# Patient Record
Sex: MALE | Race: WHITE | NOT HISPANIC OR LATINO
[De-identification: names, ages, dates, MRNs, and addresses within clinical notes are randomized per-mention and may not be internally consistent; named-entity substitution may affect disease eponyms.]

---

## 2017-04-26 ENCOUNTER — APPOINTMENT (OUTPATIENT)
Dept: INTERNAL MEDICINE | Facility: CLINIC | Age: 77
End: 2017-04-26

## 2017-04-26 VITALS
SYSTOLIC BLOOD PRESSURE: 162 MMHG | DIASTOLIC BLOOD PRESSURE: 78 MMHG | OXYGEN SATURATION: 91 % | RESPIRATION RATE: 16 BRPM | TEMPERATURE: 97.7 F | HEART RATE: 114 BPM | WEIGHT: 148 LBS | HEIGHT: 66 IN | BODY MASS INDEX: 23.78 KG/M2

## 2017-04-26 PROBLEM — Z00.00 ENCOUNTER FOR PREVENTIVE HEALTH EXAMINATION: Status: ACTIVE | Noted: 2017-04-26

## 2017-04-26 RX ORDER — VALSARTAN AND HYDROCHLOROTHIAZIDE 160; 12.5 MG/1; MG/1
160-12.5 TABLET, FILM COATED ORAL
Qty: 90 | Refills: 0 | Status: ACTIVE | COMMUNITY
Start: 2016-12-04

## 2017-04-26 RX ORDER — MIRTAZAPINE 7.5 MG/1
7.5 TABLET, FILM COATED ORAL
Qty: 90 | Refills: 0 | Status: ACTIVE | COMMUNITY
Start: 2016-11-25

## 2017-04-26 RX ORDER — GABAPENTIN 300 MG/1
300 CAPSULE ORAL
Qty: 90 | Refills: 0 | Status: ACTIVE | COMMUNITY
Start: 2017-04-12

## 2017-04-26 RX ORDER — ESCITALOPRAM OXALATE 10 MG/1
10 TABLET ORAL
Qty: 22 | Refills: 0 | Status: ACTIVE | COMMUNITY
Start: 2017-04-12

## 2017-04-26 RX ORDER — CLONAZEPAM 0.5 MG/1
0.5 TABLET ORAL
Qty: 90 | Refills: 0 | Status: ACTIVE | COMMUNITY
Start: 2016-11-15

## 2017-04-26 RX ORDER — CLONIDINE HYDROCHLORIDE 0.1 MG/1
0.1 TABLET ORAL
Qty: 90 | Refills: 0 | Status: ACTIVE | COMMUNITY
Start: 2016-11-02

## 2017-04-26 RX ORDER — GABAPENTIN 100 MG/1
100 CAPSULE ORAL
Qty: 60 | Refills: 0 | Status: ACTIVE | COMMUNITY
Start: 2017-03-10

## 2017-04-26 RX ORDER — FOLIC ACID 1 MG/1
1 TABLET ORAL
Qty: 90 | Refills: 0 | Status: ACTIVE | COMMUNITY
Start: 2017-04-12

## 2017-04-26 RX ORDER — CYANOCOBALAMIN/FOLIC AC/VIT B6 1-2.2-25MG
2.2-25-1 TABLET ORAL
Qty: 30 | Refills: 0 | Status: ACTIVE | COMMUNITY
Start: 2016-11-12

## 2017-04-28 ENCOUNTER — EMERGENCY (EMERGENCY)
Facility: HOSPITAL | Age: 77
LOS: 0 days | Discharge: HOME | End: 2017-04-28
Attending: EMERGENCY MEDICINE | Admitting: EMERGENCY MEDICINE
Payer: MEDICARE

## 2017-04-28 VITALS
HEART RATE: 91 BPM | SYSTOLIC BLOOD PRESSURE: 152 MMHG | OXYGEN SATURATION: 93 % | DIASTOLIC BLOOD PRESSURE: 79 MMHG | RESPIRATION RATE: 16 BRPM

## 2017-04-28 VITALS
HEIGHT: 66 IN | OXYGEN SATURATION: 95 % | HEART RATE: 80 BPM | WEIGHT: 147.05 LBS | SYSTOLIC BLOOD PRESSURE: 118 MMHG | DIASTOLIC BLOOD PRESSURE: 52 MMHG | TEMPERATURE: 98 F | RESPIRATION RATE: 18 BRPM

## 2017-04-28 DIAGNOSIS — R31.9 HEMATURIA, UNSPECIFIED: ICD-10-CM

## 2017-04-28 LAB
ALBUMIN SERPL ELPH-MCNC: 3.4 G/DL — SIGNIFICANT CHANGE UP (ref 3.3–5)
ALP SERPL-CCNC: 70 U/L — SIGNIFICANT CHANGE UP (ref 40–120)
ALT FLD-CCNC: 17 U/L — SIGNIFICANT CHANGE UP (ref 12–78)
ANION GAP SERPL CALC-SCNC: 7 MMOL/L — SIGNIFICANT CHANGE UP (ref 5–17)
APPEARANCE UR: CLEAR — SIGNIFICANT CHANGE UP
APTT BLD: 28.4 SEC — SIGNIFICANT CHANGE UP (ref 27.5–37.4)
AST SERPL-CCNC: 20 U/L — SIGNIFICANT CHANGE UP (ref 15–37)
BACTERIA # UR AUTO: (no result)
BASOPHILS # BLD AUTO: 0.1 K/UL — SIGNIFICANT CHANGE UP (ref 0–0.2)
BASOPHILS NFR BLD AUTO: 1 % — SIGNIFICANT CHANGE UP (ref 0–2)
BILIRUB SERPL-MCNC: 0.3 MG/DL — SIGNIFICANT CHANGE UP (ref 0.2–1.2)
BILIRUB UR-MCNC: NEGATIVE — SIGNIFICANT CHANGE UP
BUN SERPL-MCNC: 21 MG/DL — SIGNIFICANT CHANGE UP (ref 7–23)
CALCIUM SERPL-MCNC: 8.8 MG/DL — SIGNIFICANT CHANGE UP (ref 8.5–10.1)
CHLORIDE SERPL-SCNC: 102 MMOL/L — SIGNIFICANT CHANGE UP (ref 96–108)
CO2 SERPL-SCNC: 31 MMOL/L — SIGNIFICANT CHANGE UP (ref 22–31)
COLOR SPEC: YELLOW — SIGNIFICANT CHANGE UP
CREAT SERPL-MCNC: 1.1 MG/DL — SIGNIFICANT CHANGE UP (ref 0.5–1.3)
DIFF PNL FLD: (no result)
EOSINOPHIL # BLD AUTO: 0.3 K/UL — SIGNIFICANT CHANGE UP (ref 0–0.5)
EOSINOPHIL NFR BLD AUTO: 3.3 % — SIGNIFICANT CHANGE UP (ref 0–6)
EPI CELLS # UR: SIGNIFICANT CHANGE UP
GLUCOSE SERPL-MCNC: 109 MG/DL — HIGH (ref 70–99)
GLUCOSE UR QL: NEGATIVE MG/DL — SIGNIFICANT CHANGE UP
HCT VFR BLD CALC: 36.2 % — LOW (ref 39–50)
HGB BLD-MCNC: 12.2 G/DL — LOW (ref 13–17)
INR BLD: 0.95 RATIO — SIGNIFICANT CHANGE UP (ref 0.88–1.16)
KETONES UR-MCNC: NEGATIVE — SIGNIFICANT CHANGE UP
LEUKOCYTE ESTERASE UR-ACNC: NEGATIVE — SIGNIFICANT CHANGE UP
LYMPHOCYTES # BLD AUTO: 1.7 K/UL — SIGNIFICANT CHANGE UP (ref 1–3.3)
LYMPHOCYTES # BLD AUTO: 21.2 % — SIGNIFICANT CHANGE UP (ref 13–44)
MCHC RBC-ENTMCNC: 30.1 PG — SIGNIFICANT CHANGE UP (ref 27–34)
MCHC RBC-ENTMCNC: 33.6 GM/DL — SIGNIFICANT CHANGE UP (ref 32–36)
MCV RBC AUTO: 89.8 FL — SIGNIFICANT CHANGE UP (ref 80–100)
MONOCYTES # BLD AUTO: 1 K/UL — HIGH (ref 0–0.9)
MONOCYTES NFR BLD AUTO: 12.8 % — SIGNIFICANT CHANGE UP (ref 2–14)
NEUTROPHILS # BLD AUTO: 4.9 K/UL — SIGNIFICANT CHANGE UP (ref 1.8–7.4)
NEUTROPHILS NFR BLD AUTO: 61.8 % — SIGNIFICANT CHANGE UP (ref 43–77)
NITRITE UR-MCNC: NEGATIVE — SIGNIFICANT CHANGE UP
PH UR: 5 — SIGNIFICANT CHANGE UP (ref 5–8)
PLATELET # BLD AUTO: 182 K/UL — SIGNIFICANT CHANGE UP (ref 150–400)
POTASSIUM SERPL-MCNC: 3.9 MMOL/L — SIGNIFICANT CHANGE UP (ref 3.5–5.3)
POTASSIUM SERPL-SCNC: 3.9 MMOL/L — SIGNIFICANT CHANGE UP (ref 3.5–5.3)
PROT SERPL-MCNC: 7.2 GM/DL — SIGNIFICANT CHANGE UP (ref 6–8.3)
PROT UR-MCNC: NEGATIVE MG/DL — SIGNIFICANT CHANGE UP
PROTHROM AB SERPL-ACNC: 10.2 SEC — SIGNIFICANT CHANGE UP (ref 9.8–12.7)
RBC # BLD: 4.04 M/UL — LOW (ref 4.2–5.8)
RBC # FLD: 12.2 % — SIGNIFICANT CHANGE UP (ref 10.3–14.5)
RBC CASTS # UR COMP ASSIST: (no result) /HPF (ref 0–4)
SODIUM SERPL-SCNC: 140 MMOL/L — SIGNIFICANT CHANGE UP (ref 135–145)
SP GR SPEC: 1.01 — SIGNIFICANT CHANGE UP (ref 1.01–1.02)
UROBILINOGEN FLD QL: NEGATIVE MG/DL — SIGNIFICANT CHANGE UP
WBC # BLD: 7.9 K/UL — SIGNIFICANT CHANGE UP (ref 3.8–10.5)
WBC # FLD AUTO: 7.9 K/UL — SIGNIFICANT CHANGE UP (ref 3.8–10.5)
WBC UR QL: SIGNIFICANT CHANGE UP

## 2017-04-28 PROCEDURE — 74176 CT ABD & PELVIS W/O CONTRAST: CPT | Mod: 26

## 2017-04-28 PROCEDURE — 99284 EMERGENCY DEPT VISIT MOD MDM: CPT

## 2017-04-28 NOTE — ED PROVIDER NOTE - DETAILS:
I, Mya Gonzalez, performed the initial face to face bedside interview with this patient regarding history of present illness, review of symptoms and relevant past medical, social and family history.  I completed an independent physical examination.  I was the initial provider who evaluated this patient. The history, relevant review of systems, past medical and surgical history, medical decision making, and physical examination was documented by the scribe in my presence and I attest to the accuracy of the documentation.

## 2017-04-28 NOTE — ED PROVIDER NOTE - NS ED MD SCRIBE ATTENDING SCRIBE SECTIONS
RESULTS/PROGRESS NOTE/DISPOSITION/PAST MEDICAL/SURGICAL/SOCIAL HISTORY/REVIEW OF SYSTEMS/PHYSICAL EXAM/HISTORY OF PRESENT ILLNESS

## 2017-04-28 NOTE — ED ADULT NURSE NOTE - CHPI ED SYMPTOMS NEG
no blood in stool/no chills/no fever/no abdominal distension/no nausea/no burning urination/no dysuria/no vomiting/no diarrhea

## 2017-04-28 NOTE — ED PROVIDER NOTE - OBJECTIVE STATEMENT
75 y/o M presents to the ED c/o hematuria. Pt states he had hematuria last night, but did not have it this morning. Pt on 2.5L O2 at home. Denies dysuria.

## 2017-04-28 NOTE — ED PROVIDER NOTE - MEDICAL DECISION MAKING DETAILS
pt with hematuria, no fever, chills, back or abdominal pain, no dysuria will check labs, urinalysis r/o uti and ctap r/o obstructive uropathy

## 2017-04-28 NOTE — ED ADULT NURSE NOTE - LANGUAGE ASSISTANCE NEEDED
No-Patient/Caregiver offered and refused free interpretation services./patient speaks/understands english

## 2017-05-03 ENCOUNTER — APPOINTMENT (OUTPATIENT)
Dept: UROLOGY | Facility: CLINIC | Age: 77
End: 2017-05-03

## 2017-05-03 VITALS
BODY MASS INDEX: 23.63 KG/M2 | WEIGHT: 147 LBS | TEMPERATURE: 98 F | HEART RATE: 100 BPM | DIASTOLIC BLOOD PRESSURE: 60 MMHG | HEIGHT: 66 IN | SYSTOLIC BLOOD PRESSURE: 130 MMHG

## 2017-05-03 DIAGNOSIS — Z82.5 FAMILY HISTORY OF ASTHMA AND OTHER CHRONIC LOWER RESPIRATORY DISEASES: ICD-10-CM

## 2017-05-03 DIAGNOSIS — F17.200 NICOTINE DEPENDENCE, UNSPECIFIED, UNCOMPLICATED: ICD-10-CM

## 2017-05-03 DIAGNOSIS — Z78.9 OTHER SPECIFIED HEALTH STATUS: ICD-10-CM

## 2017-05-03 DIAGNOSIS — Z87.891 PERSONAL HISTORY OF NICOTINE DEPENDENCE: ICD-10-CM

## 2017-05-03 DIAGNOSIS — Z80.9 FAMILY HISTORY OF MALIGNANT NEOPLASM, UNSPECIFIED: ICD-10-CM

## 2017-05-04 ENCOUNTER — RX RENEWAL (OUTPATIENT)
Age: 77
End: 2017-05-04

## 2017-05-12 ENCOUNTER — APPOINTMENT (OUTPATIENT)
Dept: UROLOGY | Facility: CLINIC | Age: 77
End: 2017-05-12

## 2017-05-12 VITALS
SYSTOLIC BLOOD PRESSURE: 155 MMHG | TEMPERATURE: 97.2 F | WEIGHT: 147 LBS | HEIGHT: 66 IN | HEART RATE: 80 BPM | BODY MASS INDEX: 23.63 KG/M2 | DIASTOLIC BLOOD PRESSURE: 73 MMHG

## 2017-05-12 DIAGNOSIS — Z98.890 OTHER SPECIFIED POSTPROCEDURAL STATES: ICD-10-CM

## 2017-05-17 ENCOUNTER — RX RENEWAL (OUTPATIENT)
Age: 77
End: 2017-05-17

## 2017-05-17 RX ORDER — ATORVASTATIN CALCIUM 20 MG/1
20 TABLET, FILM COATED ORAL
Qty: 90 | Refills: 2 | Status: ACTIVE | COMMUNITY
Start: 2016-11-12 | End: 1900-01-01

## 2017-06-05 ENCOUNTER — OTHER (OUTPATIENT)
Age: 77
End: 2017-06-05

## 2017-06-05 ENCOUNTER — RX RENEWAL (OUTPATIENT)
Age: 77
End: 2017-06-05

## 2017-06-05 DIAGNOSIS — R60.9 EDEMA, UNSPECIFIED: ICD-10-CM

## 2017-06-05 DIAGNOSIS — M19.90 UNSPECIFIED OSTEOARTHRITIS, UNSPECIFIED SITE: ICD-10-CM

## 2017-06-05 RX ORDER — ADHESIVE BANDAGE
BANDAGE TOPICAL
Refills: 0 | Status: ACTIVE | COMMUNITY

## 2017-06-05 RX ORDER — ESCITALOPRAM OXALATE 20 MG/1
20 TABLET, FILM COATED ORAL
Refills: 0 | Status: ACTIVE | COMMUNITY

## 2017-06-05 RX ORDER — DILTIAZEM HYDROCHLORIDE 240 MG/1
240 CAPSULE, COATED, EXTENDED RELEASE ORAL
Refills: 0 | Status: ACTIVE | COMMUNITY

## 2017-06-05 RX ORDER — ALENDRONATE SODIUM 70 MG/1
70 TABLET ORAL
Refills: 0 | Status: ACTIVE | COMMUNITY

## 2017-06-05 RX ORDER — VALSARTAN AND HYDROCHLOROTHIAZIDE 160; 12.5 MG/1; MG/1
160-12.5 TABLET, FILM COATED ORAL
Refills: 0 | Status: ACTIVE | COMMUNITY

## 2017-06-06 ENCOUNTER — OTHER (OUTPATIENT)
Age: 77
End: 2017-06-06

## 2017-06-06 ENCOUNTER — OUTPATIENT (OUTPATIENT)
Dept: OUTPATIENT SERVICES | Facility: HOSPITAL | Age: 77
LOS: 1 days | Discharge: HOME | End: 2017-06-06
Payer: MEDICARE

## 2017-06-06 VITALS
RESPIRATION RATE: 17 BRPM | HEIGHT: 66 IN | SYSTOLIC BLOOD PRESSURE: 135 MMHG | WEIGHT: 147.93 LBS | HEART RATE: 100 BPM | TEMPERATURE: 98 F | DIASTOLIC BLOOD PRESSURE: 64 MMHG | OXYGEN SATURATION: 94 %

## 2017-06-06 DIAGNOSIS — J95.811 POSTPROCEDURAL PNEUMOTHORAX: ICD-10-CM

## 2017-06-06 DIAGNOSIS — Z98.49 CATARACT EXTRACTION STATUS, UNSPECIFIED EYE: Chronic | ICD-10-CM

## 2017-06-06 DIAGNOSIS — Z01.818 ENCOUNTER FOR OTHER PREPROCEDURAL EXAMINATION: ICD-10-CM

## 2017-06-06 DIAGNOSIS — I11.0 HYPERTENSIVE HEART DISEASE WITH HEART FAILURE: ICD-10-CM

## 2017-06-06 DIAGNOSIS — I50.32 CHRONIC DIASTOLIC (CONGESTIVE) HEART FAILURE: ICD-10-CM

## 2017-06-06 DIAGNOSIS — J90 PLEURAL EFFUSION, NOT ELSEWHERE CLASSIFIED: ICD-10-CM

## 2017-06-06 DIAGNOSIS — Z99.81 DEPENDENCE ON SUPPLEMENTAL OXYGEN: ICD-10-CM

## 2017-06-06 DIAGNOSIS — D49.4 NEOPLASM OF UNSPECIFIED BEHAVIOR OF BLADDER: ICD-10-CM

## 2017-06-06 DIAGNOSIS — C67.9 MALIGNANT NEOPLASM OF BLADDER, UNSPECIFIED: ICD-10-CM

## 2017-06-06 DIAGNOSIS — J44.1 CHRONIC OBSTRUCTIVE PULMONARY DISEASE WITH (ACUTE) EXACERBATION: ICD-10-CM

## 2017-06-06 LAB
ANION GAP SERPL CALC-SCNC: 5 MMOL/L — SIGNIFICANT CHANGE UP (ref 5–17)
APPEARANCE UR: CLEAR — SIGNIFICANT CHANGE UP
BACTERIA # UR AUTO: (no result)
BASOPHILS # BLD AUTO: 0.1 K/UL — SIGNIFICANT CHANGE UP (ref 0–0.2)
BASOPHILS NFR BLD AUTO: 0.6 % — SIGNIFICANT CHANGE UP (ref 0–2)
BILIRUB UR-MCNC: (no result)
BLD GP AB SCN SERPL QL: SIGNIFICANT CHANGE UP
BUN SERPL-MCNC: 30 MG/DL — HIGH (ref 7–23)
CALCIUM SERPL-MCNC: 8.7 MG/DL — SIGNIFICANT CHANGE UP (ref 8.5–10.1)
CHLORIDE SERPL-SCNC: 100 MMOL/L — SIGNIFICANT CHANGE UP (ref 96–108)
CO2 SERPL-SCNC: 34 MMOL/L — HIGH (ref 22–31)
COLOR SPEC: YELLOW — SIGNIFICANT CHANGE UP
COMMENT - URINE: SIGNIFICANT CHANGE UP
CREAT SERPL-MCNC: 1.38 MG/DL — HIGH (ref 0.5–1.3)
DIFF PNL FLD: (no result)
EOSINOPHIL # BLD AUTO: 0.3 K/UL — SIGNIFICANT CHANGE UP (ref 0–0.5)
EOSINOPHIL NFR BLD AUTO: 3.9 % — SIGNIFICANT CHANGE UP (ref 0–6)
EPI CELLS # UR: SIGNIFICANT CHANGE UP
GLUCOSE SERPL-MCNC: 109
GLUCOSE SERPL-MCNC: 109 MG/DL — HIGH (ref 70–99)
GLUCOSE UR QL: NEGATIVE MG/DL — SIGNIFICANT CHANGE UP
HCT VFR BLD CALC: 36.5 % — LOW (ref 39–50)
HGB BLD-MCNC: 12.4 G/DL — LOW (ref 13–17)
INR BLD: 0.95 RATIO — SIGNIFICANT CHANGE UP (ref 0.88–1.16)
INR PPP: 0.95
KETONES UR-MCNC: (no result)
LEUKOCYTE ESTERASE UR-ACNC: (no result)
LYMPHOCYTES # BLD AUTO: 1.4 K/UL — SIGNIFICANT CHANGE UP (ref 1–3.3)
LYMPHOCYTES # BLD AUTO: 16.7 % — SIGNIFICANT CHANGE UP (ref 13–44)
MCHC RBC-ENTMCNC: 30.4 PG — SIGNIFICANT CHANGE UP (ref 27–34)
MCHC RBC-ENTMCNC: 33.9 GM/DL — SIGNIFICANT CHANGE UP (ref 32–36)
MCV RBC AUTO: 89.9 FL — SIGNIFICANT CHANGE UP (ref 80–100)
MONOCYTES # BLD AUTO: 1 K/UL — HIGH (ref 0–0.9)
MONOCYTES NFR BLD AUTO: 11.6 % — SIGNIFICANT CHANGE UP (ref 2–14)
NEUTROPHILS # BLD AUTO: 5.8 K/UL — SIGNIFICANT CHANGE UP (ref 1.8–7.4)
NEUTROPHILS NFR BLD AUTO: 67.2 % — SIGNIFICANT CHANGE UP (ref 43–77)
NITRITE UR-MCNC: NEGATIVE — SIGNIFICANT CHANGE UP
PH UR: 5 — SIGNIFICANT CHANGE UP (ref 5–8)
PLATELET # BLD AUTO: 193 K/UL — SIGNIFICANT CHANGE UP (ref 150–400)
POTASSIUM SERPL-MCNC: 4.1 MMOL/L — SIGNIFICANT CHANGE UP (ref 3.5–5.3)
POTASSIUM SERPL-SCNC: 4.1 MMOL/L — SIGNIFICANT CHANGE UP (ref 3.5–5.3)
PROT UR-MCNC: 30 MG/DL
PROTHROM AB SERPL-ACNC: 10.3 SEC — SIGNIFICANT CHANGE UP (ref 9.8–12.7)
PT BLD: 10.3
RBC # BLD: 4.06 M/UL — LOW (ref 4.2–5.8)
RBC # FLD: 12.4 % — SIGNIFICANT CHANGE UP (ref 10.3–14.5)
RBC CASTS # UR COMP ASSIST: >50 /HPF (ref 0–4)
SODIUM SERPL-SCNC: 139 MMOL/L — SIGNIFICANT CHANGE UP (ref 135–145)
SP GR SPEC: 1.02 — SIGNIFICANT CHANGE UP (ref 1.01–1.02)
TYPE + AB SCN PNL BLD: SIGNIFICANT CHANGE UP
UROBILINOGEN FLD QL: 1 MG/DL
WBC # BLD: 8.6 K/UL — SIGNIFICANT CHANGE UP (ref 3.8–10.5)
WBC # FLD AUTO: 8.6 K/UL — SIGNIFICANT CHANGE UP (ref 3.8–10.5)
WBC UR QL: SIGNIFICANT CHANGE UP

## 2017-06-06 PROCEDURE — 93010 ELECTROCARDIOGRAM REPORT: CPT

## 2017-06-06 NOTE — H&P PST ADULT - FAMILY HISTORY
Father  Still living? No  Family history of cancer, Age at diagnosis: Age Unknown     Mother  Still living? No  Family history of asthma in mother, Age at diagnosis: Age Unknown

## 2017-06-06 NOTE — H&P PST ADULT - ASSESSMENT
75 y/o male with bladder tumor. Scheduled for TURBT with intravesical mitomycin instillation with Dr. Romo.    Plan  1. Stop all NSAIDS, herbal supplements and vitamins for 7 days.  2. NPO at midnight.  3. Take the following medications (oxycodone if needed, clonazepam, clonidine) with small sips of water on the morning of your procedure/surgery.

## 2017-06-06 NOTE — H&P PST ADULT - HISTORY OF PRESENT ILLNESS
77 y/o male with bladder tumor. Complain of hematuria. Here today for PST for TURBT with intravesical mitomycin instillation.

## 2017-06-06 NOTE — H&P PST ADULT - PMH
Anxiety    Bladder tumor    BPH (benign prostatic hypertrophy)    COPD (chronic obstructive pulmonary disease)    Depression    Hematuria    HTN (hypertension)    Hypercholesterolemia    Low back pain    Osteoarthritis    Pleural effusion  2016

## 2017-06-07 LAB
CULTURE RESULTS: SIGNIFICANT CHANGE UP
SPECIMEN SOURCE: SIGNIFICANT CHANGE UP

## 2017-06-09 ENCOUNTER — APPOINTMENT (OUTPATIENT)
Dept: INTERNAL MEDICINE | Facility: CLINIC | Age: 77
End: 2017-06-09

## 2017-06-09 ENCOUNTER — OUTPATIENT (OUTPATIENT)
Dept: OUTPATIENT SERVICES | Facility: HOSPITAL | Age: 77
LOS: 1 days | Discharge: HOME | End: 2017-06-09
Payer: MEDICARE

## 2017-06-09 VITALS
HEART RATE: 90 BPM | OXYGEN SATURATION: 95 % | WEIGHT: 137 LBS | BODY MASS INDEX: 22.02 KG/M2 | HEIGHT: 66 IN | DIASTOLIC BLOOD PRESSURE: 80 MMHG | SYSTOLIC BLOOD PRESSURE: 132 MMHG | RESPIRATION RATE: 16 BRPM | TEMPERATURE: 97.8 F

## 2017-06-09 DIAGNOSIS — I50.32 CHRONIC DIASTOLIC (CONGESTIVE) HEART FAILURE: ICD-10-CM

## 2017-06-09 DIAGNOSIS — Z99.81 DEPENDENCE ON SUPPLEMENTAL OXYGEN: ICD-10-CM

## 2017-06-09 DIAGNOSIS — I11.0 HYPERTENSIVE HEART DISEASE WITH HEART FAILURE: ICD-10-CM

## 2017-06-09 DIAGNOSIS — J95.811 POSTPROCEDURAL PNEUMOTHORAX: ICD-10-CM

## 2017-06-09 DIAGNOSIS — J90 PLEURAL EFFUSION, NOT ELSEWHERE CLASSIFIED: ICD-10-CM

## 2017-06-09 DIAGNOSIS — Z98.49 CATARACT EXTRACTION STATUS, UNSPECIFIED EYE: Chronic | ICD-10-CM

## 2017-06-09 DIAGNOSIS — Z01.818 ENCOUNTER FOR OTHER PREPROCEDURAL EXAMINATION: ICD-10-CM

## 2017-06-09 DIAGNOSIS — C67.9 MALIGNANT NEOPLASM OF BLADDER, UNSPECIFIED: ICD-10-CM

## 2017-06-09 DIAGNOSIS — I50.9 HEART FAILURE, UNSPECIFIED: ICD-10-CM

## 2017-06-09 DIAGNOSIS — J44.1 CHRONIC OBSTRUCTIVE PULMONARY DISEASE WITH (ACUTE) EXACERBATION: ICD-10-CM

## 2017-06-09 PROCEDURE — 71020: CPT | Mod: 26

## 2017-06-09 RX ORDER — OXYCODONE AND ACETAMINOPHEN 5; 325 MG/1; MG/1
5-325 TABLET ORAL
Qty: 30 | Refills: 0 | Status: ACTIVE | COMMUNITY
Start: 2017-01-03 | End: 1900-01-01

## 2017-06-12 RX ORDER — SODIUM CHLORIDE 9 MG/ML
1000 INJECTION INTRAMUSCULAR; INTRAVENOUS; SUBCUTANEOUS
Qty: 0 | Refills: 0 | Status: DISCONTINUED | OUTPATIENT
Start: 2017-06-13 | End: 2017-06-13

## 2017-06-12 RX ORDER — ONDANSETRON 8 MG/1
4 TABLET, FILM COATED ORAL ONCE
Qty: 0 | Refills: 0 | Status: DISCONTINUED | OUTPATIENT
Start: 2017-06-13 | End: 2017-06-13

## 2017-06-12 RX ORDER — OXYCODONE HYDROCHLORIDE 5 MG/1
10 TABLET ORAL EVERY 6 HOURS
Qty: 0 | Refills: 0 | Status: DISCONTINUED | OUTPATIENT
Start: 2017-06-13 | End: 2017-06-13

## 2017-06-12 RX ORDER — FENTANYL CITRATE 50 UG/ML
50 INJECTION INTRAVENOUS
Qty: 0 | Refills: 0 | Status: DISCONTINUED | OUTPATIENT
Start: 2017-06-13 | End: 2017-06-13

## 2017-06-13 ENCOUNTER — RESULT REVIEW (OUTPATIENT)
Age: 77
End: 2017-06-13

## 2017-06-13 ENCOUNTER — APPOINTMENT (OUTPATIENT)
Dept: UROLOGY | Facility: HOSPITAL | Age: 77
End: 2017-06-13

## 2017-06-13 ENCOUNTER — OUTPATIENT (OUTPATIENT)
Dept: OUTPATIENT SERVICES | Facility: HOSPITAL | Age: 77
LOS: 1 days | Discharge: HOME | End: 2017-06-13
Payer: MEDICARE

## 2017-06-13 VITALS
DIASTOLIC BLOOD PRESSURE: 80 MMHG | OXYGEN SATURATION: 95 % | RESPIRATION RATE: 18 BRPM | TEMPERATURE: 97 F | SYSTOLIC BLOOD PRESSURE: 173 MMHG | HEART RATE: 80 BPM

## 2017-06-13 VITALS
OXYGEN SATURATION: 91 % | SYSTOLIC BLOOD PRESSURE: 123 MMHG | WEIGHT: 147.93 LBS | HEART RATE: 72 BPM | DIASTOLIC BLOOD PRESSURE: 72 MMHG | TEMPERATURE: 97 F | HEIGHT: 66 IN | RESPIRATION RATE: 16 BRPM

## 2017-06-13 DIAGNOSIS — Z98.49 CATARACT EXTRACTION STATUS, UNSPECIFIED EYE: Chronic | ICD-10-CM

## 2017-06-13 PROCEDURE — 88300 SURGICAL PATH GROSS: CPT | Mod: 26

## 2017-06-13 PROCEDURE — 88307 TISSUE EXAM BY PATHOLOGIST: CPT | Mod: 26

## 2017-06-13 RX ORDER — PHENAZOPYRIDINE HCL 100 MG
1 TABLET ORAL
Qty: 9 | Refills: 0 | OUTPATIENT
Start: 2017-06-13 | End: 2017-06-16

## 2017-06-13 RX ORDER — METHENAMINE MANDELATE 1 G
1 TABLET ORAL
Qty: 0 | Refills: 0 | COMMUNITY

## 2017-06-13 RX ORDER — PHENAZOPYRIDINE HCL 100 MG
100 TABLET ORAL ONCE
Qty: 0 | Refills: 0 | Status: COMPLETED | OUTPATIENT
Start: 2017-06-13 | End: 2017-06-13

## 2017-06-13 RX ORDER — MITOMYCIN 5 MG/10ML
40 INJECTION, POWDER, LYOPHILIZED, FOR SOLUTION INTRAVENOUS ONCE
Qty: 0 | Refills: 0 | Status: DISCONTINUED | OUTPATIENT
Start: 2017-06-13 | End: 2017-06-20

## 2017-06-13 RX ADMIN — FENTANYL CITRATE 50 MICROGRAM(S): 50 INJECTION INTRAVENOUS at 09:42

## 2017-06-13 RX ADMIN — Medication 100 MILLIGRAM(S): at 09:42

## 2017-06-13 RX ADMIN — FENTANYL CITRATE 50 MICROGRAM(S): 50 INJECTION INTRAVENOUS at 09:09

## 2017-06-13 NOTE — ASU DISCHARGE PLAN (ADULT/PEDIATRIC). - NOTIFY
Persistent Nausea and Vomiting/Fever greater than 101/Inability to Tolerate Liquids or Foods/Bleeding that does not stop/Pain not relieved by Medications/Garcia catheter not draining

## 2017-06-13 NOTE — ASU DISCHARGE PLAN (ADULT/PEDIATRIC). - MEDICATION SUMMARY - MEDICATIONS TO TAKE
I will START or STAY ON the medications listed below when I get home from the hospital:    oxycodone-acetaminophen 7.5 mg-325 mg oral tablet, extended release  -- 2 tab(s) by mouth every 12 hours  -- Indication: For Home med    cloNIDine 0.1 mg oral tablet  -- 1 tab(s) by mouth once a day  -- Indication: For Home med    tamsulosin 0.4 mg oral capsule  -- 1 cap(s) by mouth once a day  -- Indication: For Home med    clonazePAM 0.5 mg oral tablet  -- 1 tab(s) by mouth 3 times a day  -- Indication: For Home med    gabapentin 300 mg oral capsule  -- 1 cap(s) by mouth once a day (at bedtime)  -- Indication: For Home med    traZODone 50 mg oral tablet  -- 2 tab(s) by mouth once a day (at bedtime)  -- Indication: For Home med    mirtazapine 7.5 mg oral tablet  -- 2 tab(s) by mouth once a day (at bedtime)  -- Indication: For Home med    atorvastatin 20 mg oral tablet  -- 1 tab(s) by mouth once a day  -- Indication: For Home med    ProAir HFA 90 mcg/inh inhalation aerosol  -- 2 puff(s) inhaled 4 times a day  -- Indication: For Home med    Spiriva 18 mcg inhalation capsule  -- 1 cap(s) inhaled once a day  -- Indication: For Home med    furosemide 40 mg oral tablet  -- 1 tab(s) by mouth once a day  -- Indication: For Home med    Klor-Con M20 oral tablet, extended release  -- 1 tab(s) by mouth once a day  -- Indication: For Home med    magnesium aspartate  -- 1 tab(s) by mouth once a day  -- Indication: For Home med    Pyridium 100 mg oral tablet  -- 1 tab(s) by mouth 3 times a day (after meals)  -- May discolor urine or feces.  Medication should be taken with plenty of water.  Take with food or milk.    -- Indication: For BLADDER TUMOR    Vitamin D3 1000 intl units oral capsule  -- 1 cap(s) by mouth once a day  -- Indication: For Home med    folic acid 1 mg oral tablet  -- 1 tab(s) by mouth once a day  -- Indication: For Home med

## 2017-06-13 NOTE — BRIEF OPERATIVE NOTE - PROCEDURE
Transurethral resection of bladder tumor by bipolar cautery  06/13/2017  with Intravesical Mitomycin instillation.  Active  BUBBA

## 2017-06-15 DIAGNOSIS — D49.4 NEOPLASM OF UNSPECIFIED BEHAVIOR OF BLADDER: ICD-10-CM

## 2017-06-15 DIAGNOSIS — F41.9 ANXIETY DISORDER, UNSPECIFIED: ICD-10-CM

## 2017-06-15 DIAGNOSIS — I50.9 HEART FAILURE, UNSPECIFIED: ICD-10-CM

## 2017-06-15 DIAGNOSIS — K21.9 GASTRO-ESOPHAGEAL REFLUX DISEASE WITHOUT ESOPHAGITIS: ICD-10-CM

## 2017-06-15 DIAGNOSIS — I12.9 HYPERTENSIVE CHRONIC KIDNEY DISEASE WITH STAGE 1 THROUGH STAGE 4 CHRONIC KIDNEY DISEASE, OR UNSPECIFIED CHRONIC KIDNEY DISEASE: ICD-10-CM

## 2017-06-15 DIAGNOSIS — C67.2 MALIGNANT NEOPLASM OF LATERAL WALL OF BLADDER: ICD-10-CM

## 2017-06-15 DIAGNOSIS — J43.9 EMPHYSEMA, UNSPECIFIED: ICD-10-CM

## 2017-06-15 DIAGNOSIS — F17.210 NICOTINE DEPENDENCE, CIGARETTES, UNCOMPLICATED: ICD-10-CM

## 2017-06-15 DIAGNOSIS — N21.0 CALCULUS IN BLADDER: ICD-10-CM

## 2017-06-15 DIAGNOSIS — N40.0 BENIGN PROSTATIC HYPERPLASIA WITHOUT LOWER URINARY TRACT SYMPTOMS: ICD-10-CM

## 2017-06-15 DIAGNOSIS — N18.9 CHRONIC KIDNEY DISEASE, UNSPECIFIED: ICD-10-CM

## 2017-06-15 LAB
CULTURE RESULTS: SIGNIFICANT CHANGE UP
SPECIMEN SOURCE: SIGNIFICANT CHANGE UP
SURGICAL PATHOLOGY FINAL REPORT - CH: SIGNIFICANT CHANGE UP

## 2017-06-16 ENCOUNTER — APPOINTMENT (OUTPATIENT)
Dept: UROLOGY | Facility: CLINIC | Age: 77
End: 2017-06-16

## 2017-06-16 VITALS
HEIGHT: 66 IN | BODY MASS INDEX: 22.02 KG/M2 | HEART RATE: 105 BPM | WEIGHT: 137 LBS | OXYGEN SATURATION: 93 % | DIASTOLIC BLOOD PRESSURE: 79 MMHG | TEMPERATURE: 97.5 F | SYSTOLIC BLOOD PRESSURE: 166 MMHG

## 2017-06-16 LAB — COMPN STONE: SIGNIFICANT CHANGE UP

## 2017-06-21 ENCOUNTER — INPATIENT (INPATIENT)
Facility: HOSPITAL | Age: 77
LOS: 6 days | Discharge: HOME HEALTH | End: 2017-06-28
Attending: FAMILY MEDICINE | Admitting: FAMILY MEDICINE
Payer: MEDICARE

## 2017-06-21 VITALS
OXYGEN SATURATION: 100 % | WEIGHT: 145.06 LBS | RESPIRATION RATE: 18 BRPM | HEIGHT: 64 IN | HEART RATE: 73 BPM | DIASTOLIC BLOOD PRESSURE: 76 MMHG | TEMPERATURE: 98 F | SYSTOLIC BLOOD PRESSURE: 136 MMHG

## 2017-06-21 DIAGNOSIS — Z98.49 CATARACT EXTRACTION STATUS, UNSPECIFIED EYE: Chronic | ICD-10-CM

## 2017-06-21 DIAGNOSIS — R53.1 WEAKNESS: ICD-10-CM

## 2017-06-21 DIAGNOSIS — R06.09 OTHER FORMS OF DYSPNEA: ICD-10-CM

## 2017-06-21 DIAGNOSIS — I50.30 UNSPECIFIED DIASTOLIC (CONGESTIVE) HEART FAILURE: ICD-10-CM

## 2017-06-21 DIAGNOSIS — D64.9 ANEMIA, UNSPECIFIED: ICD-10-CM

## 2017-06-21 DIAGNOSIS — I10 ESSENTIAL (PRIMARY) HYPERTENSION: ICD-10-CM

## 2017-06-21 DIAGNOSIS — C67.9 MALIGNANT NEOPLASM OF BLADDER, UNSPECIFIED: ICD-10-CM

## 2017-06-21 DIAGNOSIS — R31.9 HEMATURIA, UNSPECIFIED: ICD-10-CM

## 2017-06-21 DIAGNOSIS — J44.9 CHRONIC OBSTRUCTIVE PULMONARY DISEASE, UNSPECIFIED: ICD-10-CM

## 2017-06-21 LAB
ALBUMIN SERPL ELPH-MCNC: 3.3 G/DL — SIGNIFICANT CHANGE UP (ref 3.3–5)
ALP SERPL-CCNC: 70 U/L — SIGNIFICANT CHANGE UP (ref 40–120)
ALT FLD-CCNC: 9 U/L — LOW (ref 12–78)
ANION GAP SERPL CALC-SCNC: 3 MMOL/L — LOW (ref 5–17)
APPEARANCE UR: CLEAR — SIGNIFICANT CHANGE UP
APTT BLD: 28.6 SEC — SIGNIFICANT CHANGE UP (ref 27.5–37.4)
AST SERPL-CCNC: 16 U/L — SIGNIFICANT CHANGE UP (ref 15–37)
BACTERIA # UR AUTO: (no result)
BASOPHILS # BLD AUTO: 0.1 K/UL — SIGNIFICANT CHANGE UP (ref 0–0.2)
BASOPHILS NFR BLD AUTO: 0.8 % — SIGNIFICANT CHANGE UP (ref 0–2)
BILIRUB SERPL-MCNC: 0.3 MG/DL — SIGNIFICANT CHANGE UP (ref 0.2–1.2)
BILIRUB UR-MCNC: NEGATIVE — SIGNIFICANT CHANGE UP
BUN SERPL-MCNC: 27 MG/DL — HIGH (ref 7–23)
CALCIUM SERPL-MCNC: 9 MG/DL — SIGNIFICANT CHANGE UP (ref 8.5–10.1)
CHLORIDE SERPL-SCNC: 102 MMOL/L — SIGNIFICANT CHANGE UP (ref 96–108)
CO2 SERPL-SCNC: 34 MMOL/L — HIGH (ref 22–31)
COLOR SPEC: YELLOW — SIGNIFICANT CHANGE UP
CREAT SERPL-MCNC: 1.04 MG/DL — SIGNIFICANT CHANGE UP (ref 0.5–1.3)
DIFF PNL FLD: (no result)
EOSINOPHIL # BLD AUTO: 0.4 K/UL — SIGNIFICANT CHANGE UP (ref 0–0.5)
EOSINOPHIL NFR BLD AUTO: 5.4 % — SIGNIFICANT CHANGE UP (ref 0–6)
EPI CELLS # UR: SIGNIFICANT CHANGE UP
GLUCOSE SERPL-MCNC: 90 MG/DL — SIGNIFICANT CHANGE UP (ref 70–99)
GLUCOSE UR QL: NEGATIVE MG/DL — SIGNIFICANT CHANGE UP
HCT VFR BLD CALC: 34 % — LOW (ref 39–50)
HGB BLD-MCNC: 11.4 G/DL — LOW (ref 13–17)
HYALINE CASTS # UR AUTO: (no result) /LPF
INR BLD: 1.03 RATIO — SIGNIFICANT CHANGE UP (ref 0.88–1.16)
KETONES UR-MCNC: NEGATIVE — SIGNIFICANT CHANGE UP
LACTATE SERPL-SCNC: 0.6 MMOL/L — LOW (ref 0.7–2)
LEUKOCYTE ESTERASE UR-ACNC: (no result)
LYMPHOCYTES # BLD AUTO: 1.6 K/UL — SIGNIFICANT CHANGE UP (ref 1–3.3)
LYMPHOCYTES # BLD AUTO: 20.2 % — SIGNIFICANT CHANGE UP (ref 13–44)
MCHC RBC-ENTMCNC: 30.2 PG — SIGNIFICANT CHANGE UP (ref 27–34)
MCHC RBC-ENTMCNC: 33.7 GM/DL — SIGNIFICANT CHANGE UP (ref 32–36)
MCV RBC AUTO: 89.6 FL — SIGNIFICANT CHANGE UP (ref 80–100)
MONOCYTES # BLD AUTO: 0.7 K/UL — SIGNIFICANT CHANGE UP (ref 0–0.9)
MONOCYTES NFR BLD AUTO: 9.4 % — SIGNIFICANT CHANGE UP (ref 2–14)
NEUTROPHILS # BLD AUTO: 5 K/UL — SIGNIFICANT CHANGE UP (ref 1.8–7.4)
NEUTROPHILS NFR BLD AUTO: 64.2 % — SIGNIFICANT CHANGE UP (ref 43–77)
NITRITE UR-MCNC: NEGATIVE — SIGNIFICANT CHANGE UP
PH UR: 5 — SIGNIFICANT CHANGE UP (ref 5–8)
PLATELET # BLD AUTO: 233 K/UL — SIGNIFICANT CHANGE UP (ref 150–400)
POTASSIUM SERPL-MCNC: 4.5 MMOL/L — SIGNIFICANT CHANGE UP (ref 3.5–5.3)
POTASSIUM SERPL-SCNC: 4.5 MMOL/L — SIGNIFICANT CHANGE UP (ref 3.5–5.3)
PROT SERPL-MCNC: 7.2 GM/DL — SIGNIFICANT CHANGE UP (ref 6–8.3)
PROT UR-MCNC: 30 MG/DL
PROTHROM AB SERPL-ACNC: 11.1 SEC — SIGNIFICANT CHANGE UP (ref 9.8–12.7)
RAPID RVP RESULT: SIGNIFICANT CHANGE UP
RBC # BLD: 3.79 M/UL — LOW (ref 4.2–5.8)
RBC # FLD: 11.9 % — SIGNIFICANT CHANGE UP (ref 10.3–14.5)
RBC CASTS # UR COMP ASSIST: (no result) /HPF (ref 0–4)
SODIUM SERPL-SCNC: 139 MMOL/L — SIGNIFICANT CHANGE UP (ref 135–145)
SP GR SPEC: 1.02 — SIGNIFICANT CHANGE UP (ref 1.01–1.02)
TROPONIN I SERPL-MCNC: <0.015 NG/ML — SIGNIFICANT CHANGE UP (ref 0.01–0.04)
UROBILINOGEN FLD QL: NEGATIVE MG/DL — SIGNIFICANT CHANGE UP
WBC # BLD: 7.7 K/UL — SIGNIFICANT CHANGE UP (ref 3.8–10.5)
WBC # FLD AUTO: 7.7 K/UL — SIGNIFICANT CHANGE UP (ref 3.8–10.5)
WBC UR QL: SIGNIFICANT CHANGE UP

## 2017-06-21 PROCEDURE — 71010: CPT | Mod: 26

## 2017-06-21 PROCEDURE — 93010 ELECTROCARDIOGRAM REPORT: CPT

## 2017-06-21 PROCEDURE — 99285 EMERGENCY DEPT VISIT HI MDM: CPT

## 2017-06-21 RX ORDER — TIOTROPIUM BROMIDE 18 UG/1
1 CAPSULE ORAL; RESPIRATORY (INHALATION) DAILY
Qty: 0 | Refills: 0 | Status: DISCONTINUED | OUTPATIENT
Start: 2017-06-21 | End: 2017-06-28

## 2017-06-21 RX ORDER — CEFEPIME 1 G/1
1000 INJECTION, POWDER, FOR SOLUTION INTRAMUSCULAR; INTRAVENOUS ONCE
Qty: 0 | Refills: 0 | Status: COMPLETED | OUTPATIENT
Start: 2017-06-21 | End: 2017-06-21

## 2017-06-21 RX ORDER — BUDESONIDE AND FORMOTEROL FUMARATE DIHYDRATE 160; 4.5 UG/1; UG/1
2 AEROSOL RESPIRATORY (INHALATION)
Qty: 0 | Refills: 0 | Status: DISCONTINUED | OUTPATIENT
Start: 2017-06-21 | End: 2017-06-28

## 2017-06-21 RX ORDER — IPRATROPIUM/ALBUTEROL SULFATE 18-103MCG
3 AEROSOL WITH ADAPTER (GRAM) INHALATION ONCE
Qty: 0 | Refills: 0 | Status: COMPLETED | OUTPATIENT
Start: 2017-06-21 | End: 2017-06-21

## 2017-06-21 RX ORDER — CEFEPIME 1 G/1
INJECTION, POWDER, FOR SOLUTION INTRAMUSCULAR; INTRAVENOUS
Qty: 0 | Refills: 0 | Status: DISCONTINUED | OUTPATIENT
Start: 2017-06-21 | End: 2017-06-21

## 2017-06-21 RX ORDER — FUROSEMIDE 40 MG
40 TABLET ORAL DAILY
Qty: 0 | Refills: 0 | Status: DISCONTINUED | OUTPATIENT
Start: 2017-06-21 | End: 2017-06-24

## 2017-06-21 RX ORDER — TAMSULOSIN HYDROCHLORIDE 0.4 MG/1
0.4 CAPSULE ORAL AT BEDTIME
Qty: 0 | Refills: 0 | Status: DISCONTINUED | OUTPATIENT
Start: 2017-06-21 | End: 2017-06-28

## 2017-06-21 RX ORDER — VANCOMYCIN HCL 1 G
1000 VIAL (EA) INTRAVENOUS ONCE
Qty: 0 | Refills: 0 | Status: COMPLETED | OUTPATIENT
Start: 2017-06-21 | End: 2017-06-22

## 2017-06-21 RX ORDER — TAMSULOSIN HYDROCHLORIDE 0.4 MG/1
1 CAPSULE ORAL
Qty: 0 | Refills: 0 | COMMUNITY

## 2017-06-21 RX ORDER — CLONAZEPAM 1 MG
1 TABLET ORAL
Qty: 0 | Refills: 0 | COMMUNITY

## 2017-06-21 RX ORDER — TRAZODONE HCL 50 MG
100 TABLET ORAL AT BEDTIME
Qty: 0 | Refills: 0 | Status: DISCONTINUED | OUTPATIENT
Start: 2017-06-21 | End: 2017-06-28

## 2017-06-21 RX ORDER — MIRTAZAPINE 45 MG/1
7.5 TABLET, ORALLY DISINTEGRATING ORAL AT BEDTIME
Qty: 0 | Refills: 0 | Status: DISCONTINUED | OUTPATIENT
Start: 2017-06-21 | End: 2017-06-28

## 2017-06-21 RX ORDER — CLONAZEPAM 1 MG
0.5 TABLET ORAL THREE TIMES A DAY
Qty: 0 | Refills: 0 | Status: DISCONTINUED | OUTPATIENT
Start: 2017-06-21 | End: 2017-06-23

## 2017-06-21 RX ORDER — SODIUM CHLORIDE 9 MG/ML
1000 INJECTION INTRAMUSCULAR; INTRAVENOUS; SUBCUTANEOUS ONCE
Qty: 0 | Refills: 0 | Status: COMPLETED | OUTPATIENT
Start: 2017-06-21 | End: 2017-06-21

## 2017-06-21 RX ORDER — MIRTAZAPINE 45 MG/1
2 TABLET, ORALLY DISINTEGRATING ORAL
Qty: 0 | Refills: 0 | COMMUNITY

## 2017-06-21 RX ORDER — FOLIC ACID 0.8 MG
1 TABLET ORAL DAILY
Qty: 0 | Refills: 0 | Status: DISCONTINUED | OUTPATIENT
Start: 2017-06-21 | End: 2017-06-28

## 2017-06-21 RX ORDER — IPRATROPIUM/ALBUTEROL SULFATE 18-103MCG
3 AEROSOL WITH ADAPTER (GRAM) INHALATION EVERY 4 HOURS
Qty: 0 | Refills: 0 | Status: DISCONTINUED | OUTPATIENT
Start: 2017-06-21 | End: 2017-06-28

## 2017-06-21 RX ORDER — TRAZODONE HCL 50 MG
2 TABLET ORAL
Qty: 0 | Refills: 0 | COMMUNITY

## 2017-06-21 RX ORDER — GABAPENTIN 400 MG/1
1 CAPSULE ORAL
Qty: 0 | Refills: 0 | COMMUNITY

## 2017-06-21 RX ORDER — GABAPENTIN 400 MG/1
300 CAPSULE ORAL AT BEDTIME
Qty: 0 | Refills: 0 | Status: DISCONTINUED | OUTPATIENT
Start: 2017-06-21 | End: 2017-06-28

## 2017-06-21 RX ORDER — ATORVASTATIN CALCIUM 80 MG/1
20 TABLET, FILM COATED ORAL AT BEDTIME
Qty: 0 | Refills: 0 | Status: DISCONTINUED | OUTPATIENT
Start: 2017-06-21 | End: 2017-06-28

## 2017-06-21 RX ORDER — ENOXAPARIN SODIUM 100 MG/ML
40 INJECTION SUBCUTANEOUS EVERY 24 HOURS
Qty: 0 | Refills: 0 | Status: DISCONTINUED | OUTPATIENT
Start: 2017-06-21 | End: 2017-06-28

## 2017-06-21 RX ORDER — ASPIRIN/CALCIUM CARB/MAGNESIUM 324 MG
81 TABLET ORAL DAILY
Qty: 0 | Refills: 0 | Status: DISCONTINUED | OUTPATIENT
Start: 2017-06-21 | End: 2017-06-28

## 2017-06-21 RX ADMIN — CEFEPIME 100 MILLIGRAM(S): 1 INJECTION, POWDER, FOR SOLUTION INTRAMUSCULAR; INTRAVENOUS at 20:58

## 2017-06-21 RX ADMIN — Medication 3 MILLILITER(S): at 20:15

## 2017-06-21 RX ADMIN — SODIUM CHLORIDE 1000 MILLILITER(S): 9 INJECTION INTRAMUSCULAR; INTRAVENOUS; SUBCUTANEOUS at 16:30

## 2017-06-21 RX ADMIN — Medication 125 MILLIGRAM(S): at 20:15

## 2017-06-21 NOTE — ED PROVIDER NOTE - SECONDARY DIAGNOSIS.
HCAP (healthcare-associated pneumonia) Chronic obstructive pulmonary disease with acute exacerbation

## 2017-06-21 NOTE — SBIRT NOTE. - NSSBIRTSERVICES_GEN_A_ED_FT
Provided SBIRT services: Full screen Negative. Positive reinforcement provided given patient currently within healthy guidelines. Education materials reviewed and given to patient.  Audit Score: 4  DAST Score: 0

## 2017-06-21 NOTE — ED PROVIDER NOTE - OBJECTIVE STATEMENT
77 y/o M with PMHx of COPD, O2 dependent 2.5L, chronic cough (not worse), bladder surgery on Tuesday June 13th, removed cancerous lump, left the same day, given chemo inside the bladder presents to ED for weakness. Pt states that since the surgery he feels weak, tired, and cant move his legs. Pt also states he has mild bloody urine and decreased PO intake. Also c/o incontinence. No fever, HA, SOB, CP, chills, abd pain, dysuria. PMD

## 2017-06-21 NOTE — H&P ADULT - PROBLEM SELECTOR PLAN 1
R/O coronary ischemia /Doubt HCAP /Doubt acute decompensated CHF  Admit to Tele  #ARMIN  # asa 81  #continue statin  #Cardio consult  # doubt acute PNA-CXR unchanged- given first dose of cefepime and vanco in Ed-will check blood cx and follow off antibiotics for now  # pulmonary consult

## 2017-06-21 NOTE — ED ADULT NURSE NOTE - CHPI ED SYMPTOMS NEG
no numbness/no pain/no dizziness/no nausea/no chills/no tingling/no fever/no decreased eating/drinking/no vomiting

## 2017-06-21 NOTE — H&P ADULT - HISTORY OF PRESENT ILLNESS
76 y.o. male with PMH of COPD, Diastolic CHF, HTN, HLD, Depression, GERD, PMHx: :: HTN  HLD  COPD  Diastolic CHF  Depression  Urinary retention  GERD  PSHx: :: denies  Family History: :: noncontributory to current presentation  Allergies:  Allergies  No Known Allergies:  ROS: :: Nausea, Dizziness; All 76 y.o. male with PMH of COPD home O2 dependent 2L, Diastolic CHF, HTN, HLD, Depression, GERD,    bladder surgery on Tuesday June 13th, removed cancerous lump, left the same day, given chemo inside the bladder presents to ED for weakness. Pt states that since the surgery he feels weak, tired, and cant move his legs due to b/l knee pain .His family also states that for past 3 days has been appearing to have intermittent SOB . Pt also states he has mild bloody urine and decreased PO intake. Also c/o chronic  incontinence. patient was unable to do home physical therapy due to generalised weakness and physical therapist recommended to go to ER.  No fever, HA, SOB, CP, chills, abd pain, dysuria  In ED EKG- NSRno significant chnages as compared to prior EKG  CXR- small right pleural effusion  with atelectasis unchanged from old CXR  In Ed patient was given vanco and cefepime for suspected HCAP  lactate neg,1st troponin negative ,afebrile ,saturating at 95% on home O2 2L 	  	  	  	  	  	  	  	  	  	  PMHx: :: HTN  HLD  COPD  Diastolic CHF  Depression  Urinary retention  GERD  PSHx: :: Bladder cancerous tumor resection june 13th 2017 with intravesical chemo therapy   Family History: :: noncontributory to current presentation  ROS: :: Nausea, Dizziness; All

## 2017-06-21 NOTE — H&P ADULT - PROBLEM SELECTOR PLAN 7
stable s/p intravesical chemo and bladder mass resection june 13th  continue tamsulosin  DVT prophylaxis- lovenox

## 2017-06-21 NOTE — ED ADULT NURSE NOTE - OBJECTIVE STATEMENT
Pt states he had bladder surgery on 6/14/17 and now feels weak. No sick contacts, fever, no pain. Pt is on home O2 at 3Lmin

## 2017-06-21 NOTE — ED PROVIDER NOTE - ENMT NEGATIVE STATEMENT, MLM
Ears: no ear pain and no hearing problems.Nose: no nasal congestion and no nasal drainage.Mouth/Throat: no dysphagia, no hoarseness and no throat pain.Neck: no lumps, no pain, no stiffness and no swollen glands. +decreased PO intake

## 2017-06-21 NOTE — ED PROVIDER NOTE - CONSTITUTIONAL, MLM
normal... Chronically Ill appearing, well nourished, awake, alert, oriented to person, place, time/situation and in no apparent distress.

## 2017-06-21 NOTE — H&P ADULT - ASSESSMENT
76 y.o. male with PMH of COPD home O2 dependent 2L, Diastolic CHF, HTN, HLD, Depression, GERD,    bladder surgery on Tuesday June 13th, removed cancerous lump, left the same day, given chemo inside the bladder presents to ED for weakness. Pt states that since the surgery he feels weak, tired, and cant move his legs due to b/l knee pain .His family also states that for past 3 days has been appearing to have intermittent SOB . Pt also states he has mild bloody urine and decreased PO intake. Also c/o chronic  incontinence. patient was unable to do home physical therapy due to generalised weakness and physical therapist recommended to go to ER.  No fever, HA, SOB, CP, chills, abd pain, dysuria  In ED EKG- NSRno significant chnages as compared to prior EKG  CXR- small right pleural effusion  with atelectasis unchanged from old CXR  In Ed patient was given vanco and cefepime for suspected HCAP  lactate neg,1st troponin negative ,afebrile ,saturating at 95% on home O2 2L

## 2017-06-21 NOTE — ED ADULT NURSE REASSESSMENT NOTE - COMFORT CARE
Rounded with patient at 19:00, patient resting comfortably
repositioned/side rails up/warm blanket provided

## 2017-06-21 NOTE — H&P ADULT - PROBLEM SELECTOR PLAN 5
chronic diastolic heart failure ,doubt acute  clincal decompensation   continue lasix po   continue statin  start asa81 daily

## 2017-06-21 NOTE — ED ADULT NURSE REASSESSMENT NOTE - NS ED NURSE REASSESS COMMENT FT1
Pt changed and repositioned for comfort. Pt provided warm blanket and dim lights. Antibiotics infusing at this time. Pt awaiting evaluation by hospitalist.

## 2017-06-21 NOTE — H&P ADULT - NSHPPHYSICALEXAM_GEN_ALL_CORE
PHYSICAL EXAM:    Daily Height in cm: 162.56 (21 Jun 2017 14:24)    Daily     ICU Vital Signs Last 24 Hrs  T(C): 36.6, Max: 36.6 (06-21 @ 20:34)  T(F): 97.8, Max: 97.8 (06-21 @ 20:34)  HR: 76 (73 - 76)  BP: 160/73 (136/76 - 160/73)  BP(mean): --  ABP: --  ABP(mean): --  RR: 17 (17 - 18)  SpO2: 98% (98% - 100%)      Constitutional: NAD at rest,speaks in full sentences  HEENT: Atraumatic, YURIDIA, Normal, No congestion  Respiratory: Breath Sounds normal, no rhonchi/wheeze  Cardiovascular: N S1S2; BETH present  Gastrointestinal: Abdomen soft, non tender, Bowel Ssounds present  Extremities: No edema, peripheral pulses present  Neurological: AAO x 3, B/L lower extremities weaker than upper due to b/l chronic knee pain  Skin: Non cellulitic,     Back: No CVA tenderness   Musculoskeletal: non tender  Breasts: Deferred  Genitourinary: deferred  Rectal: Deferred

## 2017-06-21 NOTE — ED PROVIDER NOTE - MEDICAL DECISION MAKING DETAILS
77 y/o M s/p removal of cancerous bladder tumor with local chemo here for weakness, not eating or drinking well. Will get Labs, IV hydration, and CXR.

## 2017-06-21 NOTE — H&P ADULT - NSHPLABSRESULTS_GEN_ALL_CORE
11.4   7.7   )-----------( 233      ( 2017 16:32 )             34.0       CBC Full  -  ( 2017 16:32 )  WBC Count : 7.7 K/uL  Hemoglobin : 11.4 g/dL  Hematocrit : 34.0 %  Platelet Count - Automated : 233 K/uL  Mean Cell Volume : 89.6 fl  Mean Cell Hemoglobin : 30.2 pg  Mean Cell Hemoglobin Concentration : 33.7 gm/dL  Auto Neutrophil # : 5.0 K/uL  Auto Lymphocyte # : 1.6 K/uL  Auto Monocyte # : 0.7 K/uL  Auto Eosinophil # : 0.4 K/uL  Auto Basophil # : 0.1 K/uL  Auto Neutrophil % : 64.2 %  Auto Lymphocyte % : 20.2 %  Auto Monocyte % : 9.4 %  Auto Eosinophil % : 5.4 %  Auto Basophil % : 0.8 %          139  |  102  |  27<H>  ----------------------------<  90  4.5   |  34<H>  |  1.04    Ca    9.0      2017 16:32    TPro  7.2  /  Alb  3.3  /  TBili  0.3  /  DBili  x   /  AST  16  /  ALT  9<L>  /  AlkPhos  70  06-21      LIVER FUNCTIONS - ( 2017 16:32 )  Alb: 3.3 g/dL / Pro: 7.2 gm/dL / ALK PHOS: 70 U/L / ALT: 9 U/L / AST: 16 U/L / GGT: x             PT/INR - ( 2017 16:32 )   PT: 11.1 sec;   INR: 1.03 ratio         PTT - ( 2017 16:32 )  PTT:28.6 sec    CARDIAC MARKERS ( 2017 16:32 )  <0.015 ng/mL / x     / x     / x     / x            Urinalysis Basic - ( 2017 18:46 )    Color: Yellow / Appearance: Clear / S.020 / pH: x  Gluc: x / Ketone: Negative  / Bili: Negative / Urobili: Negative mg/dL   Blood: x / Protein: 30 mg/dL / Nitrite: Negative   Leuk Esterase: Trace / RBC: 6-10 /HPF / WBC 3-5   Sq Epi: x / Non Sq Epi: Occasional / Bacteria: Few

## 2017-06-21 NOTE — ED PROVIDER NOTE - CARE PLAN
Principal Discharge DX:	Weakness  Secondary Diagnosis:	HCAP (healthcare-associated pneumonia)  Secondary Diagnosis:	Chronic obstructive pulmonary disease with acute exacerbation

## 2017-06-21 NOTE — H&P ADULT - PROBLEM SELECTOR PLAN 4
secondary to s/p intravesical chemo vs r/o coronnary ischemia vs r/o FTT  doubt acute CHF exacerbation or HCAP  # consider PT eval and short term rehab once acute PNA and ACS ruled out

## 2017-06-21 NOTE — ED PROVIDER NOTE - PROGRESS NOTE DETAILS
Vangie MIRAMONTES (Crystal): Discussed case with wife. Pt is not able to walk at home due to weakness and has not been eating. Discussed results of tests. Pt has possible worsening of right pleural effusion, C/o increased SOB and cough. Will treat as HCAP and admit. Antibiotics and fluid administration delay due to atypical presentation. Ya NORMAN

## 2017-06-22 DIAGNOSIS — J90 PLEURAL EFFUSION, NOT ELSEWHERE CLASSIFIED: ICD-10-CM

## 2017-06-22 DIAGNOSIS — R09.02 HYPOXEMIA: ICD-10-CM

## 2017-06-22 LAB
ALBUMIN SERPL ELPH-MCNC: 2.9 G/DL — LOW (ref 3.3–5)
ALP SERPL-CCNC: 65 U/L — SIGNIFICANT CHANGE UP (ref 40–120)
ALT FLD-CCNC: 12 U/L — SIGNIFICANT CHANGE UP (ref 12–78)
ANION GAP SERPL CALC-SCNC: 4 MMOL/L — LOW (ref 5–17)
AST SERPL-CCNC: 16 U/L — SIGNIFICANT CHANGE UP (ref 15–37)
BASOPHILS # BLD AUTO: 0 K/UL — SIGNIFICANT CHANGE UP (ref 0–0.2)
BASOPHILS NFR BLD AUTO: 0.4 % — SIGNIFICANT CHANGE UP (ref 0–2)
BILIRUB SERPL-MCNC: 0.3 MG/DL — SIGNIFICANT CHANGE UP (ref 0.2–1.2)
BUN SERPL-MCNC: 22 MG/DL — SIGNIFICANT CHANGE UP (ref 7–23)
CALCIUM SERPL-MCNC: 8.2 MG/DL — LOW (ref 8.5–10.1)
CHLORIDE SERPL-SCNC: 102 MMOL/L — SIGNIFICANT CHANGE UP (ref 96–108)
CK SERPL-CCNC: 58 U/L — SIGNIFICANT CHANGE UP (ref 26–308)
CK SERPL-CCNC: 82 U/L — SIGNIFICANT CHANGE UP (ref 26–308)
CO2 SERPL-SCNC: 29 MMOL/L — SIGNIFICANT CHANGE UP (ref 22–31)
CREAT SERPL-MCNC: 1.03 MG/DL — SIGNIFICANT CHANGE UP (ref 0.5–1.3)
EOSINOPHIL # BLD AUTO: 0 K/UL — SIGNIFICANT CHANGE UP (ref 0–0.5)
EOSINOPHIL NFR BLD AUTO: 0.3 % — SIGNIFICANT CHANGE UP (ref 0–6)
GLUCOSE SERPL-MCNC: 189 MG/DL — HIGH (ref 70–99)
HCT VFR BLD CALC: 33 % — LOW (ref 39–50)
HGB BLD-MCNC: 11.1 G/DL — LOW (ref 13–17)
LYMPHOCYTES # BLD AUTO: 0.5 K/UL — LOW (ref 1–3.3)
LYMPHOCYTES # BLD AUTO: 9.8 % — LOW (ref 13–44)
MAGNESIUM SERPL-MCNC: 2 MG/DL — SIGNIFICANT CHANGE UP (ref 1.6–2.6)
MCHC RBC-ENTMCNC: 30.1 PG — SIGNIFICANT CHANGE UP (ref 27–34)
MCHC RBC-ENTMCNC: 33.7 GM/DL — SIGNIFICANT CHANGE UP (ref 32–36)
MCV RBC AUTO: 89.4 FL — SIGNIFICANT CHANGE UP (ref 80–100)
MONOCYTES # BLD AUTO: 0 K/UL — SIGNIFICANT CHANGE UP (ref 0–0.9)
MONOCYTES NFR BLD AUTO: 0.6 % — LOW (ref 2–14)
NEUTROPHILS # BLD AUTO: 4.8 K/UL — SIGNIFICANT CHANGE UP (ref 1.8–7.4)
NEUTROPHILS NFR BLD AUTO: 88.9 % — HIGH (ref 43–77)
NT-PROBNP SERPL-SCNC: 245 PG/ML — SIGNIFICANT CHANGE UP (ref 0–450)
PHOSPHATE SERPL-MCNC: 2.8 MG/DL — SIGNIFICANT CHANGE UP (ref 2.5–4.5)
PLATELET # BLD AUTO: 205 K/UL — SIGNIFICANT CHANGE UP (ref 150–400)
POTASSIUM SERPL-MCNC: 4.5 MMOL/L — SIGNIFICANT CHANGE UP (ref 3.5–5.3)
POTASSIUM SERPL-SCNC: 4.5 MMOL/L — SIGNIFICANT CHANGE UP (ref 3.5–5.3)
PROT SERPL-MCNC: 6.5 GM/DL — SIGNIFICANT CHANGE UP (ref 6–8.3)
RBC # BLD: 3.7 M/UL — LOW (ref 4.2–5.8)
RBC # FLD: 12 % — SIGNIFICANT CHANGE UP (ref 10.3–14.5)
SODIUM SERPL-SCNC: 135 MMOL/L — SIGNIFICANT CHANGE UP (ref 135–145)
TROPONIN I SERPL-MCNC: <0.015 NG/ML — SIGNIFICANT CHANGE UP (ref 0.01–0.04)
WBC # BLD: 5.4 K/UL — SIGNIFICANT CHANGE UP (ref 3.8–10.5)
WBC # FLD AUTO: 5.4 K/UL — SIGNIFICANT CHANGE UP (ref 3.8–10.5)

## 2017-06-22 PROCEDURE — 99222 1ST HOSP IP/OBS MODERATE 55: CPT

## 2017-06-22 PROCEDURE — 93010 ELECTROCARDIOGRAM REPORT: CPT

## 2017-06-22 RX ADMIN — ENOXAPARIN SODIUM 40 MILLIGRAM(S): 100 INJECTION SUBCUTANEOUS at 06:18

## 2017-06-22 RX ADMIN — TAMSULOSIN HYDROCHLORIDE 0.4 MILLIGRAM(S): 0.4 CAPSULE ORAL at 21:56

## 2017-06-22 RX ADMIN — Medication 81 MILLIGRAM(S): at 11:51

## 2017-06-22 RX ADMIN — Medication 0.5 MILLIGRAM(S): at 13:39

## 2017-06-22 RX ADMIN — Medication 250 MILLIGRAM(S): at 02:13

## 2017-06-22 RX ADMIN — Medication 100 MILLIGRAM(S): at 21:56

## 2017-06-22 RX ADMIN — Medication 0.5 MILLIGRAM(S): at 06:18

## 2017-06-22 RX ADMIN — Medication 1 MILLIGRAM(S): at 11:51

## 2017-06-22 RX ADMIN — MIRTAZAPINE 7.5 MILLIGRAM(S): 45 TABLET, ORALLY DISINTEGRATING ORAL at 21:56

## 2017-06-22 RX ADMIN — Medication 0.5 MILLIGRAM(S): at 21:56

## 2017-06-22 RX ADMIN — Medication 40 MILLIGRAM(S): at 06:18

## 2017-06-22 RX ADMIN — GABAPENTIN 300 MILLIGRAM(S): 400 CAPSULE ORAL at 21:56

## 2017-06-22 RX ADMIN — ATORVASTATIN CALCIUM 20 MILLIGRAM(S): 80 TABLET, FILM COATED ORAL at 21:56

## 2017-06-22 RX ADMIN — Medication 0.1 MILLIGRAM(S): at 06:18

## 2017-06-22 NOTE — CONSULT NOTE ADULT - ASSESSMENT
76 year old man with COPD Diastolic  dysfunction, presetns with weakness after chemo    Supportive care, normal EKG, negative cardiac enzymes, no signs of heart failure     no cardiac interventions planned at this time     please call as needed
agree with no abx treatment  CXR unchanged  cont O2  on lasix  DVT proph

## 2017-06-22 NOTE — CONSULT NOTE ADULT - SUBJECTIVE AND OBJECTIVE BOX
HPI:  76 y.o. male with PMH of COPD home O2 dependent 2L, Diastolic CHF, HTN, HLD, Depression, GERD,    bladder surgery on , removed cancerous lump, left the same day, given chemo inside the bladder presents to ED for weakness. Pt states that since the surgery he feels weak, tired, and cant move his legs due to b/l knee pain .His family also states that for past 3 days has been appearing to have intermittent SOB . Pt also states he has mild bloody urine and decreased PO intake. Also c/o chronic  incontinence. patient was unable to do home physical therapy due to generalised weakness and physical therapist recommended to go to ER.  No fever, HA, SOB, CP, chills, abd pain, dysuria  In ED EKG- NSRno significant chnages as compared to prior EKG  CXR- small right pleural effusion  with atelectasis unchanged from old CXR  In Ed patient was given vanco and cefepime for suspected HCAP  lactate neg,1st troponin negative ,afebrile ,saturating at 95% on home O2 2L. Today patient denies respiratory distress. No chest pain or palps. 	  	  	  	  	  	  	  	  	  	  PMHx: :: HTN  HLD  COPD  Diastolic CHF  Depression  Urinary retention  GERD  PSHx: :: Bladder cancerous tumor resection 2017 with intravesical chemo therapy   Family History: :: noncontributory to current presentation  ROS: :: Nausea, Dizziness; All (2017 22:25)      PAST MEDICAL & SURGICAL HISTORY:  Hematuria  BPH (benign prostatic hypertrophy)  Low back pain  Osteoarthritis  Pleural effusion: 2016  Hypercholesterolemia  Anxiety  Depression  COPD (chronic obstructive pulmonary disease)  HTN (hypertension)  Bladder tumor  No pertinent past medical history  Cataract extraction status:   b/l      MEDICATIONS  (STANDING):  cloNIDine 0.1milliGRAM(s) Oral daily  atorvastatin 20milliGRAM(s) Oral at bedtime  tiotropium 18 MICROgram(s) Capsule 1Capsule(s) Inhalation daily  furosemide    Tablet 40milliGRAM(s) Oral daily  folic acid 1milliGRAM(s) Oral daily  enoxaparin Injectable 40milliGRAM(s) SubCutaneous every 24 hours  tamsulosin 0.4milliGRAM(s) Oral at bedtime  clonazePAM Tablet 0.5milliGRAM(s) Oral three times a day  gabapentin 300milliGRAM(s) Oral at bedtime  mirtazapine 7.5milliGRAM(s) Oral at bedtime  traZODone 100milliGRAM(s) Oral at bedtime  buDESOnide 160 MICROgram(s)/formoterol 4.5 MICROgram(s) Inhaler 2Puff(s) Inhalation two times a day  aspirin  chewable 81milliGRAM(s) Oral daily    MEDICATIONS  (PRN):  ALBUTerol/ipratropium for Nebulization 3milliLiter(s) Nebulizer every 4 hours PRN Shortness of Breath and/or Wheezing      Allergies    No Known Allergies    Intolerances        SOCIAL HISTORY: Denies tobacco, etoh abuse or illicit drug use    FAMILY HISTORY:  Family history of asthma in mother (Mother)  Family history of cancer (Father): father      Vital Signs Last 24 Hrs  T(C): 36.7, Max: 36.7 ( @ 00:15)  T(F): 98.1, Max: 98.1 ( @ 05:13)  HR: 83 (65 - 83)  BP: 143/75 (130/62 - 161/70)  BP(mean): --  RR: 18 (16 - 18)  SpO2: 94% (93% - 100%)    REVIEW OF SYSTEMS:    CONSTITUTIONAL:  As per HPI.  SKIN: no rashes  HEENT:  Eyes:  No diplopia or blurred vision. ENT:  No earache, sore throat or runny nose.  CARDIOVASCULAR:  No pressure, squeezing, tightness, heaviness or aching about the chest, neck, axilla or epigastrium.  RESPIRATORY:  No cough, shortness of breath, PND or orthopnea.  GASTROINTESTINAL:  No nausea, vomiting or diarrhea.  GENITOURINARY:  No dysuria, frequency or urgency.  MUSCULOSKELETAL:  As per HPI.  SKIN:  No change in skin, hair or nails.  NEUROLOGIC:  No paresthesias, fasciculations, seizures or weakness.  PSYCHIATRIC:  No disorder of thought or mood.  ENDOCRINE:  No heat or cold intolerance, polyuria or polydipsia.  HEMATOLOGICAL:  No easy bruising or bleedings:  .     PHYSICAL EXAMINATION:    GENERAL APPEARANCE:  Pt. is not currently dyspneic, in no distress. Pt. is alert, oriented, and pleasant.  HEENT:  Pupils are normal and react normally. No icterus. Mucous membranes well colored.  NECK:  Supple. No lymphadenopathy. Jugular venous pressure not elevated. Carotids equal.   HEART: S1S2 reg with 2/6 systolic murmur  CHEST:  bilat ronchi with crackles  ABDOMEN:  Soft and nontender.   EXTREMITIES: pos edema  SKIN:  No rash or significant lesions are noted.  CNS: no focal deficits    LABS:                        11.1   5.4   )-----------( 205      ( 2017 04:26 )             33.0     06-22    135  |  102  |  22  ----------------------------<  189<H>  4.5   |  29  |  1.03    Ca    8.2<L>      2017 04:26  Phos  2.8     -22  Mg     2.0         TPro  6.5  /  Alb  2.9<L>  /  TBili  0.3  /  DBili  x   /  AST  16  /  ALT  12  /  AlkPhos  65  06-22    LIVER FUNCTIONS - ( 2017 04:26 )  Alb: 2.9 g/dL / Pro: 6.5 gm/dL / ALK PHOS: 65 U/L / ALT: 12 U/L / AST: 16 U/L / GGT: x           PT/INR - ( 2017 16:32 )   PT: 11.1 sec;   INR: 1.03 ratio         PTT - ( 2017 16:32 )  PTT:28.6 sec  CARDIAC MARKERS ( 2017 06:15 )  <0.015 ng/mL / x     / 58 U/L / x     / x      CARDIAC MARKERS ( 2017 04:26 )  <0.015 ng/mL / x     / 82 U/L / x     / x      CARDIAC MARKERS ( 2017 16:32 )  <0.015 ng/mL / x     / x     / x     / x          Urinalysis Basic - ( 2017 18:46 )    Color: Yellow / Appearance: Clear / S.020 / pH: x  Gluc: x / Ketone: Negative  / Bili: Negative / Urobili: Negative mg/dL   Blood: x / Protein: 30 mg/dL / Nitrite: Negative   Leuk Esterase: Trace / RBC: 6-10 /HPF / WBC 3-5   Sq Epi: x / Non Sq Epi: Occasional / Bacteria: Few          RADIOLOGY & ADDITIONAL STUDIES:

## 2017-06-22 NOTE — PROGRESS NOTE ADULT - SUBJECTIVE AND OBJECTIVE BOX
cc: weakness, sob    76 y.o. male with PMH of COPD home O2 dependent 2L, Diastolic CHF, HTN, HLD, Depression, GERD,    bladder surgery on , removed cancerous lump, left the same day, given chemo inside the bladder presents to ED for weakness. Pt states that since the surgery he feels weak, tired, and cant move his legs due to b/l knee pain .His family also states that for past 3 days has been appearing to have intermittent SOB . Pt also states he has mild bloody urine and decreased PO intake. Also c/o chronic  incontinence. patient was unable to do home physical therapy due to generalised weakness and physical therapist recommended to go to ER.  Was adm for weakness, poss infection, poss copd exac    Feels better today, less weak, not SOB anymore; has o2 at home per pt.          Vital Signs Last 24 Hrs  T(C): 36.7, Max: 36.7 ( @ 00:15)  T(F): 98.1, Max: 98.1 ( @ 05:13)  HR: 83 (65 - 83)  BP: 143/75 (130/62 - 161/70)  BP(mean): --  RR: 18 (16 - 18)  SpO2: 94% (93% - 100%)        GENERAL APPEARANCE:  Pt. is not currently dyspneic, in no distress. Pt. is alert, oriented, and pleasant.  HEENT:  Pupils are normal and react normally. No icterus. Mucous membranes well colored.  NECK:  Supple. No lymphadenopathy. Jugular venous pressure not elevated. Carotids equal.   HEART: S1S2 reg with 2/6 systolic murmur  CHEST:  bilat ronchi with crackles  ABDOMEN:  Soft and nontender.   EXTREMITIES: pos edema  SKIN:  No rash or significant lesions are noted.  CNS: no focal deficits    LABS:                        11.1   5.4   )-----------( 205      ( 2017 04:26 )               33.0     -    135  |  102  |  22  ----------------------------<  189<H>  4.5   |  29  |  1.03    Ca    8.2<L>      2017 04:26  Phos  2.8       Mg     2.0         TPro  6.5  /  Alb  2.9<L>  /  TBili  0.3  /  DBili  x   /  AST  16  /  ALT  12  /  AlkPhos  65  06-22    LIVER FUNCTIONS - ( 2017 04:26 )  Alb: 2.9 g/dL / Pro: 6.5 gm/dL / ALK PHOS: 65 U/L / ALT: 12 U/L / AST: 16 U/L / GGT: x           PT/INR - ( 2017 16:32 )   PT: 11.1 sec;   INR: 1.03 ratio         PTT - ( 2017 16:32 )  PTT:28.6 sec  CARDIAC MARKERS ( 2017 06:15 )  <0.015 ng/mL / x     / 58 U/L / x     / x      CARDIAC MARKERS ( 2017 04:26 )  <0.015 ng/mL / x     / 82 U/L / x     / x      CARDIAC MARKERS ( 2017 16:32 )  <0.015 ng/mL / x     / x     / x     / x          Urinalysis Basic - ( 2017 18:46 )    Color: Yellow / Appearance: Clear / S.020 / pH: x  Gluc: x / Ketone: Negative  / Bili: Negative / Urobili: Negative mg/dL   Blood: x / Protein: 30 mg/dL / Nitrite: Negative   Leuk Esterase: Trace / RBC: 6-10 /HPF / WBC 3-5   Sq Epi: x / Non Sq Epi: Occasional / Bacteria: Few      Problem/Plan - 1:  ·  Problem: Exertional dyspnea.  Plan: R/O coronary ischemia /Doubt HCAP /Doubt acute decompensated CHFvs COPD  - improved, poss COPD related; not started on steroids by his pulm, not wheezing  # doubt acute PNA-CXR unchanged- given first dose of cefepime and vanco in Ed-will check blood cx and follow off antibiotics for now      Problem/Plan - 2:  ·  Problem: Hematuria.  Plan: chronic microscopic hematuria -not worse that before- secondary to bladder cancer   - outpt f/up     Problem/Plan - 3:  ·  Problem: Anemia.  Plan: chronic anemia -likely secondary to chronic hematuria vs anemia of chronic disease  #Monitor H/H.     Problem/Plan - 4:  ·  Problem: Weakness generalized.  Plan: secondary to s/p intravesical chemo vs r/o coronnary ischemia vs r/o FTT  doubt acute CHF exacerbation or HCAP  - resolved  - pt is also on high dose trazodone, gabapentin; no change for now if weakness recur decrease dose     Problem/Plan - 5:  ·  Problem: Diastolic heart failure.  Plan: chronic diastolic heart failure ,doubt acute  clinical decompensation   continue lasix po   continue statin  start asa81 daily.     Problem/Plan - 6:  Problem: COPD (chronic obstructive pulmonary disease). Plan: stable  continue home O2  neb tx prn   inhaled steroid  continue.    Problem/Plan - 7:  ·  Problem: Bladder cancer.  Plan: stable s/p intravesical chemo and bladder mass resection   continue tamsulosin  DVT prophylaxis- lovenox.       Dispo: PT eval and rehab if stable

## 2017-06-22 NOTE — DIETITIAN INITIAL EVALUATION ADULT. - PERTINENT LABORATORY DATA
6/22: Hgb/Hct: 11.1/33 (L), Albumin: 2.9 (L), Glucose: 189 (H), Ca+: 8.2 (L), Corrected Ca+:9.08 (WNL)

## 2017-06-22 NOTE — DIETITIAN INITIAL EVALUATION ADULT. - OTHER INFO
Patient lives at home with wife who cooks, patient reports poor PO intake 3 days prior to admission, however patient states his appetite has improved since admission.

## 2017-06-23 DIAGNOSIS — J44.9 CHRONIC OBSTRUCTIVE PULMONARY DISEASE, UNSPECIFIED: ICD-10-CM

## 2017-06-23 LAB — TSH SERPL-MCNC: 0.36 UIU/ML — LOW (ref 0.36–3.74)

## 2017-06-23 PROCEDURE — 99233 SBSQ HOSP IP/OBS HIGH 50: CPT

## 2017-06-23 PROCEDURE — 93010 ELECTROCARDIOGRAM REPORT: CPT

## 2017-06-23 RX ADMIN — Medication 0.1 MILLIGRAM(S): at 05:23

## 2017-06-23 RX ADMIN — BUDESONIDE AND FORMOTEROL FUMARATE DIHYDRATE 2 PUFF(S): 160; 4.5 AEROSOL RESPIRATORY (INHALATION) at 19:35

## 2017-06-23 RX ADMIN — GABAPENTIN 300 MILLIGRAM(S): 400 CAPSULE ORAL at 21:49

## 2017-06-23 RX ADMIN — Medication 40 MILLIGRAM(S): at 05:23

## 2017-06-23 RX ADMIN — TAMSULOSIN HYDROCHLORIDE 0.4 MILLIGRAM(S): 0.4 CAPSULE ORAL at 21:49

## 2017-06-23 RX ADMIN — BUDESONIDE AND FORMOTEROL FUMARATE DIHYDRATE 2 PUFF(S): 160; 4.5 AEROSOL RESPIRATORY (INHALATION) at 08:35

## 2017-06-23 RX ADMIN — ATORVASTATIN CALCIUM 20 MILLIGRAM(S): 80 TABLET, FILM COATED ORAL at 21:49

## 2017-06-23 RX ADMIN — Medication 0.5 MILLIGRAM(S): at 07:26

## 2017-06-23 RX ADMIN — Medication 81 MILLIGRAM(S): at 11:33

## 2017-06-23 RX ADMIN — Medication 1 MILLIGRAM(S): at 11:33

## 2017-06-23 RX ADMIN — TIOTROPIUM BROMIDE 1 CAPSULE(S): 18 CAPSULE ORAL; RESPIRATORY (INHALATION) at 08:36

## 2017-06-23 RX ADMIN — ENOXAPARIN SODIUM 40 MILLIGRAM(S): 100 INJECTION SUBCUTANEOUS at 05:23

## 2017-06-23 NOTE — PROGRESS NOTE ADULT - ASSESSMENT
cont O2/bronchodilators  on lasix  change solumedrol to prednisone  off abx  has recurrent right effussion which has been drained 3 times in past  does not want pleurx catheter  would treat conservatively with diuretics  DVT proph

## 2017-06-23 NOTE — PHYSICAL THERAPY INITIAL EVALUATION ADULT - GENERAL OBSERVATIONS, REHAB EVAL
Pt received supine in bed, asleep but aroused to voice, willing to participate with PT. Pt on tele, 2L O2. Pt denies pain.

## 2017-06-23 NOTE — PHYSICAL THERAPY INITIAL EVALUATION ADULT - CRITERIA FOR SKILLED THERAPEUTIC INTERVENTIONS
impairments found/functional limitations in following categories/predicted duration of therapy intervention/risk reduction/prevention/anticipated discharge recommendation/anticipated equipment needs at discharge/rehab potential/therapy frequency

## 2017-06-23 NOTE — PROGRESS NOTE ADULT - SUBJECTIVE AND OBJECTIVE BOX
Subjective:  no acute distress      MEDICATIONS  (STANDING):  cloNIDine 0.1milliGRAM(s) Oral daily  atorvastatin 20milliGRAM(s) Oral at bedtime  tiotropium 18 MICROgram(s) Capsule 1Capsule(s) Inhalation daily  furosemide    Tablet 40milliGRAM(s) Oral daily  folic acid 1milliGRAM(s) Oral daily  enoxaparin Injectable 40milliGRAM(s) SubCutaneous every 24 hours  tamsulosin 0.4milliGRAM(s) Oral at bedtime  clonazePAM Tablet 0.5milliGRAM(s) Oral three times a day  gabapentin 300milliGRAM(s) Oral at bedtime  mirtazapine 7.5milliGRAM(s) Oral at bedtime  traZODone 100milliGRAM(s) Oral at bedtime  buDESOnide 160 MICROgram(s)/formoterol 4.5 MICROgram(s) Inhaler 2Puff(s) Inhalation two times a day  aspirin  chewable 81milliGRAM(s) Oral daily    MEDICATIONS  (PRN):  ALBUTerol/ipratropium for Nebulization 3milliLiter(s) Nebulizer every 4 hours PRN Shortness of Breath and/or Wheezing      Allergies    No Known Allergies    Intolerances        REVIEW OF SYSTEMS:    CONSTITUTIONAL:  As per HPI.  HEENT:  Eyes:  No diplopia or blurred vision. ENT:  No earache, sore throat or runny nose.  CARDIOVASCULAR:  No pressure, squeezing, tightness, heaviness or aching about the chest, neck, axilla or epigastrium.  RESPIRATORY:  No cough, shortness of breath, PND or orthopnea.  GASTROINTESTINAL:  No nausea, vomiting or diarrhea.  GENITOURINARY:  No dysuria, frequency or urgency.  MUSCULOSKELETAL:  no joint pain, deformity, tenderness  EXTREMITIES: no clubbing cyanosis,edema  SKIN:  No change in skin, hair or nails.  NEUROLOGIC:  No paresthesias, fasciculations, seizures or weakness.  PSYCHIATRIC:  No disorder of thought or mood.  ENDOCRINE:  No heat or cold intolerance, polyuria or polydipsia.  HEMATOLOGICAL:  No easy bruising or bleedings:    Vital Signs Last 24 Hrs  T(C): 36.3, Max: 36.4 ( @ 16:38)  T(F): 97.4, Max: 97.5 ( @ 16:38)  HR: 81 (81 - 99)  BP: 148/69 (124/72 - 148/69)  BP(mean): --  RR: 18 (17 - 18)  SpO2: 100% (93% - 100%)    PHYSICAL EXAMINATION:  SKIN: no rashes  HEAD: NC/AT  EYES: PERRLA, EOMI  EARS: TM's intact  NOSE: no abnormalities  NECK:  Supple. No lymphadenopathy. Jugular venous pressure not elevated. Carotids equal.   HEART:  S1S2 reg; 2/6 systolic nurnur  CHEST:  good air entry with drecreased BS right 1/3.  ABDOMEN:  Soft and nontender.   EXTREMITIES:  no C/C/E  NEURO: AAO x 3, no focal deficts       LABS:                        11.1   5.4   )-----------( 205      ( 2017 04:26 )             33.0     -    135  |  102  |  22  ----------------------------<  189<H>  4.5   |  29  |  1.03    Ca    8.2<L>      2017 04:26  Phos  2.8       Mg     2.0         TPro  6.5  /  Alb  2.9<L>  /  TBili  0.3  /  DBili  x   /  AST  16  /  ALT  12  /  AlkPhos  65  -22    PT/INR - ( 2017 16:32 )   PT: 11.1 sec;   INR: 1.03 ratio         PTT - ( 2017 16:32 )  PTT:28.6 sec  Urinalysis Basic - ( 2017 18:46 )    Color: Yellow / Appearance: Clear / S.020 / pH: x  Gluc: x / Ketone: Negative  / Bili: Negative / Urobili: Negative mg/dL   Blood: x / Protein: 30 mg/dL / Nitrite: Negative   Leuk Esterase: Trace / RBC: 6-10 /HPF / WBC 3-5   Sq Epi: x / Non Sq Epi: Occasional / Bacteria: Few        RADIOLOGY & ADDITIONAL TESTS:  CXR: pulmonary congestion, right pleural effussion

## 2017-06-23 NOTE — PHYSICAL THERAPY INITIAL EVALUATION ADULT - PLANNED THERAPY INTERVENTIONS, PT EVAL
gait training/transfer training/bed mobility training/strengthening/balance training/postural re-education

## 2017-06-23 NOTE — PROGRESS NOTE ADULT - SUBJECTIVE AND OBJECTIVE BOX
cc: weakness, sob          76 y.o. male with PMH of COPD home O2 dependent 2L, Diastolic CHF, HTN, HLD, Depression, GERD,  bladder surgery on , removed cancerous lump, left the same day, given chemo inside the bladder admitted on 17 for weakness. Pt states that since the surgery he feels weak, tired, and cant move his legs due to b/l knee pain .His family also states that for past 3 days has been appearing to have intermittent SOB . Pt also states he has mild bloody urine and decreased PO intake. Also c/o chronic  incontinence. Patient was unable to do home physical therapy due to generalized weakness and physical therapist recommended to go to ER.  Was adm for weakness, poss infection, poss copd exac     Feels better today, less weak, not SOB anymore; has o2 at home per pt.   afebrile, dyspneic, more weak today     Vital Signs Last 24 Hrs  T(C): 36.3, Max: 36.4 ( @ 11:42)  T(F): 97.3, Max: 97.6 ( @ 11:42)  HR: 82 (71 - 91)  BP: 114/57 (114/57 - 148/69)  RR: 17 (17 - 18)  SpO2: 99% (93% - 100%)      GENERAL APPEARANCE: mildly dyspneic, in no distress. Pt. is alert, oriented, and pleasant.  HEENT:  Pupils are normal and react normally. No icterus. Mucous membranes well colored.  NECK:  Supple. No lymphadenopathy. Jugular venous pressure not elevated. Carotids equal.   HEART: S1S2 reg with 2/6 systolic murmur  CHEST:  bilat ronchi with crackles  ABDOMEN:  Soft and nontender.   EXTREMITIES: pos edema  SKIN:  No rash or significant lesions are noted.  CNS: no focal deficits    LABS:      135  |  102  |  22  ----------------------------<  189<H>  4.5   |  29  |  1.03    Ca    8.2<L>      2017 04:26  Phos  2.8       Mg     2.0         TPro  6.5  /  Alb  2.9<L>  /  TBili  0.3  /  DBili  x   /  AST  16  /  ALT  12  /  AlkPhos  65  06-22                            11.1   5.4   )-----------( 205      ( 2017 04:26 )             33.0       CARDIAC MARKERS ( 2017 08:58 )  <0.015 ng/mL / x     / x     / x     / x      CARDIAC MARKERS ( 2017 06:15 )  <0.015 ng/mL / x     / 58 U/L / x     / x      CARDIAC MARKERS ( 2017 04:26 )  <0.015 ng/mL / x     / 82 U/L / x     / x            LIVER FUNCTIONS - ( 2017 04:26 )  Alb: 2.9 g/dL / Pro: 6.5 gm/dL / ALK PHOS: 65 U/L / ALT: 12 U/L / AST: 16 U/L / GGT: x                 Urinalysis Basic - ( 2017 18:46 )    Color: Yellow / Appearance: Clear / S.020 / pH: x  Gluc: x / Ketone: Negative  / Bili: Negative / Urobili: Negative mg/dL   Blood: x / Protein: 30 mg/dL / Nitrite: Negative   Leuk Esterase: Trace / RBC: 6-10 /HPF / WBC 3-5   Sq Epi: x / Non Sq Epi: Occasional / Bacteria: Few                            11.1   5.4   )-----------( 205      ( 2017 04:26 )               33.0     06-22    135  |  102  |  22  ----------------------------<  189<H>  4.5   |  29  |  1.03    Ca    8.2<L>      2017 04:26  Phos  2.8     -22  Mg     2.0         TPro  6.5  /  Alb  2.9<L>  /  TBili  0.3  /  DBili  x   /  AST  16  /  ALT  12  /  AlkPhos  65  -22    LIVER FUNCTIONS - ( 2017 04:26 )  Alb: 2.9 g/dL / Pro: 6.5 gm/dL / ALK PHOS: 65 U/L / ALT: 12 U/L / AST: 16 U/L / GGT: x           PT/INR - ( 2017 16:32 )   PT: 11.1 sec;   INR: 1.03 ratio         PTT - ( 2017 16:32 )  PTT:28.6 sec  CARDIAC MARKERS ( 2017 06:15 )  <0.015 ng/mL / x     / 58 U/L / x     / x      CARDIAC MARKERS ( 2017 04:26 )  <0.015 ng/mL / x     / 82 U/L / x     / x      CARDIAC MARKERS ( 2017 16:32 )  <0.015 ng/mL / x     / x     / x     / x          Urinalysis Basic - ( 2017 18:46 )    Color: Yellow / Appearance: Clear / S.020 / pH: x  Gluc: x / Ketone: Negative  / Bili: Negative / Urobili: Negative mg/dL   Blood: x / Protein: 30 mg/dL / Nitrite: Negative   Leuk Esterase: Trace / RBC: 6-10 /HPF / WBC 3-5   Sq Epi: x / Non Sq Epi: Occasional / Bacteria: Few     CHEST SINGLE VIEW FRONTAL                         2017  IMPRESSION:     Since 2017, unchanged small rightpleural effusion with   associated atelectasis.          Problem/Plan - 1:  ·  Problem: Exertional dyspnea.  Plan: R/O coronary ischemia /Doubt HCAP /Doubt acute decompensated CHFvs COPD  - improved, poss COPD related; not started on steroids by his pulm, not wheezing  # doubt acute PNA-CXR unchanged- given first dose of cefepime and vanco in Ed-will check blood cx and follow off antibiotics for now      Problem/Plan - 2:  ·  Problem: Hematuria.  Plan: chronic microscopic hematuria -not worse that before- secondary to bladder cancer   - outpt f/up     Problem/Plan - 3:  ·  Problem: Anemia.  Plan: chronic anemia -likely secondary to chronic hematuria vs anemia of chronic disease  #Monitor H/H.     Problem/Plan - 4:  ·  Problem: Weakness generalized.  Plan: secondary to s/p intravesical chemo vs r/o coronnary ischemia vs r/o FTT  doubt acute CHF exacerbation or HCAP  - resolved  - pt is also on high dose trazodone, gabapentin; no change for now if weakness recur decrease dose     Problem/Plan - 5:  ·  Problem: Diastolic heart failure.  Plan: chronic diastolic heart failure ,doubt acute  clinical decompensation   continue lasix po   continue statin  start asa81 daily.     Problem/Plan - 6:  Problem: COPD (chronic obstructive pulmonary disease). Plan: stable  continue home O2  neb tx prn   inhaled steroid  continue.    Problem/Plan - 7:  ·  Problem: Bladder cancer.  Plan: stable s/p intravesical chemo and bladder mass resection   continue tamsulosin  DVT prophylaxis- lovenox.       Dispo: PT eval and rehab if stable cc: weakness, sob          76 y.o. male with PMH of COPD home O2 dependent 2L, Diastolic CHF, HTN, HLD, Depression, GERD,  bladder surgery on , removed cancerous lump, left the same day, given chemo inside the bladder admitted on 17 for weakness. Pt states that since the surgery he feels weak, tired, and cant move his legs due to b/l knee pain .His family also states that for past 3 days has been appearing to have intermittent SOB . Pt also states he has mild bloody urine and decreased PO intake. Also c/o chronic  incontinence. Patient was unable to do home physical therapy due to generalized weakness and physical therapist recommended to go to ER.  Was adm for weakness, poss infection, poss copd exac     Feels better today, less weak, not SOB anymore; has o2 at home per pt.   afebrile, dyspneic, more weak today     Vital Signs Last 24 Hrs  T(C): 36.3, Max: 36.4 ( @ 11:42)  T(F): 97.3, Max: 97.6 ( @ 11:42)  HR: 82 (71 - 91)  BP: 114/57 (114/57 - 148/69)  RR: 17 (17 - 18)  SpO2: 99% (93% - 100%)      GENERAL APPEARANCE: mildly dyspneic, in no distress. Pt. is alert, oriented, and pleasant.  HEENT:  Pupils are normal and react normally. No icterus. Mucous membranes well colored.  NECK:  Supple. No lymphadenopathy. Jugular venous pressure not elevated. Carotids equal.   HEART: S1S2 reg with 2/6 systolic murmur  CHEST:  bilat ronchi with crackles  ABDOMEN:  Soft and nontender.   EXTREMITIES: pos edema  SKIN:  No rash or significant lesions are noted.  CNS: no focal deficits    LABS:      135  |  102  |  22  ----------------------------<  189<H>  4.5   |  29  |  1.03    Ca    8.2<L>      2017 04:26  Phos  2.8       Mg     2.0         TPro  6.5  /  Alb  2.9<L>  /  TBili  0.3  /  DBili  x   /  AST  16  /  ALT  12  /  AlkPhos  65  06-22                            11.1   5.4   )-----------( 205      ( 2017 04:26 )             33.0       CARDIAC MARKERS ( 2017 08:58 )  <0.015 ng/mL / x     / x     / x     / x      CARDIAC MARKERS ( 2017 06:15 )  <0.015 ng/mL / x     / 58 U/L / x     / x      CARDIAC MARKERS ( 2017 04:26 )  <0.015 ng/mL / x     / 82 U/L / x     / x            LIVER FUNCTIONS - ( 2017 04:26 )  Alb: 2.9 g/dL / Pro: 6.5 gm/dL / ALK PHOS: 65 U/L / ALT: 12 U/L / AST: 16 U/L / GGT: x                 Urinalysis Basic - ( 2017 18:46 )    Color: Yellow / Appearance: Clear / S.020 / pH: x  Gluc: x / Ketone: Negative  / Bili: Negative / Urobili: Negative mg/dL   Blood: x / Protein: 30 mg/dL / Nitrite: Negative   Leuk Esterase: Trace / RBC: 6-10 /HPF / WBC 3-5   Sq Epi: x / Non Sq Epi: Occasional / Bacteria: Few                            11.1   5.4   )-----------( 205      ( 2017 04:26 )               33.0     06-22    135  |  102  |  22  ----------------------------<  189<H>  4.5   |  29  |  1.03    Ca    8.2<L>      2017 04:26  Phos  2.8     -22  Mg     2.0         TPro  6.5  /  Alb  2.9<L>  /  TBili  0.3  /  DBili  x   /  AST  16  /  ALT  12  /  AlkPhos  65  -22    LIVER FUNCTIONS - ( 2017 04:26 )  Alb: 2.9 g/dL / Pro: 6.5 gm/dL / ALK PHOS: 65 U/L / ALT: 12 U/L / AST: 16 U/L / GGT: x           PT/INR - ( 2017 16:32 )   PT: 11.1 sec;   INR: 1.03 ratio         PTT - ( 2017 16:32 )  PTT:28.6 sec  CARDIAC MARKERS ( 2017 06:15 )  <0.015 ng/mL / x     / 58 U/L / x     / x      CARDIAC MARKERS ( 2017 04:26 )  <0.015 ng/mL / x     / 82 U/L / x     / x      CARDIAC MARKERS ( 2017 16:32 )  <0.015 ng/mL / x     / x     / x     / x          Urinalysis Basic - ( 2017 18:46 )    Color: Yellow / Appearance: Clear / S.020 / pH: x  Gluc: x / Ketone: Negative  / Bili: Negative / Urobili: Negative mg/dL   Blood: x / Protein: 30 mg/dL / Nitrite: Negative   Leuk Esterase: Trace / RBC: 6-10 /HPF / WBC 3-5   Sq Epi: x / Non Sq Epi: Occasional / Bacteria: Few     CHEST SINGLE VIEW FRONTAL                         2017  IMPRESSION:     Since 2017, unchanged small rightpleural effusion with   associated atelectasis.          Problem/Plan - 1:  ·  Problem: Exertional dyspnea.  Plan: R/O coronary ischemia /Doubt HCAP /Doubt acute decompensated CHFvs COPD  - improved, poss COPD related; not started on steroids by his pulm, not wheezing  # doubt acute PNA-CXR unchanged- given first dose of cefepime and vanco in Ed-will check blood cx and follow off antibiotics for now      Problem/Plan - 2:  ·  Problem: Hematuria.  Plan: chronic microscopic hematuria -not worse that before- secondary to bladder cancer   - outpt f/up     Problem/Plan - 3:  ·  Problem: Anemia.  Plan: chronic anemia -likely secondary to chronic hematuria vs anemia of chronic disease  #Monitor H/H.     Problem/Plan - 4:  ·  Problem: Weakness generalized.  Plan: secondary to s/p intravesical chemo vs r/o coronnary ischemia vs r/o FTT  doubt acute CHF exacerbation or HCAP  - resolved  - pt is also on high dose trazodone, gabapentin; no change for now if weakness recur decrease dose   - check TSH, B12, vit D    Problem/Plan - 5:  ·  Problem: Diastolic heart failure.  Plan: chronic diastolic heart failure ,doubt acute  clinical decompensation   continue lasix po   continue statin  start asa81 daily.     Problem/Plan - 6:  Problem: COPD (chronic obstructive pulmonary disease). Plan: stable  continue home O2  neb tx prn   inhaled steroid  continue.    Problem/Plan - 7:  ·  Problem: Bladder cancer.  Plan: stable s/p intravesical chemo and bladder mass resection   continue tamsulosin  DVT prophylaxis- lovenox.       Dispo: PT eval and rehab if stable

## 2017-06-24 LAB
24R-OH-CALCIDIOL SERPL-MCNC: 43.3 NG/ML — SIGNIFICANT CHANGE UP (ref 30–100)
ANION GAP SERPL CALC-SCNC: 3 MMOL/L — LOW (ref 5–17)
BUN SERPL-MCNC: 29 MG/DL — HIGH (ref 7–23)
CALCIUM SERPL-MCNC: 8 MG/DL — LOW (ref 8.5–10.1)
CHLORIDE SERPL-SCNC: 103 MMOL/L — SIGNIFICANT CHANGE UP (ref 96–108)
CO2 SERPL-SCNC: 33 MMOL/L — HIGH (ref 22–31)
CREAT SERPL-MCNC: 0.9 MG/DL — SIGNIFICANT CHANGE UP (ref 0.5–1.3)
FERRITIN SERPL-MCNC: 176 NG/ML — SIGNIFICANT CHANGE UP (ref 30–400)
FOLATE RBC-MCNC: 2282 NG/ML — HIGH (ref 499–1504)
GLUCOSE SERPL-MCNC: 80 MG/DL — SIGNIFICANT CHANGE UP (ref 70–99)
HCT VFR BLD CALC: 32.5 % — LOW (ref 39–50)
HCT VFR BLD CALC: 36 % — LOW (ref 39–50)
HGB BLD-MCNC: 11 G/DL — LOW (ref 13–17)
MCHC RBC-ENTMCNC: 30.3 PG — SIGNIFICANT CHANGE UP (ref 27–34)
MCHC RBC-ENTMCNC: 33.8 GM/DL — SIGNIFICANT CHANGE UP (ref 32–36)
MCV RBC AUTO: 89.6 FL — SIGNIFICANT CHANGE UP (ref 80–100)
PLATELET # BLD AUTO: 215 K/UL — SIGNIFICANT CHANGE UP (ref 150–400)
POTASSIUM SERPL-MCNC: 3.7 MMOL/L — SIGNIFICANT CHANGE UP (ref 3.5–5.3)
POTASSIUM SERPL-SCNC: 3.7 MMOL/L — SIGNIFICANT CHANGE UP (ref 3.5–5.3)
RBC # BLD: 3.63 M/UL — LOW (ref 4.2–5.8)
RBC # FLD: 12.4 % — SIGNIFICANT CHANGE UP (ref 10.3–14.5)
SODIUM SERPL-SCNC: 139 MMOL/L — SIGNIFICANT CHANGE UP (ref 135–145)
VIT B12 SERPL-MCNC: 1049 PG/ML — HIGH (ref 243–894)
WBC # BLD: 6.8 K/UL — SIGNIFICANT CHANGE UP (ref 3.8–10.5)
WBC # FLD AUTO: 6.8 K/UL — SIGNIFICANT CHANGE UP (ref 3.8–10.5)

## 2017-06-24 PROCEDURE — 71250 CT THORAX DX C-: CPT | Mod: 26

## 2017-06-24 PROCEDURE — 99232 SBSQ HOSP IP/OBS MODERATE 35: CPT

## 2017-06-24 RX ORDER — FUROSEMIDE 40 MG
40 TABLET ORAL DAILY
Qty: 0 | Refills: 0 | Status: DISCONTINUED | OUTPATIENT
Start: 2017-06-24 | End: 2017-06-26

## 2017-06-24 RX ORDER — FAMOTIDINE 10 MG/ML
20 INJECTION INTRAVENOUS DAILY
Qty: 0 | Refills: 0 | Status: DISCONTINUED | OUTPATIENT
Start: 2017-06-24 | End: 2017-06-28

## 2017-06-24 RX ORDER — KETOCONAZOLE 20 MG/G
1 AEROSOL, FOAM TOPICAL EVERY 12 HOURS
Qty: 0 | Refills: 0 | Status: DISCONTINUED | OUTPATIENT
Start: 2017-06-24 | End: 2017-06-28

## 2017-06-24 RX ADMIN — Medication 81 MILLIGRAM(S): at 12:35

## 2017-06-24 RX ADMIN — BUDESONIDE AND FORMOTEROL FUMARATE DIHYDRATE 2 PUFF(S): 160; 4.5 AEROSOL RESPIRATORY (INHALATION) at 09:20

## 2017-06-24 RX ADMIN — FAMOTIDINE 20 MILLIGRAM(S): 10 INJECTION INTRAVENOUS at 12:35

## 2017-06-24 RX ADMIN — ENOXAPARIN SODIUM 40 MILLIGRAM(S): 100 INJECTION SUBCUTANEOUS at 06:09

## 2017-06-24 RX ADMIN — Medication 40 MILLIGRAM(S): at 12:34

## 2017-06-24 RX ADMIN — KETOCONAZOLE 1 APPLICATION(S): 20 AEROSOL, FOAM TOPICAL at 18:15

## 2017-06-24 RX ADMIN — BUDESONIDE AND FORMOTEROL FUMARATE DIHYDRATE 2 PUFF(S): 160; 4.5 AEROSOL RESPIRATORY (INHALATION) at 19:39

## 2017-06-24 RX ADMIN — TAMSULOSIN HYDROCHLORIDE 0.4 MILLIGRAM(S): 0.4 CAPSULE ORAL at 21:33

## 2017-06-24 RX ADMIN — Medication 3 MILLILITER(S): at 09:16

## 2017-06-24 RX ADMIN — GABAPENTIN 300 MILLIGRAM(S): 400 CAPSULE ORAL at 21:33

## 2017-06-24 RX ADMIN — Medication 1 MILLIGRAM(S): at 12:35

## 2017-06-24 RX ADMIN — Medication 40 MILLIGRAM(S): at 06:09

## 2017-06-24 RX ADMIN — ATORVASTATIN CALCIUM 20 MILLIGRAM(S): 80 TABLET, FILM COATED ORAL at 21:33

## 2017-06-24 RX ADMIN — TIOTROPIUM BROMIDE 1 CAPSULE(S): 18 CAPSULE ORAL; RESPIRATORY (INHALATION) at 09:16

## 2017-06-24 RX ADMIN — Medication 100 MILLIGRAM(S): at 21:33

## 2017-06-24 RX ADMIN — MIRTAZAPINE 7.5 MILLIGRAM(S): 45 TABLET, ORALLY DISINTEGRATING ORAL at 21:33

## 2017-06-24 NOTE — PROGRESS NOTE ADULT - SUBJECTIVE AND OBJECTIVE BOX
Subjective:  Awake, alert. Sl dyspnea. No sputum production    MEDICATIONS  (STANDING):  cloNIDine 0.1milliGRAM(s) Oral daily  atorvastatin 20milliGRAM(s) Oral at bedtime  tiotropium 18 MICROgram(s) Capsule 1Capsule(s) Inhalation daily  folic acid 1milliGRAM(s) Oral daily  enoxaparin Injectable 40milliGRAM(s) SubCutaneous every 24 hours  tamsulosin 0.4milliGRAM(s) Oral at bedtime  gabapentin 300milliGRAM(s) Oral at bedtime  mirtazapine 7.5milliGRAM(s) Oral at bedtime  traZODone 100milliGRAM(s) Oral at bedtime  buDESOnide 160 MICROgram(s)/formoterol 4.5 MICROgram(s) Inhaler 2Puff(s) Inhalation two times a day  aspirin  chewable 81milliGRAM(s) Oral daily  furosemide   Injectable 40milliGRAM(s) IV Push daily  predniSONE   Tablet 40milliGRAM(s) Oral daily  famotidine    Tablet 20milliGRAM(s) Oral daily  ketoconazole 2% Cream 1Application(s) Topical every 12 hours    MEDICATIONS  (PRN):  ALBUTerol/ipratropium for Nebulization 3milliLiter(s) Nebulizer every 4 hours PRN Shortness of Breath and/or Wheezing      Allergies    No Known Allergies    Intolerances        Vital Signs Last 24 Hrs  T(C): 36.4, Max: 36.6 (06-23 @ 20:54)  T(F): 97.5, Max: 97.8 (06-23 @ 20:54)  HR: 85 (71 - 85)  BP: 110/59 (103/37 - 114/57)  BP(mean): --  RR: 16 (16 - 17)  SpO2: 97% (92% - 99%)    PHYSICAL EXAMINATION:    NECK:  Supple. No lymphadenopathy. Jugular venous pressure not elevated. Carotids equal.   HEART:   The cardiac impulse has a normal quality. Reg., Nl S1 and S2.  There are no murmurs, rubs or gallops noted  CHEST:  Chest with decreased BS at right base and dullness to percussion approx 1/2 up. Normal respiratory effort.  ABDOMEN:  Soft and nontender.   EXTREMITIES:  There is no edema.       LABS:                        11.0   6.8   )-----------( 215      ( 24 Jun 2017 06:00 )             32.5     06-24    139  |  103  |  29<H>  ----------------------------<  80  3.7   |  33<H>  |  0.90    Ca    8.0<L>      24 Jun 2017 06:00            RADIOLOGY & ADDITIONAL TESTS:  Lungs, Airways and Pleura: Central tracheobronchial tree is grossly   patent. Mild centrilobular emphysema. No endobronchial lesions. Moderate   to large right pleural effusion with right lower lobe compressive   atelectasis. Trace left pleural effusion with minimal left basilar   atelectasis. Scattered less than 0.3 cm pulmonary nodules.    Heart and Great Vessels: Atherosclerotic changes of the aorta and   coronary vasculature. Heart is normal in size. Trace pericardial effusion.    Mediastinum and Fay: Hiatal hernia.    Neck and Chest Wall: Minimal bilateral gynecomastia.    Bones: Degenerative changes and osteopenia.    Upper Abdomen:  Tiny gallstone.    IMPRESSION:         Moderate to large right pleural effusion with right lower lobe   compressive atelectasis. Trace left pleural effusion with minimal left   basilar atelectasis.     Scattered less than 0.3 cm pulmonary nodules    Assessment and Plan:   · Assessment		  cont O2/bronchodilators  on lasix  change solumedrol to prednisone  has recurrent right effussion which has been drained 3 times in past-now agrees to therapeutic thorocentesis  DVT proph    Problem/Plan - 1:  ·  Problem: Pleural effusion.     Problem/Plan - 2:  ·  Problem: Chronic obstructive pulmonary disease, unspecified COPD type.     Problem/Plan - 3:  ·  Problem: Diastolic heart failure.     Problem/Plan - 4:  ·  Problem: Hypoxemia.     Problem/Plan - 5:  ·  Problem: Bladder cancer.

## 2017-06-24 NOTE — PROGRESS NOTE ADULT - SUBJECTIVE AND OBJECTIVE BOX
cc: weakness, sob          76 y.o. male with PMH of COPD home O2 dependent 2L, Diastolic CHF, HTN, HLD, Depression, GERD,  bladder surgery on , removed cancerous lump, left the same day, given chemo inside the bladder admitted on 17 for weakness. Pt states that since the surgery he feels weak, tired, and cant move his legs due to b/l knee pain .His family also states that for past 3 days has been appearing to have intermittent SOB . Pt also states he has mild bloody urine and decreased PO intake. Also c/o chronic  incontinence. Patient was unable to do home physical therapy due to generalized weakness and physical therapist recommended to go to ER.  Was adm for weakness, poss infection, poss copd exac     Feels better today, less weak, not SOB anymore; has o2 at home per pt.   afebrile, dyspneic, more weak today   - more alert, denies dyspnea, plan of care discussed in length    Vital Signs Last 24 Hrs  T(C): 36.4, Max: 36.6 ( @ 20:54)  T(F): 97.5, Max: 97.8 ( @ 20:54)  HR: 85 (71 - 85)  BP: 110/59 (103/37 - 114/57)  BP(mean): --  RR: 16 (16 - 17)  SpO2: 97% (92% - 99%)    GENERAL APPEARANCE: mildly dyspneic, in no distress. Pt. is alert, oriented, and pleasant.  HEENT:  Pupils are normal and react normally. No icterus. Mucous membranes well colored.  NECK:  Supple. No lymphadenopathy. Jugular venous pressure not elevated. Carotids equal.   HEART: S1S2 reg with 2/6 systolic murmur  CHEST:  bilat ronchi with crackles  ABDOMEN:  Soft and nontender.   EXTREMITIES: pos edema  SKIN:  No rash or significant lesions are noted.  CNS: no focal deficits    LABS:      139  |  103  |  29<H>  ----------------------------<  80  3.7   |  33<H>  |  0.90    Ca    8.0<L>      2017 06:00                          11.0   6.8   )-----------( 215      ( 2017 06:00 )             32.5     06-22    135  |  102  |  22  ----------------------------<  189<H>  4.5   |  29  |  1.03    Ca    8.2<L>      2017 04:26  Phos  2.8     06-22  Mg     2.0     22    TPro  6.5  /  Alb  2.9<L>  /  TBili  0.3  /  DBili  x   /  AST  16  /  ALT  12  /  AlkPhos  65  22                            11.1   5.4   )-----------( 205      ( 2017 04:26 )             33.0       CARDIAC MARKERS ( 2017 08:58 )  <0.015 ng/mL / x     / x     / x     / x      CARDIAC MARKERS ( 2017 06:15 )  <0.015 ng/mL / x     / 58 U/L / x     / x      CARDIAC MARKERS ( 2017 04:26 )  <0.015 ng/mL / x     / 82 U/L / x     / x            LIVER FUNCTIONS - ( 2017 04:26 )  Alb: 2.9 g/dL / Pro: 6.5 gm/dL / ALK PHOS: 65 U/L / ALT: 12 U/L / AST: 16 U/L / GGT: x                 Urinalysis Basic - ( 2017 18:46 )    Color: Yellow / Appearance: Clear / S.020 / pH: x  Gluc: x / Ketone: Negative  / Bili: Negative / Urobili: Negative mg/dL   Blood: x / Protein: 30 mg/dL / Nitrite: Negative   Leuk Esterase: Trace / RBC: 6-10 /HPF / WBC 3-5   Sq Epi: x / Non Sq Epi: Occasional / Bacteria: Few                            11.1   5.4   )-----------( 205      ( 2017 04:26 )               33.0     06-22    135  |  102  |  22  ----------------------------<  189<H>  4.5   |  29  |  1.03    Ca    8.2<L>      2017 04:26  Phos  2.8     06-22  Mg     2.0     06-22    TPro  6.5  /  Alb  2.9<L>  /  TBili  0.3  /  DBili  x   /  AST  16  /  ALT  12  /  AlkPhos  65      LIVER FUNCTIONS - ( 2017 04:26 )  Alb: 2.9 g/dL / Pro: 6.5 gm/dL / ALK PHOS: 65 U/L / ALT: 12 U/L / AST: 16 U/L / GGT: x           PT/INR - ( 2017 16:32 )   PT: 11.1 sec;   INR: 1.03 ratio         PTT - ( 2017 16:32 )  PTT:28.6 sec  CARDIAC MARKERS ( 2017 06:15 )  <0.015 ng/mL / x     / 58 U/L / x     / x      CARDIAC MARKERS ( 2017 04:26 )  <0.015 ng/mL / x     / 82 U/L / x     / x      CARDIAC MARKERS ( 2017 16:32 )  <0.015 ng/mL / x     / x     / x     / x          Urinalysis Basic - ( 2017 18:46 )    Color: Yellow / Appearance: Clear / S.020 / pH: x  Gluc: x / Ketone: Negative  / Bili: Negative / Urobili: Negative mg/dL   Blood: x / Protein: 30 mg/dL / Nitrite: Negative   Leuk Esterase: Trace / RBC: 6-10 /HPF / WBC 3-5   Sq Epi: x / Non Sq Epi: Occasional / Bacteria: Few     CHEST SINGLE VIEW FRONTAL                         2017  IMPRESSION:     Since 2017, unchanged small rightpleural effusion with   associated atelectasis.      CT CHEST               2017    Moderate to large right pleural effusion with right lower lobe   compressive atelectasis. Trace left pleural effusion with minimal left   basilar atelectasis.     Scattered less than 0.3 cm pulmonary nodules      Problem/Plan - 1:  ·  Problem: Exertional dyspnea.  Plan: R/O coronary ischemia /Doubt HCAP /Doubt acute decompensated CHFvs COPD  - Due to large right sided effusion  - d/w Dr. Glover will benefit from right thoracocentesis diagnostic and therapiutic  - IR consult  - Start IV diuresis, daily weights  - improved, poss COPD related; po prednisone taper  # doubt acute PNA-CXR unchanged- given first dose of cefepime and vanco in Ed-will check blood cx and follow off antibiotics for now      Problem/Plan - 2:  ·  Problem: Hematuria.  Plan: chronic microscopic hematuria -not worse that before- secondary to bladder cancer   - outpt f/up     Problem/Plan - 3:  ·  Problem: Anemia.  Plan: chronic anemia -likely secondary to chronic hematuria vs anemia of chronic disease  #Monitor H/H.     Problem/Plan - 4:  ·  Problem: Weakness generalized.  Plan: secondary to s/p intravesical chemo vs r/o coronnary ischemia vs r/o FTT  doubt acute CHF exacerbation or HCAP  - resolved  - pt is also on high dose trazodone, gabapentin; no change for now if weakness recur decrease dose   - check TSH, B12, vit D    Problem/Plan - 5:  ·  Problem: Diastolic heart failure.  Plan: chronic diastolic heart failure ,doubt acute  clinical decompensation   continue lasix IV due to plural effusion  continue statin  start asa81 daily.     Problem/Plan - 6:  Problem: COPD (chronic obstructive pulmonary disease). Plan: stable  continue home O2  neb tx prn   inhaled steroid  continue.    Problem/Plan - 7:  ·  Problem: Bladder cancer.  Plan: stable s/p intravesical chemo and bladder mass resection   continue tamsulosin  DVT prophylaxis- lovenox.       Dispo: IV diuresis, IR for right thoracocentesis

## 2017-06-25 LAB
ANION GAP SERPL CALC-SCNC: 3 MMOL/L — LOW (ref 5–17)
APPEARANCE UR: CLEAR — SIGNIFICANT CHANGE UP
BACTERIA # UR AUTO: (no result)
BILIRUB UR-MCNC: NEGATIVE — SIGNIFICANT CHANGE UP
BUN SERPL-MCNC: 31 MG/DL — HIGH (ref 7–23)
CALCIUM SERPL-MCNC: 8.2 MG/DL — LOW (ref 8.5–10.1)
CHLORIDE SERPL-SCNC: 97 MMOL/L — SIGNIFICANT CHANGE UP (ref 96–108)
CO2 SERPL-SCNC: 35 MMOL/L — HIGH (ref 22–31)
COLOR SPEC: YELLOW — SIGNIFICANT CHANGE UP
CREAT SERPL-MCNC: 1.02 MG/DL — SIGNIFICANT CHANGE UP (ref 0.5–1.3)
DIFF PNL FLD: (no result)
EPI CELLS # UR: SIGNIFICANT CHANGE UP
GLUCOSE SERPL-MCNC: 128 MG/DL — HIGH (ref 70–99)
GLUCOSE UR QL: NEGATIVE MG/DL — SIGNIFICANT CHANGE UP
HCT VFR BLD CALC: 35.7 % — LOW (ref 39–50)
HGB BLD-MCNC: 11.9 G/DL — LOW (ref 13–17)
KETONES UR-MCNC: NEGATIVE — SIGNIFICANT CHANGE UP
LEUKOCYTE ESTERASE UR-ACNC: (no result)
MCHC RBC-ENTMCNC: 29.3 PG — SIGNIFICANT CHANGE UP (ref 27–34)
MCHC RBC-ENTMCNC: 33.4 GM/DL — SIGNIFICANT CHANGE UP (ref 32–36)
MCV RBC AUTO: 87.6 FL — SIGNIFICANT CHANGE UP (ref 80–100)
NITRITE UR-MCNC: NEGATIVE — SIGNIFICANT CHANGE UP
NT-PROBNP SERPL-SCNC: 141 PG/ML — SIGNIFICANT CHANGE UP (ref 0–450)
PH UR: 6 — SIGNIFICANT CHANGE UP (ref 5–8)
PLATELET # BLD AUTO: 242 K/UL — SIGNIFICANT CHANGE UP (ref 150–400)
POTASSIUM SERPL-MCNC: 3.4 MMOL/L — LOW (ref 3.5–5.3)
POTASSIUM SERPL-SCNC: 3.4 MMOL/L — LOW (ref 3.5–5.3)
PROT UR-MCNC: NEGATIVE MG/DL — SIGNIFICANT CHANGE UP
RBC # BLD: 4.08 M/UL — LOW (ref 4.2–5.8)
RBC # FLD: 12 % — SIGNIFICANT CHANGE UP (ref 10.3–14.5)
RBC CASTS # UR COMP ASSIST: (no result) /HPF (ref 0–4)
SODIUM SERPL-SCNC: 135 MMOL/L — SIGNIFICANT CHANGE UP (ref 135–145)
SP GR SPEC: 1 — LOW (ref 1.01–1.02)
UROBILINOGEN FLD QL: NEGATIVE MG/DL — SIGNIFICANT CHANGE UP
WBC # BLD: 8.3 K/UL — SIGNIFICANT CHANGE UP (ref 3.8–10.5)
WBC # FLD AUTO: 8.3 K/UL — SIGNIFICANT CHANGE UP (ref 3.8–10.5)
WBC UR QL: (no result)

## 2017-06-25 PROCEDURE — 99232 SBSQ HOSP IP/OBS MODERATE 35: CPT

## 2017-06-25 RX ORDER — CEFTRIAXONE 500 MG/1
1 INJECTION, POWDER, FOR SOLUTION INTRAMUSCULAR; INTRAVENOUS ONCE
Qty: 0 | Refills: 0 | Status: COMPLETED | OUTPATIENT
Start: 2017-06-25 | End: 2017-06-25

## 2017-06-25 RX ORDER — CEFTRIAXONE 500 MG/1
INJECTION, POWDER, FOR SOLUTION INTRAMUSCULAR; INTRAVENOUS
Qty: 0 | Refills: 0 | Status: DISCONTINUED | OUTPATIENT
Start: 2017-06-25 | End: 2017-06-28

## 2017-06-25 RX ORDER — CEFTRIAXONE 500 MG/1
1 INJECTION, POWDER, FOR SOLUTION INTRAMUSCULAR; INTRAVENOUS EVERY 24 HOURS
Qty: 0 | Refills: 0 | Status: DISCONTINUED | OUTPATIENT
Start: 2017-06-26 | End: 2017-06-28

## 2017-06-25 RX ORDER — POTASSIUM CHLORIDE 20 MEQ
40 PACKET (EA) ORAL ONCE
Qty: 0 | Refills: 0 | Status: COMPLETED | OUTPATIENT
Start: 2017-06-25 | End: 2017-06-25

## 2017-06-25 RX ORDER — CLONAZEPAM 1 MG
0.5 TABLET ORAL THREE TIMES A DAY
Qty: 0 | Refills: 0 | Status: DISCONTINUED | OUTPATIENT
Start: 2017-06-25 | End: 2017-06-28

## 2017-06-25 RX ADMIN — BUDESONIDE AND FORMOTEROL FUMARATE DIHYDRATE 2 PUFF(S): 160; 4.5 AEROSOL RESPIRATORY (INHALATION) at 21:02

## 2017-06-25 RX ADMIN — KETOCONAZOLE 1 APPLICATION(S): 20 AEROSOL, FOAM TOPICAL at 06:26

## 2017-06-25 RX ADMIN — GABAPENTIN 300 MILLIGRAM(S): 400 CAPSULE ORAL at 23:18

## 2017-06-25 RX ADMIN — CEFTRIAXONE 100 GRAM(S): 500 INJECTION, POWDER, FOR SOLUTION INTRAMUSCULAR; INTRAVENOUS at 17:10

## 2017-06-25 RX ADMIN — Medication 100 MILLIGRAM(S): at 23:17

## 2017-06-25 RX ADMIN — Medication 40 MILLIEQUIVALENT(S): at 11:46

## 2017-06-25 RX ADMIN — Medication 0.1 MILLIGRAM(S): at 06:23

## 2017-06-25 RX ADMIN — TAMSULOSIN HYDROCHLORIDE 0.4 MILLIGRAM(S): 0.4 CAPSULE ORAL at 23:18

## 2017-06-25 RX ADMIN — Medication 40 MILLIGRAM(S): at 06:23

## 2017-06-25 RX ADMIN — KETOCONAZOLE 1 APPLICATION(S): 20 AEROSOL, FOAM TOPICAL at 17:10

## 2017-06-25 RX ADMIN — Medication 81 MILLIGRAM(S): at 11:42

## 2017-06-25 RX ADMIN — BUDESONIDE AND FORMOTEROL FUMARATE DIHYDRATE 2 PUFF(S): 160; 4.5 AEROSOL RESPIRATORY (INHALATION) at 10:34

## 2017-06-25 RX ADMIN — ATORVASTATIN CALCIUM 20 MILLIGRAM(S): 80 TABLET, FILM COATED ORAL at 23:18

## 2017-06-25 RX ADMIN — Medication 1 MILLIGRAM(S): at 11:42

## 2017-06-25 RX ADMIN — TIOTROPIUM BROMIDE 1 CAPSULE(S): 18 CAPSULE ORAL; RESPIRATORY (INHALATION) at 10:34

## 2017-06-25 RX ADMIN — Medication 3 MILLILITER(S): at 10:34

## 2017-06-25 RX ADMIN — ENOXAPARIN SODIUM 40 MILLIGRAM(S): 100 INJECTION SUBCUTANEOUS at 06:23

## 2017-06-25 RX ADMIN — MIRTAZAPINE 7.5 MILLIGRAM(S): 45 TABLET, ORALLY DISINTEGRATING ORAL at 23:18

## 2017-06-25 RX ADMIN — FAMOTIDINE 20 MILLIGRAM(S): 10 INJECTION INTRAVENOUS at 11:43

## 2017-06-25 NOTE — PROGRESS NOTE ADULT - SUBJECTIVE AND OBJECTIVE BOX
Subjective:  Awake, alert. Comfortable at rest    MEDICATIONS  (STANDING):  cloNIDine 0.1milliGRAM(s) Oral daily  atorvastatin 20milliGRAM(s) Oral at bedtime  tiotropium 18 MICROgram(s) Capsule 1Capsule(s) Inhalation daily  folic acid 1milliGRAM(s) Oral daily  enoxaparin Injectable 40milliGRAM(s) SubCutaneous every 24 hours  tamsulosin 0.4milliGRAM(s) Oral at bedtime  gabapentin 300milliGRAM(s) Oral at bedtime  mirtazapine 7.5milliGRAM(s) Oral at bedtime  traZODone 100milliGRAM(s) Oral at bedtime  buDESOnide 160 MICROgram(s)/formoterol 4.5 MICROgram(s) Inhaler 2Puff(s) Inhalation two times a day  aspirin  chewable 81milliGRAM(s) Oral daily  furosemide   Injectable 40milliGRAM(s) IV Push daily  predniSONE   Tablet 40milliGRAM(s) Oral daily  famotidine    Tablet 20milliGRAM(s) Oral daily  ketoconazole 2% Cream 1Application(s) Topical every 12 hours    MEDICATIONS  (PRN):  ALBUTerol/ipratropium for Nebulization 3milliLiter(s) Nebulizer every 4 hours PRN Shortness of Breath and/or Wheezing      Allergies    No Known Allergies    Intolerances        Vital Signs Last 24 Hrs  T(C): 36.7, Max: 36.7 (06-24 @ 21:16)  T(F): 98.1, Max: 98.1 (06-24 @ 21:16)  HR: 94 (85 - 94)  BP: 126/52 (110/59 - 127/64)  BP(mean): --  RR: 16 (16 - 17)  SpO2: 97% (96% - 98%)    PHYSICAL EXAMINATION:    NECK:  Supple. No lymphadenopathy. Jugular venous pressure not elevated. Carotids equal.   HEART:   The cardiac impulse has a normal quality. Reg., Nl S1 and S2.  There are no murmurs, rubs or gallops noted  CHEST:  Chest with decreased BS at right base, otherwise clear. Normal respiratory effort.  ABDOMEN:  Soft and nontender.   EXTREMITIES:  There is no edema.       LABS:                        11.9   8.3   )-----------( 242      ( 25 Jun 2017 06:03 )             35.7     06-25    135  |  97  |  31<H>  ----------------------------<  128<H>  3.4<L>   |  35<H>  |  1.02    Ca    8.2<L>      25 Jun 2017 06:03            RADIOLOGY & ADDITIONAL TESTS:    Assessment and Plan:   · Assessment		  cont O2/bronchodilators  on lasix  change solumedrol to prednisone  has recurrent right effussion which has been drained 3 times in past-now agrees to therapeutic thorocentesis  DVT proph    Problem/Plan - 1:  ·  Problem: Pleural effusion.     Problem/Plan - 2:  ·  Problem: Chronic obstructive pulmonary disease, unspecified COPD type.     Problem/Plan - 3:  ·  Problem: Diastolic heart failure.     Problem/Plan - 4:  ·  Problem: Hypoxemia.     Problem/Plan - 5:  ·  Problem: Bladder cancer.

## 2017-06-25 NOTE — PROGRESS NOTE ADULT - SUBJECTIVE AND OBJECTIVE BOX
cc: weakness, sob          76 y.o. male with PMH of COPD home O2 dependent 2L, Diastolic CHF, HTN, HLD, Depression, GERD,  bladder surgery on , removed cancerous lump, left the same day, given chemo inside the bladder admitted on 17 for weakness. Pt states that since the surgery he feels weak, tired, and cant move his legs due to b/l knee pain .His family also states that for past 3 days has been appearing to have intermittent SOB . Pt also states he has mild bloody urine and decreased PO intake. Also c/o chronic  incontinence. Patient was unable to do home physical therapy due to generalized weakness and physical therapist recommended to go to ER.  Was adm for weakness, poss infection, poss copd exac     Feels better today, less weak, not SOB anymore; has o2 at home per pt.   afebrile, dyspneic, more weak today   - more alert, denies dyspnea, plan of care discussed in length   - reports tremors in the hands, denies dyspnea, CP, papitations  ROS - all other systems negative, except mention above    Vital Signs Last 24 Hrs  T(C): 36.7, Max: 36.7 ( @ 21:16)  T(F): 98, Max: 98.1 ( @ 21:16)  HR: 100 (86 - 100)  BP: 149/64 (116/60 - 149/64)  BP(mean): --  RR: 17 (16 - 17)  SpO2: 97% (96% - 99%)      GENERAL APPEARANCE: mildly dyspneic, in no distress. Pt. is alert, oriented, and pleasant.  HEENT:  Pupils are normal and react normally. No icterus. Mucous membranes well colored.  NECK:  Supple. No lymphadenopathy. Jugular venous pressure not elevated. Carotids equal.   HEART: S1S2 reg with 2/6 systolic murmur  CHEST:  bilat ronchi with crackles  ABDOMEN:  Soft and nontender.   EXTREMITIES: pos edema, + hand tremor bilateral  SKIN:  No rash or significant lesions are noted.  CNS: no focal deficits    LABS:      135  |  97  |  31<H>  ----------------------------<  128<H>  3.4<L>   |  35<H>  |  1.02    Ca    8.2<L>      2017 06:03                        11.9   8.3   )-----------( 242      ( 2017 06:03 )             35.7   Urinalysis Basic - ( 2017 14:40 )    Color: Yellow / Appearance: Clear / S.005 / pH: x  Gluc: x / Ketone: Negative  / Bili: Negative / Urobili: Negative mg/dL   Blood: x / Protein: Negative mg/dL / Nitrite: Negative   Leuk Esterase: Moderate / RBC: 6-10 /HPF / WBC 26-50   Sq Epi: x / Non Sq Epi: Few / Bacteria: Moderate          139  |  103  |  29<H>  ----------------------------<  80  3.7   |  33<H>  |  0.90    Ca    8.0<L>      2017 06:00                          11.0   6.8   )-----------( 215      ( 2017 06:00 )             32.5     06-22    135  |  102  |  22  ----------------------------<  189<H>  4.5   |  29  |  1.03    Ca    8.2<L>      2017 04:26  Phos  2.8     22  Mg     2.0         TPro  6.5  /  Alb  2.9<L>  /  TBili  0.3  /  DBili  x   /  AST  16  /  ALT  12  /  AlkPhos  65  -22                            11.1   5.4   )-----------( 205      ( 2017 04:26 )             33.0       CARDIAC MARKERS ( 2017 08:58 )  <0.015 ng/mL / x     / x     / x     / x      CARDIAC MARKERS ( 2017 06:15 )  <0.015 ng/mL / x     / 58 U/L / x     / x      CARDIAC MARKERS ( 2017 04:26 )  <0.015 ng/mL / x     / 82 U/L / x     / x            LIVER FUNCTIONS - ( 2017 04:26 )  Alb: 2.9 g/dL / Pro: 6.5 gm/dL / ALK PHOS: 65 U/L / ALT: 12 U/L / AST: 16 U/L / GGT: x                 Urinalysis Basic - ( 2017 18:46 )    Color: Yellow / Appearance: Clear / S.020 / pH: x  Gluc: x / Ketone: Negative  / Bili: Negative / Urobili: Negative mg/dL   Blood: x / Protein: 30 mg/dL / Nitrite: Negative   Leuk Esterase: Trace / RBC: 6-10 /HPF / WBC 3-5   Sq Epi: x / Non Sq Epi: Occasional / Bacteria: Few                            11.1   5.4   )-----------( 205      ( 2017 04:26 )               33.0     -22    135  |  102  |  22  ----------------------------<  189<H>  4.5   |  29  |  1.03    Ca    8.2<L>      2017 04:26  Phos  2.8       Mg     2.0         TPro  6.5  /  Alb  2.9<L>  /  TBili  0.3  /  DBili  x   /  AST  16  /  ALT  12  /  AlkPhos  65  06-22    LIVER FUNCTIONS - ( 2017 04:26 )  Alb: 2.9 g/dL / Pro: 6.5 gm/dL / ALK PHOS: 65 U/L / ALT: 12 U/L / AST: 16 U/L / GGT: x           PT/INR - ( 2017 16:32 )   PT: 11.1 sec;   INR: 1.03 ratio         PTT - ( 2017 16:32 )  PTT:28.6 sec  CARDIAC MARKERS ( 2017 06:15 )  <0.015 ng/mL / x     / 58 U/L / x     / x      CARDIAC MARKERS ( 2017 04:26 )  <0.015 ng/mL / x     / 82 U/L / x     / x      CARDIAC MARKERS ( 2017 16:32 )  <0.015 ng/mL / x     / x     / x     / x          Urinalysis Basic - ( 2017 18:46 )    Color: Yellow / Appearance: Clear / S.020 / pH: x  Gluc: x / Ketone: Negative  / Bili: Negative / Urobili: Negative mg/dL   Blood: x / Protein: 30 mg/dL / Nitrite: Negative   Leuk Esterase: Trace / RBC: 6-10 /HPF / WBC 3-5   Sq Epi: x / Non Sq Epi: Occasional / Bacteria: Few     CHEST SINGLE VIEW FRONTAL                         2017  IMPRESSION:     Since 2017, unchanged small right pleural effusion with   associated atelectasis.      CT CHEST               2017    Moderate to large right pleural effusion with right lower lobe   compressive atelectasis. Trace left pleural effusion with minimal left   basilar atelectasis.     Scattered less than 0.3 cm pulmonary nodules      Problem/Plan - 1:  ·  Problem: Exertional dyspnea.  Plan: R/O coronary ischemia /Doubt HCAP /Doubt acute decompensated CHFvs COPD  - Due to large right sided effusion  - d/w Dr. Glover will benefit from right thoracocentesis diagnostic and therapiutic  - IR consult  - Start IV diuresis, daily weights  - improved, poss COPD related; po prednisone taper  # doubt acute PNA-CXR unchanged- given first dose of cefepime and vanco in Ed-will check blood cx and follow off antibiotics for now      Problem/Plan - 2:  ·  Problem: Hematuria.  Plan: chronic microscopic hematuria -not worse that before- secondary to bladder cancer   - outpt f/up     Problem/Plan - 3:  ·  Problem: Anemia.  Plan: chronic anemia -likely secondary to chronic hematuria vs anemia of chronic disease  #Monitor H/H.     Problem/Plan - 4:  ·  Problem: Weakness generalized.  Plan: secondary to s/p intravesical chemo vs r/o coronnary ischemia vs r/o FTT  doubt acute CHF exacerbation or HCAP  - resolved  - pt is also on high dose trazodone, gabapentin; no change for now if weakness recur decrease dose   - check TSH, B12, vit D    Problem/Plan - 5:  ·  Problem: Diastolic heart failure.  Plan: chronic diastolic heart failure ,doubt acute  clinical decompensation   continue lasix IV due to plural effusion  continue statin  start asa81 daily.     Problem/Plan - 6:  Problem: COPD (chronic obstructive pulmonary disease). Plan: stable  continue home O2  neb tx prn   inhaled steroid  continue.    Problem/Plan - 7:  ·  Problem: Bladder cancer.  Plan: stable s/p intravesical chemo and bladder mass resection   continue tamsulosin  DVT prophylaxis- lovenox.     Scattered less than 0.3 cm pulmonary nodules  - outpatient follow up with pulmonology, d/w relatives          Dispo: IV diuresis, IR for right thoracocentesis, NPO form midnight

## 2017-06-26 LAB
ALBUMIN SERPL ELPH-MCNC: 3.2 G/DL — LOW (ref 3.3–5)
ALP SERPL-CCNC: 61 U/L — SIGNIFICANT CHANGE UP (ref 40–120)
ALT FLD-CCNC: 28 U/L — SIGNIFICANT CHANGE UP (ref 12–78)
ANION GAP SERPL CALC-SCNC: 7 MMOL/L — SIGNIFICANT CHANGE UP (ref 5–17)
AST SERPL-CCNC: 25 U/L — SIGNIFICANT CHANGE UP (ref 15–37)
BILIRUB DIRECT SERPL-MCNC: <0.1 MG/DL — SIGNIFICANT CHANGE UP (ref 0–0.2)
BILIRUB INDIRECT FLD-MCNC: >0.1 MG/DL — LOW (ref 0.2–1)
BILIRUB SERPL-MCNC: 0.2 MG/DL — SIGNIFICANT CHANGE UP (ref 0.2–1.2)
BUN SERPL-MCNC: 47 MG/DL — HIGH (ref 7–23)
CALCIUM SERPL-MCNC: 8.3 MG/DL — LOW (ref 8.5–10.1)
CHLORIDE SERPL-SCNC: 98 MMOL/L — SIGNIFICANT CHANGE UP (ref 96–108)
CO2 SERPL-SCNC: 33 MMOL/L — HIGH (ref 22–31)
CREAT SERPL-MCNC: 1.32 MG/DL — HIGH (ref 0.5–1.3)
CULTURE RESULTS: NO GROWTH — SIGNIFICANT CHANGE UP
GLUCOSE SERPL-MCNC: 107 MG/DL — HIGH (ref 70–99)
HCT VFR BLD CALC: 35.7 % — LOW (ref 39–50)
HGB BLD-MCNC: 12.2 G/DL — LOW (ref 13–17)
LDH SERPL L TO P-CCNC: 160 U/L — SIGNIFICANT CHANGE UP (ref 50–242)
MCHC RBC-ENTMCNC: 30.1 PG — SIGNIFICANT CHANGE UP (ref 27–34)
MCHC RBC-ENTMCNC: 34.2 GM/DL — SIGNIFICANT CHANGE UP (ref 32–36)
MCV RBC AUTO: 88.1 FL — SIGNIFICANT CHANGE UP (ref 80–100)
NT-PROBNP SERPL-SCNC: 141 PG/ML — SIGNIFICANT CHANGE UP (ref 0–450)
PLATELET # BLD AUTO: 240 K/UL — SIGNIFICANT CHANGE UP (ref 150–400)
POTASSIUM SERPL-MCNC: 3.6 MMOL/L — SIGNIFICANT CHANGE UP (ref 3.5–5.3)
POTASSIUM SERPL-SCNC: 3.6 MMOL/L — SIGNIFICANT CHANGE UP (ref 3.5–5.3)
PROT SERPL-MCNC: 6.9 GM/DL — SIGNIFICANT CHANGE UP (ref 6–8.3)
RBC # BLD: 4.05 M/UL — LOW (ref 4.2–5.8)
RBC # FLD: 12 % — SIGNIFICANT CHANGE UP (ref 10.3–14.5)
SODIUM SERPL-SCNC: 138 MMOL/L — SIGNIFICANT CHANGE UP (ref 135–145)
SPECIMEN SOURCE: SIGNIFICANT CHANGE UP
WBC # BLD: 14.8 K/UL — HIGH (ref 3.8–10.5)
WBC # FLD AUTO: 14.8 K/UL — HIGH (ref 3.8–10.5)

## 2017-06-26 PROCEDURE — 99233 SBSQ HOSP IP/OBS HIGH 50: CPT

## 2017-06-26 RX ADMIN — MIRTAZAPINE 7.5 MILLIGRAM(S): 45 TABLET, ORALLY DISINTEGRATING ORAL at 22:53

## 2017-06-26 RX ADMIN — TAMSULOSIN HYDROCHLORIDE 0.4 MILLIGRAM(S): 0.4 CAPSULE ORAL at 22:53

## 2017-06-26 RX ADMIN — TIOTROPIUM BROMIDE 1 CAPSULE(S): 18 CAPSULE ORAL; RESPIRATORY (INHALATION) at 07:38

## 2017-06-26 RX ADMIN — Medication 40 MILLIGRAM(S): at 05:58

## 2017-06-26 RX ADMIN — ATORVASTATIN CALCIUM 20 MILLIGRAM(S): 80 TABLET, FILM COATED ORAL at 22:54

## 2017-06-26 RX ADMIN — Medication 1 MILLIGRAM(S): at 13:49

## 2017-06-26 RX ADMIN — CEFTRIAXONE 100 GRAM(S): 500 INJECTION, POWDER, FOR SOLUTION INTRAMUSCULAR; INTRAVENOUS at 17:56

## 2017-06-26 RX ADMIN — KETOCONAZOLE 1 APPLICATION(S): 20 AEROSOL, FOAM TOPICAL at 05:58

## 2017-06-26 RX ADMIN — Medication 0.1 MILLIGRAM(S): at 05:58

## 2017-06-26 RX ADMIN — ENOXAPARIN SODIUM 40 MILLIGRAM(S): 100 INJECTION SUBCUTANEOUS at 05:58

## 2017-06-26 RX ADMIN — BUDESONIDE AND FORMOTEROL FUMARATE DIHYDRATE 2 PUFF(S): 160; 4.5 AEROSOL RESPIRATORY (INHALATION) at 07:38

## 2017-06-26 RX ADMIN — GABAPENTIN 300 MILLIGRAM(S): 400 CAPSULE ORAL at 22:54

## 2017-06-26 RX ADMIN — FAMOTIDINE 20 MILLIGRAM(S): 10 INJECTION INTRAVENOUS at 13:49

## 2017-06-26 RX ADMIN — BUDESONIDE AND FORMOTEROL FUMARATE DIHYDRATE 2 PUFF(S): 160; 4.5 AEROSOL RESPIRATORY (INHALATION) at 19:37

## 2017-06-26 RX ADMIN — Medication 0.5 MILLIGRAM(S): at 22:52

## 2017-06-26 RX ADMIN — Medication 100 MILLIGRAM(S): at 22:53

## 2017-06-26 RX ADMIN — KETOCONAZOLE 1 APPLICATION(S): 20 AEROSOL, FOAM TOPICAL at 17:55

## 2017-06-26 RX ADMIN — Medication 81 MILLIGRAM(S): at 13:49

## 2017-06-26 NOTE — PROGRESS NOTE ADULT - SUBJECTIVE AND OBJECTIVE BOX
Subjective:  no distress  somewhat confused      MEDICATIONS  (STANDING):  cloNIDine 0.1milliGRAM(s) Oral daily  atorvastatin 20milliGRAM(s) Oral at bedtime  tiotropium 18 MICROgram(s) Capsule 1Capsule(s) Inhalation daily  folic acid 1milliGRAM(s) Oral daily  enoxaparin Injectable 40milliGRAM(s) SubCutaneous every 24 hours  tamsulosin 0.4milliGRAM(s) Oral at bedtime  gabapentin 300milliGRAM(s) Oral at bedtime  mirtazapine 7.5milliGRAM(s) Oral at bedtime  traZODone 100milliGRAM(s) Oral at bedtime  buDESOnide 160 MICROgram(s)/formoterol 4.5 MICROgram(s) Inhaler 2Puff(s) Inhalation two times a day  aspirin  chewable 81milliGRAM(s) Oral daily  furosemide   Injectable 40milliGRAM(s) IV Push daily  predniSONE   Tablet 40milliGRAM(s) Oral daily  famotidine    Tablet 20milliGRAM(s) Oral daily  ketoconazole 2% Cream 1Application(s) Topical every 12 hours  cefTRIAXone   IVPB 1Gram(s) IV Intermittent every 24 hours  cefTRIAXone   IVPB  IV Intermittent     MEDICATIONS  (PRN):  ALBUTerol/ipratropium for Nebulization 3milliLiter(s) Nebulizer every 4 hours PRN Shortness of Breath and/or Wheezing  clonazePAM Tablet 0.5milliGRAM(s) Oral three times a day PRN anxiety      Allergies    No Known Allergies    Intolerances        REVIEW OF SYSTEMS:    CONSTITUTIONAL:  As per HPI.  HEENT:  Eyes:  No diplopia or blurred vision. ENT:  No earache, sore throat or runny nose.  CARDIOVASCULAR:  No pressure, squeezing, tightness, heaviness or aching about the chest, neck, axilla or epigastrium.  RESPIRATORY:  No cough, shortness of breath, PND or orthopnea.  GASTROINTESTINAL:  No nausea, vomiting or diarrhea.  GENITOURINARY:  No dysuria, frequency or urgency.  MUSCULOSKELETAL:  no joint pain, deformity, tenderness  EXTREMITIES: no clubbing cyanosis,edema  SKIN:  No change in skin, hair or nails.  NEUROLOGIC:  No paresthesias, fasciculations, seizures or weakness.  PSYCHIATRIC:  No disorder of thought or mood.  ENDOCRINE:  No heat or cold intolerance, polyuria or polydipsia.  HEMATOLOGICAL:  No easy bruising or bleedings:    Vital Signs Last 24 Hrs  T(C): 36.6, Max: 36.7 ( @ 17:21)  T(F): 97.8, Max: 98 ( @ 17:21)  HR: 85 (84 - 100)  BP: 130/62 (116/60 - 149/64)  BP(mean): --  RR: 17 (16 - 17)  SpO2: 97% (95% - 99%)    PHYSICAL EXAMINATION:  SKIN: no rashes  HEAD: NC/AT  EYES: PERRLA, EOMI  EARS: TM's intact  NOSE: no abnormalities  NECK:  Supple. No lymphadenopathy. Jugular venous pressure not elevated. Carotids equal.   HEART:   The cardiac impulse has a normal quality. Reg., Nl S1 and S2.  There are no murmurs, rubs or gallops noted  CHEST:  decreased BS right side  ABDOMEN:  Soft and nontender.   EXTREMITIES:  no C/C/E  NEURO: AAO x 3, no focal deficts       LABS:                        12.2   14.8  )-----------( 240      ( 2017 06:11 )             35.7         138  |  98  |  47<H>  ----------------------------<  107<H>  3.6   |  33<H>  |  1.32<H>    Ca    8.3<L>      2017 06:11        Urinalysis Basic - ( 2017 14:40 )    Color: Yellow / Appearance: Clear / S.005 / pH: x  Gluc: x / Ketone: Negative  / Bili: Negative / Urobili: Negative mg/dL   Blood: x / Protein: Negative mg/dL / Nitrite: Negative   Leuk Esterase: Moderate / RBC: 6-10 /HPF / WBC 26-50   Sq Epi: x / Non Sq Epi: Few / Bacteria: Moderate        RADIOLOGY & ADDITIONAL TESTS:

## 2017-06-26 NOTE — PROGRESS NOTE ADULT - SUBJECTIVE AND OBJECTIVE BOX
cc: weakness, sob          76 y.o. male with PMH of COPD home O2 dependent 2L, Diastolic CHF, HTN, HLD, Depression, GERD,  bladder surgery on , removed cancerous lump, left the same day, given chemo inside the bladder admitted on 17 for weakness. Pt states that since the surgery he feels weak, tired, and cant move his legs due to b/l knee pain .His family also states that for past 3 days has been appearing to have intermittent SOB . Pt also states he has mild bloody urine and decreased PO intake. Also c/o chronic  incontinence. Patient was unable to do home physical therapy due to generalized weakness and physical therapist recommended to go to ER.  Was adm for weakness, poss infection, poss copd exac     Feels better today, less weak, not SOB anymore; has o2 at home per pt.   afebrile, dyspneic, more weak today   - more alert, denies dyspnea, plan of care discussed in length   - reports tremors in the hands, denies dyspnea, CP, papitations   awaiting thoracocentesis, denies dyspnea, palpitations, tele - no events    ROS - all other systems negative, except mention above    Vital Signs Last 24 Hrs  T(C): 36.3, Max: 36.6 ( @ 04:36)  T(F): 97.4, Max: 97.8 ( @ 04:36)  HR: 104 (84 - 104)  BP: 156/46 (130/62 - 156/46)  BP(mean): --  RR: 17 (17 - 17)  SpO2: 91% (91% - 98%)    GENERAL APPEARANCE: mildly dyspneic, in no distress. Pt. is alert, oriented, and pleasant.  HEENT:  Pupils are normal and react normally. No icterus. Mucous membranes well colored.  NECK:  Supple. No lymphadenopathy. Jugular venous pressure not elevated. Carotids equal.   HEART: S1S2 reg with 2/6 systolic murmur  CHEST:  bilat ronchi with crackles  ABDOMEN:  Soft and nontender.   EXTREMITIES: pos edema, + hand tremor bilateral  SKIN:  No rash or significant lesions are noted.  CNS: no focal deficits    LABS:      138  |  98  |  47<H>  ----------------------------<  107<H>  3.6   |  33<H>  |  1.32<H>    Ca    8.3<L>      2017 06:11    TPro  6.9  /  Alb  3.2<L>  /  TBili  0.2  /  DBili  <0.1  /  AST  25  /  ALT  28  /  AlkPhos                            12.2   14.8  )-----------( 240      ( 2017 06:11 )             35.7         LIVER FUNCTIONS - ( 2017 15:52 )  Alb: 3.2 g/dL / Pro: 6.9 gm/dL / ALK PHOS: 61 U/L / ALT: 28 U/L / AST: 25 U/L / GGT: x           Urinalysis Basic - ( 2017 14:40 )  Color: Yellow / Appearance: Clear / S.005 / pH: x  Gluc: x / Ketone: Negative  / Bili: Negative / Urobili: Negative mg/dL   Blood: x / Protein: Negative mg/dL / Nitrite: Negative   Leuk Esterase: Moderate / RBC: 6-10 /HPF / WBC 26-50   Sq Epi: x / Non Sq Epi: Few / Bacteria: Moderate        138  |  98  |  47<H>  ----------------------------<  107<H>  3.6   |  33<H>  |  1.32<H>    Ca    8.3<L>      2017 06:11    TPro  6.9  /  Alb  3.2<L>  /  TBili  0.2  /  DBili  <0.1  /  AST  25  /  ALT  28  /  AlkPhos                              12.2   14.8  )-----------( 240      ( 2017 06:11 )             35.7     LIVER FUNCTIONS - ( 2017 15:52 )  Alb: 3.2 g/dL / Pro: 6.9 gm/dL / ALK PHOS: 61 U/L / ALT: 28 U/L / AST: 25 U/L / GGT: x               135  |  97  |  31<H>  ----------------------------<  128<H>  3.4<L>   |  35<H>  |  1.02    Ca    8.2<L>      2017 06:03                        11.9   8.3   )-----------( 242      ( 2017 06:03 )             35.7   Urinalysis Basic - ( 2017 14:40 )    Color: Yellow / Appearance: Clear / S.005 / pH: x  Gluc: x / Ketone: Negative  / Bili: Negative / Urobili: Negative mg/dL   Blood: x / Protein: Negative mg/dL / Nitrite: Negative   Leuk Esterase: Moderate / RBC: 6-10 /HPF / WBC 26-50   Sq Epi: x / Non Sq Epi: Few / Bacteria: Moderate          139  |  103  |  29<H>  ----------------------------<  80  3.7   |  33<H>  |  0.90    Ca    8.0<L>      2017 06:00                          11.0   6.8   )-----------( 215      ( 2017 06:00 )             32.5     06-22    135  |  102  |  22  ----------------------------<  189<H>  4.5   |  29  |  1.03    Ca    8.2<L>      2017 04:26  Phos  2.8     -22  Mg     2.0     -22    TPro  6.5  /  Alb  2.9<L>  /  TBili  0.3  /  DBili  x   /  AST  16  /  ALT  12  /  AlkPhos  65  06-22                            11.1   5.4   )-----------( 205      ( 2017 04:26 )             33.0       CARDIAC MARKERS ( 2017 08:58 )  <0.015 ng/mL / x     / x     / x     / x      CARDIAC MARKERS ( 2017 06:15 )  <0.015 ng/mL / x     / 58 U/L / x     / x      CARDIAC MARKERS ( 2017 04:26 )  <0.015 ng/mL / x     / 82 U/L / x     / x            LIVER FUNCTIONS - ( 2017 04:26 )  Alb: 2.9 g/dL / Pro: 6.5 gm/dL / ALK PHOS: 65 U/L / ALT: 12 U/L / AST: 16 U/L / GGT: x                 Urinalysis Basic - ( 2017 18:46 )    Color: Yellow / Appearance: Clear / S.020 / pH: x  Gluc: x / Ketone: Negative  / Bili: Negative / Urobili: Negative mg/dL   Blood: x / Protein: 30 mg/dL / Nitrite: Negative   Leuk Esterase: Trace / RBC: 6-10 /HPF / WBC 3-5   Sq Epi: x / Non Sq Epi: Occasional / Bacteria: Few                            11.1   5.4   )-----------( 205      ( 2017 04:26 )               33.0     22    135  |  102  |  22  ----------------------------<  189<H>  4.5   |  29  |  1.03    Ca    8.2<L>      2017 04:26  Phos  2.8     22  Mg     2.0         TPro  6.5  /  Alb  2.9<L>  /  TBili  0.3  /  DBili  x   /  AST  16  /  ALT  12  /  AlkPhos  65  -22    LIVER FUNCTIONS - ( 2017 04:26 )  Alb: 2.9 g/dL / Pro: 6.5 gm/dL / ALK PHOS: 65 U/L / ALT: 12 U/L / AST: 16 U/L / GGT: x           PT/INR - ( 2017 16:32 )   PT: 11.1 sec;   INR: 1.03 ratio         PTT - ( 2017 16:32 )  PTT:28.6 sec  CARDIAC MARKERS ( 2017 06:15 )  <0.015 ng/mL / x     / 58 U/L / x     / x      CARDIAC MARKERS ( 2017 04:26 )  <0.015 ng/mL / x     / 82 U/L / x     / x      CARDIAC MARKERS ( 2017 16:32 )  <0.015 ng/mL / x     / x     / x     / x          Urinalysis Basic - ( 2017 18:46 )    Color: Yellow / Appearance: Clear / S.020 / pH: x  Gluc: x / Ketone: Negative  / Bili: Negative / Urobili: Negative mg/dL   Blood: x / Protein: 30 mg/dL / Nitrite: Negative   Leuk Esterase: Trace / RBC: 6-10 /HPF / WBC 3-5   Sq Epi: x / Non Sq Epi: Occasional / Bacteria: Few     CHEST SINGLE VIEW FRONTAL                         2017  IMPRESSION:     Since 2017, unchanged small right pleural effusion with   associated atelectasis.      CT CHEST               2017    Moderate to large right pleural effusion with right lower lobe   compressive atelectasis. Trace left pleural effusion with minimal left   basilar atelectasis.     Scattered less than 0.3 cm pulmonary nodules      Problem/Plan - 1:  ·  Problem: Exertional dyspnea.  Plan: R/O coronary ischemia /Doubt HCAP /Doubt acute decompensated CHFvs COPD  - Due to large right sided effusion  - d/w Dr. Glover will benefit from right thoracocentesis diagnostic and therapiutic  - IR consult for thoracocentesis   - s/p  IV diuresis, daily weights, will hold lasix due to worsening of renal function  - improved, poss COPD related; po prednisone taper  # doubt acute PNA-CXR unchanged- given first dose of cefepime and vanco in Ed-will check blood cx and follow off antibiotics for now      Problem/Plan - 2:  ·  Problem: Hematuria.  Plan: chronic microscopic hematuria -not worse that before- secondary to bladder cancer   - outpt f/up     Problem/Plan - 3:  ·  Problem: Anemia.  Plan: chronic anemia -likely secondary to chronic hematuria vs anemia of chronic disease  #Monitor H/H.     Problem/Plan - 4:  ·  Problem: Weakness generalized.  Plan: secondary to s/p intravesical chemo vs r/o coronnary ischemia vs r/o FTT  doubt acute CHF exacerbation or HCAP  - resolved  - pt is also on high dose trazodone, gabapentin; no change for now if weakness recur decrease dose   - check TSH, B12, vit D - wnl    Problem/Plan - 5:  ·  Problem: Diastolic heart failure.  Plan: chronic diastolic heart failure ,doubt acute  clinical decompensation   continue lasix IV due to plural effusion  continue statin  start asa81 daily.     Problem/Plan - 6:  Problem: COPD (chronic obstructive pulmonary disease). Plan: stable  continue home O2  neb tx prn   inhaled steroid  continue.    Problem/Plan - 7:  ·  Problem: Bladder cancer.  Plan: stable s/p intravesical chemo and bladder mass resection   continue tamsulosin  DVT prophylaxis- lovenox.     Scattered less than 0.3 cm pulmonary nodules  - outpatient follow up with pulmonology, d/w relatives          Dispo: IV diuresis, IR for right thoracocentesis, NPO form midnight cc: weakness, sob          76 y.o. male with PMH of COPD home O2 dependent 2L, Diastolic CHF, HTN, HLD, Depression, GERD,  bladder surgery on , removed cancerous lump, left the same day, given chemo inside the bladder admitted on 17 for weakness. Pt states that since the surgery he feels weak, tired, and cant move his legs due to b/l knee pain .His family also states that for past 3 days has been appearing to have intermittent SOB . Pt also states he has mild bloody urine and decreased PO intake. Also c/o chronic  incontinence. Patient was unable to do home physical therapy due to generalized weakness and physical therapist recommended to go to ER.  Was adm for weakness, poss infection, poss copd exac     Feels better today, less weak, not SOB anymore; has o2 at home per pt.   afebrile, dyspneic, more weak today   - more alert, denies dyspnea, plan of care discussed in length   - reports tremors in the hands, denies dyspnea, CP, papitations   awaiting thoracocentesis, denies dyspnea, palpitations, tele - no events    ROS - all other systems negative, except mention above    Vital Signs Last 24 Hrs  T(C): 36.3, Max: 36.6 ( @ 04:36)  T(F): 97.4, Max: 97.8 ( @ 04:36)  HR: 104 (84 - 104)  BP: 156/46 (130/62 - 156/46)  BP(mean): --  RR: 17 (17 - 17)  SpO2: 91% (91% - 98%)    GENERAL APPEARANCE: mildly dyspneic, in no distress. Pt. is alert, oriented, and pleasant.  HEENT:  Pupils are normal and react normally. No icterus. Mucous membranes well colored.  NECK:  Supple. No lymphadenopathy. Jugular venous pressure not elevated. Carotids equal.   HEART: S1S2 reg with 2/6 systolic murmur  CHEST:  bilat ronchi with crackles  ABDOMEN:  Soft and nontender.   EXTREMITIES: pos edema, + hand tremor bilateral  SKIN:  No rash or significant lesions are noted.  CNS: no focal deficits    LABS:      138  |  98  |  47<H>  ----------------------------<  107<H>  3.6   |  33<H>  |  1.32<H>    Ca    8.3<L>      2017 06:11    TPro  6.9  /  Alb  3.2<L>  /  TBili  0.2  /  DBili  <0.1  /  AST  25  /  ALT  28  /  AlkPhos                            12.2   14.8  )-----------( 240      ( 2017 06:11 )             35.7         LIVER FUNCTIONS - ( 2017 15:52 )  Alb: 3.2 g/dL / Pro: 6.9 gm/dL / ALK PHOS: 61 U/L / ALT: 28 U/L / AST: 25 U/L / GGT: x           Urinalysis Basic - ( 2017 14:40 )  Color: Yellow / Appearance: Clear / S.005 / pH: x  Gluc: x / Ketone: Negative  / Bili: Negative / Urobili: Negative mg/dL   Blood: x / Protein: Negative mg/dL / Nitrite: Negative   Leuk Esterase: Moderate / RBC: 6-10 /HPF / WBC 26-50   Sq Epi: x / Non Sq Epi: Few / Bacteria: Moderate        138  |  98  |  47<H>  ----------------------------<  107<H>  3.6   |  33<H>  |  1.32<H>    Ca    8.3<L>      2017 06:11    TPro  6.9  /  Alb  3.2<L>  /  TBili  0.2  /  DBili  <0.1  /  AST  25  /  ALT  28  /  AlkPhos                              12.2   14.8  )-----------( 240      ( 2017 06:11 )             35.7     LIVER FUNCTIONS - ( 2017 15:52 )  Alb: 3.2 g/dL / Pro: 6.9 gm/dL / ALK PHOS: 61 U/L / ALT: 28 U/L / AST: 25 U/L / GGT: x               135  |  97  |  31<H>  ----------------------------<  128<H>  3.4<L>   |  35<H>  |  1.02    Ca    8.2<L>      2017 06:03                        11.9   8.3   )-----------( 242      ( 2017 06:03 )             35.7   Urinalysis Basic - ( 2017 14:40 )    Color: Yellow / Appearance: Clear / S.005 / pH: x  Gluc: x / Ketone: Negative  / Bili: Negative / Urobili: Negative mg/dL   Blood: x / Protein: Negative mg/dL / Nitrite: Negative   Leuk Esterase: Moderate / RBC: 6-10 /HPF / WBC 26-50   Sq Epi: x / Non Sq Epi: Few / Bacteria: Moderate          139  |  103  |  29<H>  ----------------------------<  80  3.7   |  33<H>  |  0.90    Ca    8.0<L>      2017 06:00                          11.0   6.8   )-----------( 215      ( 2017 06:00 )             32.5     06-22    135  |  102  |  22  ----------------------------<  189<H>  4.5   |  29  |  1.03    Ca    8.2<L>      2017 04:26  Phos  2.8     -22  Mg     2.0     -22    TPro  6.5  /  Alb  2.9<L>  /  TBili  0.3  /  DBili  x   /  AST  16  /  ALT  12  /  AlkPhos  65  06-22                            11.1   5.4   )-----------( 205      ( 2017 04:26 )             33.0       CARDIAC MARKERS ( 2017 08:58 )  <0.015 ng/mL / x     / x     / x     / x      CARDIAC MARKERS ( 2017 06:15 )  <0.015 ng/mL / x     / 58 U/L / x     / x      CARDIAC MARKERS ( 2017 04:26 )  <0.015 ng/mL / x     / 82 U/L / x     / x            LIVER FUNCTIONS - ( 2017 04:26 )  Alb: 2.9 g/dL / Pro: 6.5 gm/dL / ALK PHOS: 65 U/L / ALT: 12 U/L / AST: 16 U/L / GGT: x                 Urinalysis Basic - ( 2017 18:46 )    Color: Yellow / Appearance: Clear / S.020 / pH: x  Gluc: x / Ketone: Negative  / Bili: Negative / Urobili: Negative mg/dL   Blood: x / Protein: 30 mg/dL / Nitrite: Negative   Leuk Esterase: Trace / RBC: 6-10 /HPF / WBC 3-5   Sq Epi: x / Non Sq Epi: Occasional / Bacteria: Few                            11.1   5.4   )-----------( 205      ( 2017 04:26 )               33.0     22    135  |  102  |  22  ----------------------------<  189<H>  4.5   |  29  |  1.03    Ca    8.2<L>      2017 04:26  Phos  2.8     22  Mg     2.0         TPro  6.5  /  Alb  2.9<L>  /  TBili  0.3  /  DBili  x   /  AST  16  /  ALT  12  /  AlkPhos  65  -22    LIVER FUNCTIONS - ( 2017 04:26 )  Alb: 2.9 g/dL / Pro: 6.5 gm/dL / ALK PHOS: 65 U/L / ALT: 12 U/L / AST: 16 U/L / GGT: x           PT/INR - ( 2017 16:32 )   PT: 11.1 sec;   INR: 1.03 ratio         PTT - ( 2017 16:32 )  PTT:28.6 sec  CARDIAC MARKERS ( 2017 06:15 )  <0.015 ng/mL / x     / 58 U/L / x     / x      CARDIAC MARKERS ( 2017 04:26 )  <0.015 ng/mL / x     / 82 U/L / x     / x      CARDIAC MARKERS ( 2017 16:32 )  <0.015 ng/mL / x     / x     / x     / x          Urinalysis Basic - ( 2017 18:46 )    Color: Yellow / Appearance: Clear / S.020 / pH: x  Gluc: x / Ketone: Negative  / Bili: Negative / Urobili: Negative mg/dL   Blood: x / Protein: 30 mg/dL / Nitrite: Negative   Leuk Esterase: Trace / RBC: 6-10 /HPF / WBC 3-5   Sq Epi: x / Non Sq Epi: Occasional / Bacteria: Few     CHEST SINGLE VIEW FRONTAL                         2017  IMPRESSION:     Since 2017, unchanged small right pleural effusion with   associated atelectasis.      CT CHEST               2017    Moderate to large right pleural effusion with right lower lobe   compressive atelectasis. Trace left pleural effusion with minimal left   basilar atelectasis.     Scattered less than 0.3 cm pulmonary nodules      Problem/Plan - 1:  ·  Problem: Exertional dyspnea.  Plan: R/O coronary ischemia /Doubt HCAP /Doubt acute decompensated CHFvs COPD  - Due to large right sided effusion  - d/w Dr. Glover will benefit from right thoracocentesis diagnostic and therapiutic  - IR consult for thoracocentesis   - s/p  IV diuresis, daily weights, will hold lasix due to worsening of renal function  - improved, poss COPD related; po prednisone taper  # doubt acute PNA-CXR unchanged- given first dose of cefepime and vanco in Ed-will check blood cx and follow off antibiotics for now      Problem/Plan - 2:  ·  Problem: Hematuria.  Plan: chronic microscopic hematuria -not worse that before- secondary to bladder cancer   - outpt f/up     Problem/Plan - 3:  ·  Problem: Anemia.  Plan: chronic anemia -likely secondary to chronic hematuria vs anemia of chronic disease  #Monitor H/H.     Problem/Plan - 4:  ·  Problem: Weakness generalized.  Plan: secondary to s/p intravesical chemo vs r/o coronnary ischemia vs r/o FTT  doubt acute CHF exacerbation or HCAP  - resolved  - pt is also on high dose trazodone, gabapentin; no change for now if weakness recur decrease dose   - check TSH, B12, vit D - wnl    Problem/Plan - 5:  ·  Problem: Diastolic heart failure.  Plan: chronic diastolic heart failure ,doubt acute  clinical decompensation   continue lasix IV due to plural effusion  continue statin  start asa81 daily.     Problem/Plan - 6:  Problem: COPD (chronic obstructive pulmonary disease). Plan: stable  continue home O2  neb tx prn   inhaled steroid  continue.    Problem/Plan - 7:  ·  Problem: Bladder cancer.  Plan: stable s/p intravesical chemo and bladder mass resection   continue tamsulosin  DVT prophylaxis- lovenox.     # UTI  -c/w IV ceftriaxone #2  - f/u urine cx    Scattered less than 0.3 cm pulmonary nodules  - outpatient follow up with pulmonology, d/w relatives          Dispo: IV diuresis, IR for right thoracocentesis, NPO form midnight

## 2017-06-27 ENCOUNTER — RESULT REVIEW (OUTPATIENT)
Age: 77
End: 2017-06-27

## 2017-06-27 LAB
ALBUMIN FLD-MCNC: 2.7 G/DL — SIGNIFICANT CHANGE UP
ANION GAP SERPL CALC-SCNC: 6 MMOL/L — SIGNIFICANT CHANGE UP (ref 5–17)
B PERT IGG+IGM PNL SER: (no result)
BUN SERPL-MCNC: 57 MG/DL — HIGH (ref 7–23)
CALCIUM SERPL-MCNC: 8 MG/DL — LOW (ref 8.5–10.1)
CHLORIDE SERPL-SCNC: 97 MMOL/L — SIGNIFICANT CHANGE UP (ref 96–108)
CO2 SERPL-SCNC: 35 MMOL/L — HIGH (ref 22–31)
COLOR FLD: YELLOW — SIGNIFICANT CHANGE UP
CREAT SERPL-MCNC: 1.29 MG/DL — SIGNIFICANT CHANGE UP (ref 0.5–1.3)
CULTURE RESULTS: SIGNIFICANT CHANGE UP
CULTURE RESULTS: SIGNIFICANT CHANGE UP
EOSINOPHIL # FLD: 2 % — SIGNIFICANT CHANGE UP
FLUID INTAKE SUBSTANCE CLASS: SIGNIFICANT CHANGE UP
FLUID SEGMENTED GRANULOCYTES: 5 % — SIGNIFICANT CHANGE UP
GLUCOSE FLD-MCNC: 116 MG/DL — SIGNIFICANT CHANGE UP
GLUCOSE SERPL-MCNC: 88 MG/DL — SIGNIFICANT CHANGE UP (ref 70–99)
GRAM STN FLD: SIGNIFICANT CHANGE UP
HCT VFR BLD CALC: 37.1 % — LOW (ref 39–50)
HGB BLD-MCNC: 12.3 G/DL — LOW (ref 13–17)
LDH SERPL L TO P-CCNC: 117 U/L — SIGNIFICANT CHANGE UP
LYMPHOCYTES # FLD: 73 % — SIGNIFICANT CHANGE UP
MCHC RBC-ENTMCNC: 29.5 PG — SIGNIFICANT CHANGE UP (ref 27–34)
MCHC RBC-ENTMCNC: 33.1 GM/DL — SIGNIFICANT CHANGE UP (ref 32–36)
MCV RBC AUTO: 89.1 FL — SIGNIFICANT CHANGE UP (ref 80–100)
MONOS+MACROS # FLD: 20 % — SIGNIFICANT CHANGE UP
NIGHT BLUE STAIN TISS: SIGNIFICANT CHANGE UP
PH FLD: 7.38 — SIGNIFICANT CHANGE UP
PLATELET # BLD AUTO: 232 K/UL — SIGNIFICANT CHANGE UP (ref 150–400)
POTASSIUM SERPL-MCNC: 3.1 MMOL/L — LOW (ref 3.5–5.3)
POTASSIUM SERPL-SCNC: 3.1 MMOL/L — LOW (ref 3.5–5.3)
PROT FLD-MCNC: 4.4 G/DL — SIGNIFICANT CHANGE UP
RBC # BLD: 4.16 M/UL — LOW (ref 4.2–5.8)
RBC # FLD: 12.6 % — SIGNIFICANT CHANGE UP (ref 10.3–14.5)
RCV VOL RI: 11 /UL — HIGH (ref 0–5)
SODIUM SERPL-SCNC: 138 MMOL/L — SIGNIFICANT CHANGE UP (ref 135–145)
SPECIMEN SOURCE FLD: SIGNIFICANT CHANGE UP
SPECIMEN SOURCE: SIGNIFICANT CHANGE UP
TOTAL NUCLEATED CELL COUNT, BODY FLUID: 302 /UL — HIGH (ref 0–5)
TUBE TYPE: SIGNIFICANT CHANGE UP
WBC # BLD: 13.2 K/UL — HIGH (ref 3.8–10.5)
WBC # FLD AUTO: 13.2 K/UL — HIGH (ref 3.8–10.5)

## 2017-06-27 PROCEDURE — 88108 CYTOPATH CONCENTRATE TECH: CPT | Mod: 26

## 2017-06-27 PROCEDURE — 99233 SBSQ HOSP IP/OBS HIGH 50: CPT

## 2017-06-27 PROCEDURE — 32555 ASPIRATE PLEURA W/ IMAGING: CPT | Mod: RT

## 2017-06-27 PROCEDURE — 88305 TISSUE EXAM BY PATHOLOGIST: CPT | Mod: 26

## 2017-06-27 PROCEDURE — 71010: CPT | Mod: 26

## 2017-06-27 RX ORDER — POTASSIUM CHLORIDE 20 MEQ
40 PACKET (EA) ORAL ONCE
Qty: 0 | Refills: 0 | Status: COMPLETED | OUTPATIENT
Start: 2017-06-27 | End: 2017-06-27

## 2017-06-27 RX ADMIN — Medication 1 MILLIGRAM(S): at 12:20

## 2017-06-27 RX ADMIN — KETOCONAZOLE 1 APPLICATION(S): 20 AEROSOL, FOAM TOPICAL at 06:46

## 2017-06-27 RX ADMIN — Medication 40 MILLIGRAM(S): at 06:41

## 2017-06-27 RX ADMIN — KETOCONAZOLE 1 APPLICATION(S): 20 AEROSOL, FOAM TOPICAL at 18:40

## 2017-06-27 RX ADMIN — FAMOTIDINE 20 MILLIGRAM(S): 10 INJECTION INTRAVENOUS at 12:23

## 2017-06-27 RX ADMIN — Medication 0.1 MILLIGRAM(S): at 06:40

## 2017-06-27 RX ADMIN — TIOTROPIUM BROMIDE 1 CAPSULE(S): 18 CAPSULE ORAL; RESPIRATORY (INHALATION) at 11:18

## 2017-06-27 RX ADMIN — Medication 0.5 MILLIGRAM(S): at 16:28

## 2017-06-27 RX ADMIN — GABAPENTIN 300 MILLIGRAM(S): 400 CAPSULE ORAL at 21:29

## 2017-06-27 RX ADMIN — BUDESONIDE AND FORMOTEROL FUMARATE DIHYDRATE 2 PUFF(S): 160; 4.5 AEROSOL RESPIRATORY (INHALATION) at 21:42

## 2017-06-27 RX ADMIN — BUDESONIDE AND FORMOTEROL FUMARATE DIHYDRATE 2 PUFF(S): 160; 4.5 AEROSOL RESPIRATORY (INHALATION) at 11:18

## 2017-06-27 RX ADMIN — Medication 100 MILLIGRAM(S): at 21:30

## 2017-06-27 RX ADMIN — Medication 81 MILLIGRAM(S): at 12:19

## 2017-06-27 RX ADMIN — Medication 40 MILLIEQUIVALENT(S): at 12:21

## 2017-06-27 RX ADMIN — TAMSULOSIN HYDROCHLORIDE 0.4 MILLIGRAM(S): 0.4 CAPSULE ORAL at 21:30

## 2017-06-27 RX ADMIN — ATORVASTATIN CALCIUM 20 MILLIGRAM(S): 80 TABLET, FILM COATED ORAL at 21:29

## 2017-06-27 RX ADMIN — CEFTRIAXONE 100 GRAM(S): 500 INJECTION, POWDER, FOR SOLUTION INTRAMUSCULAR; INTRAVENOUS at 16:27

## 2017-06-27 RX ADMIN — Medication 0.5 MILLIGRAM(S): at 06:45

## 2017-06-27 RX ADMIN — MIRTAZAPINE 7.5 MILLIGRAM(S): 45 TABLET, ORALLY DISINTEGRATING ORAL at 21:29

## 2017-06-27 NOTE — PROGRESS NOTE ADULT - SUBJECTIVE AND OBJECTIVE BOX
cc: weakness, sob          76 y.o. male with PMH of COPD home O2 dependent 2L, Diastolic CHF, HTN, HLD, Depression, GERD,  bladder surgery on , removed cancerous lump, left the same day, given chemo inside the bladder admitted on 17 for weakness. Pt states that since the surgery he feels weak, tired, and cant move his legs due to b/l knee pain .His family also states that for past 3 days has been appearing to have intermittent SOB . Pt also states he has mild bloody urine and decreased PO intake. Also c/o chronic  incontinence. Patient was unable to do home physical therapy due to generalized weakness and physical therapist recommended to go to ER.  Was adm for weakness, poss infection, poss copd exac     Feels better today, less weak, not SOB anymore; has o2 at home per pt.   afebrile, dyspneic, more weak today   - more alert, denies dyspnea, plan of care discussed in length   - reports tremors in the hands, denies dyspnea, CP, papitations   awaiting thoracocentesis, denies dyspnea, palpitations, tele - no events   - s/p right sided thoracocentesis 900 cc, clear fluids, denies CP, dyspnea improved    ROS - all other systems negative, except mention above    Vital Signs Last 24 Hrs  T(C): 36.3 (2017 16:00), Max: 36.4 (2017 09:49)  T(F): 97.4 (2017 16:00), Max: 97.6 (2017 10:30)  HR: 87 (2017 16:00) (69 - 102)  BP: 116/69 (2017 16:00) (114/51 - 135/64)  BP(mean): --  RR: 16 (2017 16:00) (16 - 17)  SpO2: 99% (2017 16:00) (95% - 100%)    GENERAL APPEARANCE: mildly dyspneic, in no distress. Pt. is alert, oriented, and pleasant.  HEENT:  Pupils are normal and react normally. No icterus. Mucous membranes well colored.  NECK:  Supple. No lymphadenopathy. Jugular venous pressure not elevated. Carotids equal.   HEART: S1S2 reg with 2/6 systolic murmur  CHEST:  bilat ronchi with crackles  ABDOMEN:  Soft and nontender.   EXTREMITIES: pos edema, + hand tremor bilateral  SKIN:  No rash or significant lesions are noted.  CNS: no focal deficits    LABS:      138  |  97  |  57<H>  ----------------------------<  88  3.1<L>   |  35<H>  |  1.29    Ca    8.0<L>      2017 06:16    TPro  6.9  /  Alb  3.2<L>  /  TBili  0.2  /  DBili  <0.1  /  AST  25  /  ALT  28  /  AlkPhos  61                              12.3   13.2  )-----------( 232      ( 2017 06:16 )             37.1             LIVER FUNCTIONS - ( 2017 15:52 )  Alb: 3.2 g/dL / Pro: 6.9 gm/dL / ALK PHOS: 61 U/L / ALT: 28 U/L / AST: 25 U/L / GGT: x                       138  |  98  |  47<H>  ----------------------------<  107<H>  3.6   |  33<H>  |  1.32<H>    Ca    8.3<L>      2017 06:11    TPro  6.9  /  Alb  3.2<L>  /  TBili  0.2  /  DBili  <0.1  /  AST  25  /  ALT  28  /  AlkPhos                            12.2   14.8  )-----------( 240      ( 2017 06:11 )             35.7         LIVER FUNCTIONS - ( 2017 15:52 )  Alb: 3.2 g/dL / Pro: 6.9 gm/dL / ALK PHOS: 61 U/L / ALT: 28 U/L / AST: 25 U/L / GGT: x           Urinalysis Basic - ( 2017 14:40 )  Color: Yellow / Appearance: Clear / S.005 / pH: x  Gluc: x / Ketone: Negative  / Bili: Negative / Urobili: Negative mg/dL   Blood: x / Protein: Negative mg/dL / Nitrite: Negative   Leuk Esterase: Moderate / RBC: 6-10 /HPF / WBC 26-50   Sq Epi: x / Non Sq Epi: Few / Bacteria: Moderate        138  |  98  |  47<H>  ----------------------------<  107<H>  3.6   |  33<H>  |  1.32<H>    Ca    8.3<L>      2017 06:11    TPro  6.9  /  Alb  3.2<L>  /  TBili  0.2  /  DBili  <0.1  /  AST  25  /  ALT  28  /  AlkPhos  61                              12.2   14.8  )-----------( 240      ( 2017 06:11 )             35.7     LIVER FUNCTIONS - ( 2017 15:52 )  Alb: 3.2 g/dL / Pro: 6.9 gm/dL / ALK PHOS: 61 U/L / ALT: 28 U/L / AST: 25 U/L / GGT: x               135  |  97  |  31<H>  ----------------------------<  128<H>  3.4<L>   |  35<H>  |  1.02    Ca    8.2<L>      2017 06:03                        11.9   8.3   )-----------( 242      ( 2017 06:03 )             35.7   Urinalysis Basic - ( 2017 14:40 )    Color: Yellow / Appearance: Clear / S.005 / pH: x  Gluc: x / Ketone: Negative  / Bili: Negative / Urobili: Negative mg/dL   Blood: x / Protein: Negative mg/dL / Nitrite: Negative   Leuk Esterase: Moderate / RBC: 6-10 /HPF / WBC 26-50   Sq Epi: x / Non Sq Epi: Few / Bacteria: Moderate          139  |  103  |  29<H>  ----------------------------<  80  3.7   |  33<H>  |  0.90    Ca    8.0<L>      2017 06:00                          11.0   6.8   )-----------( 215      ( 2017 06:00 )             32.5     06-22    135  |  102  |  22  ----------------------------<  189<H>  4.5   |  29  |  1.03    Ca    8.2<L>      2017 04:26  Phos  2.8     22  Mg     2.0         TPro  6.5  /  Alb  2.9<L>  /  TBili  0.3  /  DBili  x   /  AST  16  /  ALT  12  /  AlkPhos  65                              11.1   5.4   )-----------( 205      ( 2017 04:26 )             33.0       CARDIAC MARKERS ( 2017 08:58 )  <0.015 ng/mL / x     / x     / x     / x      CARDIAC MARKERS ( 2017 06:15 )  <0.015 ng/mL / x     / 58 U/L / x     / x      CARDIAC MARKERS ( 2017 04:26 )  <0.015 ng/mL / x     / 82 U/L / x     / x            LIVER FUNCTIONS - ( 2017 04:26 )  Alb: 2.9 g/dL / Pro: 6.5 gm/dL / ALK PHOS: 65 U/L / ALT: 12 U/L / AST: 16 U/L / GGT: x                 Urinalysis Basic - ( 2017 18:46 )    Color: Yellow / Appearance: Clear / S.020 / pH: x  Gluc: x / Ketone: Negative  / Bili: Negative / Urobili: Negative mg/dL   Blood: x / Protein: 30 mg/dL / Nitrite: Negative   Leuk Esterase: Trace / RBC: 6-10 /HPF / WBC 3-5   Sq Epi: x / Non Sq Epi: Occasional / Bacteria: Few                            11.1   5.4   )-----------( 205      ( 2017 04:26 )               33.0     06-22    135  |  102  |  22  ----------------------------<  189<H>  4.5   |  29  |  1.03    Ca    8.2<L>      2017 04:26  Phos  2.8     06-  Mg     2.0     -    TPro  6.5  /  Alb  2.9<L>  /  TBili  0.3  /  DBili  x   /  AST  16  /  ALT  12  /  AlkPhos  65      LIVER FUNCTIONS - ( 2017 04:26 )  Alb: 2.9 g/dL / Pro: 6.5 gm/dL / ALK PHOS: 65 U/L / ALT: 12 U/L / AST: 16 U/L / GGT: x           PT/INR - ( 2017 16:32 )   PT: 11.1 sec;   INR: 1.03 ratio         PTT - ( 2017 16:32 )  PTT:28.6 sec  CARDIAC MARKERS ( 2017 06:15 )  <0.015 ng/mL / x     / 58 U/L / x     / x      CARDIAC MARKERS ( 2017 04:26 )  <0.015 ng/mL / x     / 82 U/L / x     / x      CARDIAC MARKERS ( 2017 16:32 )  <0.015 ng/mL / x     / x     / x     / x          Urinalysis Basic - ( 2017 18:46 )    Color: Yellow / Appearance: Clear / S.020 / pH: x  Gluc: x / Ketone: Negative  / Bili: Negative / Urobili: Negative mg/dL   Blood: x / Protein: 30 mg/dL / Nitrite: Negative   Leuk Esterase: Trace / RBC: 6-10 /HPF / WBC 3-5   Sq Epi: x / Non Sq Epi: Occasional / Bacteria: Few     CHEST SINGLE VIEW FRONTAL                         2017  IMPRESSION:     Since 2017, unchanged small right pleural effusion with   associated atelectasis.      CT CHEST               2017    Moderate to large right pleural effusion with right lower lobe   compressive atelectasis. Trace left pleural effusion with minimal left   basilar atelectasis.     Scattered less than 0.3 cm pulmonary nodules      Problem/Plan - 1:  ·  Problem: Exertional dyspnea.  Plan: R/O coronary ischemia /Doubt HCAP /Doubt acute decompensated CHFvs COPD  - Due to large right sided effusion s/p Right thoracocentesis 900 cc - f/u fluid analysis  - d/w Dr. Glover will benefit from right thoracocentesis diagnostic and therapiutic  - IR consult for thoracocentesis   - s/p  IV diuresis, daily weights, will hold lasix due to worsening of renal function  - improved, poss COPD related; po prednisone taper  # doubt acute PNA-CXR unchanged- given first dose of cefepime and vanco in Ed-will check blood cx and follow off antibiotics for now      Problem/Plan - 2:  ·  Problem: Hematuria.  Plan: chronic microscopic hematuria -not worse that before- secondary to bladder cancer   - outpt f/up     Problem/Plan - 3:  ·  Problem: Anemia.  Plan: chronic anemia -likely secondary to chronic hematuria vs anemia of chronic disease  #Monitor H/H.     Problem/Plan - 4:  ·  Problem: Weakness generalized.  Plan: secondary to s/p intravesical chemo vs r/o coronnary ischemia vs r/o FTT  doubt acute CHF exacerbation or HCAP  - resolved  - pt is also on high dose trazodone, gabapentin; no change for now if weakness recur decrease dose   - check TSH, B12, vit D - wnl    Problem/Plan - 5:  ·  Problem: Diastolic heart failure.  Plan: chronic diastolic heart failure ,doubt acute  clinical decompensation   continue lasix IV due to plural effusion  continue statin  start asa81 daily.     Problem/Plan - 6:  Problem: COPD (chronic obstructive pulmonary disease). Plan: stable  continue home O2  neb tx prn   inhaled steroid  continue.    Problem/Plan - 7:  ·  Problem: Bladder cancer.  Plan: stable s/p intravesical chemo and bladder mass resection   continue tamsulosin  DVT prophylaxis- lovenox.     # UTI  -c/w IV ceftriaxone #2  - f/u urine cx - no growth    Scattered less than 0.3 cm pulmonary nodules  - outpatient follow up with pulmonology, d/w relatives          Dispo: CXR in am, PT, SW, d/c planning for linnea cc: weakness, sob          76 y.o. male with PMH of COPD home O2 dependent 2L, Diastolic CHF, HTN, HLD, Depression, GERD,  bladder surgery on , removed cancerous lump, left the same day, given chemo inside the bladder admitted on 17 for weakness. Pt states that since the surgery he feels weak, tired, and cant move his legs due to b/l knee pain .His family also states that for past 3 days has been appearing to have intermittent SOB . Pt also states he has mild bloody urine and decreased PO intake. Also c/o chronic  incontinence. Patient was unable to do home physical therapy due to generalized weakness and physical therapist recommended to go to ER.  Was adm for weakness, poss infection, poss copd exac     Feels better today, less weak, not SOB anymore; has o2 at home per pt.   afebrile, dyspneic, more weak today   - more alert, denies dyspnea, plan of care discussed in length   - reports tremors in the hands, denies dyspnea, CP, papitations   awaiting thoracocentesis, denies dyspnea, palpitations, tele - no events   - s/p right sided thoracocentesis 900 cc, clear fluids, denies CP, dyspnea improved    ROS - all other systems negative, except mention above    Vital Signs Last 24 Hrs  T(C): 36.3 (2017 16:00), Max: 36.4 (2017 09:49)  T(F): 97.4 (2017 16:00), Max: 97.6 (2017 10:30)  HR: 87 (2017 16:00) (69 - 102)  BP: 116/69 (2017 16:00) (114/51 - 135/64)  BP(mean): --  RR: 16 (2017 16:00) (16 - 17)  SpO2: 99% (2017 16:00) (95% - 100%)    GENERAL APPEARANCE: mildly dyspneic, in no distress. Pt. is alert, oriented, and pleasant.  HEENT:  Pupils are normal and react normally. No icterus. Mucous membranes well colored.  NECK:  Supple. No lymphadenopathy. Jugular venous pressure not elevated. Carotids equal.   HEART: S1S2 reg with 2/6 systolic murmur  CHEST:  bilat ronchi with crackles  ABDOMEN:  Soft and nontender.   EXTREMITIES: pos edema, + hand tremor bilateral  SKIN:  No rash or significant lesions are noted.  CNS: no focal deficits    LABS:      138  |  97  |  57<H>  ----------------------------<  88  3.1<L>   |  35<H>  |  1.29    Ca    8.0<L>      2017 06:16    TPro  6.9  /  Alb  3.2<L>  /  TBili  0.2  /  DBili  <0.1  /  AST  25  /  ALT  28  /  AlkPhos  61                              12.3   13.2  )-----------( 232      ( 2017 06:16 )             37.1             LIVER FUNCTIONS - ( 2017 15:52 )  Alb: 3.2 g/dL / Pro: 6.9 gm/dL / ALK PHOS: 61 U/L / ALT: 28 U/L / AST: 25 U/L / GGT: x                       138  |  98  |  47<H>  ----------------------------<  107<H>  3.6   |  33<H>  |  1.32<H>    Ca    8.3<L>      2017 06:11    TPro  6.9  /  Alb  3.2<L>  /  TBili  0.2  /  DBili  <0.1  /  AST  25  /  ALT  28  /  AlkPhos                            12.2   14.8  )-----------( 240      ( 2017 06:11 )             35.7         LIVER FUNCTIONS - ( 2017 15:52 )  Alb: 3.2 g/dL / Pro: 6.9 gm/dL / ALK PHOS: 61 U/L / ALT: 28 U/L / AST: 25 U/L / GGT: x           Urinalysis Basic - ( 2017 14:40 )  Color: Yellow / Appearance: Clear / S.005 / pH: x  Gluc: x / Ketone: Negative  / Bili: Negative / Urobili: Negative mg/dL   Blood: x / Protein: Negative mg/dL / Nitrite: Negative   Leuk Esterase: Moderate / RBC: 6-10 /HPF / WBC 26-50   Sq Epi: x / Non Sq Epi: Few / Bacteria: Moderate        138  |  98  |  47<H>  ----------------------------<  107<H>  3.6   |  33<H>  |  1.32<H>    Ca    8.3<L>      2017 06:11    TPro  6.9  /  Alb  3.2<L>  /  TBili  0.2  /  DBili  <0.1  /  AST  25  /  ALT  28  /  AlkPhos  61                              12.2   14.8  )-----------( 240      ( 2017 06:11 )             35.7     LIVER FUNCTIONS - ( 2017 15:52 )  Alb: 3.2 g/dL / Pro: 6.9 gm/dL / ALK PHOS: 61 U/L / ALT: 28 U/L / AST: 25 U/L / GGT: x               135  |  97  |  31<H>  ----------------------------<  128<H>  3.4<L>   |  35<H>  |  1.02    Ca    8.2<L>      2017 06:03                        11.9   8.3   )-----------( 242      ( 2017 06:03 )             35.7   Urinalysis Basic - ( 2017 14:40 )    Color: Yellow / Appearance: Clear / S.005 / pH: x  Gluc: x / Ketone: Negative  / Bili: Negative / Urobili: Negative mg/dL   Blood: x / Protein: Negative mg/dL / Nitrite: Negative   Leuk Esterase: Moderate / RBC: 6-10 /HPF / WBC 26-50   Sq Epi: x / Non Sq Epi: Few / Bacteria: Moderate          139  |  103  |  29<H>  ----------------------------<  80  3.7   |  33<H>  |  0.90    Ca    8.0<L>      2017 06:00                          11.0   6.8   )-----------( 215      ( 2017 06:00 )             32.5     06-22    135  |  102  |  22  ----------------------------<  189<H>  4.5   |  29  |  1.03    Ca    8.2<L>      2017 04:26  Phos  2.8     22  Mg     2.0         TPro  6.5  /  Alb  2.9<L>  /  TBili  0.3  /  DBili  x   /  AST  16  /  ALT  12  /  AlkPhos  65                              11.1   5.4   )-----------( 205      ( 2017 04:26 )             33.0       CARDIAC MARKERS ( 2017 08:58 )  <0.015 ng/mL / x     / x     / x     / x      CARDIAC MARKERS ( 2017 06:15 )  <0.015 ng/mL / x     / 58 U/L / x     / x      CARDIAC MARKERS ( 2017 04:26 )  <0.015 ng/mL / x     / 82 U/L / x     / x            LIVER FUNCTIONS - ( 2017 04:26 )  Alb: 2.9 g/dL / Pro: 6.5 gm/dL / ALK PHOS: 65 U/L / ALT: 12 U/L / AST: 16 U/L / GGT: x                 Urinalysis Basic - ( 2017 18:46 )    Color: Yellow / Appearance: Clear / S.020 / pH: x  Gluc: x / Ketone: Negative  / Bili: Negative / Urobili: Negative mg/dL   Blood: x / Protein: 30 mg/dL / Nitrite: Negative   Leuk Esterase: Trace / RBC: 6-10 /HPF / WBC 3-5   Sq Epi: x / Non Sq Epi: Occasional / Bacteria: Few                            11.1   5.4   )-----------( 205      ( 2017 04:26 )               33.0     06-22    135  |  102  |  22  ----------------------------<  189<H>  4.5   |  29  |  1.03    Ca    8.2<L>      2017 04:26  Phos  2.8     06-  Mg     2.0     -    TPro  6.5  /  Alb  2.9<L>  /  TBili  0.3  /  DBili  x   /  AST  16  /  ALT  12  /  AlkPhos  65      LIVER FUNCTIONS - ( 2017 04:26 )  Alb: 2.9 g/dL / Pro: 6.5 gm/dL / ALK PHOS: 65 U/L / ALT: 12 U/L / AST: 16 U/L / GGT: x           PT/INR - ( 2017 16:32 )   PT: 11.1 sec;   INR: 1.03 ratio         PTT - ( 2017 16:32 )  PTT:28.6 sec  CARDIAC MARKERS ( 2017 06:15 )  <0.015 ng/mL / x     / 58 U/L / x     / x      CARDIAC MARKERS ( 2017 04:26 )  <0.015 ng/mL / x     / 82 U/L / x     / x      CARDIAC MARKERS ( 2017 16:32 )  <0.015 ng/mL / x     / x     / x     / x          Urinalysis Basic - ( 2017 18:46 )    Color: Yellow / Appearance: Clear / S.020 / pH: x  Gluc: x / Ketone: Negative  / Bili: Negative / Urobili: Negative mg/dL   Blood: x / Protein: 30 mg/dL / Nitrite: Negative   Leuk Esterase: Trace / RBC: 6-10 /HPF / WBC 3-5   Sq Epi: x / Non Sq Epi: Occasional / Bacteria: Few     CHEST SINGLE VIEW FRONTAL                         2017  IMPRESSION:     Since 2017, unchanged small right pleural effusion with   associated atelectasis.      CT CHEST               2017    Moderate to large right pleural effusion with right lower lobe   compressive atelectasis. Trace left pleural effusion with minimal left   basilar atelectasis.     Scattered less than 0.3 cm pulmonary nodules      Problem/Plan - 1:  ·  Problem: Exertional dyspnea.  Plan: R/O coronary ischemia /Doubt HCAP /Doubt acute decompensated CHFvs COPD  - Due to large right sided effusion s/p Right thoracocentesis 900 cc - f/u fluid analysis  - CXR after procedure - tiny rigth pneumothorax, will repeat CXR in am  - d/w Dr. Glover will benefit from right thoracocentesis diagnostic and therapiutic  - IR consult for thoracocentesis   - s/p  IV diuresis, daily weights, will hold lasix due to worsening of renal function  - improved, poss COPD related; po prednisone taper  # doubt acute PNA-CXR unchanged- given first dose of cefepime and vanco in Ed-will check blood cx and follow off antibiotics for now      Problem/Plan - 2:  ·  Problem: Hematuria.  Plan: chronic microscopic hematuria -not worse that before- secondary to bladder cancer   - outpt f/up     Problem/Plan - 3:  ·  Problem: Anemia.  Plan: chronic anemia -likely secondary to chronic hematuria vs anemia of chronic disease  #Monitor H/H.     Problem/Plan - 4:  ·  Problem: Weakness generalized.  Plan: secondary to s/p intravesical chemo vs r/o coronnary ischemia vs r/o FTT  doubt acute CHF exacerbation or HCAP  - resolved  - pt is also on high dose trazodone, gabapentin; no change for now if weakness recur decrease dose   - check TSH, B12, vit D - wnl    Problem/Plan - 5:  ·  Problem: Diastolic heart failure.  Plan: chronic diastolic heart failure ,doubt acute  clinical decompensation   continue lasix IV due to plural effusion  continue statin  start asa81 daily.     Problem/Plan - 6:  Problem: COPD (chronic obstructive pulmonary disease). Plan: stable  continue home O2  neb tx prn   inhaled steroid  continue.    Problem/Plan - 7:  ·  Problem: Bladder cancer.  Plan: stable s/p intravesical chemo and bladder mass resection   continue tamsulosin  DVT prophylaxis- lovenox.     # UTI  -c/w IV ceftriaxone #2  - f/u urine cx - no growth    Scattered less than 0.3 cm pulmonary nodules  - outpatient follow up with pulmonology, d/w relatives          Dispo: CXR in am, PT, SW, d/c planning for linnea

## 2017-06-28 VITALS — OXYGEN SATURATION: 96 %

## 2017-06-28 LAB
ANION GAP SERPL CALC-SCNC: 6 MMOL/L — SIGNIFICANT CHANGE UP (ref 5–17)
BUN SERPL-MCNC: 50 MG/DL — HIGH (ref 7–23)
CALCIUM SERPL-MCNC: 8.3 MG/DL — LOW (ref 8.5–10.1)
CHLORIDE SERPL-SCNC: 99 MMOL/L — SIGNIFICANT CHANGE UP (ref 96–108)
CO2 SERPL-SCNC: 32 MMOL/L — HIGH (ref 22–31)
CREAT SERPL-MCNC: 1.03 MG/DL — SIGNIFICANT CHANGE UP (ref 0.5–1.3)
GLUCOSE SERPL-MCNC: 96 MG/DL — SIGNIFICANT CHANGE UP (ref 70–99)
HCT VFR BLD CALC: 35.4 % — LOW (ref 39–50)
HGB BLD-MCNC: 11.6 G/DL — LOW (ref 13–17)
MCHC RBC-ENTMCNC: 29.5 PG — SIGNIFICANT CHANGE UP (ref 27–34)
MCHC RBC-ENTMCNC: 32.7 GM/DL — SIGNIFICANT CHANGE UP (ref 32–36)
MCV RBC AUTO: 90.1 FL — SIGNIFICANT CHANGE UP (ref 80–100)
NON-GYN CYTOLOGY SPEC: SIGNIFICANT CHANGE UP
PLATELET # BLD AUTO: 215 K/UL — SIGNIFICANT CHANGE UP (ref 150–400)
POTASSIUM SERPL-MCNC: 3.5 MMOL/L — SIGNIFICANT CHANGE UP (ref 3.5–5.3)
POTASSIUM SERPL-SCNC: 3.5 MMOL/L — SIGNIFICANT CHANGE UP (ref 3.5–5.3)
RBC # BLD: 3.92 M/UL — LOW (ref 4.2–5.8)
RBC # FLD: 12.8 % — SIGNIFICANT CHANGE UP (ref 10.3–14.5)
SODIUM SERPL-SCNC: 137 MMOL/L — SIGNIFICANT CHANGE UP (ref 135–145)
WBC # BLD: 15.7 K/UL — HIGH (ref 3.8–10.5)
WBC # FLD AUTO: 15.7 K/UL — HIGH (ref 3.8–10.5)

## 2017-06-28 PROCEDURE — 71010: CPT | Mod: 26

## 2017-06-28 PROCEDURE — 99233 SBSQ HOSP IP/OBS HIGH 50: CPT

## 2017-06-28 RX ORDER — KETOCONAZOLE 20 MG/G
1 AEROSOL, FOAM TOPICAL
Qty: 1 | Refills: 0 | OUTPATIENT
Start: 2017-06-28 | End: 2017-07-08

## 2017-06-28 RX ORDER — ASPIRIN/CALCIUM CARB/MAGNESIUM 324 MG
1 TABLET ORAL
Qty: 30 | Refills: 0 | OUTPATIENT
Start: 2017-06-28 | End: 2017-07-28

## 2017-06-28 RX ORDER — CLONAZEPAM 1 MG
1 TABLET ORAL
Qty: 0 | Refills: 0 | COMMUNITY

## 2017-06-28 RX ORDER — FUROSEMIDE 40 MG
1 TABLET ORAL
Qty: 0 | Refills: 0 | COMMUNITY

## 2017-06-28 RX ORDER — FUROSEMIDE 40 MG
1 TABLET ORAL
Qty: 30 | Refills: 0 | OUTPATIENT
Start: 2017-06-28 | End: 2017-07-28

## 2017-06-28 RX ADMIN — KETOCONAZOLE 1 APPLICATION(S): 20 AEROSOL, FOAM TOPICAL at 06:15

## 2017-06-28 RX ADMIN — Medication 40 MILLIGRAM(S): at 06:15

## 2017-06-28 RX ADMIN — Medication 0.1 MILLIGRAM(S): at 06:15

## 2017-06-28 RX ADMIN — CEFTRIAXONE 100 GRAM(S): 500 INJECTION, POWDER, FOR SOLUTION INTRAMUSCULAR; INTRAVENOUS at 15:59

## 2017-06-28 RX ADMIN — ENOXAPARIN SODIUM 40 MILLIGRAM(S): 100 INJECTION SUBCUTANEOUS at 06:15

## 2017-06-28 RX ADMIN — Medication 1 MILLIGRAM(S): at 11:59

## 2017-06-28 RX ADMIN — FAMOTIDINE 20 MILLIGRAM(S): 10 INJECTION INTRAVENOUS at 11:59

## 2017-06-28 RX ADMIN — Medication 3 MILLILITER(S): at 13:37

## 2017-06-28 RX ADMIN — Medication 81 MILLIGRAM(S): at 11:59

## 2017-06-28 NOTE — DISCHARGE NOTE ADULT - ADDITIONAL INSTRUCTIONS
follow up with PCP within 1 week  return to ED if fever, abdominal pain, nausea, vomiting, chest pain, dyspnea

## 2017-06-28 NOTE — DISCHARGE NOTE ADULT - MEDICATION SUMMARY - MEDICATIONS TO CHANGE
I will SWITCH the dose or number of times a day I take the medications listed below when I get home from the hospital:    clonazePAM 0.5 mg oral tablet  -- 1 tab(s) by mouth 3 times a day    furosemide 40 mg oral tablet  -- 1 tab(s) by mouth once a day

## 2017-06-28 NOTE — DISCHARGE NOTE ADULT - MEDICATION SUMMARY - MEDICATIONS TO TAKE
I will START or STAY ON the medications listed below when I get home from the hospital:    predniSONE 10 mg oral tablet  -- 3  tab(s) by mouth once a day for 3 days, than 2 tabs once for 3 days, 1 tabs once for 3 days  -- It is very important that you take or use this exactly as directed.  Do not skip doses or discontinue unless directed by your doctor.  Obtain medical advice before taking any non-prescription drugs as some may affect the action of this medication.  Take with food or milk.    -- Indication: For COPD (chronic obstructive pulmonary disease)    aspirin 81 mg oral tablet, chewable  -- 1 tab(s) by mouth once a day  -- Indication: For Diastolic heart failure    cloNIDine 0.1 mg oral tablet  -- 1 tab(s) by mouth once a day  -- Indication: For Hypertension    tamsulosin 0.4 mg oral capsule  -- 1 cap(s) by mouth once a day  -- Indication: For Bladder cancer    gabapentin 300 mg oral capsule  -- 1 cap(s) by mouth once a day (at bedtime)  -- Indication: For Pain    clonazePAM 0.5 mg oral tablet  -- 1 tab(s) by mouth 3 times a day, As Needed for tremor  -- Indication: For tremor    mirtazapine 7.5 mg oral tablet  -- 1 tab(s) by mouth once a day (at bedtime)  -- Indication: For insomnia    traZODone 100 mg oral tablet  -- 1 tab(s) by mouth once a day (at bedtime)  -- Indication: For Poor sleep    atorvastatin 20 mg oral tablet  -- 1 tab(s) by mouth once a day  -- Indication: For Hyperlipidemia    Advair  mcg-21 mcg/inh inhalation aerosol  -- 2 puff(s) inhaled 2 times a day  -- Indication: For Dyspnea    ProAir HFA 90 mcg/inh inhalation aerosol  -- 2 puff(s) inhaled 4 times a day  -- Indication: For Dyspnea    Spiriva 18 mcg inhalation capsule  -- 1 cap(s) inhaled once a day  -- Indication: For Dyspnea    ketoconazole 2% topical cream  -- 1 application on skin every 12 hours  -- Indication: For face rash    furosemide 20 mg oral tablet  -- 1 tab(s) by mouth once a day  -- Avoid prolonged or excessive exposure to direct and/or artificial sunlight while taking this medication.  It is very important that you take or use this exactly as directed.  Do not skip doses or discontinue unless directed by your doctor.  It may be advisable to drink a full glass orange juice or eat a banana daily while taking this medication.    -- Indication: For Diastolic heart failure    Klor-Con M20 oral tablet, extended release  -- 1 tab(s) by mouth once a day  -- Indication: For Diastolic heart failure    magnesium aspartate  -- 1 tab(s) by mouth once a day  -- Indication: For supplements    folic acid 1 mg oral tablet  -- 1 tab(s) by mouth once a day  -- Indication: For vitamins    Vitamin D3 1000 intl units oral capsule  -- 1 cap(s) by mouth once a day  -- Indication: For vitamins

## 2017-06-28 NOTE — DISCHARGE NOTE ADULT - MEDICATION SUMMARY - MEDICATIONS TO STOP TAKING
I will STOP taking the medications listed below when I get home from the hospital:    oxycodone-acetaminophen 7.5 mg-325 mg oral tablet, extended release  -- 2 tab(s) by mouth every 12 hours    Pyridium 100 mg oral tablet  -- 1 tab(s) by mouth 3 times a day (after meals)  -- May discolor urine or feces.  Medication should be taken with plenty of water.  Take with food or milk.

## 2017-06-28 NOTE — DISCHARGE NOTE ADULT - PLAN OF CARE
prevent worsening follow up with PCP within 1 week follow up with Dr. Glover for fluid analysis results Blood in urine follow up with urology within 1 -2 weeks urine culture - no infection follow up with dr. Glover for repeat imaging to monitor repeat cbc in 3-7 days to establish resolution of leukocytosis

## 2017-06-28 NOTE — DISCHARGE NOTE ADULT - HOSPITAL COURSE
76 y.o. male with PMH of COPD home O2 dependent 2L, Diastolic CHF, HTN, HLD, Depression, GERD,  bladder surgery on Tuesday June 13th, removed cancerous lump, left the same day, given chemo inside the bladder admitted on 6/21/17 for weakness. Pt states that since the surgery he feels weak, tired, and cant move his legs due to b/l knee pain .His family also states that for past 3 days has been appearing to have intermittent SOB . Pt also states he has mild bloody urine and decreased PO intake. Also c/o chronic  incontinence. Patient was unable to do home physical therapy due to generalized weakness and physical therapist recommended to go to ER.  Was adm for weakness, poss infection, poss copd exac    6/22 Feels better today, less weak, not SOB anymore; has o2 at home per pt.  6/23 afebrile, dyspneic, more weak today   6/24- more alert, denies dyspnea, plan of care discussed in length  6/25 - reports tremors in the hands, denies dyspnea, CP, papitations  6/26 awaiting thoracocentesis, denies dyspnea, palpitations, tele - no events  6/27 - s/p right sided thoracocentesis 900 cc, clear fluids, denies CP, dyspnea improved  6/28 - denies dyspnea, cough, eager to go home, denies CP  ROS - all other systems negative, except mention above  Vital Signs Last 24 Hrs  T(C): 36.7 (28 Jun 2017 10:05), Max: 36.7 (27 Jun 2017 20:40)  T(F): 98 (28 Jun 2017 10:05), Max: 98 (27 Jun 2017 20:40)  HR: 78 (28 Jun 2017 14:17) (71 - 87)  BP: 119/57 (28 Jun 2017 10:05) (116/69 - 138/62)  BP(mean): --  RR: 16 (28 Jun 2017 10:05) (16 - 16)  SpO2: 96% (28 Jun 2017 14:17) (94% - 99%)  GENERAL APPEARANCE: mildly dyspneic, in no distress. Pt. is alert, oriented, and pleasant.  HEENT:  Pupils are normal and react normally. No icterus. Mucous membranes well colored.  NECK:  Supple. No lymphadenopathy. Jugular venous pressure not elevated. Carotids equal.   HEART: S1S2 reg with 2/6 systolic murmur  CHEST:  bilat ronchi with crackles  ABDOMEN:  Soft and nontender.   EXTREMITIES: pos edema, + hand tremor bilateral  SKIN:  No rash or significant lesions are noted.  CNS: no focal deficits      Problem/Plan - 1:  ·  Problem: Exertional dyspnea.  Plan: R/O coronary ischemia /Doubt HCAP /Doubt acute decompensated CHFvs COPD  - Due to large right sided effusion s/p Right thoracocentesis 900 cc - f/u fluid analysis  - CXR after procedure - tiny rigth pneumothorax, will repeat CXR today - no Pneumotorax  - d/w Dr. Glover  - s/p  IV diuresis, daily weights, will hold lasix due to worsening of renal function, restart 20 mg lasix  - improved, poss COPD related; po prednisone taper  # doubt acute PNA-CXR unchanged- given first dose of cefepime and vanco in Ed-will check blood cx and follow off antibiotics for now      Problem/Plan - 2:  ·  Problem: Hematuria.  Plan: chronic microscopic hematuria -not worse that before- secondary to bladder cancer   - outpt f/up with urology    Problem/Plan - 3:  ·  Problem: Anemia.  Plan: chronic anemia -likely secondary to chronic hematuria vs anemia of chronic disease  #Monitor H/H.     Problem/Plan - 4:  ·  Problem: Weakness generalized.  Plan: secondary to s/p intravesical chemo vs r/o coronnary ischemia vs r/o FTT  doubt acute CHF exacerbation or HCAP  - resolved  - pt is also on high dose trazodone, gabapentin; no change for now if weakness recur decrease dose   - check TSH, B12, vit D - wnl    Problem/Plan - 5:  ·  Problem: Diastolic heart failure.  Plan: chronic diastolic heart failure ,doubt acute  clinical decompensation   continue lasix IV due to plural effusion  continue statin  start asa81 daily.     Problem/Plan - 6:  Problem: COPD (chronic obstructive pulmonary disease). Plan: stable  continue home O2  neb tx prn   inhaled steroid  continue.    Problem/Plan - 7:  ·  Problem: Bladder cancer.  Plan: stable s/p intravesical chemo and bladder mass resection june 13th  continue tamsulosin  DVT prophylaxis- lovenox.     # Pyuria and hematuria unlikely UTI  -c/w IV ceftriaxone #2  - f/u urine cx - no growth    Scattered less than 0.3 cm pulmonary nodules  - outpatient follow up with pulmonology, d/w relatives    Disposition - medically optimized to be discharged home with close follow up with PCP within 1 week  return to ED if fever, abdominal pain, nausea, vomiting, chest pain, dyspnea  Discharge plan discussed with patient, RN  Patient advised to follow up with PCP within 3-7 days  time spend 40 min            Dispo: CXR in am, PT, SW, d/c planning for linnea

## 2017-06-28 NOTE — DISCHARGE NOTE ADULT - CARE PROVIDER_API CALL
Dg Glover), Internal Medicine; Pulmonary Disease  [address] [place] [address]  Phone: [phone]  Fax: [phone]

## 2017-06-28 NOTE — DISCHARGE NOTE ADULT - CARE PLAN
Principal Discharge DX:	Chronic obstructive pulmonary disease with acute exacerbation Principal Discharge DX:	Chronic obstructive pulmonary disease with acute exacerbation  Goal:	prevent worsening  Instructions for follow-up, activity and diet:	follow up with PCP within 1 week  Secondary Diagnosis:	Pleural effusion  Instructions for follow-up, activity and diet:	follow up with Dr. Glover for fluid analysis results  Secondary Diagnosis:	Hematuria  Goal:	urine culture - no infection  Instructions for follow-up, activity and diet:	Blood in urine follow up with urology within 1 -2 weeks  Secondary Diagnosis:	Pulmonary nodule  Instructions for follow-up, activity and diet:	follow up with dr. Glover for repeat imaging to monitor  Secondary Diagnosis:	Leukocytosis  Instructions for follow-up, activity and diet:	repeat cbc in 3-7 days to establish resolution of leukocytosis

## 2017-06-28 NOTE — DISCHARGE NOTE ADULT - OTHER SIGNIFICANT FINDINGS
Complete Blood Count in AM (06.28.17 @ 06:47)    WBC Count: 15.7 K/uL    RBC Count: 3.92 M/uL    Hemoglobin: 11.6 g/dL    Hematocrit: 35.4 %    Mean Cell Volume: 90.1 fl    Mean Cell Hemoglobin: 29.5 pg    Mean Cell Hemoglobin Conc: 32.7 gm/dL    Red Cell Distrib Width: 12.8 %    Platelet Count - Automated: 215 K/uL    Basic Metabolic Panel in AM (06.28.17 @ 06:47)    Sodium, Serum: 137 mmol/L    Potassium, Serum: 3.5 mmol/L    Chloride, Serum: 99 mmol/L    Carbon Dioxide, Serum: 32 mmol/L    Anion Gap, Serum: 6 mmol/L    Blood Urea Nitrogen, Serum: 50 mg/dL    Creatinine, Serum: 1.03 mg/dL    Glucose, Serum: 96 mg/dL    Calcium, Total Serum: 8.3 mg/dL   CT CHEST               06/24/2017    Moderate to large right pleural effusion with right lower lobe   compressive atelectasis. Trace left pleural effusion with minimal left   basilar atelectasis.     Scattered less than 0.3 cm pulmonary nodules    < from: Xray Chest 1 View AP/PA. (06.28.17 @ 09:19) >  IMPRESSION: Small right pleural effusion has developed.  No pneumothorax.    < end of copied text >

## 2017-07-02 LAB
CULTURE RESULTS: SIGNIFICANT CHANGE UP
SPECIMEN SOURCE: SIGNIFICANT CHANGE UP

## 2017-07-03 ENCOUNTER — RX RENEWAL (OUTPATIENT)
Age: 77
End: 2017-07-03

## 2017-07-03 DIAGNOSIS — J44.1 CHRONIC OBSTRUCTIVE PULMONARY DISEASE WITH (ACUTE) EXACERBATION: ICD-10-CM

## 2017-07-03 DIAGNOSIS — E78.00 PURE HYPERCHOLESTEROLEMIA, UNSPECIFIED: ICD-10-CM

## 2017-07-03 DIAGNOSIS — J90 PLEURAL EFFUSION, NOT ELSEWHERE CLASSIFIED: ICD-10-CM

## 2017-07-03 DIAGNOSIS — R31.9 HEMATURIA, UNSPECIFIED: ICD-10-CM

## 2017-07-03 DIAGNOSIS — K21.9 GASTRO-ESOPHAGEAL REFLUX DISEASE WITHOUT ESOPHAGITIS: ICD-10-CM

## 2017-07-03 DIAGNOSIS — D50.0 IRON DEFICIENCY ANEMIA SECONDARY TO BLOOD LOSS (CHRONIC): ICD-10-CM

## 2017-07-03 DIAGNOSIS — R91.8 OTHER NONSPECIFIC ABNORMAL FINDING OF LUNG FIELD: ICD-10-CM

## 2017-07-03 DIAGNOSIS — E78.5 HYPERLIPIDEMIA, UNSPECIFIED: ICD-10-CM

## 2017-07-03 DIAGNOSIS — Z99.81 DEPENDENCE ON SUPPLEMENTAL OXYGEN: ICD-10-CM

## 2017-07-03 DIAGNOSIS — J95.811 POSTPROCEDURAL PNEUMOTHORAX: ICD-10-CM

## 2017-07-03 DIAGNOSIS — R25.1 TREMOR, UNSPECIFIED: ICD-10-CM

## 2017-07-03 DIAGNOSIS — Z92.21 PERSONAL HISTORY OF ANTINEOPLASTIC CHEMOTHERAPY: ICD-10-CM

## 2017-07-03 DIAGNOSIS — I11.0 HYPERTENSIVE HEART DISEASE WITH HEART FAILURE: ICD-10-CM

## 2017-07-03 DIAGNOSIS — T45.1X5A ADVERSE EFFECT OF ANTINEOPLASTIC AND IMMUNOSUPPRESSIVE DRUGS, INITIAL ENCOUNTER: ICD-10-CM

## 2017-07-03 DIAGNOSIS — R31.29 OTHER MICROSCOPIC HEMATURIA: ICD-10-CM

## 2017-07-03 DIAGNOSIS — I50.32 CHRONIC DIASTOLIC (CONGESTIVE) HEART FAILURE: ICD-10-CM

## 2017-07-03 DIAGNOSIS — D63.8 ANEMIA IN OTHER CHRONIC DISEASES CLASSIFIED ELSEWHERE: ICD-10-CM

## 2017-07-03 DIAGNOSIS — M19.90 UNSPECIFIED OSTEOARTHRITIS, UNSPECIFIED SITE: ICD-10-CM

## 2017-07-03 DIAGNOSIS — N39.0 URINARY TRACT INFECTION, SITE NOT SPECIFIED: ICD-10-CM

## 2017-07-03 DIAGNOSIS — R53.1 WEAKNESS: ICD-10-CM

## 2017-07-03 DIAGNOSIS — Z87.891 PERSONAL HISTORY OF NICOTINE DEPENDENCE: ICD-10-CM

## 2017-07-03 DIAGNOSIS — C67.9 MALIGNANT NEOPLASM OF BLADDER, UNSPECIFIED: ICD-10-CM

## 2017-07-03 DIAGNOSIS — D72.829 ELEVATED WHITE BLOOD CELL COUNT, UNSPECIFIED: ICD-10-CM

## 2017-07-03 DIAGNOSIS — R09.02 HYPOXEMIA: ICD-10-CM

## 2017-07-13 ENCOUNTER — APPOINTMENT (OUTPATIENT)
Dept: INTERNAL MEDICINE | Facility: CLINIC | Age: 77
End: 2017-07-13

## 2017-07-13 VITALS
HEIGHT: 66 IN | DIASTOLIC BLOOD PRESSURE: 60 MMHG | TEMPERATURE: 98.1 F | WEIGHT: 142 LBS | HEART RATE: 90 BPM | SYSTOLIC BLOOD PRESSURE: 130 MMHG | BODY MASS INDEX: 22.82 KG/M2 | OXYGEN SATURATION: 95 % | RESPIRATION RATE: 16 BRPM

## 2017-07-13 DIAGNOSIS — R42 DIZZINESS AND GIDDINESS: ICD-10-CM

## 2017-07-13 LAB
BILIRUB UR QL STRIP: NORMAL
CLARITY UR: CLEAR
COLLECTION METHOD: NORMAL
GLUCOSE UR-MCNC: NORMAL
HCG UR QL: 0.2 EU/DL
HGB UR QL STRIP.AUTO: NORMAL
KETONES UR-MCNC: NORMAL
LEUKOCYTE ESTERASE UR QL STRIP: NORMAL
NITRITE UR QL STRIP: NORMAL
PH UR STRIP: 8
PROT UR STRIP-MCNC: NORMAL
SP GR UR STRIP: 1

## 2017-07-13 RX ORDER — TRAZODONE HYDROCHLORIDE 50 MG/1
50 TABLET ORAL
Qty: 180 | Refills: 3 | Status: DISCONTINUED | COMMUNITY
Start: 2016-11-20 | End: 2017-07-13

## 2017-07-13 RX ORDER — OXYCODONE AND ACETAMINOPHEN 5; 325 MG/1; MG/1
5-325 TABLET ORAL
Qty: 30 | Refills: 0 | Status: ACTIVE | COMMUNITY
Start: 2017-07-13 | End: 1900-01-01

## 2017-07-13 RX ORDER — CLONAZEPAM 0.5 MG/1
0.5 TABLET ORAL
Qty: 90 | Refills: 0 | Status: ACTIVE | COMMUNITY
Start: 2017-07-13 | End: 1900-01-01

## 2017-07-13 RX ORDER — TRAZODONE HYDROCHLORIDE 50 MG/1
50 TABLET ORAL
Refills: 0 | Status: ACTIVE | COMMUNITY

## 2017-07-14 LAB
APPEARANCE: CLEAR
BACTERIA: NEGATIVE
BILIRUBIN URINE: NEGATIVE
BLOOD URINE: NEGATIVE
COLOR: YELLOW
GLUCOSE QUALITATIVE U: NORMAL MG/DL
HYALINE CASTS: 1 /LPF
KETONES URINE: NEGATIVE
LEUKOCYTE ESTERASE URINE: NEGATIVE
MICROSCOPIC-UA: NORMAL
NITRITE URINE: NEGATIVE
PH URINE: 8.5
PROTEIN URINE: 30 MG/DL
RED BLOOD CELLS URINE: 6 /HPF
SPECIFIC GRAVITY URINE: 1.02
SQUAMOUS EPITHELIAL CELLS: 2 /HPF
UROBILINOGEN URINE: NORMAL MG/DL
WHITE BLOOD CELLS URINE: 4 /HPF

## 2017-07-17 LAB — BACTERIA UR CULT: NORMAL

## 2017-07-25 ENCOUNTER — APPOINTMENT (OUTPATIENT)
Dept: INTERNAL MEDICINE | Facility: CLINIC | Age: 77
End: 2017-07-25

## 2017-07-26 LAB
CULTURE RESULTS: SIGNIFICANT CHANGE UP
SPECIMEN SOURCE: SIGNIFICANT CHANGE UP

## 2017-07-28 ENCOUNTER — OTHER (OUTPATIENT)
Age: 77
End: 2017-07-28

## 2017-07-31 ENCOUNTER — MEDICATION RENEWAL (OUTPATIENT)
Age: 77
End: 2017-07-31

## 2017-08-01 LAB — GLUCOSE SERPL-MCNC: 119

## 2017-08-10 ENCOUNTER — RX RENEWAL (OUTPATIENT)
Age: 77
End: 2017-08-10

## 2017-08-16 LAB
CULTURE RESULTS: SIGNIFICANT CHANGE UP
SPECIMEN SOURCE: SIGNIFICANT CHANGE UP

## 2017-08-21 ENCOUNTER — RX RENEWAL (OUTPATIENT)
Age: 77
End: 2017-08-21

## 2017-08-23 ENCOUNTER — INPATIENT (INPATIENT)
Facility: HOSPITAL | Age: 77
LOS: 5 days | Discharge: NURSING, REHAB OR LONG-TERM CARE | End: 2017-08-29
Attending: INTERNAL MEDICINE | Admitting: INTERNAL MEDICINE
Payer: MEDICARE

## 2017-08-23 ENCOUNTER — APPOINTMENT (OUTPATIENT)
Dept: INTERNAL MEDICINE | Facility: CLINIC | Age: 77
End: 2017-08-23

## 2017-08-23 VITALS
DIASTOLIC BLOOD PRESSURE: 94 MMHG | HEART RATE: 85 BPM | WEIGHT: 139.99 LBS | TEMPERATURE: 98 F | OXYGEN SATURATION: 96 % | RESPIRATION RATE: 19 BRPM | SYSTOLIC BLOOD PRESSURE: 152 MMHG | HEIGHT: 66 IN

## 2017-08-23 DIAGNOSIS — Z98.49 CATARACT EXTRACTION STATUS, UNSPECIFIED EYE: Chronic | ICD-10-CM

## 2017-08-23 LAB
ALBUMIN SERPL ELPH-MCNC: 3.7 G/DL — SIGNIFICANT CHANGE UP (ref 3.3–5)
ALP SERPL-CCNC: 76 U/L — SIGNIFICANT CHANGE UP (ref 40–120)
ALT FLD-CCNC: 15 U/L — SIGNIFICANT CHANGE UP (ref 12–78)
ANION GAP SERPL CALC-SCNC: 9 MMOL/L — SIGNIFICANT CHANGE UP (ref 5–17)
APPEARANCE UR: CLEAR — SIGNIFICANT CHANGE UP
APTT BLD: 28.7 SEC — SIGNIFICANT CHANGE UP (ref 27.5–37.4)
AST SERPL-CCNC: 26 U/L — SIGNIFICANT CHANGE UP (ref 15–37)
BACTERIA # UR AUTO: (no result)
BASOPHILS # BLD AUTO: 0.1 K/UL — SIGNIFICANT CHANGE UP (ref 0–0.2)
BASOPHILS NFR BLD AUTO: 1.3 % — SIGNIFICANT CHANGE UP (ref 0–2)
BILIRUB SERPL-MCNC: 0.4 MG/DL — SIGNIFICANT CHANGE UP (ref 0.2–1.2)
BILIRUB UR-MCNC: NEGATIVE — SIGNIFICANT CHANGE UP
BUN SERPL-MCNC: 23 MG/DL — SIGNIFICANT CHANGE UP (ref 7–23)
CALCIUM SERPL-MCNC: 9 MG/DL — SIGNIFICANT CHANGE UP (ref 8.5–10.1)
CHLORIDE SERPL-SCNC: 102 MMOL/L — SIGNIFICANT CHANGE UP (ref 96–108)
CO2 SERPL-SCNC: 27 MMOL/L — SIGNIFICANT CHANGE UP (ref 22–31)
COLOR SPEC: YELLOW — SIGNIFICANT CHANGE UP
CREAT SERPL-MCNC: 1.08 MG/DL — SIGNIFICANT CHANGE UP (ref 0.5–1.3)
DIFF PNL FLD: (no result)
EOSINOPHIL # BLD AUTO: 0.1 K/UL — SIGNIFICANT CHANGE UP (ref 0–0.5)
EOSINOPHIL NFR BLD AUTO: 0.8 % — SIGNIFICANT CHANGE UP (ref 0–6)
EPI CELLS # UR: SIGNIFICANT CHANGE UP
GLUCOSE SERPL-MCNC: 91 MG/DL — SIGNIFICANT CHANGE UP (ref 70–99)
GLUCOSE UR QL: NEGATIVE MG/DL — SIGNIFICANT CHANGE UP
HCT VFR BLD CALC: 39.4 % — SIGNIFICANT CHANGE UP (ref 39–50)
HGB BLD-MCNC: 13.1 G/DL — SIGNIFICANT CHANGE UP (ref 13–17)
INR BLD: 1.02 RATIO — SIGNIFICANT CHANGE UP (ref 0.88–1.16)
KETONES UR-MCNC: (no result)
LEUKOCYTE ESTERASE UR-ACNC: (no result)
LYMPHOCYTES # BLD AUTO: 1.3 K/UL — SIGNIFICANT CHANGE UP (ref 1–3.3)
LYMPHOCYTES # BLD AUTO: 14.2 % — SIGNIFICANT CHANGE UP (ref 13–44)
MCHC RBC-ENTMCNC: 29.6 PG — SIGNIFICANT CHANGE UP (ref 27–34)
MCHC RBC-ENTMCNC: 33.3 GM/DL — SIGNIFICANT CHANGE UP (ref 32–36)
MCV RBC AUTO: 89 FL — SIGNIFICANT CHANGE UP (ref 80–100)
MONOCYTES # BLD AUTO: 1.1 K/UL — HIGH (ref 0–0.9)
MONOCYTES NFR BLD AUTO: 12 % — SIGNIFICANT CHANGE UP (ref 2–14)
NEUTROPHILS # BLD AUTO: 6.5 K/UL — SIGNIFICANT CHANGE UP (ref 1.8–7.4)
NEUTROPHILS NFR BLD AUTO: 71.8 % — SIGNIFICANT CHANGE UP (ref 43–77)
NITRITE UR-MCNC: NEGATIVE — SIGNIFICANT CHANGE UP
NT-PROBNP SERPL-SCNC: 239 PG/ML — SIGNIFICANT CHANGE UP (ref 0–450)
PH UR: 5 — SIGNIFICANT CHANGE UP (ref 5–8)
PLATELET # BLD AUTO: 270 K/UL — SIGNIFICANT CHANGE UP (ref 150–400)
POTASSIUM SERPL-MCNC: 3.9 MMOL/L — SIGNIFICANT CHANGE UP (ref 3.5–5.3)
POTASSIUM SERPL-SCNC: 3.9 MMOL/L — SIGNIFICANT CHANGE UP (ref 3.5–5.3)
PROT SERPL-MCNC: 7.7 GM/DL — SIGNIFICANT CHANGE UP (ref 6–8.3)
PROT UR-MCNC: 30 MG/DL
PROTHROM AB SERPL-ACNC: 11 SEC — SIGNIFICANT CHANGE UP (ref 9.8–12.7)
RBC # BLD: 4.42 M/UL — SIGNIFICANT CHANGE UP (ref 4.2–5.8)
RBC # FLD: 12.7 % — SIGNIFICANT CHANGE UP (ref 10.3–14.5)
RBC CASTS # UR COMP ASSIST: (no result) /HPF (ref 0–4)
SODIUM SERPL-SCNC: 138 MMOL/L — SIGNIFICANT CHANGE UP (ref 135–145)
SP GR SPEC: 1.02 — SIGNIFICANT CHANGE UP (ref 1.01–1.02)
TROPONIN I SERPL-MCNC: <0.015 NG/ML — SIGNIFICANT CHANGE UP (ref 0.01–0.04)
UROBILINOGEN FLD QL: NEGATIVE MG/DL — SIGNIFICANT CHANGE UP
WBC # BLD: 9 K/UL — SIGNIFICANT CHANGE UP (ref 3.8–10.5)
WBC # FLD AUTO: 9 K/UL — SIGNIFICANT CHANGE UP (ref 3.8–10.5)
WBC UR QL: (no result)

## 2017-08-23 PROCEDURE — 71010: CPT | Mod: 26

## 2017-08-23 PROCEDURE — 93010 ELECTROCARDIOGRAM REPORT: CPT

## 2017-08-23 PROCEDURE — 99285 EMERGENCY DEPT VISIT HI MDM: CPT

## 2017-08-23 RX ORDER — ATORVASTATIN CALCIUM 80 MG/1
20 TABLET, FILM COATED ORAL AT BEDTIME
Qty: 0 | Refills: 0 | Status: DISCONTINUED | OUTPATIENT
Start: 2017-08-23 | End: 2017-08-29

## 2017-08-23 RX ORDER — ASPIRIN/CALCIUM CARB/MAGNESIUM 324 MG
81 TABLET ORAL DAILY
Qty: 0 | Refills: 0 | Status: DISCONTINUED | OUTPATIENT
Start: 2017-08-23 | End: 2017-08-29

## 2017-08-23 RX ORDER — TAMSULOSIN HYDROCHLORIDE 0.4 MG/1
0.4 CAPSULE ORAL AT BEDTIME
Qty: 0 | Refills: 0 | Status: DISCONTINUED | OUTPATIENT
Start: 2017-08-23 | End: 2017-08-29

## 2017-08-23 RX ORDER — CEFEPIME 1 G/1
1000 INJECTION, POWDER, FOR SOLUTION INTRAMUSCULAR; INTRAVENOUS EVERY 24 HOURS
Qty: 0 | Refills: 0 | Status: DISCONTINUED | OUTPATIENT
Start: 2017-08-23 | End: 2017-08-23

## 2017-08-23 RX ORDER — IPRATROPIUM/ALBUTEROL SULFATE 18-103MCG
3 AEROSOL WITH ADAPTER (GRAM) INHALATION ONCE
Qty: 0 | Refills: 0 | Status: COMPLETED | OUTPATIENT
Start: 2017-08-23 | End: 2017-08-23

## 2017-08-23 RX ORDER — TIOTROPIUM BROMIDE 18 UG/1
1 CAPSULE ORAL; RESPIRATORY (INHALATION) DAILY
Qty: 0 | Refills: 0 | Status: DISCONTINUED | OUTPATIENT
Start: 2017-08-23 | End: 2017-08-29

## 2017-08-23 RX ORDER — IPRATROPIUM/ALBUTEROL SULFATE 18-103MCG
3 AEROSOL WITH ADAPTER (GRAM) INHALATION EVERY 4 HOURS
Qty: 0 | Refills: 0 | Status: DISCONTINUED | OUTPATIENT
Start: 2017-08-23 | End: 2017-08-24

## 2017-08-23 RX ORDER — BUDESONIDE, MICRONIZED 100 %
0.5 POWDER (GRAM) MISCELLANEOUS
Qty: 0 | Refills: 0 | Status: DISCONTINUED | OUTPATIENT
Start: 2017-08-23 | End: 2017-08-29

## 2017-08-23 RX ORDER — CEFEPIME 1 G/1
1000 INJECTION, POWDER, FOR SOLUTION INTRAMUSCULAR; INTRAVENOUS ONCE
Qty: 0 | Refills: 0 | Status: COMPLETED | OUTPATIENT
Start: 2017-08-23 | End: 2017-08-23

## 2017-08-23 RX ORDER — TRAZODONE HCL 50 MG
100 TABLET ORAL AT BEDTIME
Qty: 0 | Refills: 0 | Status: DISCONTINUED | OUTPATIENT
Start: 2017-08-23 | End: 2017-08-29

## 2017-08-23 RX ORDER — IPRATROPIUM/ALBUTEROL SULFATE 18-103MCG
3 AEROSOL WITH ADAPTER (GRAM) INHALATION EVERY 6 HOURS
Qty: 0 | Refills: 0 | Status: DISCONTINUED | OUTPATIENT
Start: 2017-08-23 | End: 2017-08-24

## 2017-08-23 RX ORDER — MIRTAZAPINE 45 MG/1
7.5 TABLET, ORALLY DISINTEGRATING ORAL DAILY
Qty: 0 | Refills: 0 | Status: DISCONTINUED | OUTPATIENT
Start: 2017-08-23 | End: 2017-08-29

## 2017-08-23 RX ORDER — FUROSEMIDE 40 MG
20 TABLET ORAL DAILY
Qty: 0 | Refills: 0 | Status: DISCONTINUED | OUTPATIENT
Start: 2017-08-23 | End: 2017-08-24

## 2017-08-23 RX ORDER — CLONAZEPAM 1 MG
0.5 TABLET ORAL THREE TIMES A DAY
Qty: 0 | Refills: 0 | Status: DISCONTINUED | OUTPATIENT
Start: 2017-08-23 | End: 2017-08-29

## 2017-08-23 RX ORDER — SODIUM CHLORIDE 9 MG/ML
3 INJECTION INTRAMUSCULAR; INTRAVENOUS; SUBCUTANEOUS ONCE
Qty: 0 | Refills: 0 | Status: COMPLETED | OUTPATIENT
Start: 2017-08-23 | End: 2017-08-23

## 2017-08-23 RX ORDER — GABAPENTIN 400 MG/1
300 CAPSULE ORAL AT BEDTIME
Qty: 0 | Refills: 0 | Status: DISCONTINUED | OUTPATIENT
Start: 2017-08-23 | End: 2017-08-29

## 2017-08-23 RX ADMIN — Medication 125 MILLIGRAM(S): at 16:46

## 2017-08-23 RX ADMIN — Medication 3 MILLILITER(S): at 16:46

## 2017-08-23 RX ADMIN — SODIUM CHLORIDE 3 MILLILITER(S): 9 INJECTION INTRAMUSCULAR; INTRAVENOUS; SUBCUTANEOUS at 16:28

## 2017-08-23 NOTE — ED PROVIDER NOTE - ENMT, MLM
Airway patent, Nasal mucosa clear. Mouth with moist mucosa. Throat has no vesicles, no oropharyngeal exudates and uvula is midline. Neck supple, nontender +lipoma over right infero post neck.

## 2017-08-23 NOTE — H&P ADULT - NSHPPHYSICALEXAM_GEN_ALL_CORE
PHYSICAL EXAM:    Daily Height in cm: 167.64 (23 Aug 2017 15:21)    Daily     ICU Vital Signs Last 24 Hrs  T(C): 36.6 (23 Aug 2017 19:18), Max: 37.4 (23 Aug 2017 16:37)  T(F): 97.9 (23 Aug 2017 19:18), Max: 99.4 (23 Aug 2017 16:37)  HR: 100 (23 Aug 2017 19:18) (85 - 100)  BP: 155/94 (23 Aug 2017 19:18) (152/94 - 155/94)  BP(mean): --  ABP: --  ABP(mean): --  RR: 22 (23 Aug 2017 19:18) (19 - 22)  SpO2: 98% (23 Aug 2017 19:18) (96% - 98%)      Constitutional: NAD on nasal cannula  HEENT: Atraumatic, YURIDIA, Normal, No congestion  Respiratory: right lower lung field with inspiratory crackles and B/l expiratory mild wheezing    Cardiovascular: N S1S2; BETH present  Gastrointestinal: Abdomen soft, non tender, Bowel Ssounds present  Extremities: No edema, peripheral pulses present  Neurological: AAO x 2 (does not recall year-says he is forgetful), no gross focal motor deficits  Skin: Non cellulitic,    Back: No CVA tenderness   Musculoskeletal: non tender  Breasts: Deferred  Genitourinary: deferred  Rectal: Deferred

## 2017-08-23 NOTE — H&P ADULT - NSHPLABSRESULTS_GEN_ALL_CORE
13.1   9.0   )-----------( 270      ( 23 Aug 2017 16:37 )             39.4       CBC Full  -  ( 23 Aug 2017 16:37 )  WBC Count : 9.0 K/uL  Hemoglobin : 13.1 g/dL  Hematocrit : 39.4 %  Platelet Count - Automated : 270 K/uL  Mean Cell Volume : 89.0 fl  Mean Cell Hemoglobin : 29.6 pg  Mean Cell Hemoglobin Concentration : 33.3 gm/dL  Auto Neutrophil # : 6.5 K/uL  Auto Lymphocyte # : 1.3 K/uL  Auto Monocyte # : 1.1 K/uL  Auto Eosinophil # : 0.1 K/uL  Auto Basophil # : 0.1 K/uL  Auto Neutrophil % : 71.8 %  Auto Lymphocyte % : 14.2 %  Auto Monocyte % : 12.0 %  Auto Eosinophil % : 0.8 %  Auto Basophil % : 1.3 %          138  |  102  |  23  ----------------------------<  91  3.9   |  27  |  1.08    Ca    9.0      23 Aug 2017 16:37    TPro  7.7  /  Alb  3.7  /  TBili  0.4  /  DBili  x   /  AST  26  /  ALT  15  /  AlkPhos  76  08-23      LIVER FUNCTIONS - ( 23 Aug 2017 16:37 )  Alb: 3.7 g/dL / Pro: 7.7 gm/dL / ALK PHOS: 76 U/L / ALT: 15 U/L / AST: 26 U/L / GGT: x             PT/INR - ( 23 Aug 2017 16:37 )   PT: 11.0 sec;   INR: 1.02 ratio         PTT - ( 23 Aug 2017 16:37 )  PTT:28.7 sec    CARDIAC MARKERS ( 23 Aug 2017 16:37 )  <0.015 ng/mL / x     / x     / x     / x            Urinalysis Basic - ( 23 Aug 2017 20:35 )    Color: Yellow / Appearance: Clear / S.020 / pH: x  Gluc: x / Ketone: Small  / Bili: Negative / Urobili: Negative mg/dL   Blood: x / Protein: 30 mg/dL / Nitrite: Negative   Leuk Esterase: Small / RBC: 6-10 /HPF / WBC 11-25   Sq Epi: x / Non Sq Epi: Few / Bacteria: Few

## 2017-08-23 NOTE — ED PROVIDER NOTE - CARDIAC, MLM
Normal radial pulse, Normal rate, regular rhythm.  Heart sounds S1, S2.  No murmurs, rubs or gallops.

## 2017-08-23 NOTE — ED PROVIDER NOTE - PROGRESS NOTE DETAILS
ED Scribe Silvia Ko scribing for and in the presence of ED attending Dr. Russell. Pt upon reevaluation symptoms improved. Lung exam diffuse increased air entry better than initially. pt clinically stable. plans ambulation trial tbd if no decompensation. Dr. Russell:  informed by RN that pt is too unsteady on his feet, failded ambulation trial.  Pt aware TBA.

## 2017-08-23 NOTE — ED PROVIDER NOTE - MUSCULOSKELETAL, MLM
MAEx4 SLR 30 deg, Spine appears normal, range of motion is not limited, no muscle or joint tenderness

## 2017-08-23 NOTE — H&P ADULT - HISTORY OF PRESENT ILLNESS
76 y.o. male with PMH of COPD home O2 dependent 2L, Diastolic CHF, HTN, HLD, Depression, GERD, hx of bladder ca s/p mass resection and intravesical chemo june 13th 2017  PMHx: :: HTN  HLD  COPD  Diastolic CHF  Depression  Urinary retention  GERD  PSHx: :: Bladder cancerous tumor resection june 13th 2017 with intravesical chemo therapy   Family History: :: noncontributory to current presentation  ROS: :: Nausea, Dizziness; Al 77 y.o. male with PMH of COPD home O2 dependent 2L, Diastolic CHF, HTN, HLD, Depression, GERD, hx of bladder ca s/p mass resection and intravesical chemo june 13th 2017,   presents to the ED c/o SOB. Patient reported having  increased SOB and MATA since last night. Partially but inadequately treated by MDI at home. +home neb, though pt did not use. +mild cough, nonproductive. Decreased appetite since past few days. No h/o headache, fever, dizziness, abd pain, nvd, cp, rash, or urinary incontinence.  IN ED patient was given neb tx with IV solumedrol which improved symptoms ,but patient failed ambulation trial in ED due to unsteady gait,CXR- shows increased right sided pleural effusion small to moderate ,but no apparent cephalization or pulmonary vascular congestion  Patient did not have a fever, WBC wnl, troponinx1 wnl, pro BNP wnl,EKG NSR,shows old septal infarct,non specific ST/T wave changes ,in V1 early T wave repolarization is present in old EKG in june 2017	    PMHx: :: HTN  HLD  COPD  Diastolic CHF  Depression  Urinary retention  GERD  PSHx: :: Bladder cancerous tumor resection june 13th 2017 with intravesical chemo therapy   Family History: :: noncontributory to current presentation

## 2017-08-23 NOTE — ED ADULT NURSE REASSESSMENT NOTE - NS ED NURSE REASSESS COMMENT FT1
Pt reports relief with tx and medication. O2 98% on 2LNC. Pt repositioned for comfort and provided with sandwich tray. A+Ox3 VSS. Safety maintained.
RN attempted to ambulate pt. pt shaky and unsteady. pt only able to stand for a short moment, pt then stated "it feels like my legs are giving out." Dr. Russell made aware.

## 2017-08-23 NOTE — ED PROVIDER NOTE - OBJECTIVE STATEMENT
76 y/o M PMHx COPD/emphysema, presents to the ED s/p SOB. The pt provides that he started having increased SOB and MATA since last night. Partially but inadequately treated by MDI at home. +home neb, though pt did not use. +mild cough, nonproductive. Decreased appetite since past few days. No h/o headache, fever, dizziness, abd pain, nvd, cp, rash, or urinary incontinence. PMD/Pulm Dr. Glover

## 2017-08-23 NOTE — ED PROVIDER NOTE - MEDICAL DECISION MAKING DETAILS
78 y/o M c/o sob, to obtain labs, nebs, cxr, reassess. 76 y/o M PMHx COPD elderly white male c/o MATA since past few days, partially relieved on home nebs, didn't use home nebs today, ill appearing, nontoxic, doesn't meet severe sepsis clinical criteria    plan: Duo nebs, o2 supplemental nasal cannula, pulse ox, labs, cxr, reassess.

## 2017-08-23 NOTE — H&P ADULT - ASSESSMENT
77 y.o. male with PMH of COPD home O2 dependent 2L, Diastolic CHF, HTN, HLD, Depression, GERD, hx of bladder ca s/p mass resection and intravesical chemo june 13th 2017,   presents to the ED c/o SOB.   Assessment/Plan # SOB likely secondary to Mild acute COPD exacerbation/Worsening of chronic right pleural effusion-now small to moderate 77 y.o. male with PMH of COPD home O2 dependent 2L, Diastolic CHF, HTN, HLD, Depression, GERD, hx of bladder ca s/p mass resection and intravesical chemo june 13th 2017,   presents to the ED c/o SOB.   Assessment/Plan 1. SOB likely secondary to Mild Acute COPD exacerbation/ Worsening of chronic right pleural effusion-now small to moderate/ ?Malignant effusion -(although no malignant cells on pleural fluid cytology june 2017) vs ? PNA vs Transudate 2.Mixed exudative/transudative   pleural fluid picture based on light's criteria and serum/pleural albumin ratio   based -on pleural fluid analysis done june 2017 r/o malignant pleural effusion  3.Unlikely Acute decompensated Diastolic CHF(BNP wnl,no CXR signs of pulmonary vascular congestion or cephalization)/Less likely PNA(WBC wnl,no fever,no productive cough)  * Admit to Med surg *neb tx,Iv solumedrol q8 hrs ,inhaled steroids , *IV cefepime x1 to cover for gm negative PNA- although less likely-will give one time dose and f/u blood cx -off further antibiotics,unless fever or signs of infection *continue po lasix,asa,statin  4.hx of HTN , 5.bladder CA s/p mass resection+intravesical chemo-june 2017, 6. hx peripheral neuropathy *continue clonidine ,flomax ,gabapentin  &. DVT prophylaxis heparin SC 77 y.o. male with PMH of COPD home O2 dependent 2L, Diastolic CHF, HTN, HLD, Depression, GERD, hx of bladder ca s/p mass resection and intravesical chemo june 13th 2017,   presents to the ED c/o SOB.   Assessment/Plan 1. SOB likely secondary to Mild Acute COPD exacerbation/ Worsening of chronic right pleural effusion-now small to moderate/ ?Malignant effusion -(although no malignant cells on pleural fluid cytology june 2017) vs ? PNA vs Transudate 2.Mixed exudative/transudative   pleural fluid picture based on light's criteria and serum/pleural albumin ratio   based -on pleural fluid analysis done june 2017 r/o malignant pleural effusion  3.Unlikely Acute decompensated Diastolic CHF(BNP wnl,no CXR signs of pulmonary vascular congestion or cephalization)/Less likely PNA(WBC wnl,no fever,no productive cough)  * Admit to Med surg *neb tx,Iv solumedrol q8 hrs ,inhaled steroids , *IV cefepime x1 to cover for gm negative PNA- although less likely-will give one time dose and f/u blood cx -off further antibiotics,unless fever or signs of infection *continue po lasix,asa,statin  4.hx of HTN , 5.bladder CA s/p mass resection+intravesical chemo-june 2017, 6. hx peripheral neuropathy *continue clonidine ,flomax ,gabapentin  7. unsteady gait secondary to generalised weakness *consider short term rehab   &. DVT prophylaxis heparin SC

## 2017-08-23 NOTE — ED PROVIDER NOTE - CONSTITUTIONAL, MLM
normal... Elderly white male, mildly ill appearing, mild respi discomfort, no sentence shortening, nontoxic, awake, alert, oriented to person, place, time/situation

## 2017-08-24 DIAGNOSIS — J44.1 CHRONIC OBSTRUCTIVE PULMONARY DISEASE WITH (ACUTE) EXACERBATION: ICD-10-CM

## 2017-08-24 DIAGNOSIS — R26.2 DIFFICULTY IN WALKING, NOT ELSEWHERE CLASSIFIED: ICD-10-CM

## 2017-08-24 DIAGNOSIS — J90 PLEURAL EFFUSION, NOT ELSEWHERE CLASSIFIED: ICD-10-CM

## 2017-08-24 DIAGNOSIS — R26.81 UNSTEADINESS ON FEET: ICD-10-CM

## 2017-08-24 DIAGNOSIS — D49.4 NEOPLASM OF UNSPECIFIED BEHAVIOR OF BLADDER: ICD-10-CM

## 2017-08-24 DIAGNOSIS — N40.0 BENIGN PROSTATIC HYPERPLASIA WITHOUT LOWER URINARY TRACT SYMPTOMS: ICD-10-CM

## 2017-08-24 DIAGNOSIS — F32.9 MAJOR DEPRESSIVE DISORDER, SINGLE EPISODE, UNSPECIFIED: ICD-10-CM

## 2017-08-24 DIAGNOSIS — I10 ESSENTIAL (PRIMARY) HYPERTENSION: ICD-10-CM

## 2017-08-24 DIAGNOSIS — E78.5 HYPERLIPIDEMIA, UNSPECIFIED: ICD-10-CM

## 2017-08-24 DIAGNOSIS — F41.9 ANXIETY DISORDER, UNSPECIFIED: ICD-10-CM

## 2017-08-24 DIAGNOSIS — G62.9 POLYNEUROPATHY, UNSPECIFIED: ICD-10-CM

## 2017-08-24 PROCEDURE — 99223 1ST HOSP IP/OBS HIGH 75: CPT

## 2017-08-24 PROCEDURE — 71250 CT THORAX DX C-: CPT | Mod: 26

## 2017-08-24 PROCEDURE — 71030: CPT | Mod: 26

## 2017-08-24 RX ORDER — IPRATROPIUM/ALBUTEROL SULFATE 18-103MCG
3 AEROSOL WITH ADAPTER (GRAM) INHALATION ONCE
Qty: 0 | Refills: 0 | Status: COMPLETED | OUTPATIENT
Start: 2017-08-24 | End: 2017-08-29

## 2017-08-24 RX ORDER — ESCITALOPRAM OXALATE 10 MG/1
20 TABLET, FILM COATED ORAL DAILY
Qty: 0 | Refills: 0 | Status: DISCONTINUED | OUTPATIENT
Start: 2017-08-24 | End: 2017-08-29

## 2017-08-24 RX ORDER — FUROSEMIDE 40 MG
40 TABLET ORAL DAILY
Qty: 0 | Refills: 0 | Status: DISCONTINUED | OUTPATIENT
Start: 2017-08-24 | End: 2017-08-25

## 2017-08-24 RX ORDER — FUROSEMIDE 40 MG
40 TABLET ORAL DAILY
Qty: 0 | Refills: 0 | Status: DISCONTINUED | OUTPATIENT
Start: 2017-08-24 | End: 2017-08-24

## 2017-08-24 RX ORDER — ENOXAPARIN SODIUM 100 MG/ML
40 INJECTION SUBCUTANEOUS EVERY 24 HOURS
Qty: 0 | Refills: 0 | Status: DISCONTINUED | OUTPATIENT
Start: 2017-08-24 | End: 2017-08-29

## 2017-08-24 RX ADMIN — Medication 100 MILLIGRAM(S): at 21:48

## 2017-08-24 RX ADMIN — ENOXAPARIN SODIUM 40 MILLIGRAM(S): 100 INJECTION SUBCUTANEOUS at 05:40

## 2017-08-24 RX ADMIN — ATORVASTATIN CALCIUM 20 MILLIGRAM(S): 80 TABLET, FILM COATED ORAL at 21:47

## 2017-08-24 RX ADMIN — Medication 0.5 MILLIGRAM(S): at 09:24

## 2017-08-24 RX ADMIN — Medication 81 MILLIGRAM(S): at 12:30

## 2017-08-24 RX ADMIN — Medication 20 MILLIGRAM(S): at 04:37

## 2017-08-24 RX ADMIN — MIRTAZAPINE 7.5 MILLIGRAM(S): 45 TABLET, ORALLY DISINTEGRATING ORAL at 12:30

## 2017-08-24 RX ADMIN — GABAPENTIN 300 MILLIGRAM(S): 400 CAPSULE ORAL at 21:48

## 2017-08-24 RX ADMIN — Medication 0.5 MILLIGRAM(S): at 18:38

## 2017-08-24 RX ADMIN — TAMSULOSIN HYDROCHLORIDE 0.4 MILLIGRAM(S): 0.4 CAPSULE ORAL at 21:48

## 2017-08-24 RX ADMIN — Medication 3 MILLILITER(S): at 07:55

## 2017-08-24 RX ADMIN — Medication 0.5 MILLIGRAM(S): at 20:13

## 2017-08-24 RX ADMIN — CEFEPIME 100 MILLIGRAM(S): 1 INJECTION, POWDER, FOR SOLUTION INTRAMUSCULAR; INTRAVENOUS at 00:31

## 2017-08-24 RX ADMIN — Medication 60 MILLIGRAM(S): at 05:40

## 2017-08-24 RX ADMIN — Medication 40 MILLIGRAM(S): at 15:57

## 2017-08-24 RX ADMIN — Medication 0.5 MILLIGRAM(S): at 07:55

## 2017-08-24 RX ADMIN — Medication 0.1 MILLIGRAM(S): at 04:37

## 2017-08-24 NOTE — PHYSICAL THERAPY INITIAL EVALUATION ADULT - GENERAL OBSERVATIONS, REHAB EVAL
resting supine in bed after returned from ECHO,c/o technician pressing too hard with transducer on anterior chest "like they going to break all my bones",pt with tremulousness BUEs,he is R hand dominant ,IV R forearm covered with Coban wrap ?to prevent pt from dislodging

## 2017-08-24 NOTE — PHYSICAL THERAPY INITIAL EVALUATION ADULT - CRITERIA FOR SKILLED THERAPEUTIC INTERVENTIONS
impairments found/rehab potential/functional limitations in following categories/risk reduction/prevention/therapy frequency/anticipated equipment needs at discharge/predicted duration of therapy intervention

## 2017-08-24 NOTE — PHYSICAL THERAPY INITIAL EVALUATION ADULT - GROSSLY INTACT, SENSORY
Grossly Intact/pt is very sensitive to deep touch/pressure anterior chest wall,LUE diffusely,?hypersensitive

## 2017-08-24 NOTE — CONSULT NOTE ADULT - SUBJECTIVE AND OBJECTIVE BOX
HPI:  77 y.o. male with PMH of COPD home O2 dependent 2L, Diastolic CHF, HTN, HLD, Depression, GERD, hx of bladder ca s/p mass resection and intravesical chemo 2017,   presents to the ED c/o SOB. Patient reported having  increased SOB and MATA since last night. Partially but inadequately treated by MDI at home. +home neb, though pt did not use. +mild cough, nonproductive. Decreased appetite since past few days. No h/o headache, fever, dizziness, abd pain, nvd, cp, rash, or urinary incontinence.  IN ED patient was given neb tx with IV solumedrol which improved symptoms ,but patient failed ambulation trial in ED due to unsteady gait,CXR- shows increased right sided pleural effusion small to moderate ,but no apparent cephalization or pulmonary vascular congestion  Patient did not have a fever, WBC wnl, troponinx1 wnl, pro BNP wnl,EKG NSR,shows old septal infarct,non specific ST/T wave changes ,in V1 early T wave repolarization is present in old EKG in 2017	    a/w SOB. no cough, sputum, fever. feels about same today. CXR small right effusion, inc c/w . on home O2 2L/min. has severe COPD FEV1 0.86. previous thoracentesison R  fluid borderline exud/transudate, cytology negative.      PMHx: :: HTN  HLD  COPD  Diastolic CHF  Depression  Urinary retention  GERD  PSHx: :: Bladder cancerous tumor resection 2017 with intravesical chemo therapy   Family History: :: noncontributory to current presentation (23 Aug 2017 23:58)      PAST MEDICAL & SURGICAL HISTORY:  Hematuria  BPH (benign prostatic hypertrophy)  Low back pain  Osteoarthritis  Pleural effusion: 2016  Hypercholesterolemia  Anxiety  Depression  COPD (chronic obstructive pulmonary disease)  HTN (hypertension)  Bladder tumor  Cataract extraction status:   b/l      MEDICATIONS  (STANDING):  ALBUTerol/ipratropium for Nebulization 3 milliLiter(s) Nebulizer every 6 hours  methylPREDNISolone sodium succinate Injectable 60 milliGRAM(s) IV Push every 8 hours  buDESOnide   0.5 milliGRAM(s) Respule 0.5 milliGRAM(s) Nebulizer two times a day  aspirin  chewable 81 milliGRAM(s) Oral daily  cloNIDine 0.1 milliGRAM(s) Oral daily  tamsulosin 0.4 milliGRAM(s) Oral at bedtime  gabapentin 300 milliGRAM(s) Oral at bedtime  mirtazapine 7.5 milliGRAM(s) Oral daily  traZODone 100 milliGRAM(s) Oral at bedtime  atorvastatin 20 milliGRAM(s) Oral at bedtime  tiotropium 18 MICROgram(s) Capsule 1 Capsule(s) Inhalation daily  furosemide    Tablet 20 milliGRAM(s) Oral daily  enoxaparin Injectable 40 milliGRAM(s) SubCutaneous every 24 hours    MEDICATIONS  (PRN):  ALBUTerol/ipratropium for Nebulization 3 milliLiter(s) Nebulizer every 4 hours PRN Shortness of Breath and/or Wheezing  clonazePAM Tablet 0.5 milliGRAM(s) Oral three times a day PRN agitation/anxiety      Allergies    No Known Allergies    Intolerances        SOCIAL HISTORY: Denies tobacco, etoh abuse or illicit drug use    FAMILY HISTORY:  Family history of asthma in mother (Mother)  Family history of cancer (Father): father      Vital Signs Last 24 Hrs  T(C): 36.5 (24 Aug 2017 08:59), Max: 37.4 (23 Aug 2017 16:37)  T(F): 97.7 (24 Aug 2017 08:59), Max: 99.4 (23 Aug 2017 16:37)  HR: 88 (24 Aug 2017 08:59) (85 - 100)  BP: 161/71 (24 Aug 2017 08:59) (150/74 - 182/84)  BP(mean): --  RR: 20 (24 Aug 2017 08:59) (18 - 22)  SpO2: 95% (24 Aug 2017 08:59) (95% - 98%)    REVIEW OF SYSTEMS:    CONSTITUTIONAL:  As per HPI.  HEENT:  Eyes:  No diplopia or blurred vision. ENT:  No earache, sore throat or runny nose.  CARDIOVASCULAR:  No pressure, squeezing, tightness, heaviness or aching about the chest, neck, axilla or epigastrium.  RESPIRATORY:  see above  GASTROINTESTINAL:  No nausea, vomiting or diarrhea.  GENITOURINARY:  No dysuria, frequency or urgency.  MUSCULOSKELETAL:  As per HPI.  SKIN:  No change in skin, hair or nails.  NEUROLOGIC:  No paresthesias, fasciculations, seizures or weakness.  PSYCHIATRIC:  No disorder of thought or mood.  ENDOCRINE:  No heat or cold intolerance, polyuria or polydipsia.  HEMATOLOGICAL:  No easy bruising or bleedings:  .     PHYSICAL EXAMINATION:    GENERAL APPEARANCE:  Pt. is not currently dyspneic, in no distress. Pt. is alert, oriented, and pleasant.  HEENT:  Pupils are normal and react normally. No icterus. Mucous membranes well colored.  NECK:  Supple. No lymphadenopathy. Jugular venous pressure not elevated. Carotids equal.   HEART:   The cardiac impulse has a normal quality. Regular. Normal S1 and S2. There are no murmurs, rubs or gallops noted  CHEST:  expir rhonchi bilat.  ABDOMEN:  Soft and nontender.   EXTREMITIES:  There is no cyanosis, clubbing or edema.   SKIN:  No rash or significant lesions are noted.  Neuro: Alert, awake, and O x 3.      LABS:                        13.1   9.0   )-----------( 270      ( 23 Aug 2017 16:37 )             39.4     08-    138  |  102  |  23  ----------------------------<  91  3.9   |  27  |  1.08    Ca    9.0      23 Aug 2017 16:37    TPro  7.7  /  Alb  3.7  /  TBili  0.4  /  DBili  x   /  AST  26  /  ALT  15  /  AlkPhos  76  23    LIVER FUNCTIONS - ( 23 Aug 2017 16:37 )  Alb: 3.7 g/dL / Pro: 7.7 gm/dL / ALK PHOS: 76 U/L / ALT: 15 U/L / AST: 26 U/L / GGT: x           PT/INR - ( 23 Aug 2017 16:37 )   PT: 11.0 sec;   INR: 1.02 ratio         PTT - ( 23 Aug 2017 16:37 )  PTT:28.7 sec  CARDIAC MARKERS ( 23 Aug 2017 16:37 )  <0.015 ng/mL / x     / x     / x     / x          Urinalysis Basic - ( 23 Aug 2017 20:35 )    Color: Yellow / Appearance: Clear / S.020 / pH: x  Gluc: x / Ketone: Small  / Bili: Negative / Urobili: Negative mg/dL   Blood: x / Protein: 30 mg/dL / Nitrite: Negative   Leuk Esterase: Small / RBC: 6-10 /HPF / WBC 11-25   Sq Epi: x / Non Sq Epi: Few / Bacteria: Few          RADIOLOGY & ADDITIONAL STUDIES:

## 2017-08-24 NOTE — PHYSICAL THERAPY INITIAL EVALUATION ADULT - ADDITIONAL COMMENTS
wife reports pt has few steps to enter home and requires assistance of 1PA to safely negotiate,can stay on main floor of home

## 2017-08-24 NOTE — PHYSICAL THERAPY INITIAL EVALUATION ADULT - REFERRING PHYSICIAN, REHAB EVAL
Dr Edward Kayserian, family medicine resident (Dr SHANNAN Goetz, Attending Physician) 8/24/17 @ 12:58pm

## 2017-08-24 NOTE — PHYSICAL THERAPY INITIAL EVALUATION ADULT - DIAGNOSIS, PT EVAL
Pleural Effusion,r/o PNA,debility,tremors acute exacerbation of COPD,+Pleural Effusion,r/o PNA,debility,tremors

## 2017-08-24 NOTE — CONSULT NOTE ADULT - ASSESSMENT
a/w SOB. good O2 sat on 2L NC.  pleural effusion on R has mildly increased c/w 6/28. has severe COPD, treat for exacerbation. Nebs, O2, budesonide, solumedrol (decrease to 40 mg q 8 hrs). PT consult.

## 2017-08-24 NOTE — PHYSICAL THERAPY INITIAL EVALUATION ADULT - ACTIVE RANGE OF MOTION EXAMINATION, REHAB EVAL
bilateral  lower extremity Active ROM was WFL (within functional limits)/R shoulder elevation moderately limited (dominant UE; decreased hamstring flexibility bilaterally c/w increased chair,bedrest AAROM)/bilateral upper extremity Active ROM was WFL (within functional limits)/deficits as listed below

## 2017-08-24 NOTE — DIETITIAN INITIAL EVALUATION ADULT. - ENERGY NEEDS
Ht.     66 "        Wt.     64   kg               BMI        22.6          IBW   64    kg               Pt is at 100   %  IBW

## 2017-08-24 NOTE — DIETITIAN INITIAL EVALUATION ADULT. - OTHER INFO
Consult for anorexia/bulimia.  Pt has pmh of COPD, CHF, HTN, p/w SOB, MATA.  Pt on remeron Consult for anorexia/bulimia.  Pt has pmh of COPD, CHF, HTN, p/w SOB, MATA.  Pt on remeron.  Pt is confused, agitated, refuses nutrition focused physical exam .  Pt does not answer questions, says he wants to be left alone.

## 2017-08-25 DIAGNOSIS — N39.0 URINARY TRACT INFECTION, SITE NOT SPECIFIED: ICD-10-CM

## 2017-08-25 LAB
ANION GAP SERPL CALC-SCNC: 10 MMOL/L — SIGNIFICANT CHANGE UP (ref 5–17)
BUN SERPL-MCNC: 42 MG/DL — HIGH (ref 7–23)
CALCIUM SERPL-MCNC: 8.9 MG/DL — SIGNIFICANT CHANGE UP (ref 8.5–10.1)
CHLORIDE SERPL-SCNC: 100 MMOL/L — SIGNIFICANT CHANGE UP (ref 96–108)
CO2 SERPL-SCNC: 29 MMOL/L — SIGNIFICANT CHANGE UP (ref 22–31)
CREAT SERPL-MCNC: 1.16 MG/DL — SIGNIFICANT CHANGE UP (ref 0.5–1.3)
GLUCOSE SERPL-MCNC: 117 MG/DL — HIGH (ref 70–99)
HCT VFR BLD CALC: 37.8 % — LOW (ref 39–50)
HGB BLD-MCNC: 12.8 G/DL — LOW (ref 13–17)
MCHC RBC-ENTMCNC: 30.4 PG — SIGNIFICANT CHANGE UP (ref 27–34)
MCHC RBC-ENTMCNC: 34 GM/DL — SIGNIFICANT CHANGE UP (ref 32–36)
MCV RBC AUTO: 89.4 FL — SIGNIFICANT CHANGE UP (ref 80–100)
PLATELET # BLD AUTO: 259 K/UL — SIGNIFICANT CHANGE UP (ref 150–400)
POTASSIUM SERPL-MCNC: 3.3 MMOL/L — LOW (ref 3.5–5.3)
POTASSIUM SERPL-SCNC: 3.3 MMOL/L — LOW (ref 3.5–5.3)
RBC # BLD: 4.23 M/UL — SIGNIFICANT CHANGE UP (ref 4.2–5.8)
RBC # FLD: 13.1 % — SIGNIFICANT CHANGE UP (ref 10.3–14.5)
SODIUM SERPL-SCNC: 139 MMOL/L — SIGNIFICANT CHANGE UP (ref 135–145)
WBC # BLD: 12.6 K/UL — HIGH (ref 3.8–10.5)
WBC # FLD AUTO: 12.6 K/UL — HIGH (ref 3.8–10.5)

## 2017-08-25 PROCEDURE — 99233 SBSQ HOSP IP/OBS HIGH 50: CPT

## 2017-08-25 PROCEDURE — 93306 TTE W/DOPPLER COMPLETE: CPT | Mod: 26

## 2017-08-25 PROCEDURE — 76770 US EXAM ABDO BACK WALL COMP: CPT | Mod: 26

## 2017-08-25 RX ORDER — CEFTRIAXONE 500 MG/1
1 INJECTION, POWDER, FOR SOLUTION INTRAMUSCULAR; INTRAVENOUS ONCE
Qty: 0 | Refills: 0 | Status: COMPLETED | OUTPATIENT
Start: 2017-08-25 | End: 2017-08-25

## 2017-08-25 RX ORDER — FUROSEMIDE 40 MG
40 TABLET ORAL EVERY 12 HOURS
Qty: 0 | Refills: 0 | Status: DISCONTINUED | OUTPATIENT
Start: 2017-08-25 | End: 2017-08-28

## 2017-08-25 RX ORDER — POTASSIUM CHLORIDE 20 MEQ
20 PACKET (EA) ORAL ONCE
Qty: 0 | Refills: 0 | Status: COMPLETED | OUTPATIENT
Start: 2017-08-25 | End: 2017-08-25

## 2017-08-25 RX ORDER — CEFTRIAXONE 500 MG/1
INJECTION, POWDER, FOR SOLUTION INTRAMUSCULAR; INTRAVENOUS
Qty: 0 | Refills: 0 | Status: DISCONTINUED | OUTPATIENT
Start: 2017-08-25 | End: 2017-08-28

## 2017-08-25 RX ORDER — CEFTRIAXONE 500 MG/1
1 INJECTION, POWDER, FOR SOLUTION INTRAMUSCULAR; INTRAVENOUS EVERY 24 HOURS
Qty: 0 | Refills: 0 | Status: DISCONTINUED | OUTPATIENT
Start: 2017-08-26 | End: 2017-08-28

## 2017-08-25 RX ADMIN — Medication 40 MILLIGRAM(S): at 06:10

## 2017-08-25 RX ADMIN — TAMSULOSIN HYDROCHLORIDE 0.4 MILLIGRAM(S): 0.4 CAPSULE ORAL at 21:31

## 2017-08-25 RX ADMIN — Medication 40 MILLIGRAM(S): at 17:11

## 2017-08-25 RX ADMIN — ATORVASTATIN CALCIUM 20 MILLIGRAM(S): 80 TABLET, FILM COATED ORAL at 21:31

## 2017-08-25 RX ADMIN — TIOTROPIUM BROMIDE 1 CAPSULE(S): 18 CAPSULE ORAL; RESPIRATORY (INHALATION) at 11:03

## 2017-08-25 RX ADMIN — Medication 20 MILLIEQUIVALENT(S): at 16:23

## 2017-08-25 RX ADMIN — MIRTAZAPINE 7.5 MILLIGRAM(S): 45 TABLET, ORALLY DISINTEGRATING ORAL at 11:45

## 2017-08-25 RX ADMIN — Medication 40 MILLIGRAM(S): at 06:07

## 2017-08-25 RX ADMIN — Medication 81 MILLIGRAM(S): at 11:45

## 2017-08-25 RX ADMIN — CEFTRIAXONE 100 GRAM(S): 500 INJECTION, POWDER, FOR SOLUTION INTRAMUSCULAR; INTRAVENOUS at 16:22

## 2017-08-25 RX ADMIN — GABAPENTIN 300 MILLIGRAM(S): 400 CAPSULE ORAL at 21:31

## 2017-08-25 RX ADMIN — Medication 100 MILLIGRAM(S): at 21:31

## 2017-08-25 RX ADMIN — ESCITALOPRAM OXALATE 20 MILLIGRAM(S): 10 TABLET, FILM COATED ORAL at 11:45

## 2017-08-25 RX ADMIN — ENOXAPARIN SODIUM 40 MILLIGRAM(S): 100 INJECTION SUBCUTANEOUS at 06:09

## 2017-08-25 RX ADMIN — Medication 0.1 MILLIGRAM(S): at 06:12

## 2017-08-26 DIAGNOSIS — F03.90 UNSPECIFIED DEMENTIA WITHOUT BEHAVIORAL DISTURBANCE: ICD-10-CM

## 2017-08-26 LAB
ANION GAP SERPL CALC-SCNC: 5 MMOL/L — SIGNIFICANT CHANGE UP (ref 5–17)
BUN SERPL-MCNC: 43 MG/DL — HIGH (ref 7–23)
CALCIUM SERPL-MCNC: 8.4 MG/DL — LOW (ref 8.5–10.1)
CHLORIDE SERPL-SCNC: 100 MMOL/L — SIGNIFICANT CHANGE UP (ref 96–108)
CO2 SERPL-SCNC: 35 MMOL/L — HIGH (ref 22–31)
CREAT SERPL-MCNC: 1.08 MG/DL — SIGNIFICANT CHANGE UP (ref 0.5–1.3)
GLUCOSE SERPL-MCNC: 91 MG/DL — SIGNIFICANT CHANGE UP (ref 70–99)
HCT VFR BLD CALC: 35.8 % — LOW (ref 39–50)
HGB BLD-MCNC: 11.8 G/DL — LOW (ref 13–17)
MCHC RBC-ENTMCNC: 29.5 PG — SIGNIFICANT CHANGE UP (ref 27–34)
MCHC RBC-ENTMCNC: 32.9 GM/DL — SIGNIFICANT CHANGE UP (ref 32–36)
MCV RBC AUTO: 89.6 FL — SIGNIFICANT CHANGE UP (ref 80–100)
PLATELET # BLD AUTO: 240 K/UL — SIGNIFICANT CHANGE UP (ref 150–400)
POTASSIUM SERPL-MCNC: 3.2 MMOL/L — LOW (ref 3.5–5.3)
POTASSIUM SERPL-SCNC: 3.2 MMOL/L — LOW (ref 3.5–5.3)
RBC # BLD: 4 M/UL — LOW (ref 4.2–5.8)
RBC # FLD: 13.2 % — SIGNIFICANT CHANGE UP (ref 10.3–14.5)
SODIUM SERPL-SCNC: 140 MMOL/L — SIGNIFICANT CHANGE UP (ref 135–145)
WBC # BLD: 10.4 K/UL — SIGNIFICANT CHANGE UP (ref 3.8–10.5)
WBC # FLD AUTO: 10.4 K/UL — SIGNIFICANT CHANGE UP (ref 3.8–10.5)

## 2017-08-26 PROCEDURE — 99233 SBSQ HOSP IP/OBS HIGH 50: CPT

## 2017-08-26 RX ORDER — POTASSIUM CHLORIDE 20 MEQ
40 PACKET (EA) ORAL ONCE
Qty: 0 | Refills: 0 | Status: COMPLETED | OUTPATIENT
Start: 2017-08-26 | End: 2017-08-26

## 2017-08-26 RX ADMIN — ATORVASTATIN CALCIUM 20 MILLIGRAM(S): 80 TABLET, FILM COATED ORAL at 20:57

## 2017-08-26 RX ADMIN — MIRTAZAPINE 7.5 MILLIGRAM(S): 45 TABLET, ORALLY DISINTEGRATING ORAL at 13:53

## 2017-08-26 RX ADMIN — Medication 0.1 MILLIGRAM(S): at 06:11

## 2017-08-26 RX ADMIN — Medication 40 MILLIGRAM(S): at 06:09

## 2017-08-26 RX ADMIN — Medication 40 MILLIEQUIVALENT(S): at 13:53

## 2017-08-26 RX ADMIN — Medication 0.5 MILLIGRAM(S): at 07:51

## 2017-08-26 RX ADMIN — Medication 81 MILLIGRAM(S): at 13:52

## 2017-08-26 RX ADMIN — ENOXAPARIN SODIUM 40 MILLIGRAM(S): 100 INJECTION SUBCUTANEOUS at 06:09

## 2017-08-26 RX ADMIN — GABAPENTIN 300 MILLIGRAM(S): 400 CAPSULE ORAL at 20:57

## 2017-08-26 RX ADMIN — Medication 40 MILLIGRAM(S): at 19:04

## 2017-08-26 RX ADMIN — CEFTRIAXONE 100 GRAM(S): 500 INJECTION, POWDER, FOR SOLUTION INTRAMUSCULAR; INTRAVENOUS at 13:53

## 2017-08-26 RX ADMIN — TIOTROPIUM BROMIDE 1 CAPSULE(S): 18 CAPSULE ORAL; RESPIRATORY (INHALATION) at 11:02

## 2017-08-26 RX ADMIN — Medication 100 MILLIGRAM(S): at 20:56

## 2017-08-26 RX ADMIN — ESCITALOPRAM OXALATE 20 MILLIGRAM(S): 10 TABLET, FILM COATED ORAL at 13:52

## 2017-08-26 RX ADMIN — TAMSULOSIN HYDROCHLORIDE 0.4 MILLIGRAM(S): 0.4 CAPSULE ORAL at 20:57

## 2017-08-26 RX ADMIN — Medication 0.5 MILLIGRAM(S): at 19:56

## 2017-08-27 LAB
ANION GAP SERPL CALC-SCNC: 6 MMOL/L — SIGNIFICANT CHANGE UP (ref 5–17)
BUN SERPL-MCNC: 63 MG/DL — HIGH (ref 7–23)
CALCIUM SERPL-MCNC: 9.3 MG/DL — SIGNIFICANT CHANGE UP (ref 8.5–10.1)
CHLORIDE SERPL-SCNC: 96 MMOL/L — SIGNIFICANT CHANGE UP (ref 96–108)
CO2 SERPL-SCNC: 37 MMOL/L — HIGH (ref 22–31)
CREAT SERPL-MCNC: 1.09 MG/DL — SIGNIFICANT CHANGE UP (ref 0.5–1.3)
GLUCOSE SERPL-MCNC: 103 MG/DL — HIGH (ref 70–99)
HCT VFR BLD CALC: 34.4 % — LOW (ref 39–50)
HGB BLD-MCNC: 11.6 G/DL — LOW (ref 13–17)
MCHC RBC-ENTMCNC: 30.4 PG — SIGNIFICANT CHANGE UP (ref 27–34)
MCHC RBC-ENTMCNC: 33.8 GM/DL — SIGNIFICANT CHANGE UP (ref 32–36)
MCV RBC AUTO: 90 FL — SIGNIFICANT CHANGE UP (ref 80–100)
PLATELET # BLD AUTO: 243 K/UL — SIGNIFICANT CHANGE UP (ref 150–400)
POTASSIUM SERPL-MCNC: 3.2 MMOL/L — LOW (ref 3.5–5.3)
POTASSIUM SERPL-SCNC: 3.2 MMOL/L — LOW (ref 3.5–5.3)
RBC # BLD: 3.82 M/UL — LOW (ref 4.2–5.8)
RBC # FLD: 12.6 % — SIGNIFICANT CHANGE UP (ref 10.3–14.5)
SODIUM SERPL-SCNC: 139 MMOL/L — SIGNIFICANT CHANGE UP (ref 135–145)
WBC # BLD: 10 K/UL — SIGNIFICANT CHANGE UP (ref 3.8–10.5)
WBC # FLD AUTO: 10 K/UL — SIGNIFICANT CHANGE UP (ref 3.8–10.5)

## 2017-08-27 RX ORDER — POTASSIUM CHLORIDE 20 MEQ
20 PACKET (EA) ORAL
Qty: 0 | Refills: 0 | Status: COMPLETED | OUTPATIENT
Start: 2017-08-27 | End: 2017-08-27

## 2017-08-27 RX ADMIN — TAMSULOSIN HYDROCHLORIDE 0.4 MILLIGRAM(S): 0.4 CAPSULE ORAL at 21:24

## 2017-08-27 RX ADMIN — GABAPENTIN 300 MILLIGRAM(S): 400 CAPSULE ORAL at 21:24

## 2017-08-27 RX ADMIN — Medication 81 MILLIGRAM(S): at 11:10

## 2017-08-27 RX ADMIN — Medication 100 MILLIGRAM(S): at 21:24

## 2017-08-27 RX ADMIN — ENOXAPARIN SODIUM 40 MILLIGRAM(S): 100 INJECTION SUBCUTANEOUS at 06:19

## 2017-08-27 RX ADMIN — ESCITALOPRAM OXALATE 20 MILLIGRAM(S): 10 TABLET, FILM COATED ORAL at 11:10

## 2017-08-27 RX ADMIN — ATORVASTATIN CALCIUM 20 MILLIGRAM(S): 80 TABLET, FILM COATED ORAL at 21:24

## 2017-08-27 RX ADMIN — Medication 40 MILLIGRAM(S): at 18:12

## 2017-08-27 RX ADMIN — CEFTRIAXONE 100 GRAM(S): 500 INJECTION, POWDER, FOR SOLUTION INTRAMUSCULAR; INTRAVENOUS at 13:08

## 2017-08-27 RX ADMIN — Medication 40 MILLIGRAM(S): at 06:19

## 2017-08-27 RX ADMIN — Medication 20 MILLIEQUIVALENT(S): at 11:10

## 2017-08-27 RX ADMIN — Medication 0.1 MILLIGRAM(S): at 06:19

## 2017-08-27 RX ADMIN — Medication 0.5 MILLIGRAM(S): at 19:06

## 2017-08-27 RX ADMIN — MIRTAZAPINE 7.5 MILLIGRAM(S): 45 TABLET, ORALLY DISINTEGRATING ORAL at 11:10

## 2017-08-27 RX ADMIN — Medication 20 MILLIEQUIVALENT(S): at 13:06

## 2017-08-28 LAB
ANION GAP SERPL CALC-SCNC: 7 MMOL/L — SIGNIFICANT CHANGE UP (ref 5–17)
BUN SERPL-MCNC: 57 MG/DL — HIGH (ref 7–23)
CALCIUM SERPL-MCNC: 8.8 MG/DL — SIGNIFICANT CHANGE UP (ref 8.5–10.1)
CHLORIDE SERPL-SCNC: 94 MMOL/L — LOW (ref 96–108)
CO2 SERPL-SCNC: 36 MMOL/L — HIGH (ref 22–31)
CREAT SERPL-MCNC: 1.08 MG/DL — SIGNIFICANT CHANGE UP (ref 0.5–1.3)
GLUCOSE SERPL-MCNC: 106 MG/DL — HIGH (ref 70–99)
HCT VFR BLD CALC: 36.2 % — LOW (ref 39–50)
HGB BLD-MCNC: 12.2 G/DL — LOW (ref 13–17)
MCHC RBC-ENTMCNC: 30.4 PG — SIGNIFICANT CHANGE UP (ref 27–34)
MCHC RBC-ENTMCNC: 33.7 GM/DL — SIGNIFICANT CHANGE UP (ref 32–36)
MCV RBC AUTO: 90.4 FL — SIGNIFICANT CHANGE UP (ref 80–100)
PLATELET # BLD AUTO: 262 K/UL — SIGNIFICANT CHANGE UP (ref 150–400)
POTASSIUM SERPL-MCNC: 3.4 MMOL/L — LOW (ref 3.5–5.3)
POTASSIUM SERPL-SCNC: 3.4 MMOL/L — LOW (ref 3.5–5.3)
RBC # BLD: 4.01 M/UL — LOW (ref 4.2–5.8)
RBC # FLD: 12.8 % — SIGNIFICANT CHANGE UP (ref 10.3–14.5)
SODIUM SERPL-SCNC: 137 MMOL/L — SIGNIFICANT CHANGE UP (ref 135–145)
WBC # BLD: 11.6 K/UL — HIGH (ref 3.8–10.5)
WBC # FLD AUTO: 11.6 K/UL — HIGH (ref 3.8–10.5)

## 2017-08-28 RX ORDER — POTASSIUM CHLORIDE 20 MEQ
20 PACKET (EA) ORAL ONCE
Qty: 0 | Refills: 0 | Status: COMPLETED | OUTPATIENT
Start: 2017-08-28 | End: 2017-08-28

## 2017-08-28 RX ORDER — FUROSEMIDE 40 MG
40 TABLET ORAL
Qty: 0 | Refills: 0 | Status: DISCONTINUED | OUTPATIENT
Start: 2017-08-28 | End: 2017-08-29

## 2017-08-28 RX ADMIN — GABAPENTIN 300 MILLIGRAM(S): 400 CAPSULE ORAL at 22:08

## 2017-08-28 RX ADMIN — Medication 0.1 MILLIGRAM(S): at 06:01

## 2017-08-28 RX ADMIN — ENOXAPARIN SODIUM 40 MILLIGRAM(S): 100 INJECTION SUBCUTANEOUS at 06:02

## 2017-08-28 RX ADMIN — Medication 81 MILLIGRAM(S): at 12:22

## 2017-08-28 RX ADMIN — Medication 20 MILLIEQUIVALENT(S): at 19:09

## 2017-08-28 RX ADMIN — ESCITALOPRAM OXALATE 20 MILLIGRAM(S): 10 TABLET, FILM COATED ORAL at 12:23

## 2017-08-28 RX ADMIN — TAMSULOSIN HYDROCHLORIDE 0.4 MILLIGRAM(S): 0.4 CAPSULE ORAL at 22:09

## 2017-08-28 RX ADMIN — Medication 40 MILLIGRAM(S): at 06:01

## 2017-08-28 RX ADMIN — MIRTAZAPINE 7.5 MILLIGRAM(S): 45 TABLET, ORALLY DISINTEGRATING ORAL at 12:23

## 2017-08-28 RX ADMIN — TIOTROPIUM BROMIDE 1 CAPSULE(S): 18 CAPSULE ORAL; RESPIRATORY (INHALATION) at 07:46

## 2017-08-28 RX ADMIN — ATORVASTATIN CALCIUM 20 MILLIGRAM(S): 80 TABLET, FILM COATED ORAL at 22:08

## 2017-08-28 RX ADMIN — Medication 0.5 MILLIGRAM(S): at 19:18

## 2017-08-28 RX ADMIN — Medication 0.5 MILLIGRAM(S): at 07:46

## 2017-08-28 RX ADMIN — Medication 40 MILLIGRAM(S): at 19:09

## 2017-08-28 RX ADMIN — Medication 50 MILLIGRAM(S): at 06:01

## 2017-08-28 RX ADMIN — Medication 100 MILLIGRAM(S): at 22:08

## 2017-08-28 RX ADMIN — CEFTRIAXONE 100 GRAM(S): 500 INJECTION, POWDER, FOR SOLUTION INTRAMUSCULAR; INTRAVENOUS at 14:50

## 2017-08-29 ENCOUNTER — TRANSCRIPTION ENCOUNTER (OUTPATIENT)
Age: 77
End: 2017-08-29

## 2017-08-29 VITALS — WEIGHT: 141.98 LBS

## 2017-08-29 LAB
ANION GAP SERPL CALC-SCNC: 6 MMOL/L — SIGNIFICANT CHANGE UP (ref 5–17)
BUN SERPL-MCNC: 66 MG/DL — HIGH (ref 7–23)
CALCIUM SERPL-MCNC: 9.5 MG/DL — SIGNIFICANT CHANGE UP (ref 8.5–10.1)
CHLORIDE SERPL-SCNC: 96 MMOL/L — SIGNIFICANT CHANGE UP (ref 96–108)
CO2 SERPL-SCNC: 37 MMOL/L — HIGH (ref 22–31)
CREAT SERPL-MCNC: 1.17 MG/DL — SIGNIFICANT CHANGE UP (ref 0.5–1.3)
CULTURE RESULTS: SIGNIFICANT CHANGE UP
CULTURE RESULTS: SIGNIFICANT CHANGE UP
GLUCOSE SERPL-MCNC: 124 MG/DL — HIGH (ref 70–99)
HCT VFR BLD CALC: 33.3 % — LOW (ref 39–50)
HGB BLD-MCNC: 11.5 G/DL — LOW (ref 13–17)
MCHC RBC-ENTMCNC: 31.2 PG — SIGNIFICANT CHANGE UP (ref 27–34)
MCHC RBC-ENTMCNC: 34.5 GM/DL — SIGNIFICANT CHANGE UP (ref 32–36)
MCV RBC AUTO: 90.5 FL — SIGNIFICANT CHANGE UP (ref 80–100)
PLATELET # BLD AUTO: 263 K/UL — SIGNIFICANT CHANGE UP (ref 150–400)
POTASSIUM SERPL-MCNC: 3.9 MMOL/L — SIGNIFICANT CHANGE UP (ref 3.5–5.3)
POTASSIUM SERPL-SCNC: 3.9 MMOL/L — SIGNIFICANT CHANGE UP (ref 3.5–5.3)
RBC # BLD: 3.68 M/UL — LOW (ref 4.2–5.8)
RBC # FLD: 13.2 % — SIGNIFICANT CHANGE UP (ref 10.3–14.5)
SODIUM SERPL-SCNC: 139 MMOL/L — SIGNIFICANT CHANGE UP (ref 135–145)
SPECIMEN SOURCE: SIGNIFICANT CHANGE UP
SPECIMEN SOURCE: SIGNIFICANT CHANGE UP
WBC # BLD: 14.3 K/UL — HIGH (ref 3.8–10.5)
WBC # FLD AUTO: 14.3 K/UL — HIGH (ref 3.8–10.5)

## 2017-08-29 RX ORDER — MIRTAZAPINE 45 MG/1
1 TABLET, ORALLY DISINTEGRATING ORAL
Qty: 0 | Refills: 0 | COMMUNITY
Start: 2017-08-29

## 2017-08-29 RX ORDER — TAMSULOSIN HYDROCHLORIDE 0.4 MG/1
1 CAPSULE ORAL
Qty: 0 | Refills: 0 | COMMUNITY

## 2017-08-29 RX ORDER — TAMSULOSIN HYDROCHLORIDE 0.4 MG/1
1 CAPSULE ORAL
Qty: 0 | Refills: 0 | COMMUNITY
Start: 2017-08-29

## 2017-08-29 RX ORDER — MAGNESIUM ASPARTATE HCL 61 MG(615)
1 TABLET, DELAYED RELEASE (ENTERIC COATED) ORAL
Qty: 0 | Refills: 0 | COMMUNITY

## 2017-08-29 RX ORDER — TAMSULOSIN HYDROCHLORIDE 0.4 MG/1
2 CAPSULE ORAL
Qty: 0 | Refills: 0 | COMMUNITY
Start: 2017-08-29

## 2017-08-29 RX ORDER — ALBUTEROL 90 UG/1
2 AEROSOL, METERED ORAL
Qty: 0 | Refills: 0 | COMMUNITY

## 2017-08-29 RX ORDER — ESCITALOPRAM OXALATE 10 MG/1
1 TABLET, FILM COATED ORAL
Qty: 0 | Refills: 0 | COMMUNITY
Start: 2017-08-29

## 2017-08-29 RX ORDER — MIRTAZAPINE 45 MG/1
1 TABLET, ORALLY DISINTEGRATING ORAL
Qty: 0 | Refills: 0 | COMMUNITY

## 2017-08-29 RX ADMIN — MIRTAZAPINE 7.5 MILLIGRAM(S): 45 TABLET, ORALLY DISINTEGRATING ORAL at 13:03

## 2017-08-29 RX ADMIN — ENOXAPARIN SODIUM 40 MILLIGRAM(S): 100 INJECTION SUBCUTANEOUS at 06:02

## 2017-08-29 RX ADMIN — Medication 40 MILLIGRAM(S): at 06:02

## 2017-08-29 RX ADMIN — Medication 3 MILLILITER(S): at 09:26

## 2017-08-29 RX ADMIN — Medication 50 MILLIGRAM(S): at 06:45

## 2017-08-29 RX ADMIN — Medication 0.5 MILLIGRAM(S): at 06:02

## 2017-08-29 RX ADMIN — ESCITALOPRAM OXALATE 20 MILLIGRAM(S): 10 TABLET, FILM COATED ORAL at 13:03

## 2017-08-29 RX ADMIN — Medication 0.1 MILLIGRAM(S): at 06:45

## 2017-08-29 RX ADMIN — Medication 0.5 MILLIGRAM(S): at 09:26

## 2017-08-29 RX ADMIN — Medication 81 MILLIGRAM(S): at 13:03

## 2017-08-29 NOTE — PROGRESS NOTE ADULT - PROBLEM SELECTOR PLAN 1
-Methylprenisolone 40mg IV switched to prednisone 50mg PO  -Budesonide 0.5mg BID  -Tiotropium 18mcg  -Albuterol/Duoneb D/C'd due to tremoring  -O2 saturation on ambulation with oxygen (noted on home O2 2L)
-methylprednisolone 40mg IV push Qd  -Budesonide 0.5mg BID  -Tiotropium 18mcg  -Albuterol/Duoneb D/C'd due to tremoring
Prednisone 50mg PO  -Budesonide 0.5mg BID  -Tiotropium 18mcg  -Albuterol/Duoneb D/C'd due to tremoring  -cont with cont Oxygen therapy  (noted on home O2 2L).
Prednisone 50mg PO taper  -Will send home on advair, spiriva and pro-air inhalers.  -cont with cont Oxygen therapy  (noted on home O2 2L).  -Medically optimized for d/c to [facility] today.
-admit to med surg  -methylprednisolone 40mg IV push Q12h  -Budesonide 0.5mg BID  -Furosemide 20mg  -Tiotropium 18mcg  -Albuterol/Duoneb D/C'd due to tremoring
-continue methylprednisolone 40mg IV push Qd, consider transition to PO tomorrow  -Budesonide 0.5mg BID  -Tiotropium 18mcg  -Albuterol/Duoneb D/C'd due to tremoring  -O2 saturation on ambulation with oxygen (noted on home O2 2L)

## 2017-08-29 NOTE — DISCHARGE NOTE ADULT - CARE PROVIDERS DIRECT ADDRESSES
[email or web address]

## 2017-08-29 NOTE — DISCHARGE NOTE ADULT - PLAN OF CARE
Stable patient Follow-up outpatient Pulmonology, Dr. Glover. Continue advair, spiriva, pro-air. Continue prednisone taper 50mg PO x3 days, 40mg PO x3 days, 30mg PO x3 days, 20mg PO x3 days, 10mg POx 3 days. Follow-up PCP Continue clonazepam as needed, follow-up PCP Patient does not plan on receiving further chemotherapy. Follow-up with PCP Continue furosemide, EF 55-60% with trace mitral and tricuspid regurgitation, follow-up with PCP Continue mirtazapine, trazodone, escitalopram  Follow-up with PCP Continue clonidine, follow-up with PCP

## 2017-08-29 NOTE — PROGRESS NOTE ADULT - PROBLEM SELECTOR PROBLEM 7
HTN (hypertension)
Unsteady gait
HLD (hyperlipidemia)

## 2017-08-29 NOTE — PROGRESS NOTE ADULT - PROBLEM SELECTOR PLAN 4
s/p resection 06/17  condition stable
s/p resection 06/17  condition stable
s/p resection 06/17  condition stable. Pts last chemotherapy was in june and is not planned for any more rounds of chemotherapy. He can get d/c to [facility]
s/p resection 06/17  condition stable. Pts last chemotherapy was in june and is not planned for any more rounds of chemotherapy. He can get d/c to [facility]
-Tamsulosin 0.4mg orally qhs
s/p resection 06/17  condition stable

## 2017-08-29 NOTE — PROGRESS NOTE ADULT - PROBLEM SELECTOR PLAN 5
-Tamsulosin 0.4mg orally qhs
-gabapentin 300mg orally qhs

## 2017-08-29 NOTE — DISCHARGE NOTE ADULT - SECONDARY DIAGNOSIS.
Anxiety Bladder tumor Chronic diastolic congestive heart failure Depression, unspecified depression type Essential hypertension

## 2017-08-29 NOTE — DISCHARGE NOTE ADULT - MEDICATION SUMMARY - MEDICATIONS TO TAKE
I will START or STAY ON the medications listed below when I get home from the hospital:    predniSONE 10 mg oral tablet  -- 5 tab(s) by mouth once a day x 3 days  4 tab(s) by mouth once a day x 3 days  3 tab(s) by mouth once a day x 3 days  2 tab(s) by mouth once a day x 3 days  1 tab(s) by mouth once a day x 3 days  -- Indication: For COPD exacerbation    aspirin 81 mg oral tablet, chewable  -- 1 tab(s) by mouth once a day  -- Indication: For Secondary prevention    cloNIDine 0.1 mg oral tablet  -- 1 tab(s) by mouth once a day  -- Indication: For HTN (hypertension)    tamsulosin 0.4 mg oral capsule  -- 1 cap(s) by mouth once a day (at bedtime)  -- Indication: For Benign prostatic hyperplasia    clonazePAM 0.5 mg oral tablet  -- 1 tab(s) by mouth 3 times a day, As Needed for tremor  -- Indication: For Anxiety    gabapentin 300 mg oral capsule  -- 1 cap(s) by mouth once a day (at bedtime)  -- Indication: For Peripheral neuropathy    traZODone 100 mg oral tablet  -- 1 tab(s) by mouth once a day (at bedtime)  -- Indication: For Depression    escitalopram 20 mg oral tablet  -- 1 tab(s) by mouth once a day  -- Indication: For Depression    mirtazapine 7.5 mg oral tablet  -- 1 tab(s) by mouth once a day  -- Indication: For Depression    atorvastatin 20 mg oral tablet  -- 1 tab(s) by mouth once a day  -- Indication: For HLD (hyperlipidemia)    Spiriva 18 mcg inhalation capsule  -- 1 cap(s) inhaled once a day  -- Indication: For COPD exacerbation    Advair  mcg-21 mcg/inh inhalation aerosol  -- 2 puff(s) inhaled 2 times a day  -- Indication: For COPD exacerbation    ProAir HFA 90 mcg/inh inhalation aerosol  -- 2 puff(s) inhaled 4 times a day, As Needed for shortness of breath  -- Indication: For COPD exacerbation    furosemide 20 mg oral tablet  -- 1 tab(s) by mouth once a day  -- Avoid prolonged or excessive exposure to direct and/or artificial sunlight while taking this medication.  It is very important that you take or use this exactly as directed.  Do not skip doses or discontinue unless directed by your doctor.  It may be advisable to drink a full glass orange juice or eat a banana daily while taking this medication.    -- Indication: For Diuretic    Klor-Con M20 oral tablet, extended release  -- 1 tab(s) by mouth once a day  -- Indication: For Supplement    folic acid 1 mg oral tablet  -- 1 tab(s) by mouth once a day  -- Indication: For Supplement    Vitamin D3 1000 intl units oral capsule  -- 1 cap(s) by mouth once a day  -- Indication: For Supplement

## 2017-08-29 NOTE — PROGRESS NOTE ADULT - PROBLEM SELECTOR PROBLEM 5
Depression
Benign prostatic hyperplasia
Dementia
Peripheral neuropathy

## 2017-08-29 NOTE — PROGRESS NOTE ADULT - PROBLEM SELECTOR PROBLEM 2
Pleural effusion
COPD exacerbation
Pleural effusion
HTN (hypertension)

## 2017-08-29 NOTE — PROGRESS NOTE ADULT - PROBLEM SELECTOR PLAN 3
-Clonidine 0.1mg daily
s/p resection 06/17  condition stable

## 2017-08-29 NOTE — PROGRESS NOTE ADULT - PROBLEM SELECTOR PLAN 2
IV Lasix 40mg q12h
PO Lasix 40mg q12h
-Clonidine 0.1mg daily  -
IV Lasix 40mg q12h

## 2017-08-29 NOTE — PROGRESS NOTE ADULT - SUBJECTIVE AND OBJECTIVE BOX
Pt has been seen and examined with FP resident, resident supervised agree with a/p       Patient is a 77y old  Male who presents with a chief complaint of SOB (23 Aug 2017 23:58)        HPI:  77 y.o. male with PMH of COPD home O2 dependent 2L, Diastolic CHF, HTN, HLD, Depression, GERD, hx of bladder ca s/p mass resection and intravesical chemo june 13th 2017,   presents to the ED c/o SOB.    PHYSICAL EXAM:  Vital Signs Last 24 Hrs  T(C): 36.5 (24 Aug 2017 08:59), Max: 37.4 (23 Aug 2017 16:37)  T(F): 97.7 (24 Aug 2017 08:59), Max: 99.4 (23 Aug 2017 16:37)  HR: 88 (24 Aug 2017 08:59) (85 - 100)  BP: 161/71 (24 Aug 2017 08:59) (150/74 - 182/84)  BP(mean): --  RR: 20 (24 Aug 2017 08:59) (18 - 22)  SpO2: 95% (24 Aug 2017 08:59) (95% - 98%)  general- anxious appearence   -rs- poor air entry, ? cta  -cvs-s1s2 normal   -p/a-soft,bs+  -extremity- no asymmetrical swelling noted   -cns- non focal         A/P    #shortness of breath   -etiology not clear- appears most likely to pleural effusion - pt is not wheezing at this time but has poor air entry   -cut down on steroids, avoid albuterol as he is anxious appearance  -CT chest for better evaluation    #Pleural effusion -etiology ?  -start iv lasix for possible diastolic heart failure  -echo for any pathology explaining above   -monitor it     #dvt pr
Pt has been seen and examined with FP resident, resident supervised agree with a/p       Patient is a 77y old  Male who presents with a chief complaint of SOB (23 Aug 2017 23:58)        HPI:  77 y.o. male with PMH of COPD home O2 dependent 2L, Diastolic CHF, HTN, HLD, Depression, GERD, hx of bladder ca s/p mass resection and intravesical chemo june 13th 2017,   presents to the ED c/o SOB.    PHYSICAL EXAM:  Vital Signs Last 24 Hrs  T(C): 36.6 (25 Aug 2017 12:22), Max: 36.6 (24 Aug 2017 16:55)  T(F): 97.9 (25 Aug 2017 12:22), Max: 97.9 (24 Aug 2017 16:55)  HR: 103 (25 Aug 2017 12:22) (72 - 103)  BP: 127/52 (25 Aug 2017 12:22) (109/63 - 149/71)  BP(mean): --  RR: 18 (25 Aug 2017 12:22) (16 - 18)  SpO2: 100% (25 Aug 2017 12:22) (96% - 100%)    general- comfortable  -rs- poor air entry, ? cta  -cvs-s1s2 normal   -p/a-soft,bs+  -extremity- no asymmetrical swelling noted   -cns- non focal         A/P    #shortness of breath   -etiology not clear- appears most likely to pleural effusion along with possible AECOPD and acute decompensated diastolic heart failure   -improved with lasix -increase lasix and monitor renal panel closely     #Pleural effusion -etiology ?  -most likely due to acute decompensated diastolic heart failure   -monitor it, repeat imagine study later to see for improvement, if improvement then continue same if not then therapeutic and diagnostic tap     #dvt pr
CHIEF COMPLAINT:    SOB/MATA    SUBJECTIVE:     77y Male PMHx COPD, home O2 2L, Diastolic CHF, HTN, HLD, Depression, GERD, hx of bladder ca s/p mass resection and intravesical chemo on 06/13/17, presents with SOB/MATA since last night. Partially treated at home with inhaler at home. SOB associated with mild cough, nonproductive. Patient admits appetite since past few days has decreased. Denies HA, fever, dizziness, abdominal pain n/v/d, chest pain, rash or urinary incontinence. Treated in ED with nebs, IV Solumedrol, with symptomatic relief, but patient not able to ambulate due to unsteady gait. CXR - R pleural effusion, increased size from old CXR in June, no cephalization r pulm congestion. 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     Patient seen and examined at bedside. Patient is confused speaks some English, some Georgian. Patient admits nervousness and anxiety with shortness of breath. Denies fever, urinary issues, abdominal issues, chest pain. Pt. noticed to be tremoring, will d/c albuterol medications, and decrease steroid dosage.    REVIEW OF SYSTEMS:    CONSTITUTIONAL: No weakness, fevers or chills, feels anxious  EYES/ENT: No visual changes;  No vertigo or throat pain   NECK: No pain or stiffness  RESPIRATORY: shortness of breath, no cough  CARDIOVASCULAR: No chest pain or palpitations  GASTROINTESTINAL: No abdominal or epigastric pain. No nausea, vomiting, or hematemesis; No diarrhea or constipation. No melena or hematochezia.  GENITOURINARY: No dysuria, frequency or hematuria  NEUROLOGICAL: No numbness or weakness  SKIN: No itching, burning, rashes, or lesions   All other review of systems is negative unless indicated above    Vital Signs Last 24 Hrs  T(C): 36.5 (24 Aug 2017 08:59), Max: 37.4 (23 Aug 2017 16:37)  T(F): 97.7 (24 Aug 2017 08:59), Max: 99.4 (23 Aug 2017 16:37)  HR: 88 (24 Aug 2017 08:59) (85 - 100)  BP: 161/71 (24 Aug 2017 08:59) (150/74 - 182/84)  BP(mean): --  RR: 20 (24 Aug 2017 08:59) (18 - 22)  SpO2: 95% (24 Aug 2017 08:59) (95% - 98%)    I&O's Summary    23 Aug 2017 07:01  -  24 Aug 2017 07:00  --------------------------------------------------------  IN: 100 mL / OUT: 100 mL / NET: 0 mL        CAPILLARY BLOOD GLUCOSE          PHYSICAL EXAM:    Constitutional: NAD, awake and alert, well-developed, anxious-appearing  Neck: No JVD  Respiratory: Generalized wheezing on auscultation  Cardiovascular: S1 and S2, regular rate and rhythm, no Murmurs, gallops or rubs  Gastrointestinal: Bowel Sounds present, soft, nontender, nondistended, no guarding, no rebound  Extremities: No peripheral edema, tremoring in upper extremities  Vascular: 2+ peripheral pulses  Neurological: A/O x 3, no focal deficits    MEDICATIONS:  MEDICATIONS  (STANDING):  buDESOnide   0.5 milliGRAM(s) Respule 0.5 milliGRAM(s) Nebulizer two times a day  aspirin  chewable 81 milliGRAM(s) Oral daily  cloNIDine 0.1 milliGRAM(s) Oral daily  tamsulosin 0.4 milliGRAM(s) Oral at bedtime  gabapentin 300 milliGRAM(s) Oral at bedtime  mirtazapine 7.5 milliGRAM(s) Oral daily  traZODone 100 milliGRAM(s) Oral at bedtime  atorvastatin 20 milliGRAM(s) Oral at bedtime  tiotropium 18 MICROgram(s) Capsule 1 Capsule(s) Inhalation daily  furosemide    Tablet 20 milliGRAM(s) Oral daily  enoxaparin Injectable 40 milliGRAM(s) SubCutaneous every 24 hours  methylPREDNISolone sodium succinate Injectable 40 milliGRAM(s) IV Push every 8 hours      LABS: All Labs Reviewed:                        13.1   9.0   )-----------( 270      ( 23 Aug 2017 16:37 )             39.4     08-23    138  |  102  |  23  ----------------------------<  91  3.9   |  27  |  1.08    Ca    9.0      23 Aug 2017 16:37    TPro  7.7  /  Alb  3.7  /  TBili  0.4  /  DBili  x   /  AST  26  /  ALT  15  /  AlkPhos  76  08-23    PT/INR - ( 23 Aug 2017 16:37 )   PT: 11.0 sec;   INR: 1.02 ratio         PTT - ( 23 Aug 2017 16:37 )  PTT:28.7 sec  CARDIAC MARKERS ( 23 Aug 2017 16:37 )  <0.015 ng/mL / x     / x     / x     / x          Blood Culture:     RADIOLOGY/EKG:    DVT PPX:    ADVANCED DIRECTIVE:    DISPOSITION:
CHIEF COMPLAINT:    SOB/MATA    SUBJECTIVE:     77y Male PMHx COPD, home O2 2L, Diastolic CHF, HTN, HLD, Depression, GERD, hx of bladder ca s/p mass resection and intravesical chemo on 06/13/17, presents with SOB/MATA since last night. Partially treated at home with inhaler at home. SOB associated with mild cough, nonproductive. Patient admits appetite since past few days has decreased. Denies HA, fever, dizziness, abdominal pain n/v/d, chest pain, rash or urinary incontinence. Treated in ED with nebs, IV Solumedrol, with symptomatic relief, but patient not able to ambulate due to unsteady gait. CXR - R pleural effusion, increased size from old CXR in June, no cephalization r pulm congestion. 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     Pt. seen and examined at bedside. Complained of issues of burning with urination. Found to have been retaining, ordering a bladder scan and Renal US. Patient echo results show EF of 55-60%. Patient denies fevers/chills/sob. Breathing comfortably today. Still having tremors, will call wife and see what baseline is.    REVIEW OF SYSTEMS:    CONSTITUTIONAL: No weakness, fevers or chills, feels anxious  EYES/ENT: No visual changes;  No vertigo or throat pain   NECK: No pain or stiffness  RESPIRATORY: shortness of breath, no cough  CARDIOVASCULAR: No chest pain or palpitations  GASTROINTESTINAL: No abdominal or epigastric pain. No nausea, vomiting, or hematemesis; No diarrhea or constipation. No melena or hematochezia.  GENITOURINARY: No dysuria, frequency or hematuria  NEUROLOGICAL: No numbness or weakness  SKIN: No itching, burning, rashes, or lesions   All other review of systems is negative unless indicated above    Vital Signs Last 24 Hrs  T(C): 36.5 (24 Aug 2017 08:59), Max: 37.4 (23 Aug 2017 16:37)  T(F): 97.7 (24 Aug 2017 08:59), Max: 99.4 (23 Aug 2017 16:37)  HR: 88 (24 Aug 2017 08:59) (85 - 100)  BP: 161/71 (24 Aug 2017 08:59) (150/74 - 182/84)  BP(mean): --  RR: 20 (24 Aug 2017 08:59) (18 - 22)  SpO2: 95% (24 Aug 2017 08:59) (95% - 98%)    I&O's Summary    23 Aug 2017 07:01  -  24 Aug 2017 07:00  --------------------------------------------------------  IN: 100 mL / OUT: 100 mL / NET: 0 mL        CAPILLARY BLOOD GLUCOSE          PHYSICAL EXAM:    Constitutional: NAD, awake and alert, well-developed, anxious-appearing  Neck: No JVD  Respiratory: Auscultation not appreciable  Cardiovascular: S1 and S2, regular rate and rhythm, no Murmurs, gallops or rubs  Gastrointestinal: Bowel Sounds present, soft, nontender, nondistended, no guarding, no rebound  Extremities: No peripheral edema, tremoring in upper extremities  Vascular: 2+ peripheral pulses    MEDICATIONS:  MEDICATIONS  (STANDING):  buDESOnide   0.5 milliGRAM(s) Respule 0.5 milliGRAM(s) Nebulizer two times a day  aspirin  chewable 81 milliGRAM(s) Oral daily  cloNIDine 0.1 milliGRAM(s) Oral daily  tamsulosin 0.4 milliGRAM(s) Oral at bedtime  gabapentin 300 milliGRAM(s) Oral at bedtime  mirtazapine 7.5 milliGRAM(s) Oral daily  traZODone 100 milliGRAM(s) Oral at bedtime  atorvastatin 20 milliGRAM(s) Oral at bedtime  tiotropium 18 MICROgram(s) Capsule 1 Capsule(s) Inhalation daily  furosemide    Tablet 20 milliGRAM(s) Oral daily  enoxaparin Injectable 40 milliGRAM(s) SubCutaneous every 24 hours  methylPREDNISolone sodium succinate Injectable 40 milliGRAM(s) IV Push every 8 hours      LABS: All Labs Reviewed:                        12.8   12.6  )-----------( 259      ( 25 Aug 2017 07:32 )             37.8   08-25    139  |  100  |  42<H>  ----------------------------<  117<H>  3.3<L>   |  29  |  1.16    Ca    8.9      25 Aug 2017 07:32    TPro  7.7  /  Alb  3.7  /  TBili  0.4  /  DBili  x   /  AST  26  /  ALT  15  /  AlkPhos  76  08-23    PT/INR - ( 23 Aug 2017 16:37 )   PT: 11.0 sec;   INR: 1.02 ratio         PTT - ( 23 Aug 2017 16:37 )  PTT:28.7 sec  CARDIAC MARKERS ( 23 Aug 2017 16:37 )  <0.015 ng/mL / x     / x     / x     / x          Blood Culture:     RADIOLOGY/EKG:    DVT PPX:    ADVANCED DIRECTIVE:    DISPOSITION:
HPI:  77 y.o. male with PMH of COPD home O2 dependent 2L, Diastolic CHF, HTN, HLD, Depression, GERD, hx of bladder ca s/p mass resection and intravesical chemo 2017,   presents to the ED c/o SOB. Patient reported having  increased SOB and MATA since last night. Partially but inadequately treated by [facility] at home. +home neb, though pt did not use. +mild cough, nonproductive. Decreased appetite since past few days. No h/o headache, fever, dizziness, abd pain, nvd, cp, rash, or urinary incontinence.  IN ED patient was given neb tx with IV solumedrol which improved symptoms ,but patient failed ambulation trial in ED due to unsteady gait,CXR- shows increased right sided pleural effusion small to moderate ,but no apparent cephalization or pulmonary vascular congestion  Patient did not have a fever, WBC wnl, troponinx1 wnl, pro BNP wnl,EKG NSR,shows old septal infarct,non specific ST/T wave changes ,in V1 early T wave repolarization is present in old EKG in 2017	    a/w SOB. no cough, sputum, fever. feels about same today. CXR small right effusion, inc c/w . on home O2 2L/min. has severe COPD FEV1 0.86. previous thoracentesison R  fluid borderline exud/transudate, cytology negative.  : sleeping this AM, afeb, no distress. CT chest shows mild-mod R effusion.    PMHx: :: HTN  HLD  COPD  Diastolic CHF  Depression  Urinary retention  GERD  PSHx: :: Bladder cancerous tumor resection 2017 with intravesical chemo therapy   Family History: :: noncontributory to current presentation (23 Aug 2017 23:58)      PAST MEDICAL & SURGICAL HISTORY:  Hematuria  BPH (benign prostatic hypertrophy)  Low back pain  Osteoarthritis  Pleural effusion: 2016  Hypercholesterolemia  Anxiety  Depression  COPD (chronic obstructive pulmonary disease)  HTN (hypertension)  Bladder tumor  Cataract extraction status:   b/l      MEDICATIONS  (STANDING):  ALBUTerol/ipratropium for Nebulization 3 milliLiter(s) Nebulizer every 6 hours  methylPREDNISolone sodium succinate Injectable 60 milliGRAM(s) IV Push every 8 hours  buDESOnide   0.5 milliGRAM(s) Respule 0.5 milliGRAM(s) Nebulizer two times a day  aspirin  chewable 81 milliGRAM(s) Oral daily  cloNIDine 0.1 milliGRAM(s) Oral daily  tamsulosin 0.4 milliGRAM(s) Oral at bedtime  gabapentin 300 milliGRAM(s) Oral at bedtime  mirtazapine 7.5 milliGRAM(s) Oral daily  traZODone 100 milliGRAM(s) Oral at bedtime  atorvastatin 20 milliGRAM(s) Oral at bedtime  tiotropium 18 MICROgram(s) Capsule 1 Capsule(s) Inhalation daily  furosemide    Tablet 20 milliGRAM(s) Oral daily  enoxaparin Injectable 40 milliGRAM(s) SubCutaneous every 24 hours    MEDICATIONS  (PRN):  ALBUTerol/ipratropium for Nebulization 3 milliLiter(s) Nebulizer every 4 hours PRN Shortness of Breath and/or Wheezing  clonazePAM Tablet 0.5 milliGRAM(s) Oral three times a day PRN agitation/anxiety      Allergies    No Known Allergies    Intolerances        SOCIAL HISTORY: Denies tobacco, etoh abuse or illicit drug use    FAMILY HISTORY:  Family history of asthma in mother (Mother)  Family history of cancer (Father): father      Vital Signs Last 24 Hrs  T(C): 36.5 (24 Aug 2017 08:59), Max: 37.4 (23 Aug 2017 16:37)  T(F): 97.7 (24 Aug 2017 08:59), Max: 99.4 (23 Aug 2017 16:37)  HR: 88 (24 Aug 2017 08:59) (85 - 100)  BP: 161/71 (24 Aug 2017 08:59) (150/74 - 182/84)  BP(mean): --  RR: 20 (24 Aug 2017 08:59) (18 - 22)  SpO2: 95% (24 Aug 2017 08:59) (95% - 98%)    REVIEW OF SYSTEMS:    CONSTITUTIONAL:  As per HPI.  HEENT:  Eyes:  No diplopia or blurred vision. ENT:  No earache, sore throat or runny nose.  CARDIOVASCULAR:  No pressure, squeezing, tightness, heaviness or aching about the chest, neck, axilla or epigastrium.  RESPIRATORY:  see above  GASTROINTESTINAL:  No nausea, vomiting or diarrhea.  GENITOURINARY:  No dysuria, frequency or urgency.  MUSCULOSKELETAL:  As per HPI.  SKIN:  No change in skin, hair or nails.  NEUROLOGIC:  No paresthesias, fasciculations, seizures or weakness.  PSYCHIATRIC:  No disorder of thought or mood.  ENDOCRINE:  No heat or cold intolerance, polyuria or polydipsia.  HEMATOLOGICAL:  No easy bruising or bleedings:  .     PHYSICAL EXAMINATION:    GENERAL APPEARANCE:  Pt. is not currently dyspneic, in no distress. Pt. is alert, oriented, and pleasant.  HEENT:  Pupils are normal and react normally. No icterus. Mucous membranes well colored.  NECK:  Supple. No lymphadenopathy. Jugular venous pressure not elevated. Carotids equal.   HEART:   The cardiac impulse has a normal quality. Regular. Normal S1 and S2. There are no murmurs, rubs or gallops noted  CHEST:  decreased aud BS's R base.  ABDOMEN:  Soft and nontender.   EXTREMITIES:  There is no cyanosis, clubbing or edema.   SKIN:  No rash or significant lesions are noted.  Neuro: Alert, awake, and O x 3.      LABS:                        13.1   9.0   )-----------( 270      ( 23 Aug 2017 16:37 )             39.4     -    138  |  102  |  23  ----------------------------<  91  3.9   |  27  |  1.08    Ca    9.0      23 Aug 2017 16:37    TPro  7.7  /  Alb  3.7  /  TBili  0.4  /  DBili  x   /  AST  26  /  ALT  15  /  AlkPhos  76      LIVER FUNCTIONS - ( 23 Aug 2017 16:37 )  Alb: 3.7 g/dL / Pro: 7.7 gm/dL / ALK PHOS: 76 U/L / ALT: 15 U/L / AST: 26 U/L / GGT: x           PT/INR - ( 23 Aug 2017 16:37 )   PT: 11.0 sec;   INR: 1.02 ratio         PTT - ( 23 Aug 2017 16:37 )  PTT:28.7 sec  CARDIAC MARKERS ( 23 Aug 2017 16:37 )  <0.015 ng/mL / x     / x     / x     / x          Urinalysis Basic - ( 23 Aug 2017 20:35 )    Color: Yellow / Appearance: Clear / S.020 / pH: x  Gluc: x / Ketone: Small  / Bili: Negative / Urobili: Negative mg/dL   Blood: x / Protein: 30 mg/dL / Nitrite: Negative   Leuk Esterase: Small / RBC: 6-10 /HPF / WBC 11-25   Sq Epi: x / Non Sq Epi: Few / Bacteria: Few          RADIOLOGY & ADDITIONAL STUDIES:   EXAM:  CT CHEST                            PROCEDURE DATE:  2017          INTERPRETATION:  Exam Date: 2017 2:25 PM  Clinical Information: Pleural effusion  Technique:       CT of the chest was performed with axial images obtained   from the thoracic to the inlet to the bilateral adrenal glands       without IV contrast. Maximum intensity projection images were obtained.  Comparison:  2017    FINDINGS:    Lungs, Airways and Pleura: Central tracheobronchial tree is grossly   patent. No endobronchial lesions. Mild centrilobular emphysema. Mild to   moderate right pleural effusion mildly decreased from prior exam with   right lower lobe compressive atelectasis improved as compared to prior   exam. No pneumothorax. No pulmonary edema. Minimal biapical pleural   parenchymal scarring. Peripheral triangular density in the posterior   right apex may represent atelectasis and or scarring unchanged. A few   scattered less than 0.4 cm right upper lobe nodules are unchanged.   Calcified granuloma in the left apex unchanged.    Heart and Great Vessels: Atherosclerotic changes of the aorta and   coronary vasculature. Heart is normal in size. No pericardial effusions.    Mediastinum and Fay: within normal limits    Neck and Chest Wall: Mild bilateral gynecomastia.    Bones: Degenerative changes.    Upper Abdomen:  No stones.    IMPRESSION:         Improving mild to moderate right pleural effusion with associated right   lower lobe compressive atelectasis.    Scattered less than0.4 cm nodules are unchanged.
INTERVAL/OVERNIGHT EVENTS: This is 77y Male PMHx COPD, home O2 2L, Diastolic CHF, HTN, HLD, Depression, GERD, hx of bladder ca s/p mass resection and intravesical chemo on 06/13/17, presents with SOB/MATA since night prior to admission. Partially treated at home with inhaler. SOB associated with mild cough, nonproductive. Patient admits appetite since past few days has decreased. Denies HA, fever, dizziness, abdominal pain n/v/d, chest pain, rash or urinary incontinence. Treated in ED with nebs, IV Solumedrol, with symptomatic relief, but patient not able to ambulate due to unsteady gait. CXR - R pleural effusion, increased size from old CXR in June, no cephalization r pulm congestion. 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     8/26- Patient reports SOB has improved. Denies fever, CP, dizziness, fatigue, N/V/D, appetite loss or additional acute complaints. Hemodynamically stable. No overnight events. Discussed with patient plan to continue diuresis with possible evaluation for discharge tomorrow.     8/28: Pt seen and examined at bedside. Doing better. D/c planning. No complaints       MEDICATIONS  (STANDING):  buDESOnide   0.5 milliGRAM(s) Respule 0.5 milliGRAM(s) Nebulizer two times a day  aspirin  chewable 81 milliGRAM(s) Oral daily  cloNIDine 0.1 milliGRAM(s) Oral daily  tamsulosin 0.4 milliGRAM(s) Oral at bedtime  gabapentin 300 milliGRAM(s) Oral at bedtime  mirtazapine 7.5 milliGRAM(s) Oral daily  traZODone 100 milliGRAM(s) Oral at bedtime  atorvastatin 20 milliGRAM(s) Oral at bedtime  tiotropium 18 MICROgram(s) Capsule 1 Capsule(s) Inhalation daily  enoxaparin Injectable 40 milliGRAM(s) SubCutaneous every 24 hours  escitalopram 20 milliGRAM(s) Oral daily  cefTRIAXone   IVPB   IV Intermittent   cefTRIAXone   IVPB 1 Gram(s) IV Intermittent every 24 hours  furosemide   Injectable 40 milliGRAM(s) IV Push every 12 hours  predniSONE   Tablet 50 milliGRAM(s) Oral daily    MEDICATIONS  (PRN):  clonazePAM Tablet 0.5 milliGRAM(s) Oral three times a day PRN agitation/anxiety  ALBUTerol/ipratropium for Nebulization. 3 milliLiter(s) Nebulizer once PRN Shortness of Breath and/or Wheezing      Allergies    No Known Allergies    REVIEW OF SYSTEMS:    CONSTITUTIONAL: No weakness, fevers or chills  EYES/ENT: No visual changes;  No vertigo or throat pain   NECK: No pain or stiffness  RESPIRATORY: No cough, wheezing, hemoptysis; No shortness of breath  CARDIOVASCULAR: No chest pain or palpitations  GASTROINTESTINAL: No abdominal or epigastric pain. No nausea, vomiting, or hematemesis; No diarrhea or constipation. No melena or hematochezia.  GENITOURINARY: No dysuria, frequency or hematuria  NEUROLOGICAL: No numbness or weakness  SKIN: No itching, burning, rashes, or lesions   All other review of systems is negative unless indicated above.      VS reviewed, stable.  Gen: patient is comfortable, smiling, interactive, well appearing, no acute distress  HEENT: NC/AT, no conjunctivitis or scleral icterus; no nasal discharge or congestion. OP without exudates/erythema.  Neck: FROM, supple, no cervical LAD, large fluctuant mass on back of neck (chronic)  Chest: RLL crackles, otherwise CTA b/l, good air entry, no tachypnea or retractions  CV: regular rate and rhythm, soft systolic murmur  Abd: soft, nontender, nondistended, no HSM appreciated, +BS  Back: no vertebral or paraspinal tenderness along entire spine; no CVAT  Extrem: No joint effusion or tenderness; FROM of all joints; no deformities or erythema noted. 2+ peripheral pulses, WWP.   Neuro: non-focal    T(C): 36.6 (08-28-17 @ 17:34), Max: 36.6 (08-28-17 @ 10:50)  HR: 96 (08-28-17 @ 17:34) (61 - 96)  BP: 118/60 (08-28-17 @ 17:34) (108/53 - 155/49)  RR: 17 (08-28-17 @ 17:34) (17 - 18)  SpO2: 97% (08-28-17 @ 17:34) (97% - 100%)  Wt(kg): --                              12.2   11.6  )-----------( 262      ( 28 Aug 2017 07:23 )             36.2     28 Aug 2017 07:23    137    |  94     |  57     ----------------------------<  106    3.4     |  36     |  1.08     Ca    8.8        28 Aug 2017 07:23        CAPILLARY BLOOD GLUCOSE
INTERVAL/OVERNIGHT EVENTS: This is 77y Male PMHx COPD, home O2 2L, Diastolic CHF, HTN, HLD, Depression, GERD, hx of bladder ca s/p mass resection and intravesical chemo on 17, presents with SOB/MATA since night prior to admission. Partially treated at home with inhaler. SOB associated with mild cough, nonproductive. Patient admits appetite since past few days has decreased. Denies HA, fever, dizziness, abdominal pain n/v/d, chest pain, rash or urinary incontinence. Treated in ED with nebs, IV Solumedrol, with symptomatic relief, but patient not able to ambulate due to unsteady gait. CXR - R pleural effusion, increased size from old CXR in , no cephalization r pulm congestion. 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     - Patient reports SOB has improved. Denies fever, CP, dizziness, fatigue, N/V/D, appetite loss or additional acute complaints. Hemodynamically stable. No overnight events. Discussed with patient plan to continue diuresis with possible evaluation for discharge tomorrow.       MEDICATIONS  (STANDING):  buDESOnide   0.5 milliGRAM(s) Respule 0.5 milliGRAM(s) Nebulizer two times a day  aspirin  chewable 81 milliGRAM(s) Oral daily  cloNIDine 0.1 milliGRAM(s) Oral daily  tamsulosin 0.4 milliGRAM(s) Oral at bedtime  gabapentin 300 milliGRAM(s) Oral at bedtime  mirtazapine 7.5 milliGRAM(s) Oral daily  traZODone 100 milliGRAM(s) Oral at bedtime  atorvastatin 20 milliGRAM(s) Oral at bedtime  tiotropium 18 MICROgram(s) Capsule 1 Capsule(s) Inhalation daily  enoxaparin Injectable 40 milliGRAM(s) SubCutaneous every 24 hours  escitalopram 20 milliGRAM(s) Oral daily  methylPREDNISolone sodium succinate Injectable 40 milliGRAM(s) IV Push daily  cefTRIAXone   IVPB   IV Intermittent   cefTRIAXone   IVPB 1 Gram(s) IV Intermittent every 24 hours  furosemide   Injectable 40 milliGRAM(s) IV Push every 12 hours    MEDICATIONS  (PRN):  clonazePAM Tablet 0.5 milliGRAM(s) Oral three times a day PRN agitation/anxiety  ALBUTerol/ipratropium for Nebulization. 3 milliLiter(s) Nebulizer once PRN Shortness of Breath and/or Wheezing    Allergies    No Known Allergies    REVIEW OF SYSTEMS:    CONSTITUTIONAL: No weakness, fevers or chills  EYES/ENT: No visual changes;  No vertigo or throat pain   NECK: No pain or stiffness  RESPIRATORY: No cough, wheezing, hemoptysis; No shortness of breath  CARDIOVASCULAR: No chest pain or palpitations  GASTROINTESTINAL: No abdominal or epigastric pain. No nausea, vomiting, or hematemesis; No diarrhea or constipation. No melena or hematochezia.  GENITOURINARY: No dysuria, frequency or hematuria  NEUROLOGICAL: No numbness or weakness  SKIN: No itching, burning, rashes, or lesions   All other review of systems is negative unless indicated above.    Vital Signs Last 24 Hrs  T(C): 36.6 (26 Aug 2017 11:45), Max: 36.6 (25 Aug 2017 21:00)  T(F): 97.9 (26 Aug 2017 11:45), Max: 97.9 (25 Aug 2017 21:00)  HR: 80 (26 Aug 2017 11:45) (51 - 93)  BP: 101/44 (26 Aug 2017 11:45) (101/44 - 117/61)  BP(mean): --  RR: 18 (25 Aug 2017 21:00) (17 - 18)  SpO2: 98% (26 Aug 2017 11:45) (96% - 99%)  I&O's Summary    25 Aug 2017 07:01  -  26 Aug 2017 07:00  --------------------------------------------------------  IN: 100 mL / OUT: 0 mL / NET: 100 mL      Daily Weight in k (24 Aug 2017 08:30)  BMI (kg/m2): 22.6 (23 @ 15:21)    VS reviewed, stable.  Gen: patient is comfortable, smiling, interactive, well appearing, no acute distress  HEENT: NC/AT, no conjunctivitis or scleral icterus; no nasal discharge or congestion. OP without exudates/erythema.  Neck: FROM, supple, no cervical LAD, large fluctuant mass on back of neck (chronic)  Chest: RLL crackles, otherwise CTA b/l, good air entry, no tachypnea or retractions  CV: regular rate and rhythm, soft systolic murmur  Abd: soft, nontender, nondistended, no HSM appreciated, +BS  Back: no vertebral or paraspinal tenderness along entire spine; no CVAT  Extrem: No joint effusion or tenderness; FROM of all joints; no deformities or erythema noted. 2+ peripheral pulses, WWP.   Neuro: non-focal    Interval Lab Results:                        11.8   10.4  )-----------( 240      ( 26 Aug 2017 06:01 )             35.8                         12.8   12.6  )-----------( 259      ( 25 Aug 2017 07:32 )             37.8                         13.1   9.0   )-----------( 270      ( 23 Aug 2017 16:37 )             39.4                               140    |  100    |  43                  Calcium: 8.4   / iCa: x      ( @ 06:01)    ----------------------------<  91        Magnesium: x                                3.2     |  35     |  1.08             Phosphorous: x              INTERVAL IMAGING STUDIES:    A/P:   This is a Patient is a 77y old  Male who presents with a chief complaint of SOB (23 Aug 2017 23:58)
INTERVAL/OVERNIGHT EVENTS: This is 77y Male PMHx COPD, home O2 2L, Diastolic CHF, HTN, HLD, Depression, GERD, hx of bladder ca s/p mass resection and intravesical chemo on 17, presents with SOB/MATA since night prior to admission. Partially treated at home with inhaler. SOB associated with mild cough, nonproductive. Patient admits appetite since past few days has decreased. Denies HA, fever, dizziness, abdominal pain n/v/d, chest pain, rash or urinary incontinence. Treated in ED with nebs, IV Solumedrol, with symptomatic relief, but patient not able to ambulate due to unsteady gait. CXR - R pleural effusion, increased size from old CXR in , no cephalization r pulm congestion. 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     - Patient seen and examined at bedside. NAD, Afebrile. Patient admits SOB has improved. Denies fever, CP, dizziness, fatigue, N/V/D, appetite loss or additional acute complaints. No overnight events. PT note appreciated, advised patient to be discharged to [facility] for weakness, gait instability. IV Methylprednisolone switched to PO prednisone       MEDICATIONS  (STANDING):  buDESOnide   0.5 milliGRAM(s) Respule 0.5 milliGRAM(s) Nebulizer two times a day  aspirin  chewable 81 milliGRAM(s) Oral daily  cloNIDine 0.1 milliGRAM(s) Oral daily  tamsulosin 0.4 milliGRAM(s) Oral at bedtime  gabapentin 300 milliGRAM(s) Oral at bedtime  mirtazapine 7.5 milliGRAM(s) Oral daily  traZODone 100 milliGRAM(s) Oral at bedtime  atorvastatin 20 milliGRAM(s) Oral at bedtime  tiotropium 18 MICROgram(s) Capsule 1 Capsule(s) Inhalation daily  enoxaparin Injectable 40 milliGRAM(s) SubCutaneous every 24 hours  escitalopram 20 milliGRAM(s) Oral daily  cefTRIAXone   IVPB   IV Intermittent   cefTRIAXone   IVPB 1 Gram(s) IV Intermittent every 24 hours  furosemide   Injectable 40 milliGRAM(s) IV Push every 12 hours  predniSONE   Tablet 50 milliGRAM(s) Oral daily    MEDICATIONS  (PRN):  clonazePAM Tablet 0.5 milliGRAM(s) Oral three times a day PRN agitation/anxiety  ALBUTerol/ipratropium for Nebulization. 3 milliLiter(s) Nebulizer once PRN Shortness of Breath and/or Wheezing    Allergies    No Known Allergies    REVIEW OF SYSTEMS:    CONSTITUTIONAL: No weakness, fevers or chills  EYES/ENT: No visual changes;  No vertigo or throat pain   NECK: No pain or stiffness  RESPIRATORY: No cough, wheezing, hemoptysis; No shortness of breath  CARDIOVASCULAR: No chest pain or palpitations  GASTROINTESTINAL: No abdominal or epigastric pain. No nausea, vomiting, or hematemesis; No diarrhea or constipation. No melena or hematochezia.  GENITOURINARY: No dysuria, frequency or hematuria  NEUROLOGICAL: No numbness or weakness  SKIN: No itching, burning, rashes, or lesions   All other review of systems is negative unless indicated above.    Vital Signs Last 24 Hrs  T(C): 36.5 (27 Aug 2017 11:40), Max: 36.7 (26 Aug 2017 17:18)  T(F): 97.7 (27 Aug 2017 11:40), Max: 98.1 (26 Aug 2017 17:18)  HR: 70 (27 Aug 2017 11:40) (52 - 98)  BP: 120/54 (27 Aug 2017 11:40) (119/54 - 132/62)  BP(mean): --  RR: 18 (27 Aug 2017 06:07) (17 - 18)  SpO2: 97% (27 Aug 2017 11:40) (97% - 100%)  25 Aug 2017 07:01  -  26 Aug 2017 07:00  --------------------------------------------------------  IN: 100 mL / OUT: 0 mL / NET: 100 mL      Daily Weight in k (24 Aug 2017 08:30)  BMI (kg/m2): 22.6 ( @ 15:21)    VS reviewed, stable.  Gen: patient is comfortable, smiling, interactive, well appearing, no acute distress  HEENT: NC/AT, no conjunctivitis or scleral icterus; no nasal discharge or congestion. OP without exudates/erythema.  Neck: FROM, supple, no cervical LAD, large fluctuant mass on back of neck (chronic)  Chest: CTA b/l, good air entry, no tachypnea or retractions, diminished sounds on R side  CV: regular rate and rhythm, soft systolic murmur  Abd: soft, nontender, nondistended,  Back: no vertebral or paraspinal tenderness along entire spine; no CVAT  Extrem: No joint effusion or tenderness; FROM of all joints; no deformities or erythema noted. 2+ peripheral pulses, WWP.   Neuro: non-focal    Interval Lab Results:                        11.8   10.4  )-----------( 240      ( 26 Aug 2017 06:01 )             35.8                         12.8   12.6  )-----------( 259      ( 25 Aug 2017 07:32 )             37.8                         13.1   9.0   )-----------( 270      ( 23 Aug 2017 16:37 )             39.4                               140    |  100    |  43                  Calcium: 8.4   / iCa: x      ( @ 06:01)    ----------------------------<  91        Magnesium: x                                3.2     |  35     |  1.08             Phosphorous: x              INTERVAL IMAGING STUDIES:    A/P:   This is a Patient is a 77y old  Male who presents with a chief complaint of SOB (23 Aug 2017 23:58)
INTERVAL/OVERNIGHT EVENTS: This is 77y Male PMHx COPD, home O2 2L, Diastolic CHF, HTN, HLD, Depression, GERD, hx of bladder ca s/p mass resection and intravesical chemo on 17, presents with SOB/MATA since night prior to admission. Partially treated at home with inhaler. SOB associated with mild cough, nonproductive. Patient admits appetite since past few days has decreased. Denies HA, fever, dizziness, abdominal pain n/v/d, chest pain, rash or urinary incontinence. Treated in ED with nebs, IV Solumedrol, with symptomatic relief, but patient not able to ambulate due to unsteady gait. CXR - R pleural effusion, increased size from old CXR in , no cephalization r pulm congestion. 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     : Patient reports feeling better with improved breathing. Denies acute complaints. Hemodynamically stable and agreeable to discharge to [facility] to build his strength. Discussed with patient need to have taper of steroids to help with his breathing and need to follow with his pulmonologist/ PCP Dr. Glover as an outpatient. All questions answered.    MEDICATIONS  (STANDING):  buDESOnide   0.5 milliGRAM(s) Respule 0.5 milliGRAM(s) Nebulizer two times a day  aspirin  chewable 81 milliGRAM(s) Oral daily  cloNIDine 0.1 milliGRAM(s) Oral daily  tamsulosin 0.4 milliGRAM(s) Oral at bedtime  gabapentin 300 milliGRAM(s) Oral at bedtime  mirtazapine 7.5 milliGRAM(s) Oral daily  traZODone 100 milliGRAM(s) Oral at bedtime  atorvastatin 20 milliGRAM(s) Oral at bedtime  tiotropium 18 MICROgram(s) Capsule 1 Capsule(s) Inhalation daily  enoxaparin Injectable 40 milliGRAM(s) SubCutaneous every 24 hours  escitalopram 20 milliGRAM(s) Oral daily  predniSONE   Tablet 50 milliGRAM(s) Oral daily  furosemide    Tablet 40 milliGRAM(s) Oral two times a day    MEDICATIONS  (PRN):  clonazePAM Tablet 0.5 milliGRAM(s) Oral three times a day PRN agitation/anxiety    Allergies    No Known Allergies    REVIEW OF SYSTEMS:    CONSTITUTIONAL: No weakness, fevers or chills  EYES/ENT: No visual changes;  No vertigo or throat pain   NECK: No pain or stiffness  RESPIRATORY: No cough, wheezing, hemoptysis; No shortness of breath  CARDIOVASCULAR: No chest pain or palpitations  GASTROINTESTINAL: No abdominal or epigastric pain. No nausea, vomiting, or hematemesis; No diarrhea or constipation. No melena or hematochezia.  GENITOURINARY: No dysuria, frequency or hematuria  NEUROLOGICAL: No numbness or weakness  SKIN: No itching, burning, rashes, or lesions   All other review of systems is negative unless indicated above.    Vital Signs Last 24 Hrs  T(C): 36.3 (29 Aug 2017 11:22), Max: 36.6 (28 Aug 2017 17:34)  T(F): 97.3 (29 Aug 2017 11:22), Max: 97.8 (28 Aug 2017 17:34)  HR: 92 (29 Aug 2017 11:22) (71 - 96)  BP: 132/51 (29 Aug 2017 11:22) (118/60 - 132/58)  BP(mean): --  RR: 17 (29 Aug 2017 11:22) (17 - 17)  SpO2: 99% (29 Aug 2017 11:22) (95% - 99%)  I&O's Summary    Pain Score:  Daily Weight in k.4 (29 Aug 2017 15:34)  BMI (kg/m2): 22.6 (08-23 @ 15:21)    VS reviewed, stable.  Gen: patient is comfortable, smiling, interactive, well appearing, no acute distress  HEENT: NC/AT, no conjunctivitis or scleral icterus; no nasal discharge or congestion.  Neck: FROM  Chest: CTA b/l, no crackles/wheezes, good air entry, no tachypnea or retractions  CV: regular rate and rhythm, slight systolic murmur over base  Abd: soft, nontender, nondistended, no HSM appreciated, +BS  Back: no vertebral or paraspinal tenderness along entire spine; no CVAT  Extrem: No joint effusion or tenderness; FROM of all joints; no deformities or erythema noted. 2+ peripheral pulses, WWP.   Neuro: non-focal    Interval Lab Results:                        11.5   14.3  )-----------( 263      ( 29 Aug 2017 07:15 )             33.3                         12.2   11.6  )-----------( 262      ( 28 Aug 2017 07:23 )             36.2                         11.6   10.0  )-----------( 243      ( 27 Aug 2017 07:05 )             34.4                               139    |  96     |  66                  Calcium: 9.5   / iCa: x      ( @ 07:15)    ----------------------------<  124       Magnesium: x                                3.9     |  37     |  1.17             Phosphorous: x
Subjective:  awake and alert; some confusion  no sob      MEDICATIONS  (STANDING):  buDESOnide   0.5 milliGRAM(s) Respule 0.5 milliGRAM(s) Nebulizer two times a day  aspirin  chewable 81 milliGRAM(s) Oral daily  cloNIDine 0.1 milliGRAM(s) Oral daily  tamsulosin 0.4 milliGRAM(s) Oral at bedtime  gabapentin 300 milliGRAM(s) Oral at bedtime  mirtazapine 7.5 milliGRAM(s) Oral daily  traZODone 100 milliGRAM(s) Oral at bedtime  atorvastatin 20 milliGRAM(s) Oral at bedtime  tiotropium 18 MICROgram(s) Capsule 1 Capsule(s) Inhalation daily  enoxaparin Injectable 40 milliGRAM(s) SubCutaneous every 24 hours  escitalopram 20 milliGRAM(s) Oral daily  methylPREDNISolone sodium succinate Injectable 40 milliGRAM(s) IV Push daily  cefTRIAXone   IVPB   IV Intermittent   cefTRIAXone   IVPB 1 Gram(s) IV Intermittent every 24 hours  furosemide   Injectable 40 milliGRAM(s) IV Push every 12 hours  potassium chloride    Tablet ER 40 milliEquivalent(s) Oral once    MEDICATIONS  (PRN):  clonazePAM Tablet 0.5 milliGRAM(s) Oral three times a day PRN agitation/anxiety  ALBUTerol/ipratropium for Nebulization. 3 milliLiter(s) Nebulizer once PRN Shortness of Breath and/or Wheezing      Allergies    No Known Allergies    Intolerances        REVIEW OF SYSTEMS:    CONSTITUTIONAL:  As per HPI.  HEENT:  Eyes:  No diplopia or blurred vision. ENT:  No earache, sore throat or runny nose.  CARDIOVASCULAR:  No pressure, squeezing, tightness, heaviness or aching about the chest, neck, axilla or epigastrium.  RESPIRATORY:  No cough, shortness of breath, PND or orthopnea.  GASTROINTESTINAL:  No nausea, vomiting or diarrhea.  GENITOURINARY:  No dysuria, frequency or urgency.  MUSCULOSKELETAL:  no joint pain, deformity, tenderness  EXTREMITIES: no clubbing cyanosis,edema  SKIN:  No change in skin, hair or nails.  NEUROLOGIC:  No paresthesias, fasciculations, seizures or weakness.  PSYCHIATRIC:  No disorder of thought or mood.  ENDOCRINE:  No heat or cold intolerance, polyuria or polydipsia.  HEMATOLOGICAL:  No easy bruising or bleedings:    Vital Signs Last 24 Hrs  T(C): 36.6 (26 Aug 2017 05:45), Max: 36.6 (25 Aug 2017 12:22)  T(F): 97.8 (26 Aug 2017 05:45), Max: 97.9 (25 Aug 2017 12:22)  HR: 51 (26 Aug 2017 08:30) (51 - 103)  BP: 111/84 (26 Aug 2017 05:45) (107/55 - 127/52)  BP(mean): --  RR: 18 (25 Aug 2017 21:00) (17 - 18)  SpO2: 99% (26 Aug 2017 08:30) (96% - 100%)    PHYSICAL EXAMINATION:  SKIN: no rashes  HEAD: NC/AT  EYES: PERRLA, EOMI  EARS: TM's intact  NOSE: no abnormalities  NECK:  Supple. No lymphadenopathy. Jugular venous pressure not elevated. Carotids equal.   HEART:   The cardiac impulse has a normal quality. Reg., Nl S1 and S2.  There are no murmurs, rubs or gallops noted  CHEST:  decreasded BS r base  ABDOMEN:  Soft and nontender.   EXTREMITIES:  no C/C/E  NEURO: AAO x 3, no focal deficts       LABS:                        11.8   10.4  )-----------( 240      ( 26 Aug 2017 06:01 )             35.8     08-26    140  |  100  |  43<H>  ----------------------------<  91  3.2<L>   |  35<H>  |  1.08    Ca    8.4<L>      26 Aug 2017 06:01            RADIOLOGY & ADDITIONAL TESTS:

## 2017-08-29 NOTE — PROGRESS NOTE ADULT - PROBLEM SELECTOR PLAN 8
-Atorvastatin 20mg Oral qhs
-Atorvastatin 20mg Oral qhs
-Atorvastatin 20mg Oral qhs  -Aspirin 81mg
-Trazodone 100mg orally qhs  -Mirtazepine 7.5 qd  -Clonazepam 0.5mg PRN

## 2017-08-29 NOTE — PROGRESS NOTE ADULT - ASSESSMENT
77 y.o. male with PMH of COPD home O2 dependent 2L, Diastolic CHF, HTN, HLD, Depression, GERD, hx of bladder ca s/p mass resection and intravesical chemo june 13th 2017,   presents to the ED c/o SOB.
77 y.o. male with PMH of COPD home O2 dependent 2L, Diastolic CHF, HTN, HLD, Depression, GERD, hx of bladder ca s/p mass resection and intravesical chemo june 13th 2017,   presents to the ED c/o SOB.
77 y.o. male with PMH of COPD home O2 dependent 2L, Diastolic CHF, HTN, HLD, Depression, GERD, hx of bladder ca s/p mass resection and intravesical chemo june 13th 2017, presents to ED with shortness of breath.
a/w SOB. good O2 sat on 2L NC.  pleural effusion on R has mildly increased c/w 6/28. has severe COPD, treat for exacerbation. Nebs, O2, budesonide, solumedrol 40 mg/d. PT consult.    if accumulation of pleural fluid or increase in SOB, would ask IR to do dx'tic/therapeutic thoracentesis.
cont O2  diuresis  ct scan noted  dvt proph

## 2017-08-29 NOTE — DISCHARGE NOTE ADULT - PATIENT PORTAL LINK FT
“You can access the [employer] Patient Portal, offered by [facility], by registering with the following website: [email or web address]”

## 2017-08-29 NOTE — DISCHARGE NOTE ADULT - HOSPITAL COURSE
This is 77y Male PMHx COPD, home O2 2L, Diastolic CHF, HTN, HLD, Depression, GERD, hx of bladder ca s/p mass resection and intravesical chemo on 06/13/17, presents with SOB/MATA found to be in acute exacerbation of COPD. Now stable s/p management with inhalers and steroids. Plan to discharge to [facility]

## 2017-08-29 NOTE — DISCHARGE NOTE ADULT - CARE PROVIDER_API CALL
Dg Glover), Internal Medicine; Pulmonary Disease  [address] [place] [address]  Phone: [phone]  Fax: [phone]

## 2017-08-29 NOTE — PROGRESS NOTE ADULT - PROBLEM SELECTOR PROBLEM 1
COPD exacerbation
Pleural effusion
COPD exacerbation

## 2017-08-29 NOTE — PROGRESS NOTE ADULT - PROBLEM SELECTOR PLAN 10
-monitor WBC  -IV Ceftriaxone 1gm IV QD
resolved   stop Abx
resolved   stop Abx
-monitor WBC  -IV Ceftriaxone 1gm IV QD
-monitor WBC  -IV Ceftriaxone 1gm IV QD

## 2017-08-29 NOTE — PROGRESS NOTE ADULT - PROBLEM SELECTOR PROBLEM 8
HLD (hyperlipidemia)
Depression

## 2017-08-29 NOTE — PROGRESS NOTE ADULT - ATTENDING COMMENTS
Patient seen and examined with Dr. Kev Raymond on the Family Medicine Teaching Service.  Agree with history, physical, labs and plan which were reviewed in detail.
Patient seen and examined with Miriam Raymond and  Edward Kayserian on the Family Medicine Teaching Service.  Agree with history, physical, labs and plan which were reviewed in detail.
Patient seen and examined with Miriam Raymond and  Edward Kayserian on the Family Medicine Teaching Service.  Agree with history, physical, labs and plan which were reviewed in detail.
Patient seen and examined with Miriam Palacio, Vangie Liz and Shaq Hernandez on the Family Medicine Teaching Service.  Agree with history, physical, labs and plan which were reviewed in detail.

## 2017-08-29 NOTE — DISCHARGE NOTE ADULT - MEDICATION SUMMARY - MEDICATIONS TO CHANGE
I will SWITCH the dose or number of times a day I take the medications listed below when I get home from the hospital:    predniSONE 10 mg oral tablet  -- 3  tab(s) by mouth once a day for 3 days, than 2 tabs once for 3 days, 1 tabs once for 3 days  -- It is very important that you take or use this exactly as directed.  Do not skip doses or discontinue unless directed by your doctor.  Obtain medical advice before taking any non-prescription drugs as some may affect the action of this medication.  Take with food or milk.

## 2017-08-29 NOTE — DISCHARGE NOTE ADULT - MEDICATION SUMMARY - MEDICATIONS TO STOP TAKING
I will STOP taking the medications listed below when I get home from the hospital:    magnesium aspartate  -- 1 tab(s) by mouth once a day

## 2017-08-29 NOTE — PROGRESS NOTE ADULT - PROBLEM SELECTOR PROBLEM 3
Bladder tumor
HTN (hypertension)
Urinary tract infection
Bladder tumor

## 2017-08-29 NOTE — PROGRESS NOTE ADULT - PROBLEM SELECTOR PLAN 9
-Trazodone 100mg orally qhs  -Mirtazepine 7.5 qd  -Clonazepam 0.5mg PRN

## 2017-08-29 NOTE — PROGRESS NOTE ADULT - PROBLEM SELECTOR PROBLEM 4
Unable to ambulate
Bladder tumor
Benign prostatic hyperplasia

## 2017-08-29 NOTE — DISCHARGE NOTE ADULT - CARE PLAN
Principal Discharge DX:	COPD exacerbation  Goal:	Stable patient  Instructions for follow-up, activity and diet:	Follow-up outpatient Pulmonology, Dr. Glover. Continue advair, spiriva, pro-air. Continue prednisone taper 50mg PO x3 days, 40mg PO x3 days, 30mg PO x3 days, 20mg PO x3 days, 10mg POx 3 days. Follow-up PCP  Secondary Diagnosis:	Anxiety  Goal:	Stable patient  Instructions for follow-up, activity and diet:	Continue clonazepam as needed, follow-up PCP  Secondary Diagnosis:	Bladder tumor  Goal:	Stable patient  Instructions for follow-up, activity and diet:	Patient does not plan on receiving further chemotherapy. Follow-up with PCP  Secondary Diagnosis:	Chronic diastolic congestive heart failure  Goal:	Stable patient  Instructions for follow-up, activity and diet:	Continue furosemide, EF 55-60% with trace mitral and tricuspid regurgitation, follow-up with PCP  Secondary Diagnosis:	Depression, unspecified depression type  Goal:	Stable patient  Instructions for follow-up, activity and diet:	Continue mirtazapine, trazodone, escitalopram  Follow-up with PCP  Secondary Diagnosis:	Essential hypertension  Goal:	Stable patient  Instructions for follow-up, activity and diet:	Continue clonidine, follow-up with PCP

## 2017-08-29 NOTE — PROGRESS NOTE ADULT - PROBLEM SELECTOR PLAN 6
-gabapentin 300mg orally qhs
-PT Consult
-gabapentin 300mg orally qhs

## 2017-09-02 DIAGNOSIS — F41.9 ANXIETY DISORDER, UNSPECIFIED: ICD-10-CM

## 2017-09-02 DIAGNOSIS — I50.33 ACUTE ON CHRONIC DIASTOLIC (CONGESTIVE) HEART FAILURE: ICD-10-CM

## 2017-09-02 DIAGNOSIS — F32.9 MAJOR DEPRESSIVE DISORDER, SINGLE EPISODE, UNSPECIFIED: ICD-10-CM

## 2017-09-02 DIAGNOSIS — J44.1 CHRONIC OBSTRUCTIVE PULMONARY DISEASE WITH (ACUTE) EXACERBATION: ICD-10-CM

## 2017-09-02 DIAGNOSIS — G62.9 POLYNEUROPATHY, UNSPECIFIED: ICD-10-CM

## 2017-09-02 DIAGNOSIS — N39.0 URINARY TRACT INFECTION, SITE NOT SPECIFIED: ICD-10-CM

## 2017-09-02 DIAGNOSIS — E78.5 HYPERLIPIDEMIA, UNSPECIFIED: ICD-10-CM

## 2017-09-02 DIAGNOSIS — N40.0 BENIGN PROSTATIC HYPERPLASIA WITHOUT LOWER URINARY TRACT SYMPTOMS: ICD-10-CM

## 2017-09-02 DIAGNOSIS — K21.9 GASTRO-ESOPHAGEAL REFLUX DISEASE WITHOUT ESOPHAGITIS: ICD-10-CM

## 2017-09-02 DIAGNOSIS — Z85.51 PERSONAL HISTORY OF MALIGNANT NEOPLASM OF BLADDER: ICD-10-CM

## 2017-09-02 DIAGNOSIS — I11.0 HYPERTENSIVE HEART DISEASE WITH HEART FAILURE: ICD-10-CM

## 2017-09-05 DIAGNOSIS — I50.32 CHRONIC DIASTOLIC (CONGESTIVE) HEART FAILURE: ICD-10-CM

## 2017-09-15 ENCOUNTER — APPOINTMENT (OUTPATIENT)
Dept: UROLOGY | Facility: CLINIC | Age: 77
End: 2017-09-15

## 2017-09-22 ENCOUNTER — EMERGENCY (EMERGENCY)
Facility: HOSPITAL | Age: 77
LOS: 0 days | Discharge: HOME | End: 2017-09-22
Attending: EMERGENCY MEDICINE | Admitting: EMERGENCY MEDICINE
Payer: MEDICARE

## 2017-09-22 VITALS
HEART RATE: 89 BPM | RESPIRATION RATE: 18 BRPM | TEMPERATURE: 98 F | OXYGEN SATURATION: 97 % | SYSTOLIC BLOOD PRESSURE: 122 MMHG | DIASTOLIC BLOOD PRESSURE: 84 MMHG

## 2017-09-22 VITALS
SYSTOLIC BLOOD PRESSURE: 127 MMHG | WEIGHT: 139.99 LBS | OXYGEN SATURATION: 95 % | HEIGHT: 66 IN | HEART RATE: 116 BPM | DIASTOLIC BLOOD PRESSURE: 67 MMHG | RESPIRATION RATE: 16 BRPM | TEMPERATURE: 98 F

## 2017-09-22 DIAGNOSIS — R33.9 RETENTION OF URINE, UNSPECIFIED: ICD-10-CM

## 2017-09-22 DIAGNOSIS — I10 ESSENTIAL (PRIMARY) HYPERTENSION: ICD-10-CM

## 2017-09-22 DIAGNOSIS — R06.02 SHORTNESS OF BREATH: ICD-10-CM

## 2017-09-22 DIAGNOSIS — J44.9 CHRONIC OBSTRUCTIVE PULMONARY DISEASE, UNSPECIFIED: ICD-10-CM

## 2017-09-22 DIAGNOSIS — Z98.49 CATARACT EXTRACTION STATUS, UNSPECIFIED EYE: Chronic | ICD-10-CM

## 2017-09-22 DIAGNOSIS — E78.5 HYPERLIPIDEMIA, UNSPECIFIED: ICD-10-CM

## 2017-09-22 DIAGNOSIS — N40.0 BENIGN PROSTATIC HYPERPLASIA WITHOUT LOWER URINARY TRACT SYMPTOMS: ICD-10-CM

## 2017-09-22 DIAGNOSIS — N39.0 URINARY TRACT INFECTION, SITE NOT SPECIFIED: ICD-10-CM

## 2017-09-22 LAB
ALBUMIN SERPL ELPH-MCNC: 2.8 G/DL — LOW (ref 3.3–5)
ALP SERPL-CCNC: 85 U/L — SIGNIFICANT CHANGE UP (ref 40–120)
ALT FLD-CCNC: 16 U/L — SIGNIFICANT CHANGE UP (ref 12–78)
ANION GAP SERPL CALC-SCNC: 3 MMOL/L — LOW (ref 5–17)
APPEARANCE UR: CLEAR — SIGNIFICANT CHANGE UP
APTT BLD: 29.4 SEC — SIGNIFICANT CHANGE UP (ref 27.5–37.4)
AST SERPL-CCNC: 16 U/L — SIGNIFICANT CHANGE UP (ref 15–37)
BACTERIA # UR AUTO: (no result)
BASOPHILS # BLD AUTO: 0.1 K/UL — SIGNIFICANT CHANGE UP (ref 0–0.2)
BASOPHILS NFR BLD AUTO: 0.8 % — SIGNIFICANT CHANGE UP (ref 0–2)
BILIRUB SERPL-MCNC: 0.4 MG/DL — SIGNIFICANT CHANGE UP (ref 0.2–1.2)
BILIRUB UR-MCNC: NEGATIVE — SIGNIFICANT CHANGE UP
BUN SERPL-MCNC: 11 MG/DL — SIGNIFICANT CHANGE UP (ref 7–23)
CALCIUM SERPL-MCNC: 8.3 MG/DL — LOW (ref 8.5–10.1)
CHLORIDE SERPL-SCNC: 101 MMOL/L — SIGNIFICANT CHANGE UP (ref 96–108)
CO2 SERPL-SCNC: 33 MMOL/L — HIGH (ref 22–31)
COLOR SPEC: YELLOW — SIGNIFICANT CHANGE UP
CREAT SERPL-MCNC: 1 MG/DL — SIGNIFICANT CHANGE UP (ref 0.5–1.3)
DIFF PNL FLD: (no result)
EOSINOPHIL # BLD AUTO: 0.5 K/UL — SIGNIFICANT CHANGE UP (ref 0–0.5)
EOSINOPHIL NFR BLD AUTO: 5.4 % — SIGNIFICANT CHANGE UP (ref 0–6)
EPI CELLS # UR: SIGNIFICANT CHANGE UP
GLUCOSE SERPL-MCNC: 122 MG/DL — HIGH (ref 70–99)
GLUCOSE UR QL: NEGATIVE MG/DL — SIGNIFICANT CHANGE UP
HCT VFR BLD CALC: 33.6 % — LOW (ref 39–50)
HGB BLD-MCNC: 11.3 G/DL — LOW (ref 13–17)
HYALINE CASTS # UR AUTO: (no result) /LPF
INR BLD: 0.98 RATIO — SIGNIFICANT CHANGE UP (ref 0.88–1.16)
KETONES UR-MCNC: NEGATIVE — SIGNIFICANT CHANGE UP
LACTATE SERPL-SCNC: 1.1 MMOL/L — SIGNIFICANT CHANGE UP (ref 0.7–2)
LEUKOCYTE ESTERASE UR-ACNC: (no result)
LYMPHOCYTES # BLD AUTO: 0.9 K/UL — LOW (ref 1–3.3)
LYMPHOCYTES # BLD AUTO: 11.1 % — LOW (ref 13–44)
MCHC RBC-ENTMCNC: 30.6 PG — SIGNIFICANT CHANGE UP (ref 27–34)
MCHC RBC-ENTMCNC: 33.7 GM/DL — SIGNIFICANT CHANGE UP (ref 32–36)
MCV RBC AUTO: 90.6 FL — SIGNIFICANT CHANGE UP (ref 80–100)
MONOCYTES # BLD AUTO: 1.4 K/UL — HIGH (ref 0–0.9)
MONOCYTES NFR BLD AUTO: 16.4 % — HIGH (ref 2–14)
NEUTROPHILS # BLD AUTO: 5.7 K/UL — SIGNIFICANT CHANGE UP (ref 1.8–7.4)
NEUTROPHILS NFR BLD AUTO: 66.4 % — SIGNIFICANT CHANGE UP (ref 43–77)
NITRITE UR-MCNC: NEGATIVE — SIGNIFICANT CHANGE UP
PH UR: 8 — SIGNIFICANT CHANGE UP (ref 5–8)
PLATELET # BLD AUTO: 192 K/UL — SIGNIFICANT CHANGE UP (ref 150–400)
POTASSIUM SERPL-MCNC: 3.6 MMOL/L — SIGNIFICANT CHANGE UP (ref 3.5–5.3)
POTASSIUM SERPL-SCNC: 3.6 MMOL/L — SIGNIFICANT CHANGE UP (ref 3.5–5.3)
PROT SERPL-MCNC: 6.8 GM/DL — SIGNIFICANT CHANGE UP (ref 6–8.3)
PROT UR-MCNC: NEGATIVE MG/DL — SIGNIFICANT CHANGE UP
PROTHROM AB SERPL-ACNC: 10.6 SEC — SIGNIFICANT CHANGE UP (ref 9.8–12.7)
RBC # BLD: 3.71 M/UL — LOW (ref 4.2–5.8)
RBC # FLD: 12.9 % — SIGNIFICANT CHANGE UP (ref 10.3–14.5)
RBC CASTS # UR COMP ASSIST: (no result) /HPF (ref 0–4)
SODIUM SERPL-SCNC: 137 MMOL/L — SIGNIFICANT CHANGE UP (ref 135–145)
SP GR SPEC: 1.01 — SIGNIFICANT CHANGE UP (ref 1.01–1.02)
UROBILINOGEN FLD QL: NEGATIVE MG/DL — SIGNIFICANT CHANGE UP
WBC # BLD: 8.5 K/UL — SIGNIFICANT CHANGE UP (ref 3.8–10.5)
WBC # FLD AUTO: 8.5 K/UL — SIGNIFICANT CHANGE UP (ref 3.8–10.5)
WBC UR QL: (no result)

## 2017-09-22 PROCEDURE — 71010: CPT | Mod: 26

## 2017-09-22 PROCEDURE — 99285 EMERGENCY DEPT VISIT HI MDM: CPT

## 2017-09-22 PROCEDURE — 93010 ELECTROCARDIOGRAM REPORT: CPT

## 2017-09-22 RX ORDER — NITROFURANTOIN MACROCRYSTAL 50 MG
1 CAPSULE ORAL
Qty: 14 | Refills: 0 | OUTPATIENT
Start: 2017-09-22 | End: 2017-09-29

## 2017-09-22 RX ORDER — SODIUM CHLORIDE 9 MG/ML
3 INJECTION INTRAMUSCULAR; INTRAVENOUS; SUBCUTANEOUS ONCE
Qty: 0 | Refills: 0 | Status: COMPLETED | OUTPATIENT
Start: 2017-09-22 | End: 2017-09-22

## 2017-09-22 RX ORDER — ACETAMINOPHEN 500 MG
1000 TABLET ORAL ONCE
Qty: 0 | Refills: 0 | Status: COMPLETED | OUTPATIENT
Start: 2017-09-22 | End: 2017-09-22

## 2017-09-22 RX ORDER — NITROFURANTOIN MACROCRYSTAL 50 MG
100 CAPSULE ORAL
Qty: 0 | Refills: 0 | Status: DISCONTINUED | OUTPATIENT
Start: 2017-09-22 | End: 2017-09-22

## 2017-09-22 RX ORDER — SODIUM CHLORIDE 9 MG/ML
500 INJECTION INTRAMUSCULAR; INTRAVENOUS; SUBCUTANEOUS
Qty: 0 | Refills: 0 | Status: COMPLETED | OUTPATIENT
Start: 2017-09-22 | End: 2017-09-22

## 2017-09-22 RX ADMIN — Medication 1000 MILLIGRAM(S): at 18:30

## 2017-09-22 RX ADMIN — SODIUM CHLORIDE 2000 MILLILITER(S): 9 INJECTION INTRAMUSCULAR; INTRAVENOUS; SUBCUTANEOUS at 17:20

## 2017-09-22 RX ADMIN — SODIUM CHLORIDE 3 MILLILITER(S): 9 INJECTION INTRAMUSCULAR; INTRAVENOUS; SUBCUTANEOUS at 17:33

## 2017-09-22 RX ADMIN — SODIUM CHLORIDE 2000 MILLILITER(S): 9 INJECTION INTRAMUSCULAR; INTRAVENOUS; SUBCUTANEOUS at 19:41

## 2017-09-22 RX ADMIN — SODIUM CHLORIDE 2000 MILLILITER(S): 9 INJECTION INTRAMUSCULAR; INTRAVENOUS; SUBCUTANEOUS at 17:34

## 2017-09-22 RX ADMIN — SODIUM CHLORIDE 2000 MILLILITER(S): 9 INJECTION INTRAMUSCULAR; INTRAVENOUS; SUBCUTANEOUS at 18:07

## 2017-09-22 RX ADMIN — Medication 100 MILLIGRAM(S): at 19:41

## 2017-09-22 NOTE — ED PROVIDER NOTE - OBJECTIVE STATEMENT
76 y/o male with PMHx of hematuria, HTN, HLD, COPD, BPH, bladder tumor, osteoarthritis, anxiety, depression, presents to ED c/o 78 y/o male with PMHx of hematuria, HTN, HLD, COPD, BPH, bladder tumor, osteoarthritis, anxiety, depression, presents to the ED c/o 76 y/o male with PMHx of hematuria, HTN, HLD, COPD, BPH, bladder tumor, osteoarthritis, anxiety, depression, presents to the ED c/o inability to urinate. Pts wife states he is only able to urinate drops at a time, can normally urinate freely. No vomiting. no sick contact. +Difficulty breathing. Oxygen at home daily. Former smoker.

## 2017-09-22 NOTE — ED ADULT NURSE REASSESSMENT NOTE - NS ED NURSE REASSESS COMMENT FT1
patient discharged home. patient's wife called per patient's request to pick him up, wife states she will not be able to come get him until morning. patient aware. patient resting in bed comfortably. afebrile. denies pain or discomfort at this time. voided into urinal. will continue to monitor.

## 2017-09-22 NOTE — ED PROVIDER NOTE - NS_ ATTENDINGSCRIBEDETAILS _ED_A_ED_FT
I, Jasmeet Dacosta MD,  performed the initial face to face bedside interview with this patient regarding history of present illness, review of symptoms and relevant past medical, social and family history.  I completed an independent physical examination.    The history, relevant review of systems, past medical and surgical history, medical decision making, and physical examination was documented by the scribe in my presence and I attest to the accuracy of the documentation.

## 2017-09-23 LAB
CULTURE RESULTS: NO GROWTH — SIGNIFICANT CHANGE UP
SPECIMEN SOURCE: SIGNIFICANT CHANGE UP

## 2017-09-28 LAB
CULTURE RESULTS: SIGNIFICANT CHANGE UP
CULTURE RESULTS: SIGNIFICANT CHANGE UP
SPECIMEN SOURCE: SIGNIFICANT CHANGE UP
SPECIMEN SOURCE: SIGNIFICANT CHANGE UP

## 2017-09-30 ENCOUNTER — EMERGENCY (EMERGENCY)
Facility: HOSPITAL | Age: 77
LOS: 0 days | Discharge: HOME | End: 2017-10-01
Attending: EMERGENCY MEDICINE | Admitting: EMERGENCY MEDICINE
Payer: MEDICARE

## 2017-09-30 VITALS
HEART RATE: 106 BPM | TEMPERATURE: 98 F | OXYGEN SATURATION: 100 % | RESPIRATION RATE: 20 BRPM | HEIGHT: 66 IN | SYSTOLIC BLOOD PRESSURE: 134 MMHG | DIASTOLIC BLOOD PRESSURE: 78 MMHG | WEIGHT: 145.06 LBS

## 2017-09-30 DIAGNOSIS — N40.0 BENIGN PROSTATIC HYPERPLASIA WITHOUT LOWER URINARY TRACT SYMPTOMS: ICD-10-CM

## 2017-09-30 DIAGNOSIS — Z98.49 CATARACT EXTRACTION STATUS, UNSPECIFIED EYE: Chronic | ICD-10-CM

## 2017-09-30 DIAGNOSIS — J44.1 CHRONIC OBSTRUCTIVE PULMONARY DISEASE WITH (ACUTE) EXACERBATION: ICD-10-CM

## 2017-09-30 DIAGNOSIS — R50.9 FEVER, UNSPECIFIED: ICD-10-CM

## 2017-09-30 DIAGNOSIS — I10 ESSENTIAL (PRIMARY) HYPERTENSION: ICD-10-CM

## 2017-09-30 DIAGNOSIS — R06.02 SHORTNESS OF BREATH: ICD-10-CM

## 2017-09-30 LAB
ALBUMIN SERPL ELPH-MCNC: 3.4 G/DL — SIGNIFICANT CHANGE UP (ref 3.3–5)
ALP SERPL-CCNC: 103 U/L — SIGNIFICANT CHANGE UP (ref 40–120)
ALT FLD-CCNC: 19 U/L — SIGNIFICANT CHANGE UP (ref 12–78)
ANION GAP SERPL CALC-SCNC: 7 MMOL/L — SIGNIFICANT CHANGE UP (ref 5–17)
APTT BLD: 29.6 SEC — SIGNIFICANT CHANGE UP (ref 27.5–37.4)
AST SERPL-CCNC: 20 U/L — SIGNIFICANT CHANGE UP (ref 15–37)
BASOPHILS # BLD AUTO: 0.2 K/UL — SIGNIFICANT CHANGE UP (ref 0–0.2)
BASOPHILS NFR BLD AUTO: 1.3 % — SIGNIFICANT CHANGE UP (ref 0–2)
BILIRUB SERPL-MCNC: 0.5 MG/DL — SIGNIFICANT CHANGE UP (ref 0.2–1.2)
BUN SERPL-MCNC: 17 MG/DL — SIGNIFICANT CHANGE UP (ref 7–23)
CALCIUM SERPL-MCNC: 9.1 MG/DL — SIGNIFICANT CHANGE UP (ref 8.5–10.1)
CHLORIDE SERPL-SCNC: 93 MMOL/L — LOW (ref 96–108)
CO2 SERPL-SCNC: 36 MMOL/L — HIGH (ref 22–31)
CREAT SERPL-MCNC: 1.02 MG/DL — SIGNIFICANT CHANGE UP (ref 0.5–1.3)
EOSINOPHIL # BLD AUTO: 0.8 K/UL — HIGH (ref 0–0.5)
EOSINOPHIL NFR BLD AUTO: 6.7 % — HIGH (ref 0–6)
GLUCOSE SERPL-MCNC: 116 MG/DL — HIGH (ref 70–99)
HCT VFR BLD CALC: 40.3 % — SIGNIFICANT CHANGE UP (ref 39–50)
HGB BLD-MCNC: 13.3 G/DL — SIGNIFICANT CHANGE UP (ref 13–17)
INR BLD: 1.05 RATIO — SIGNIFICANT CHANGE UP (ref 0.88–1.16)
LYMPHOCYTES # BLD AUTO: 1.1 K/UL — SIGNIFICANT CHANGE UP (ref 1–3.3)
LYMPHOCYTES # BLD AUTO: 9.2 % — LOW (ref 13–44)
MANUAL DIF COMMENT BLD-IMP: SIGNIFICANT CHANGE UP
MCHC RBC-ENTMCNC: 29.7 PG — SIGNIFICANT CHANGE UP (ref 27–34)
MCHC RBC-ENTMCNC: 33 GM/DL — SIGNIFICANT CHANGE UP (ref 32–36)
MCV RBC AUTO: 90.2 FL — SIGNIFICANT CHANGE UP (ref 80–100)
MONOCYTES # BLD AUTO: 1.9 K/UL — HIGH (ref 0–0.9)
MONOCYTES NFR BLD AUTO: 16.4 % — HIGH (ref 2–14)
NEUTROPHILS # BLD AUTO: 7.7 K/UL — HIGH (ref 1.8–7.4)
NEUTROPHILS NFR BLD AUTO: 66.4 % — SIGNIFICANT CHANGE UP (ref 43–77)
PLAT MORPH BLD: NORMAL — SIGNIFICANT CHANGE UP
PLATELET # BLD AUTO: 410 K/UL — HIGH (ref 150–400)
POTASSIUM SERPL-MCNC: 3.5 MMOL/L — SIGNIFICANT CHANGE UP (ref 3.5–5.3)
POTASSIUM SERPL-SCNC: 3.5 MMOL/L — SIGNIFICANT CHANGE UP (ref 3.5–5.3)
PROT SERPL-MCNC: 8 GM/DL — SIGNIFICANT CHANGE UP (ref 6–8.3)
PROTHROM AB SERPL-ACNC: 11.3 SEC — SIGNIFICANT CHANGE UP (ref 9.8–12.7)
RBC # BLD: 4.47 M/UL — SIGNIFICANT CHANGE UP (ref 4.2–5.8)
RBC # FLD: 12.2 % — SIGNIFICANT CHANGE UP (ref 10.3–14.5)
RBC BLD AUTO: NORMAL — SIGNIFICANT CHANGE UP
SODIUM SERPL-SCNC: 136 MMOL/L — SIGNIFICANT CHANGE UP (ref 135–145)
WBC # BLD: 11.6 K/UL — HIGH (ref 3.8–10.5)
WBC # FLD AUTO: 11.6 K/UL — HIGH (ref 3.8–10.5)

## 2017-09-30 PROCEDURE — 71010: CPT | Mod: 26

## 2017-09-30 PROCEDURE — 93010 ELECTROCARDIOGRAM REPORT: CPT

## 2017-09-30 PROCEDURE — 99285 EMERGENCY DEPT VISIT HI MDM: CPT

## 2017-09-30 RX ORDER — IPRATROPIUM/ALBUTEROL SULFATE 18-103MCG
3 AEROSOL WITH ADAPTER (GRAM) INHALATION
Qty: 0 | Refills: 0 | Status: COMPLETED | OUTPATIENT
Start: 2017-09-30 | End: 2017-09-30

## 2017-09-30 RX ORDER — AZITHROMYCIN 500 MG/1
500 TABLET, FILM COATED ORAL ONCE
Qty: 0 | Refills: 0 | Status: COMPLETED | OUTPATIENT
Start: 2017-09-30 | End: 2017-09-30

## 2017-09-30 RX ORDER — SODIUM CHLORIDE 9 MG/ML
3 INJECTION INTRAMUSCULAR; INTRAVENOUS; SUBCUTANEOUS ONCE
Qty: 0 | Refills: 0 | Status: COMPLETED | OUTPATIENT
Start: 2017-09-30 | End: 2017-09-30

## 2017-09-30 RX ORDER — SODIUM CHLORIDE 9 MG/ML
1000 INJECTION INTRAMUSCULAR; INTRAVENOUS; SUBCUTANEOUS ONCE
Qty: 0 | Refills: 0 | Status: COMPLETED | OUTPATIENT
Start: 2017-09-30 | End: 2017-09-30

## 2017-09-30 RX ORDER — CEFTRIAXONE 500 MG/1
1 INJECTION, POWDER, FOR SOLUTION INTRAMUSCULAR; INTRAVENOUS ONCE
Qty: 0 | Refills: 0 | Status: COMPLETED | OUTPATIENT
Start: 2017-09-30 | End: 2017-09-30

## 2017-09-30 RX ORDER — ACETAMINOPHEN 500 MG
650 TABLET ORAL ONCE
Qty: 0 | Refills: 0 | Status: COMPLETED | OUTPATIENT
Start: 2017-09-30 | End: 2017-09-30

## 2017-09-30 RX ORDER — ALBUTEROL 90 UG/1
2.5 AEROSOL, METERED ORAL
Qty: 0 | Refills: 0 | Status: COMPLETED | OUTPATIENT
Start: 2017-09-30 | End: 2017-10-01

## 2017-09-30 RX ADMIN — AZITHROMYCIN 255 MILLIGRAM(S): 500 TABLET, FILM COATED ORAL at 22:09

## 2017-09-30 RX ADMIN — Medication 3 MILLILITER(S): at 21:08

## 2017-09-30 RX ADMIN — Medication 3 MILLILITER(S): at 20:58

## 2017-09-30 RX ADMIN — CEFTRIAXONE 100 GRAM(S): 500 INJECTION, POWDER, FOR SOLUTION INTRAMUSCULAR; INTRAVENOUS at 21:18

## 2017-09-30 RX ADMIN — SODIUM CHLORIDE 1000 MILLILITER(S): 9 INJECTION INTRAMUSCULAR; INTRAVENOUS; SUBCUTANEOUS at 20:27

## 2017-09-30 RX ADMIN — Medication 3 MILLILITER(S): at 20:48

## 2017-09-30 RX ADMIN — Medication 125 MILLIGRAM(S): at 20:59

## 2017-09-30 RX ADMIN — SODIUM CHLORIDE 3 MILLILITER(S): 9 INJECTION INTRAMUSCULAR; INTRAVENOUS; SUBCUTANEOUS at 20:48

## 2017-09-30 RX ADMIN — Medication 650 MILLIGRAM(S): at 20:59

## 2017-09-30 NOTE — ED PROVIDER NOTE - MEDICAL DECISION MAKING DETAILS
Pt with progressive SOB, hx COPD/CHF. Likely COPD exacerbation on exam - plan labs, CXR, nebs, steroids, reassess

## 2017-09-30 NOTE — ED ADULT NURSE NOTE - OBJECTIVE STATEMENT
presents to the ED with SOB for a few days. wife reports intermittent fevers for the last few days. breathing even and labored. on 3L nc. o2 sat 100%. EKG done and placed on cardiac monitor. IVL placed and labs drawn. will monitor.

## 2017-09-30 NOTE — ED PROVIDER NOTE - RESPIRATORY, MLM
Breath sounds clear and equal bilaterally. mild tachypnea, increased expiratory phase with poor air movement, no crackles appreciated.

## 2017-09-30 NOTE — ED PROVIDER NOTE - PROGRESS NOTE DETAILS
Will give fluids to keep MAP greater than 65 as opposed to weight-based bolusing pt feeling well.  no sob.  lungs clear but no ride home.  will keep till am pt remains stable, clear, no distress.  RN attempted to call family, no answer

## 2017-09-30 NOTE — ED ADULT TRIAGE NOTE - CHIEF COMPLAINT QUOTE
Patient reports that he has a history of COPD emphysema and that he has been having increasing shortness of breath and activity intolerance over the past day.

## 2017-10-01 VITALS
SYSTOLIC BLOOD PRESSURE: 154 MMHG | OXYGEN SATURATION: 100 % | TEMPERATURE: 98 F | RESPIRATION RATE: 20 BRPM | HEART RATE: 84 BPM | DIASTOLIC BLOOD PRESSURE: 86 MMHG

## 2017-10-01 LAB — RAPID RVP RESULT: SIGNIFICANT CHANGE UP

## 2017-10-01 RX ORDER — AZITHROMYCIN 500 MG/1
1 TABLET, FILM COATED ORAL
Qty: 4 | Refills: 0 | OUTPATIENT
Start: 2017-10-01 | End: 2017-10-05

## 2017-10-01 RX ADMIN — ALBUTEROL 2.5 MILLIGRAM(S): 90 AEROSOL, METERED ORAL at 00:28

## 2017-10-01 RX ADMIN — ALBUTEROL 2.5 MILLIGRAM(S): 90 AEROSOL, METERED ORAL at 00:48

## 2017-10-01 RX ADMIN — ALBUTEROL 2.5 MILLIGRAM(S): 90 AEROSOL, METERED ORAL at 00:08

## 2017-10-01 NOTE — ED ADULT NURSE REASSESSMENT NOTE - NS ED NURSE REASSESS COMMENT FT1
pt resting comfortably during am rounds. no needs at this time.  awaiting ride home
report received from bijal nice rn, pt resting comfortably on stretcher, vss, afebrile, no s/sx of distress noted, awaiting for wife.

## 2017-10-02 ENCOUNTER — RX RENEWAL (OUTPATIENT)
Age: 77
End: 2017-10-02

## 2017-10-04 ENCOUNTER — MEDICATION RENEWAL (OUTPATIENT)
Age: 77
End: 2017-10-04

## 2017-10-16 ENCOUNTER — RX RENEWAL (OUTPATIENT)
Age: 77
End: 2017-10-16

## 2017-10-18 ENCOUNTER — MEDICATION RENEWAL (OUTPATIENT)
Age: 77
End: 2017-10-18

## 2017-11-28 ENCOUNTER — RX RENEWAL (OUTPATIENT)
Age: 77
End: 2017-11-28

## 2017-12-04 ENCOUNTER — EMERGENCY (EMERGENCY)
Facility: HOSPITAL | Age: 77
LOS: 0 days | Discharge: HOME | End: 2017-12-04
Attending: EMERGENCY MEDICINE | Admitting: EMERGENCY MEDICINE
Payer: MEDICARE

## 2017-12-04 VITALS
TEMPERATURE: 98 F | OXYGEN SATURATION: 100 % | HEIGHT: 68 IN | DIASTOLIC BLOOD PRESSURE: 96 MMHG | SYSTOLIC BLOOD PRESSURE: 149 MMHG | WEIGHT: 130.07 LBS | HEART RATE: 86 BPM | RESPIRATION RATE: 16 BRPM

## 2017-12-04 VITALS
OXYGEN SATURATION: 100 % | RESPIRATION RATE: 15 BRPM | DIASTOLIC BLOOD PRESSURE: 79 MMHG | SYSTOLIC BLOOD PRESSURE: 144 MMHG | HEART RATE: 77 BPM

## 2017-12-04 DIAGNOSIS — R10.9 UNSPECIFIED ABDOMINAL PAIN: ICD-10-CM

## 2017-12-04 DIAGNOSIS — Z98.49 CATARACT EXTRACTION STATUS, UNSPECIFIED EYE: Chronic | ICD-10-CM

## 2017-12-04 DIAGNOSIS — R31.9 HEMATURIA, UNSPECIFIED: ICD-10-CM

## 2017-12-04 DIAGNOSIS — N39.0 URINARY TRACT INFECTION, SITE NOT SPECIFIED: ICD-10-CM

## 2017-12-04 DIAGNOSIS — R33.9 RETENTION OF URINE, UNSPECIFIED: ICD-10-CM

## 2017-12-04 LAB
ALBUMIN SERPL ELPH-MCNC: 3.2 G/DL — LOW (ref 3.3–5)
ALP SERPL-CCNC: 73 U/L — SIGNIFICANT CHANGE UP (ref 40–120)
ALT FLD-CCNC: 13 U/L — SIGNIFICANT CHANGE UP (ref 12–78)
ANION GAP SERPL CALC-SCNC: 6 MMOL/L — SIGNIFICANT CHANGE UP (ref 5–17)
APPEARANCE UR: CLEAR — SIGNIFICANT CHANGE UP
AST SERPL-CCNC: 20 U/L — SIGNIFICANT CHANGE UP (ref 15–37)
BACTERIA # UR AUTO: (no result)
BASOPHILS # BLD AUTO: 0.1 K/UL — SIGNIFICANT CHANGE UP (ref 0–0.2)
BASOPHILS NFR BLD AUTO: 1.1 % — SIGNIFICANT CHANGE UP (ref 0–2)
BILIRUB SERPL-MCNC: 0.5 MG/DL — SIGNIFICANT CHANGE UP (ref 0.2–1.2)
BILIRUB UR-MCNC: NEGATIVE — SIGNIFICANT CHANGE UP
BUN SERPL-MCNC: 19 MG/DL — SIGNIFICANT CHANGE UP (ref 7–23)
CALCIUM SERPL-MCNC: 8.7 MG/DL — SIGNIFICANT CHANGE UP (ref 8.5–10.1)
CHLORIDE SERPL-SCNC: 103 MMOL/L — SIGNIFICANT CHANGE UP (ref 96–108)
CO2 SERPL-SCNC: 31 MMOL/L — SIGNIFICANT CHANGE UP (ref 22–31)
COLOR SPEC: YELLOW — SIGNIFICANT CHANGE UP
COMMENT - URINE: SIGNIFICANT CHANGE UP
CREAT SERPL-MCNC: 0.89 MG/DL — SIGNIFICANT CHANGE UP (ref 0.5–1.3)
DIFF PNL FLD: (no result)
EOSINOPHIL # BLD AUTO: 0.3 K/UL — SIGNIFICANT CHANGE UP (ref 0–0.5)
EOSINOPHIL NFR BLD AUTO: 3.7 % — SIGNIFICANT CHANGE UP (ref 0–6)
EPI CELLS # UR: SIGNIFICANT CHANGE UP
GLUCOSE SERPL-MCNC: 90 MG/DL — SIGNIFICANT CHANGE UP (ref 70–99)
GLUCOSE UR QL: NEGATIVE MG/DL — SIGNIFICANT CHANGE UP
HCT VFR BLD CALC: 34.9 % — LOW (ref 39–50)
HGB BLD-MCNC: 11.4 G/DL — LOW (ref 13–17)
KETONES UR-MCNC: (no result)
LEUKOCYTE ESTERASE UR-ACNC: (no result)
LYMPHOCYTES # BLD AUTO: 1.5 K/UL — SIGNIFICANT CHANGE UP (ref 1–3.3)
LYMPHOCYTES # BLD AUTO: 21 % — SIGNIFICANT CHANGE UP (ref 13–44)
MCHC RBC-ENTMCNC: 29.6 PG — SIGNIFICANT CHANGE UP (ref 27–34)
MCHC RBC-ENTMCNC: 32.7 GM/DL — SIGNIFICANT CHANGE UP (ref 32–36)
MCV RBC AUTO: 90.7 FL — SIGNIFICANT CHANGE UP (ref 80–100)
MONOCYTES # BLD AUTO: 1.1 K/UL — HIGH (ref 0–0.9)
MONOCYTES NFR BLD AUTO: 15.6 % — HIGH (ref 2–14)
NEUTROPHILS # BLD AUTO: 4.2 K/UL — SIGNIFICANT CHANGE UP (ref 1.8–7.4)
NEUTROPHILS NFR BLD AUTO: 58.7 % — SIGNIFICANT CHANGE UP (ref 43–77)
NITRITE UR-MCNC: NEGATIVE — SIGNIFICANT CHANGE UP
PH UR: 8 — SIGNIFICANT CHANGE UP (ref 5–8)
PLATELET # BLD AUTO: 264 K/UL — SIGNIFICANT CHANGE UP (ref 150–400)
POTASSIUM SERPL-MCNC: 3.2 MMOL/L — LOW (ref 3.5–5.3)
POTASSIUM SERPL-SCNC: 3.2 MMOL/L — LOW (ref 3.5–5.3)
PROT SERPL-MCNC: 6.8 GM/DL — SIGNIFICANT CHANGE UP (ref 6–8.3)
PROT UR-MCNC: 15 MG/DL
RBC # BLD: 3.84 M/UL — LOW (ref 4.2–5.8)
RBC # FLD: 13 % — SIGNIFICANT CHANGE UP (ref 10.3–14.5)
RBC CASTS # UR COMP ASSIST: (no result) /HPF (ref 0–4)
SODIUM SERPL-SCNC: 140 MMOL/L — SIGNIFICANT CHANGE UP (ref 135–145)
SP GR SPEC: 1.01 — SIGNIFICANT CHANGE UP (ref 1.01–1.02)
UROBILINOGEN FLD QL: 1 MG/DL
WBC # BLD: 7.2 K/UL — SIGNIFICANT CHANGE UP (ref 3.8–10.5)
WBC # FLD AUTO: 7.2 K/UL — SIGNIFICANT CHANGE UP (ref 3.8–10.5)
WBC UR QL: SIGNIFICANT CHANGE UP

## 2017-12-04 PROCEDURE — 99285 EMERGENCY DEPT VISIT HI MDM: CPT

## 2017-12-04 RX ORDER — POTASSIUM CHLORIDE 20 MEQ
40 PACKET (EA) ORAL ONCE
Qty: 0 | Refills: 0 | Status: COMPLETED | OUTPATIENT
Start: 2017-12-04 | End: 2017-12-04

## 2017-12-04 RX ORDER — CEFTRIAXONE 500 MG/1
1 INJECTION, POWDER, FOR SOLUTION INTRAMUSCULAR; INTRAVENOUS ONCE
Qty: 0 | Refills: 0 | Status: DISCONTINUED | OUTPATIENT
Start: 2017-12-04 | End: 2017-12-04

## 2017-12-04 RX ORDER — SODIUM CHLORIDE 9 MG/ML
1000 INJECTION INTRAMUSCULAR; INTRAVENOUS; SUBCUTANEOUS ONCE
Qty: 0 | Refills: 0 | Status: COMPLETED | OUTPATIENT
Start: 2017-12-04 | End: 2017-12-04

## 2017-12-04 RX ORDER — CEFTRIAXONE 500 MG/1
1000 INJECTION, POWDER, FOR SOLUTION INTRAMUSCULAR; INTRAVENOUS ONCE
Qty: 0 | Refills: 0 | Status: COMPLETED | OUTPATIENT
Start: 2017-12-04 | End: 2017-12-04

## 2017-12-04 RX ORDER — MOXIFLOXACIN HYDROCHLORIDE TABLETS, 400 MG 400 MG/1
1 TABLET, FILM COATED ORAL
Qty: 14 | Refills: 0 | OUTPATIENT
Start: 2017-12-04 | End: 2017-12-11

## 2017-12-04 RX ADMIN — CEFTRIAXONE 1000 MILLIGRAM(S): 500 INJECTION, POWDER, FOR SOLUTION INTRAMUSCULAR; INTRAVENOUS at 13:53

## 2017-12-04 RX ADMIN — Medication 40 MILLIEQUIVALENT(S): at 13:21

## 2017-12-04 RX ADMIN — SODIUM CHLORIDE 1000 MILLILITER(S): 9 INJECTION INTRAMUSCULAR; INTRAVENOUS; SUBCUTANEOUS at 13:10

## 2017-12-04 NOTE — ED PROVIDER NOTE - OBJECTIVE STATEMENT
77 y-o Male with PMHX of bladder tumor, BPH, COPD, BIB EMS c/o urinary retention and pain x several days. 77 y-o Male with PMHX of bladder tumor, BPH, COPD, BIB EMS c/o urinary retention and pain x 5 days. Pt states has been having difficulty urinating with dysuria pain. Pt notes feeling very dry mouthed. Denies SOB, CP, fever, Hematuria.

## 2017-12-04 NOTE — ED ADULT NURSE NOTE - OBJECTIVE STATEMENT
Pt c/o urinary retention, states he has these issues intermittently, has prostate issues in the past. States he hasn't been able to urinate for a couple of days. Pubic region soft, non-distended, states not currently painful but does have intermittent pains in that region from time to time.

## 2017-12-04 NOTE — ED ADULT NURSE REASSESSMENT NOTE - NS ED NURSE REASSESS COMMENT FT1
Bladder scanner performed on pt d/t hx of urinary retention. Only 43 cc's noted, pt able to void 50 cc's, has hx of prostate issues. MD Rice made aware. Pt's bladder currently non-tender to touch, non-distended.

## 2017-12-05 LAB
CULTURE RESULTS: SIGNIFICANT CHANGE UP
SPECIMEN SOURCE: SIGNIFICANT CHANGE UP

## 2017-12-20 ENCOUNTER — APPOINTMENT (OUTPATIENT)
Dept: INTERNAL MEDICINE | Facility: CLINIC | Age: 77
End: 2017-12-20

## 2018-01-18 ENCOUNTER — APPOINTMENT (OUTPATIENT)
Dept: INTERNAL MEDICINE | Facility: CLINIC | Age: 78
End: 2018-01-18
Payer: MEDICARE

## 2018-01-18 ENCOUNTER — NON-APPOINTMENT (OUTPATIENT)
Age: 78
End: 2018-01-18

## 2018-01-18 VITALS
OXYGEN SATURATION: 93 % | DIASTOLIC BLOOD PRESSURE: 60 MMHG | HEART RATE: 82 BPM | TEMPERATURE: 97.7 F | WEIGHT: 131 LBS | HEIGHT: 66 IN | SYSTOLIC BLOOD PRESSURE: 108 MMHG | BODY MASS INDEX: 21.05 KG/M2 | RESPIRATION RATE: 16 BRPM

## 2018-01-18 DIAGNOSIS — M81.0 AGE-RELATED OSTEOPOROSIS W/OUT CURRENT PATHOLOGICAL FRACTURE: ICD-10-CM

## 2018-01-18 DIAGNOSIS — H26.9 UNSPECIFIED CATARACT: ICD-10-CM

## 2018-01-18 DIAGNOSIS — F51.01 PRIMARY INSOMNIA: ICD-10-CM

## 2018-01-18 PROCEDURE — 94060 EVALUATION OF WHEEZING: CPT

## 2018-01-18 PROCEDURE — 99214 OFFICE O/P EST MOD 30 MIN: CPT | Mod: 25

## 2018-01-18 RX ORDER — ASPIRIN 81 MG/1
81 TABLET, CHEWABLE ORAL
Qty: 30 | Refills: 0 | Status: ACTIVE | COMMUNITY
Start: 2017-06-28

## 2018-01-18 RX ORDER — FUROSEMIDE 40 MG/1
40 TABLET ORAL
Qty: 90 | Refills: 1 | Status: DISCONTINUED | COMMUNITY
Start: 2017-08-10 | End: 2018-01-18

## 2018-01-31 RX ORDER — POTASSIUM CHLORIDE 1500 MG/1
20 TABLET, EXTENDED RELEASE ORAL DAILY
Qty: 30 | Refills: 5 | Status: ACTIVE | COMMUNITY
Start: 2016-12-12 | End: 1900-01-01

## 2018-02-06 ENCOUNTER — EMERGENCY (EMERGENCY)
Facility: HOSPITAL | Age: 78
LOS: 0 days | Discharge: HOME | End: 2018-02-06
Attending: STUDENT IN AN ORGANIZED HEALTH CARE EDUCATION/TRAINING PROGRAM | Admitting: STUDENT IN AN ORGANIZED HEALTH CARE EDUCATION/TRAINING PROGRAM
Payer: MEDICARE

## 2018-02-06 VITALS
TEMPERATURE: 99 F | RESPIRATION RATE: 17 BRPM | OXYGEN SATURATION: 100 % | WEIGHT: 128.09 LBS | HEIGHT: 65 IN | HEART RATE: 90 BPM | SYSTOLIC BLOOD PRESSURE: 130 MMHG | DIASTOLIC BLOOD PRESSURE: 69 MMHG

## 2018-02-06 DIAGNOSIS — R33.9 RETENTION OF URINE, UNSPECIFIED: ICD-10-CM

## 2018-02-06 DIAGNOSIS — E78.00 PURE HYPERCHOLESTEROLEMIA, UNSPECIFIED: ICD-10-CM

## 2018-02-06 DIAGNOSIS — F32.9 MAJOR DEPRESSIVE DISORDER, SINGLE EPISODE, UNSPECIFIED: ICD-10-CM

## 2018-02-06 DIAGNOSIS — I10 ESSENTIAL (PRIMARY) HYPERTENSION: ICD-10-CM

## 2018-02-06 DIAGNOSIS — N40.1 BENIGN PROSTATIC HYPERPLASIA WITH LOWER URINARY TRACT SYMPTOMS: ICD-10-CM

## 2018-02-06 DIAGNOSIS — Z98.49 CATARACT EXTRACTION STATUS, UNSPECIFIED EYE: Chronic | ICD-10-CM

## 2018-02-06 LAB
ANION GAP SERPL CALC-SCNC: 4 MMOL/L — LOW (ref 5–17)
APPEARANCE UR: CLEAR — SIGNIFICANT CHANGE UP
BACTERIA # UR AUTO: (no result)
BILIRUB UR-MCNC: NEGATIVE — SIGNIFICANT CHANGE UP
BUN SERPL-MCNC: 16 MG/DL — SIGNIFICANT CHANGE UP (ref 7–23)
CALCIUM SERPL-MCNC: 8.3 MG/DL — LOW (ref 8.5–10.1)
CHLORIDE SERPL-SCNC: 104 MMOL/L — SIGNIFICANT CHANGE UP (ref 96–108)
CO2 SERPL-SCNC: 32 MMOL/L — HIGH (ref 22–31)
COLOR SPEC: YELLOW — SIGNIFICANT CHANGE UP
COMMENT - URINE: SIGNIFICANT CHANGE UP
CREAT SERPL-MCNC: 0.95 MG/DL — SIGNIFICANT CHANGE UP (ref 0.5–1.3)
DIFF PNL FLD: (no result)
EPI CELLS # UR: SIGNIFICANT CHANGE UP
GLUCOSE SERPL-MCNC: 91 MG/DL — SIGNIFICANT CHANGE UP (ref 70–99)
GLUCOSE UR QL: NEGATIVE MG/DL — SIGNIFICANT CHANGE UP
KETONES UR-MCNC: (no result)
LEUKOCYTE ESTERASE UR-ACNC: (no result)
NITRITE UR-MCNC: NEGATIVE — SIGNIFICANT CHANGE UP
PH UR: 7 — SIGNIFICANT CHANGE UP (ref 5–8)
POTASSIUM SERPL-MCNC: 3.2 MMOL/L — LOW (ref 3.5–5.3)
POTASSIUM SERPL-SCNC: 3.2 MMOL/L — LOW (ref 3.5–5.3)
PROT UR-MCNC: 30 MG/DL
RBC CASTS # UR COMP ASSIST: (no result) /HPF (ref 0–4)
SODIUM SERPL-SCNC: 140 MMOL/L — SIGNIFICANT CHANGE UP (ref 135–145)
SP GR SPEC: 1.01 — SIGNIFICANT CHANGE UP (ref 1.01–1.02)
UROBILINOGEN FLD QL: 1 MG/DL
WBC UR QL: SIGNIFICANT CHANGE UP

## 2018-02-06 PROCEDURE — 99285 EMERGENCY DEPT VISIT HI MDM: CPT

## 2018-02-06 NOTE — ED PROVIDER NOTE - OBJECTIVE STATEMENT
76 y/o male with PMHx of BPH, HLD, HTN, COPD presents to the ED c/o urinary retention since 8pm last night. Pt reports similar problems 6 months ago. Also c/o mild abd pressure. No testicular swelling or pain. 76 y/o male with PMHx of BPH, HLD, HTN, COPD presents to the ED c/o urinary retention- unable to urinate since 8pm last night. Pt reports similar problems 6 months ago. Also c/o mild abd pressure. No testicular swelling or pain. No dysuria, no penile discharge. No nausea or vomiting; no back pain.

## 2018-02-07 ENCOUNTER — APPOINTMENT (OUTPATIENT)
Dept: UROLOGY | Facility: CLINIC | Age: 78
End: 2018-02-07

## 2018-02-07 LAB
CULTURE RESULTS: NO GROWTH — SIGNIFICANT CHANGE UP
SPECIMEN SOURCE: SIGNIFICANT CHANGE UP

## 2018-02-14 ENCOUNTER — APPOINTMENT (OUTPATIENT)
Dept: UROLOGY | Facility: CLINIC | Age: 78
End: 2018-02-14

## 2018-02-20 ENCOUNTER — CLINICAL ADVICE (OUTPATIENT)
Age: 78
End: 2018-02-20

## 2018-02-22 ENCOUNTER — EMERGENCY (EMERGENCY)
Facility: HOSPITAL | Age: 78
LOS: 0 days | Discharge: HOME | End: 2018-02-22
Attending: EMERGENCY MEDICINE | Admitting: EMERGENCY MEDICINE
Payer: MEDICARE

## 2018-02-22 VITALS
SYSTOLIC BLOOD PRESSURE: 146 MMHG | DIASTOLIC BLOOD PRESSURE: 67 MMHG | OXYGEN SATURATION: 96 % | HEART RATE: 73 BPM | RESPIRATION RATE: 18 BRPM

## 2018-02-22 VITALS
WEIGHT: 128.97 LBS | TEMPERATURE: 98 F | SYSTOLIC BLOOD PRESSURE: 155 MMHG | RESPIRATION RATE: 18 BRPM | OXYGEN SATURATION: 100 % | HEART RATE: 73 BPM | DIASTOLIC BLOOD PRESSURE: 86 MMHG

## 2018-02-22 DIAGNOSIS — I10 ESSENTIAL (PRIMARY) HYPERTENSION: ICD-10-CM

## 2018-02-22 DIAGNOSIS — N40.1 BENIGN PROSTATIC HYPERPLASIA WITH LOWER URINARY TRACT SYMPTOMS: ICD-10-CM
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
## 2018-02-22 DIAGNOSIS — R33.9 RETENTION OF URINE, UNSPECIFIED: ICD-10-CM

## 2018-02-22 DIAGNOSIS — J90 PLEURAL EFFUSION, NOT ELSEWHERE CLASSIFIED: ICD-10-CM

## 2018-02-22 DIAGNOSIS — E78.00 PURE HYPERCHOLESTEROLEMIA, UNSPECIFIED: ICD-10-CM

## 2018-02-22 DIAGNOSIS — N48.89 OTHER SPECIFIED DISORDERS OF PENIS: ICD-10-CM

## 2018-02-22 DIAGNOSIS — F41.9 ANXIETY DISORDER, UNSPECIFIED: ICD-10-CM

## 2018-02-22 DIAGNOSIS — F32.9 MAJOR DEPRESSIVE DISORDER, SINGLE EPISODE, UNSPECIFIED: ICD-10-CM

## 2018-02-22 DIAGNOSIS — T83.84XA PAIN DUE TO GENITOURINARY PROSTHETIC DEVICES, IMPLANTS AND GRAFTS, INITIAL ENCOUNTER: ICD-10-CM

## 2018-02-22 DIAGNOSIS — J44.9 CHRONIC OBSTRUCTIVE PULMONARY DISEASE, UNSPECIFIED: ICD-10-CM

## 2018-02-22 DIAGNOSIS — M54.5 LOW BACK PAIN: ICD-10-CM

## 2018-02-22 DIAGNOSIS — Z98.49 CATARACT EXTRACTION STATUS, UNSPECIFIED EYE: Chronic | ICD-10-CM

## 2018-02-22 DIAGNOSIS — M19.90 UNSPECIFIED OSTEOARTHRITIS, UNSPECIFIED SITE: ICD-10-CM
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
## 2018-02-22 PROCEDURE — 99283 EMERGENCY DEPT VISIT LOW MDM: CPT

## 2018-02-22 RX ORDER — FLUTICASONE PROPIONATE AND SALMETEROL 50; 250 UG/1; UG/1
2 POWDER ORAL; RESPIRATORY (INHALATION)
Qty: 0 | Refills: 0 | COMMUNITY

## 2018-02-22 RX ORDER — GABAPENTIN 400 MG/1
1 CAPSULE ORAL
Qty: 0 | Refills: 0 | COMMUNITY

## 2018-02-22 RX ORDER — ALBUTEROL 90 UG/1
2 AEROSOL, METERED ORAL
Qty: 0 | Refills: 0 | COMMUNITY

## 2018-02-22 RX ORDER — TRAZODONE HCL 50 MG
1 TABLET ORAL
Qty: 0 | Refills: 0 | COMMUNITY

## 2018-02-22 RX ORDER — TIOTROPIUM BROMIDE 18 UG/1
1 CAPSULE ORAL; RESPIRATORY (INHALATION)
Qty: 0 | Refills: 0 | COMMUNITY

## 2018-02-22 RX ORDER — FOLIC ACID 0.8 MG
1 TABLET ORAL
Qty: 0 | Refills: 0 | COMMUNITY

## 2018-02-22 RX ORDER — CHOLECALCIFEROL (VITAMIN D3) 125 MCG
1 CAPSULE ORAL
Qty: 0 | Refills: 0 | COMMUNITY

## 2018-02-22 RX ORDER — POTASSIUM CHLORIDE 20 MEQ
1 PACKET (EA) ORAL
Qty: 0 | Refills: 0 | COMMUNITY

## 2018-02-22 NOTE — ED ADULT NURSE NOTE - OBJECTIVE STATEMENT
Patient comes to ED for burning in penis. pt has a hx of prostate Ca and enlarged prostate. pt had a vincent placed yesterday by MD galeano. pt is complaining of burning at the insertion site. pt did not have stat lock in place. pt had vincent pulling in pants. stat lock placed on vincent. some relief. pt denies nay abdominal pain or blood in urine.

## 2018-02-22 NOTE — ED PROVIDER NOTE - GENITOURINARY, MLM
Chaperoned by Nurse Porter, normal foreskin, no redness, no bleeding, no discharge. Catheter in place draining well, clean urine. Chaperoned by Nurse Indra, normal foreskin, no redness, no bleeding, no discharge. Catheter in place draining well, clean urine.

## 2018-02-22 NOTE — ED ADULT TRIAGE NOTE - CHIEF COMPLAINT QUOTE
Abdominal pain x 2-3 days, without n/v/d.  Pt has hx of prostate ca, urinary catheter DC'd yesterday.

## 2018-02-22 NOTE — ED PROVIDER NOTE - MEDICAL DECISION MAKING DETAILS
Pt presents to the ED c/o of burning sensation after catheter replacement yesterday. Pt presents to the ED c/o of burning sensation after catheter replacement yesterday. No signs of infection or obstruction. Likely due to pulling from catheter to leg pab as it is not secured to leg. will placed lock secured to leg to relieve pulling pressure. dc with urology follow up.

## 2018-02-22 NOTE — ED PROVIDER NOTE - OBJECTIVE STATEMENT
77 y-o Male with PMHx of Prostate CA, HTN, COPD presents to the ED c/o of Burning sensation to penis.  Pt was given urinary catheter for urinary retention. Pt states he D/C catheter himself several days ago. Yesterday Pt came back to the ED and replaced catheter, and since then has had burning sensation to urethra. Denies N/V/D, Fever, Cough 77 y-o Male with PMHx of Prostate CA, HTN, COPD presents to the ED c/o of Burning sensation to penis.  Pt was given urinary catheter for urinary retention. Pt states he D/C catheter himself several days ago. Pt had seen urology and had replaced catheter, and since then has had burning sensation to urethra. Denies N/V/D, Fever, Cough No abd pain, testicular pain or swelling. No meds for pain. No bleeding or penile dc. No trauma.

## 2018-02-27 ENCOUNTER — RX RENEWAL (OUTPATIENT)
Age: 78
End: 2018-02-27

## 2018-03-26 ENCOUNTER — RX RENEWAL (OUTPATIENT)
Age: 78
End: 2018-03-26

## 2018-03-27 ENCOUNTER — RX RENEWAL (OUTPATIENT)
Age: 78
End: 2018-03-27

## 2018-03-27 RX ORDER — PANTOPRAZOLE 40 MG/1
40 TABLET, DELAYED RELEASE ORAL
Qty: 90 | Refills: 0 | Status: ACTIVE | COMMUNITY
Start: 2016-11-13 | End: 1900-01-01

## 2018-03-28 ENCOUNTER — APPOINTMENT (OUTPATIENT)
Dept: UROLOGY | Facility: CLINIC | Age: 78
End: 2018-03-28
Payer: MEDICARE

## 2018-03-28 VITALS
WEIGHT: 131 LBS | HEIGHT: 66 IN | DIASTOLIC BLOOD PRESSURE: 59 MMHG | BODY MASS INDEX: 21.05 KG/M2 | SYSTOLIC BLOOD PRESSURE: 90 MMHG

## 2018-03-28 PROCEDURE — 99214 OFFICE O/P EST MOD 30 MIN: CPT | Mod: 25

## 2018-03-28 PROCEDURE — 51702 INSERT TEMP BLADDER CATH: CPT

## 2018-03-29 ENCOUNTER — RX RENEWAL (OUTPATIENT)
Age: 78
End: 2018-03-29

## 2018-03-30 ENCOUNTER — RX RENEWAL (OUTPATIENT)
Age: 78
End: 2018-03-30

## 2018-04-02 ENCOUNTER — RX RENEWAL (OUTPATIENT)
Age: 78
End: 2018-04-02

## 2018-04-02 RX ORDER — FLUTICASONE PROPIONATE AND SALMETEROL 50; 250 UG/1; UG/1
250-50 POWDER RESPIRATORY (INHALATION)
Qty: 180 | Refills: 1 | Status: ACTIVE | COMMUNITY
Start: 2018-01-18 | End: 1900-01-01

## 2018-04-10 NOTE — CONSULT NOTE ADULT - SUBJECTIVE AND OBJECTIVE BOX
vital signs CHIEF COMPLAINT: Patient is a 76y old  Male who presents with a chief complaint of generalised weaknesss (21 Jun 2017 22:25)      HPI:  76 y.o. male with PMH of COPD home O2 dependent 2L, Diastolic CHF, HTN, HLD, Depression, GERD,    bladder surgery on Tuesday June 13th, removed cancerous lump, left the same day, given chemo inside the bladder presents to ED for weakness. Pt states that since the surgery he feels weak, tired, and cant move his legs due to b/l knee pain .His family also states that for past 3 days has been appearing to have intermittent SOB . Pt also states he has mild bloody urine and decreased PO intake. Also c/o chronic  incontinence. patient was unable to do home physical therapy due to generalised weakness and physical therapist recommended to go to ER.  No fever, HA, SOB, CP, chills, abd pain, dysuria  In ED EKG- NSRno significant chnages as compared to prior EKG  CXR- small right pleural effusion  with atelectasis unchanged from old CXR  In Ed patient was given vanco and cefepime for suspected HCAP  lactate neg,1st troponin negative ,afebrile ,saturating at 95% on home O2 2L 	  	  	  6/22- feels tired but ok  	  	  	  PMHx: :: HTN  HLD  COPD  Diastolic CHF  Depression  Urinary retention  GERD  PSHx: :: Bladder cancerous tumor resection june 13th 2017 with intravesical chemo therapy   Family History: :: noncontributory to current presentation  ROS: :: Nausea, Dizziness; All (21 Jun 2017 22:25)      PMHx: PAST MEDICAL & SURGICAL HISTORY:  Hematuria  BPH (benign prostatic hypertrophy)  Low back pain  Osteoarthritis  Pleural effusion: 2016  Hypercholesterolemia  Anxiety  Depression  COPD (chronic obstructive pulmonary disease)  HTN (hypertension)  Bladder tumor  No pertinent past medical history  Cataract extraction status: 2015  b/l        Soc Hx:No Known Allergies        REVIEW OF SYSTEMS:    CONSTITUTIONAL: No weakness, fevers or chills  EYES/ENT: No visual changes;  No vertigo or throat pain   NECK: No pain or stiffness  RESPIRATORY: No cough, wheezing, hemoptysis; No shortness of breath  CARDIOVASCULAR: No chest pain or palpitations  GASTROINTESTINAL: No abdominal or epigastric pain. No nausea, vomiting, or hematemesis; No diarrhea or constipation. No melena or hematochezia.  GENITOURINARY: No dysuria, frequency or hematuria  NEUROLOGICAL: No numbness or weakness  SKIN: No itching, burning, rashes, or lesions   All other review of systems is negative unless indicated above    Vital Signs Last 24 Hrs  T(C): 36.7, Max: 36.7 (06-22 @ 00:15)  T(F): 98.1, Max: 98.1 (06-22 @ 05:13)  HR: 83 (65 - 83)  BP: 143/75 (130/62 - 161/70)  BP(mean): --  RR: 18 (16 - 18)  SpO2: 94% (93% - 100%)    I&O's Summary    I & Os for current day (as of 22 Jun 2017 07:01)  =============================================  IN: 1000 ml / OUT: 0 ml / NET: 1000 ml          PHYSICAL EXAM:   Constitutional: NAD, awake and alert, well-developed  HEENT: PERR, EOMI, Normal Hearing, MMM  Neck: Soft and supple, No LAD, No JVD  Respiratory: Breath sounds are clear bilaterally, No wheezing, rales or rhonchi  Cardiovascular: S1 and S2, regular rate and rhythm, no Murmurs, gallops or rubs  Gastrointestinal: Bowel Sounds present, soft, nontender, nondistended, no guarding, no rebound  Extremities: No peripheral edema  Vascular: 2+ peripheral pulses  Neurological: A/O x 3, no focal deficits  Musculoskeletal: 5/5 strength b/l upper and lower extremities  Skin: No rashes    MEDICATIONS:  MEDICATIONS  (STANDING):  cloNIDine 0.1milliGRAM(s) Oral daily  atorvastatin 20milliGRAM(s) Oral at bedtime  tiotropium 18 MICROgram(s) Capsule 1Capsule(s) Inhalation daily  furosemide    Tablet 40milliGRAM(s) Oral daily  folic acid 1milliGRAM(s) Oral daily  enoxaparin Injectable 40milliGRAM(s) SubCutaneous every 24 hours  tamsulosin 0.4milliGRAM(s) Oral at bedtime  clonazePAM Tablet 0.5milliGRAM(s) Oral three times a day  gabapentin 300milliGRAM(s) Oral at bedtime  mirtazapine 7.5milliGRAM(s) Oral at bedtime  traZODone 100milliGRAM(s) Oral at bedtime  buDESOnide 160 MICROgram(s)/formoterol 4.5 MICROgram(s) Inhaler 2Puff(s) Inhalation two times a day  aspirin  chewable 81milliGRAM(s) Oral daily      LABS: All Labs Reviewed:                        11.1   5.4   )-----------( 205      ( 22 Jun 2017 04:26 )             33.0     06-22    135  |  102  |  22  ----------------------------<  189<H>  4.5   |  29  |  1.03    Ca    8.2<L>      22 Jun 2017 04:26  Phos  2.8     06-22  Mg     2.0     06-22    TPro  6.5  /  Alb  2.9<L>  /  TBili  0.3  /  DBili  x   /  AST  16  /  ALT  12  /  AlkPhos  65  06-22    PT/INR - ( 21 Jun 2017 16:32 )   PT: 11.1 sec;   INR: 1.03 ratio         PTT - ( 21 Jun 2017 16:32 )  PTT:28.6 sec  CARDIAC MARKERS ( 22 Jun 2017 04:26 )  <0.015 ng/mL / x     / 82 U/L / x     / x      CARDIAC MARKERS ( 21 Jun 2017 16:32 )  <0.015 ng/mL / x     / x     / x     / x          Serum Pro-Brain Natriuretic Peptide: 245 pg/mL (06-21 @ 16:32)        EKG: Sinus  rhythm, normal axis and intervals no significant ST changes     Telemetry: SR-  few PAC/ PVC

## 2018-04-25 ENCOUNTER — APPOINTMENT (OUTPATIENT)
Dept: UROLOGY | Facility: CLINIC | Age: 78
End: 2018-04-25
Payer: MEDICARE

## 2018-04-25 DIAGNOSIS — N47.2 PARAPHIMOSIS: ICD-10-CM

## 2018-04-25 PROCEDURE — 54450 PREPUTIAL STRETCHING: CPT

## 2018-04-25 PROCEDURE — 99214 OFFICE O/P EST MOD 30 MIN: CPT | Mod: 25

## 2018-04-25 PROCEDURE — 51700 IRRIGATION OF BLADDER: CPT

## 2018-05-23 ENCOUNTER — APPOINTMENT (OUTPATIENT)
Dept: INTERNAL MEDICINE | Facility: CLINIC | Age: 78
End: 2018-05-23

## 2018-05-24 ENCOUNTER — MEDICATION RENEWAL (OUTPATIENT)
Age: 78
End: 2018-05-24

## 2018-06-27 ENCOUNTER — APPOINTMENT (OUTPATIENT)
Dept: UROLOGY | Facility: CLINIC | Age: 78
End: 2018-06-27

## 2018-06-28 ENCOUNTER — INPATIENT (INPATIENT)
Facility: HOSPITAL | Age: 78
LOS: 10 days | Discharge: NURSING, REHAB OR LONG-TERM CARE | End: 2018-07-09
Attending: FAMILY MEDICINE | Admitting: FAMILY MEDICINE
Payer: MEDICARE

## 2018-06-28 VITALS
WEIGHT: 139.99 LBS | HEIGHT: 67 IN | SYSTOLIC BLOOD PRESSURE: 117 MMHG | HEART RATE: 110 BPM | TEMPERATURE: 98 F | RESPIRATION RATE: 21 BRPM | DIASTOLIC BLOOD PRESSURE: 53 MMHG

## 2018-06-28 DIAGNOSIS — J90 PLEURAL EFFUSION, NOT ELSEWHERE CLASSIFIED: ICD-10-CM

## 2018-06-28 DIAGNOSIS — Z29.9 ENCOUNTER FOR PROPHYLACTIC MEASURES, UNSPECIFIED: ICD-10-CM

## 2018-06-28 DIAGNOSIS — A41.9 SEPSIS, UNSPECIFIED ORGANISM: ICD-10-CM

## 2018-06-28 DIAGNOSIS — Z98.49 CATARACT EXTRACTION STATUS, UNSPECIFIED EYE: Chronic | ICD-10-CM

## 2018-06-28 DIAGNOSIS — J18.9 PNEUMONIA, UNSPECIFIED ORGANISM: ICD-10-CM

## 2018-06-28 DIAGNOSIS — E87.6 HYPOKALEMIA: ICD-10-CM

## 2018-06-28 DIAGNOSIS — J44.1 CHRONIC OBSTRUCTIVE PULMONARY DISEASE WITH (ACUTE) EXACERBATION: ICD-10-CM

## 2018-06-28 DIAGNOSIS — I10 ESSENTIAL (PRIMARY) HYPERTENSION: ICD-10-CM

## 2018-06-28 LAB
ALBUMIN SERPL ELPH-MCNC: 3.1 G/DL — LOW (ref 3.3–5)
ALP SERPL-CCNC: 123 U/L — HIGH (ref 40–120)
ALT FLD-CCNC: 26 U/L — SIGNIFICANT CHANGE UP (ref 12–78)
ANION GAP SERPL CALC-SCNC: 4 MMOL/L — LOW (ref 5–17)
ANISOCYTOSIS BLD QL: SLIGHT — SIGNIFICANT CHANGE UP
APTT BLD: 30.3 SEC — SIGNIFICANT CHANGE UP (ref 27.5–37.4)
AST SERPL-CCNC: 41 U/L — HIGH (ref 15–37)
BASOPHILS # BLD AUTO: 0 K/UL — SIGNIFICANT CHANGE UP (ref 0–0.2)
BASOPHILS NFR BLD AUTO: 0 % — SIGNIFICANT CHANGE UP (ref 0–2)
BILIRUB SERPL-MCNC: 1.1 MG/DL — SIGNIFICANT CHANGE UP (ref 0.2–1.2)
BUN SERPL-MCNC: 23 MG/DL — SIGNIFICANT CHANGE UP (ref 7–23)
CALCIUM SERPL-MCNC: 8.2 MG/DL — LOW (ref 8.5–10.1)
CHLORIDE SERPL-SCNC: 104 MMOL/L — SIGNIFICANT CHANGE UP (ref 96–108)
CK SERPL-CCNC: 121 U/L — SIGNIFICANT CHANGE UP (ref 26–308)
CO2 SERPL-SCNC: 31 MMOL/L — SIGNIFICANT CHANGE UP (ref 22–31)
CREAT SERPL-MCNC: 1.12 MG/DL — SIGNIFICANT CHANGE UP (ref 0.5–1.3)
ELLIPTOCYTES BLD QL SMEAR: SLIGHT — SIGNIFICANT CHANGE UP
EOSINOPHIL # BLD AUTO: 0.39 K/UL — SIGNIFICANT CHANGE UP (ref 0–0.5)
EOSINOPHIL NFR BLD AUTO: 2 % — SIGNIFICANT CHANGE UP (ref 0–6)
GLUCOSE SERPL-MCNC: 93 MG/DL — SIGNIFICANT CHANGE UP (ref 70–99)
HCT VFR BLD CALC: 35.4 % — LOW (ref 39–50)
HGB BLD-MCNC: 11.5 G/DL — LOW (ref 13–17)
INR BLD: 1.05 RATIO — SIGNIFICANT CHANGE UP (ref 0.88–1.16)
LACTATE SERPL-SCNC: 1 MMOL/L — SIGNIFICANT CHANGE UP (ref 0.7–2)
LYMPHOCYTES # BLD AUTO: 1.18 K/UL — SIGNIFICANT CHANGE UP (ref 1–3.3)
LYMPHOCYTES # BLD AUTO: 6 % — LOW (ref 13–44)
MACROCYTES BLD QL: SLIGHT — SIGNIFICANT CHANGE UP
MANUAL SMEAR VERIFICATION: SIGNIFICANT CHANGE UP
MCHC RBC-ENTMCNC: 28.4 PG — SIGNIFICANT CHANGE UP (ref 27–34)
MCHC RBC-ENTMCNC: 32.5 GM/DL — SIGNIFICANT CHANGE UP (ref 32–36)
MCV RBC AUTO: 87.4 FL — SIGNIFICANT CHANGE UP (ref 80–100)
MICROCYTES BLD QL: SLIGHT — SIGNIFICANT CHANGE UP
MONOCYTES # BLD AUTO: 1.18 K/UL — HIGH (ref 0–0.9)
MONOCYTES NFR BLD AUTO: 6 % — SIGNIFICANT CHANGE UP (ref 2–14)
NEUTROPHILS # BLD AUTO: 16.95 K/UL — HIGH (ref 1.8–7.4)
NEUTROPHILS NFR BLD AUTO: 84 % — HIGH (ref 43–77)
NEUTS BAND # BLD: 2 % — SIGNIFICANT CHANGE UP (ref 0–8)
NRBC # BLD: 0 /100 — SIGNIFICANT CHANGE UP (ref 0–0)
NRBC # BLD: SIGNIFICANT CHANGE UP /100 WBCS (ref 0–0)
NT-PROBNP SERPL-SCNC: 289 PG/ML — SIGNIFICANT CHANGE UP (ref 0–450)
OVALOCYTES BLD QL SMEAR: SLIGHT — SIGNIFICANT CHANGE UP
PLAT MORPH BLD: NORMAL — SIGNIFICANT CHANGE UP
PLATELET # BLD AUTO: 256 K/UL — SIGNIFICANT CHANGE UP (ref 150–400)
POIKILOCYTOSIS BLD QL AUTO: SLIGHT — SIGNIFICANT CHANGE UP
POLYCHROMASIA BLD QL SMEAR: SLIGHT — SIGNIFICANT CHANGE UP
POTASSIUM SERPL-MCNC: 3 MMOL/L — LOW (ref 3.5–5.3)
POTASSIUM SERPL-SCNC: 3 MMOL/L — LOW (ref 3.5–5.3)
PROT SERPL-MCNC: 7 GM/DL — SIGNIFICANT CHANGE UP (ref 6–8.3)
PROTHROM AB SERPL-ACNC: 11.4 SEC — SIGNIFICANT CHANGE UP (ref 9.8–12.7)
RBC # BLD: 4.05 M/UL — LOW (ref 4.2–5.8)
RBC # FLD: 13.6 % — SIGNIFICANT CHANGE UP (ref 10.3–14.5)
RBC BLD AUTO: ABNORMAL
SODIUM SERPL-SCNC: 139 MMOL/L — SIGNIFICANT CHANGE UP (ref 135–145)
TROPONIN I SERPL-MCNC: 0.02 NG/ML — SIGNIFICANT CHANGE UP (ref 0.01–0.04)
TROPONIN I SERPL-MCNC: 0.02 NG/ML — SIGNIFICANT CHANGE UP (ref 0.01–0.04)
TROPONIN I SERPL-MCNC: <0.015 NG/ML — SIGNIFICANT CHANGE UP (ref 0.01–0.04)
WBC # BLD: 19.71 K/UL — HIGH (ref 3.8–10.5)
WBC # FLD AUTO: 19.71 K/UL — HIGH (ref 3.8–10.5)

## 2018-06-28 PROCEDURE — 71045 X-RAY EXAM CHEST 1 VIEW: CPT | Mod: 26

## 2018-06-28 PROCEDURE — 99285 EMERGENCY DEPT VISIT HI MDM: CPT

## 2018-06-28 PROCEDURE — 71250 CT THORAX DX C-: CPT | Mod: 26

## 2018-06-28 PROCEDURE — 93010 ELECTROCARDIOGRAM REPORT: CPT

## 2018-06-28 RX ORDER — IPRATROPIUM/ALBUTEROL SULFATE 18-103MCG
3 AEROSOL WITH ADAPTER (GRAM) INHALATION
Qty: 0 | Refills: 0 | Status: COMPLETED | OUTPATIENT
Start: 2018-06-28 | End: 2018-06-28

## 2018-06-28 RX ORDER — CEFTRIAXONE 500 MG/1
1 INJECTION, POWDER, FOR SOLUTION INTRAMUSCULAR; INTRAVENOUS EVERY 24 HOURS
Qty: 0 | Refills: 0 | Status: DISCONTINUED | OUTPATIENT
Start: 2018-06-28 | End: 2018-06-28

## 2018-06-28 RX ORDER — SODIUM CHLORIDE 9 MG/ML
2000 INJECTION INTRAMUSCULAR; INTRAVENOUS; SUBCUTANEOUS ONCE
Qty: 0 | Refills: 0 | Status: COMPLETED | OUTPATIENT
Start: 2018-06-28 | End: 2018-06-28

## 2018-06-28 RX ORDER — FUROSEMIDE 40 MG
20 TABLET ORAL DAILY
Qty: 0 | Refills: 0 | Status: DISCONTINUED | OUTPATIENT
Start: 2018-06-28 | End: 2018-07-09

## 2018-06-28 RX ORDER — AZITHROMYCIN 500 MG/1
500 TABLET, FILM COATED ORAL EVERY 24 HOURS
Qty: 0 | Refills: 0 | Status: DISCONTINUED | OUTPATIENT
Start: 2018-06-28 | End: 2018-07-04

## 2018-06-28 RX ORDER — POTASSIUM CHLORIDE 20 MEQ
20 PACKET (EA) ORAL
Qty: 0 | Refills: 0 | Status: COMPLETED | OUTPATIENT
Start: 2018-06-28 | End: 2018-06-28

## 2018-06-28 RX ORDER — ESCITALOPRAM OXALATE 10 MG/1
20 TABLET, FILM COATED ORAL DAILY
Qty: 0 | Refills: 0 | Status: DISCONTINUED | OUTPATIENT
Start: 2018-06-28 | End: 2018-07-09

## 2018-06-28 RX ORDER — TIOTROPIUM BROMIDE 18 UG/1
0 CAPSULE ORAL; RESPIRATORY (INHALATION)
Qty: 0 | Refills: 0 | COMMUNITY

## 2018-06-28 RX ORDER — SODIUM CHLORIDE 9 MG/ML
3 INJECTION INTRAMUSCULAR; INTRAVENOUS; SUBCUTANEOUS ONCE
Qty: 0 | Refills: 0 | Status: COMPLETED | OUTPATIENT
Start: 2018-06-28 | End: 2018-06-28

## 2018-06-28 RX ORDER — ALBUTEROL 90 UG/1
0 AEROSOL, METERED ORAL
Qty: 0 | Refills: 0 | COMMUNITY

## 2018-06-28 RX ORDER — FINASTERIDE 5 MG/1
5 TABLET, FILM COATED ORAL DAILY
Qty: 0 | Refills: 0 | Status: DISCONTINUED | OUTPATIENT
Start: 2018-06-28 | End: 2018-07-09

## 2018-06-28 RX ORDER — PANTOPRAZOLE SODIUM 20 MG/1
0 TABLET, DELAYED RELEASE ORAL
Qty: 0 | Refills: 0 | COMMUNITY

## 2018-06-28 RX ORDER — ASPIRIN/CALCIUM CARB/MAGNESIUM 324 MG
81 TABLET ORAL DAILY
Qty: 0 | Refills: 0 | Status: DISCONTINUED | OUTPATIENT
Start: 2018-06-28 | End: 2018-07-09

## 2018-06-28 RX ORDER — TAMSULOSIN HYDROCHLORIDE 0.4 MG/1
0.4 CAPSULE ORAL AT BEDTIME
Qty: 0 | Refills: 0 | Status: DISCONTINUED | OUTPATIENT
Start: 2018-06-28 | End: 2018-07-09

## 2018-06-28 RX ORDER — TRAZODONE HCL 50 MG
100 TABLET ORAL AT BEDTIME
Qty: 0 | Refills: 0 | Status: DISCONTINUED | OUTPATIENT
Start: 2018-06-28 | End: 2018-07-09

## 2018-06-28 RX ORDER — AZITHROMYCIN 500 MG/1
500 TABLET, FILM COATED ORAL ONCE
Qty: 0 | Refills: 0 | Status: COMPLETED | OUTPATIENT
Start: 2018-06-28 | End: 2018-06-28

## 2018-06-28 RX ORDER — CEFTRIAXONE 500 MG/1
1000 INJECTION, POWDER, FOR SOLUTION INTRAMUSCULAR; INTRAVENOUS EVERY 24 HOURS
Qty: 0 | Refills: 0 | Status: DISCONTINUED | OUTPATIENT
Start: 2018-06-28 | End: 2018-07-06

## 2018-06-28 RX ORDER — ATORVASTATIN CALCIUM 80 MG/1
20 TABLET, FILM COATED ORAL AT BEDTIME
Qty: 0 | Refills: 0 | Status: DISCONTINUED | OUTPATIENT
Start: 2018-06-28 | End: 2018-07-09

## 2018-06-28 RX ORDER — CLONAZEPAM 1 MG
0.5 TABLET ORAL THREE TIMES A DAY
Qty: 0 | Refills: 0 | Status: DISCONTINUED | OUTPATIENT
Start: 2018-06-28 | End: 2018-07-05

## 2018-06-28 RX ORDER — POTASSIUM CHLORIDE 20 MEQ
40 PACKET (EA) ORAL ONCE
Qty: 0 | Refills: 0 | Status: COMPLETED | OUTPATIENT
Start: 2018-06-28 | End: 2018-06-28

## 2018-06-28 RX ORDER — CEFTRIAXONE 500 MG/1
1000 INJECTION, POWDER, FOR SOLUTION INTRAMUSCULAR; INTRAVENOUS ONCE
Qty: 0 | Refills: 0 | Status: COMPLETED | OUTPATIENT
Start: 2018-06-28 | End: 2018-06-28

## 2018-06-28 RX ORDER — OXYCODONE AND ACETAMINOPHEN 5; 325 MG/1; MG/1
1 TABLET ORAL EVERY 6 HOURS
Qty: 0 | Refills: 0 | Status: DISCONTINUED | OUTPATIENT
Start: 2018-06-28 | End: 2018-07-05

## 2018-06-28 RX ORDER — POTASSIUM CHLORIDE 20 MEQ
20 PACKET (EA) ORAL DAILY
Qty: 0 | Refills: 0 | Status: DISCONTINUED | OUTPATIENT
Start: 2018-06-29 | End: 2018-07-09

## 2018-06-28 RX ORDER — HEPARIN SODIUM 5000 [USP'U]/ML
5000 INJECTION INTRAVENOUS; SUBCUTANEOUS EVERY 12 HOURS
Qty: 0 | Refills: 0 | Status: DISCONTINUED | OUTPATIENT
Start: 2018-06-28 | End: 2018-07-09

## 2018-06-28 RX ORDER — ACETAMINOPHEN 500 MG
975 TABLET ORAL ONCE
Qty: 0 | Refills: 0 | Status: COMPLETED | OUTPATIENT
Start: 2018-06-28 | End: 2018-06-28

## 2018-06-28 RX ORDER — IPRATROPIUM/ALBUTEROL SULFATE 18-103MCG
3 AEROSOL WITH ADAPTER (GRAM) INHALATION EVERY 6 HOURS
Qty: 0 | Refills: 0 | Status: DISCONTINUED | OUTPATIENT
Start: 2018-06-28 | End: 2018-07-09

## 2018-06-28 RX ADMIN — SODIUM CHLORIDE 2000 MILLILITER(S): 9 INJECTION INTRAMUSCULAR; INTRAVENOUS; SUBCUTANEOUS at 10:15

## 2018-06-28 RX ADMIN — Medication 125 MILLIGRAM(S): at 10:40

## 2018-06-28 RX ADMIN — CEFTRIAXONE 1000 MILLIGRAM(S): 500 INJECTION, POWDER, FOR SOLUTION INTRAMUSCULAR; INTRAVENOUS at 10:29

## 2018-06-28 RX ADMIN — Medication 20 MILLIGRAM(S): at 17:28

## 2018-06-28 RX ADMIN — Medication 81 MILLIGRAM(S): at 17:28

## 2018-06-28 RX ADMIN — Medication 3 MILLILITER(S): at 10:41

## 2018-06-28 RX ADMIN — Medication 3 MILLILITER(S): at 11:53

## 2018-06-28 RX ADMIN — Medication 40 MILLIEQUIVALENT(S): at 11:55

## 2018-06-28 RX ADMIN — Medication 20 MILLIEQUIVALENT(S): at 17:28

## 2018-06-28 RX ADMIN — FINASTERIDE 5 MILLIGRAM(S): 5 TABLET, FILM COATED ORAL at 17:28

## 2018-06-28 RX ADMIN — SODIUM CHLORIDE 3 MILLILITER(S): 9 INJECTION INTRAMUSCULAR; INTRAVENOUS; SUBCUTANEOUS at 10:15

## 2018-06-28 RX ADMIN — Medication 975 MILLIGRAM(S): at 10:30

## 2018-06-28 RX ADMIN — OXYCODONE AND ACETAMINOPHEN 1 TABLET(S): 5; 325 TABLET ORAL at 17:33

## 2018-06-28 RX ADMIN — Medication 20 MILLIEQUIVALENT(S): at 19:57

## 2018-06-28 RX ADMIN — ATORVASTATIN CALCIUM 20 MILLIGRAM(S): 80 TABLET, FILM COATED ORAL at 22:48

## 2018-06-28 RX ADMIN — TAMSULOSIN HYDROCHLORIDE 0.4 MILLIGRAM(S): 0.4 CAPSULE ORAL at 22:48

## 2018-06-28 RX ADMIN — AZITHROMYCIN 255 MILLIGRAM(S): 500 TABLET, FILM COATED ORAL at 10:29

## 2018-06-28 RX ADMIN — HEPARIN SODIUM 5000 UNIT(S): 5000 INJECTION INTRAVENOUS; SUBCUTANEOUS at 17:29

## 2018-06-28 RX ADMIN — Medication 3 MILLILITER(S): at 11:58

## 2018-06-28 RX ADMIN — Medication 100 MILLIGRAM(S): at 22:48

## 2018-06-28 NOTE — H&P ADULT - PROBLEM SELECTOR PLAN 1
possibly CAP, aspiration  will start on IV azithro, rocephin  check sputum cs  request Pulm cs  check speech/swallow cs to eval for aspiration

## 2018-06-28 NOTE — SWALLOW BEDSIDE ASSESSMENT ADULT - SWALLOW EVAL: CRITERIA FOR SKILLED INTERVENTION MET
no problems identified which require skilled intervention/ACUTE SPEECH PATHOLOGY INTERVENTION IS NOT CLINICALLY WARRANTED AND WOULD NOT . NO SPEECH-LANGUAGE PATHOLOGY OR DYSPHAGIA PROCESS WERE EVIDENT ON EXAM. GIVEN ABOVE, WILL NOT ACTIVELY FOLLOW. RECONSULT PRN AS NEEDED.

## 2018-06-28 NOTE — H&P ADULT - NSHPLABSRESULTS_GEN_ALL_CORE
Labs personally reviewed.                          11.5   19.71 )-----------( 256      ( 28 Jun 2018 10:20 )             35.4       06-28    139  |  104  |  23  ----------------------------<  93  3.0<L>   |  31  |  1.12    Ca    8.2<L>      28 Jun 2018 10:20    TPro  7.0  /  Alb  3.1<L>  /  TBili  1.1  /  DBili  x   /  AST  41<H>  /  ALT  26  /  AlkPhos  123<H>  06-28          PT/INR - ( 28 Jun 2018 10:20 )   PT: 11.4 sec;   INR: 1.05 ratio         PTT - ( 28 Jun 2018 10:20 )  PTT:30.3 sec    Lactate Trend  06-28 @ 10:20 Lactate:1.0       CARDIAC MARKERS ( 28 Jun 2018 13:20 )  0.021 ng/mL / x     / x     / x     / x      CARDIAC MARKERS ( 28 Jun 2018 10:20 )  <0.015 ng/mL / x     / 121 U/L / x     / x          Imaging personally reviewed.  CT chest- multilobar Rt pneumonia w/ Rt mod pl eff    EKG personally reviewed.  - sinus tachycardia

## 2018-06-28 NOTE — SWALLOW BEDSIDE ASSESSMENT ADULT - NS SPL SWALLOW CLINIC TRIAL FT
OROPHARYNGEAL SWALLOWING INTEGRITY SUBJECTIVELY APPEARED TO BE WITHIN FUNCTIONAL PARAMETERS FOR AGE. LARYNGEAL LIFT ON PALPATION DURING SWALLOWING TRIALS WAS FELT TO BE FUNCTIONAL FOR AGE, NO BEHAVIORAL ASPIRATION SIGNS WERE EXHIBITED ON EXAM. ODYNOPHAGIA WAS DENIED.

## 2018-06-28 NOTE — ED PROVIDER NOTE - PROGRESS NOTE DETAILS
Negrito DO: Endorsed to hospitalist at this time; placed on tele- per tele monitor- runs of ?vtach while in ED.

## 2018-06-28 NOTE — SWALLOW BEDSIDE ASSESSMENT ADULT - SWALLOW EVAL: DIAGNOSIS
1) The patient demonstrates Oropharyngeal Swallowing integrity which subjectively appears to be within functional parameters for age. NO behavioral aspiration signs were exhibited on exam.  Pt did NOT appear to be aspirating during clinical swallowing testing.  2) The pt was alert. He seemed [place] but was interactive. He was able to verbalize during communicative probes and in conversation. His verbalizations were produced in English with scattered [place]. His utterances were produced without a primary motor speech or primary linguistic pathology. At reported communicative baseline.

## 2018-06-28 NOTE — SWALLOW BEDSIDE ASSESSMENT ADULT - SLP GENERAL OBSERVATIONS
On encounter, the pt occasionally coughed non nutritively but was not in overt respiratory distress. The pt was alert. He seemed [place] but was interactive. He was able to verbalize during communicative probes and in conversation. His verbalizations were produced in English with scattered Romansh. His utterances were produced without a primary motor speech or primary linguistic pathology. At reported communicative baseline. Note that pt denied Dysphagia or aspiration signs with meals.

## 2018-06-28 NOTE — H&P ADULT - PROBLEM SELECTOR PLAN 3
2/2 Pneumonia  pt does not appear to be clinically in CHF exacerbation- in fact appears dry  will c/w po lasix as per home dose - consider IV lasix if not improvement or worsens  monitor for resolution - check repeat CXR as indicated

## 2018-06-28 NOTE — H&P ADULT - ASSESSMENT
77M w/PMH HTN, HLD, COPD on Home O2, BPH, presents w/ increased sob this AM and fever since last night.  Pt is fair historian, family n/a, history obtained from chart, EMS record.  Pt denies any chest pain, cough.  He does say he became very sob just today.      In ED, CT chest found w/ Multilobar RT sided pna w/ Mod pleural effusion, started on rocephin/azithro, given duoneb.  Currently, pt is pleasant and doing well, no c/o, asking for something to eat. 77M w/PMH HTN, HLD, COPD on Home O2, BPH, presents w/ increased sob this AM and fever since last night.    In ED, CT chest found w/ Multilobar RT sided pna w/ Mod pleural effusion, started on rocephin/azithro, given duoneb.

## 2018-06-28 NOTE — H&P ADULT - PROBLEM SELECTOR PLAN 4
2/2 Pneumonia/pleural effusion  c/w oxygen, monitor O2 sats and titrate to keep sats>90%  received solumedrol in ED  will start on solumedrol 40mg daily  duonebs  pulm cs for further recs

## 2018-06-28 NOTE — SWALLOW BEDSIDE ASSESSMENT ADULT - SWALLOW EVAL: SECRETION MANAGEMENT
Adequate on exam. No drooling noted and strength of his volitional cough was functional, albeit somewhat moist.

## 2018-06-28 NOTE — ED PROVIDER NOTE - OBJECTIVE STATEMENT
78 y/o male with a PMHx of HTN, HLD, COPD, BPH, and bladder tumor presents to the ED c/o SOB and fever. Pt's wife at bedside states ot has been experiencing dypsnea and his temperature was taken to be 103 and 102 on two separate occasions, no OTC medications given for fever. Pt uses home O2 all the time. Pt was a smoker, quit about 10 years ago. PMD: [place]. 78 y/o male with a PMHx of HTN, HLD, COPD, BPH, and bladder tumor presents to the ED c/o SOB and fever. Pt's wife at bedside states that patient has been experiencing sob and his temperature was taken to be 103 and 102 on two separate occasions, no OTC medications given for fever. Pt uses home O2 all the time. Pt was a smoker, quit about 10 years ago. PMD: [place].

## 2018-06-28 NOTE — H&P ADULT - NEUROLOGICAL DETAILS
responds to pain/cranial nerves intact/responds to verbal commands/sensation intact/alert and oriented x 3

## 2018-06-28 NOTE — SWALLOW BEDSIDE ASSESSMENT ADULT - SWALLOW EVAL: RECOMMENDED DIET
SUGGEST A REGULAR TEXTURE DIET WITH THIN LIQUID CONSISTENCIES AS TOLERATED. THE PATIENT APPEARS CLINICALLY TOLERANT OF THESE FOOD TEXTURES FROM AN OROPHARYNGEAL SWALLOWING STANCE ON CLINICAL EXAM.

## 2018-06-28 NOTE — H&P ADULT - HISTORY OF PRESENT ILLNESS
77M w/PMH HTN, HLD, COPD on Home O2, BPH, presents w/ increased sob this AM and fever since last night.  Pt is fair historian, family n/a, history obtained from chart, EMS record.  Pt denies any chest pain, cough.  He does say he became very sob just today.      In ED, CT chest found w/ Multilobar RT sided pna w/ Mod pleural effusion, started on rocephin/azithro, given duoneb.  Currently, pt is pleasant and doing well, no c/o, asking for something to eat.

## 2018-06-28 NOTE — SWALLOW BEDSIDE ASSESSMENT ADULT - COMMENTS
The patient was admitted to  with fever and SOB. Hospital course is notable for hypokalemia, sepsis, right pneumonia with pleural effusion, and COPD exacerbation. This profile is superimposed upon a history of COPD for which he is on home O2, HTN, HLD, BPH, bladder tumor NOS, OA, lower back pain, and anxiety. The patient was admitted to  with fever and SOB. Hospital course is notable for hypokalemia, sepsis, right pneumonia with pleural effusion, and COPD exacerbation. This profile is superimposed upon a history of COPD for which he is on home O2, HTN, HLD, BPH, bladder tumor NOS, OA, lower back pain, anxiety, and past cataract extraction.

## 2018-06-28 NOTE — H&P ADULT - NSHPPHYSICALEXAM_GEN_ALL_CORE
Vital Signs Last 24 Hrs  T(C): 39.6 (28 Jun 2018 10:05), Max: 39.6 (28 Jun 2018 10:05)  T(F): 103.3 (28 Jun 2018 10:05), Max: 103.3 (28 Jun 2018 10:05)  HR: 103 (28 Jun 2018 11:52) (103 - 110)  BP: 112/56 (28 Jun 2018 11:52) (112/56 - 119/67)  RR: 21 (28 Jun 2018 11:52) (21 - 23)  SpO2: 100% (28 Jun 2018 11:52) (88% - 100%)

## 2018-06-29 ENCOUNTER — RESULT REVIEW (OUTPATIENT)
Age: 78
End: 2018-06-29

## 2018-06-29 DIAGNOSIS — J98.11 ATELECTASIS: ICD-10-CM

## 2018-06-29 DIAGNOSIS — J18.9 PNEUMONIA, UNSPECIFIED ORGANISM: ICD-10-CM

## 2018-06-29 DIAGNOSIS — J90 PLEURAL EFFUSION, NOT ELSEWHERE CLASSIFIED: ICD-10-CM

## 2018-06-29 DIAGNOSIS — F03.90 UNSPECIFIED DEMENTIA WITHOUT BEHAVIORAL DISTURBANCE: ICD-10-CM

## 2018-06-29 LAB
ALBUMIN SERPL ELPH-MCNC: 2.6 G/DL — LOW (ref 3.3–5)
ALP SERPL-CCNC: 94 U/L — SIGNIFICANT CHANGE UP (ref 40–120)
ALT FLD-CCNC: 39 U/L — SIGNIFICANT CHANGE UP (ref 12–78)
ANION GAP SERPL CALC-SCNC: 6 MMOL/L — SIGNIFICANT CHANGE UP (ref 5–17)
AST SERPL-CCNC: 38 U/L — HIGH (ref 15–37)
B PERT IGG+IGM PNL SER: ABNORMAL
BILIRUB SERPL-MCNC: 0.3 MG/DL — SIGNIFICANT CHANGE UP (ref 0.2–1.2)
BUN SERPL-MCNC: 29 MG/DL — HIGH (ref 7–23)
CALCIUM SERPL-MCNC: 7.7 MG/DL — LOW (ref 8.5–10.1)
CHLORIDE SERPL-SCNC: 109 MMOL/L — HIGH (ref 96–108)
CO2 SERPL-SCNC: 29 MMOL/L — SIGNIFICANT CHANGE UP (ref 22–31)
COLOR FLD: YELLOW — SIGNIFICANT CHANGE UP
CREAT SERPL-MCNC: 1.03 MG/DL — SIGNIFICANT CHANGE UP (ref 0.5–1.3)
FLUID INTAKE SUBSTANCE CLASS: SIGNIFICANT CHANGE UP
FLUID SEGMENTED GRANULOCYTES: 48 % — SIGNIFICANT CHANGE UP
GLUCOSE FLD-MCNC: 158 MG/DL — SIGNIFICANT CHANGE UP
GLUCOSE SERPL-MCNC: 142 MG/DL — HIGH (ref 70–99)
GRAM STN FLD: SIGNIFICANT CHANGE UP
HCT VFR BLD CALC: 31 % — LOW (ref 39–50)
HGB BLD-MCNC: 10.2 G/DL — LOW (ref 13–17)
LDH SERPL L TO P-CCNC: 196 U/L — SIGNIFICANT CHANGE UP
LYMPHOCYTES # FLD: 36 % — SIGNIFICANT CHANGE UP
MCHC RBC-ENTMCNC: 29.1 PG — SIGNIFICANT CHANGE UP (ref 27–34)
MCHC RBC-ENTMCNC: 32.9 GM/DL — SIGNIFICANT CHANGE UP (ref 32–36)
MCV RBC AUTO: 88.6 FL — SIGNIFICANT CHANGE UP (ref 80–100)
MESOTHL CELL # FLD: 3 % — SIGNIFICANT CHANGE UP
MONOS+MACROS # FLD: 11 % — SIGNIFICANT CHANGE UP
NRBC # BLD: 0 /100 WBCS — SIGNIFICANT CHANGE UP (ref 0–0)
OTHER CELLS FLD MANUAL: 2 % — SIGNIFICANT CHANGE UP
PH FLD: 7.28 — SIGNIFICANT CHANGE UP
PLATELET # BLD AUTO: 219 K/UL — SIGNIFICANT CHANGE UP (ref 150–400)
POTASSIUM SERPL-MCNC: 4.2 MMOL/L — SIGNIFICANT CHANGE UP (ref 3.5–5.3)
POTASSIUM SERPL-SCNC: 4.2 MMOL/L — SIGNIFICANT CHANGE UP (ref 3.5–5.3)
PROT FLD-MCNC: 3.5 G/DL — SIGNIFICANT CHANGE UP
PROT SERPL-MCNC: 6.5 GM/DL — SIGNIFICANT CHANGE UP (ref 6–8.3)
RBC # BLD: 3.5 M/UL — LOW (ref 4.2–5.8)
RBC # FLD: 14.2 % — SIGNIFICANT CHANGE UP (ref 10.3–14.5)
RCV VOL RI: 1000 /UL — HIGH (ref 0–5)
SODIUM SERPL-SCNC: 144 MMOL/L — SIGNIFICANT CHANGE UP (ref 135–145)
SPECIMEN SOURCE: SIGNIFICANT CHANGE UP
TOTAL NUCLEATED CELL COUNT, BODY FLUID: 2786 /UL — HIGH (ref 0–5)
TUBE TYPE: SIGNIFICANT CHANGE UP
WBC # BLD: 24.4 K/UL — HIGH (ref 3.8–10.5)
WBC # FLD AUTO: 24.4 K/UL — HIGH (ref 3.8–10.5)

## 2018-06-29 PROCEDURE — 93010 ELECTROCARDIOGRAM REPORT: CPT

## 2018-06-29 PROCEDURE — 99223 1ST HOSP IP/OBS HIGH 75: CPT

## 2018-06-29 PROCEDURE — 99222 1ST HOSP IP/OBS MODERATE 55: CPT

## 2018-06-29 PROCEDURE — 99232 SBSQ HOSP IP/OBS MODERATE 35: CPT

## 2018-06-29 PROCEDURE — 71045 X-RAY EXAM CHEST 1 VIEW: CPT | Mod: 26,76

## 2018-06-29 PROCEDURE — 32557 INSERT CATH PLEURA W/ IMAGE: CPT | Mod: RT

## 2018-06-29 RX ADMIN — CEFTRIAXONE 1000 MILLIGRAM(S): 500 INJECTION, POWDER, FOR SOLUTION INTRAMUSCULAR; INTRAVENOUS at 12:11

## 2018-06-29 RX ADMIN — Medication 0.5 MILLIGRAM(S): at 05:17

## 2018-06-29 RX ADMIN — Medication 0.5 MILLIGRAM(S): at 12:14

## 2018-06-29 RX ADMIN — ATORVASTATIN CALCIUM 20 MILLIGRAM(S): 80 TABLET, FILM COATED ORAL at 22:59

## 2018-06-29 RX ADMIN — Medication 3 MILLILITER(S): at 11:00

## 2018-06-29 RX ADMIN — FINASTERIDE 5 MILLIGRAM(S): 5 TABLET, FILM COATED ORAL at 12:14

## 2018-06-29 RX ADMIN — Medication 20 MILLIGRAM(S): at 05:11

## 2018-06-29 RX ADMIN — AZITHROMYCIN 255 MILLIGRAM(S): 500 TABLET, FILM COATED ORAL at 12:14

## 2018-06-29 RX ADMIN — Medication 20 MILLIEQUIVALENT(S): at 12:14

## 2018-06-29 RX ADMIN — OXYCODONE AND ACETAMINOPHEN 1 TABLET(S): 5; 325 TABLET ORAL at 22:57

## 2018-06-29 RX ADMIN — OXYCODONE AND ACETAMINOPHEN 1 TABLET(S): 5; 325 TABLET ORAL at 21:16

## 2018-06-29 RX ADMIN — ESCITALOPRAM OXALATE 20 MILLIGRAM(S): 10 TABLET, FILM COATED ORAL at 12:14

## 2018-06-29 RX ADMIN — Medication 40 MILLIGRAM(S): at 05:11

## 2018-06-29 RX ADMIN — HEPARIN SODIUM 5000 UNIT(S): 5000 INJECTION INTRAVENOUS; SUBCUTANEOUS at 05:11

## 2018-06-29 RX ADMIN — Medication 3 MILLILITER(S): at 19:58

## 2018-06-29 RX ADMIN — TAMSULOSIN HYDROCHLORIDE 0.4 MILLIGRAM(S): 0.4 CAPSULE ORAL at 22:59

## 2018-06-29 RX ADMIN — Medication 100 MILLIGRAM(S): at 22:59

## 2018-06-29 NOTE — PROGRESS NOTE ADULT - SUBJECTIVE AND OBJECTIVE BOX
Pt C/O Pain at CT site. RN noted bandage partially off      Vital Signs Last 24 Hrs  T(C): 36.4 (29 Jun 2018 21:21), Max: 36.8 (29 Jun 2018 16:12)  T(F): 97.6 (29 Jun 2018 21:21), Max: 98.2 (29 Jun 2018 16:12)  HR: 72 (30 Jun 2018 01:42) (72 - 97)  BP: 129/95 (29 Jun 2018 21:21) (122/62 - 135/56)  BP(mean): --  RR: 17 (29 Jun 2018 21:21) (17 - 18)  SpO2: 93% (29 Jun 2018 21:21) (93% - 100%)    CT site: pigtail CT sutured in place. does not appear to have moved/been pulled out, no bleeding , erythema or exudate at insertion site    Lungs:  Rhonchi B/L Rt>L    A/P ; ? CY accidently pulled at          appears in place, reinforced with additional tegaderm          stat CXR shows pigtail in RLL, no PTX appreciated , draining serous fluid in thoraclex          F/U AM CXR

## 2018-06-29 NOTE — CONSULT NOTE ADULT - SUBJECTIVE AND OBJECTIVE BOX
HPI:  77M w/PMH HTN, HLD, COPD on Home O2, BPH, presents w/ increased sob this AM and fever since last night.  Pt is fair historian, family n/a, history obtained from chart, EMS record.  Pt denies any chest pain, cough.  He does say he became very sob just today.    Patient is poor historian with dementia. CT scan shows multifocal pneumonia with right pleural effussion.      PAST MEDICAL & SURGICAL HISTORY:  Hematuria  BPH (benign prostatic hypertrophy)  Low back pain  Osteoarthritis  Pleural effusion: 2016  Hypercholesterolemia  Anxiety  Depression  COPD (chronic obstructive pulmonary disease)  HTN (hypertension)  Bladder tumor  Cataract extraction status: 2015  b/l      MEDICATIONS  (STANDING):  ALBUTerol/ipratropium for Nebulization 3 milliLiter(s) Nebulizer every 6 hours  aspirin  chewable 81 milliGRAM(s) Oral daily  atorvastatin 20 milliGRAM(s) Oral at bedtime  azithromycin  IVPB 500 milliGRAM(s) IV Intermittent every 24 hours  cefTRIAXone Injectable. 1000 milliGRAM(s) IV Push every 24 hours  escitalopram 20 milliGRAM(s) Oral daily  finasteride 5 milliGRAM(s) Oral daily  furosemide    Tablet 20 milliGRAM(s) Oral daily  heparin  Injectable 5000 Unit(s) SubCutaneous every 12 hours  methylPREDNISolone sodium succinate Injectable 40 milliGRAM(s) IV Push daily  potassium chloride    Tablet ER 20 milliEquivalent(s) Oral daily  tamsulosin 0.4 milliGRAM(s) Oral at bedtime  traZODone 100 milliGRAM(s) Oral at bedtime    MEDICATIONS  (PRN):  clonazePAM Tablet 0.5 milliGRAM(s) Oral three times a day PRN anxiety  oxyCODONE    5 mG/acetaminophen 325 mG 1 Tablet(s) Oral every 6 hours PRN Moderate Pain (4 - 6)      Allergies    No Known Allergies    Intolerances        SOCIAL HISTORY: Denies tobacco, etoh abuse or illicit drug use    FAMILY HISTORY:  Family history of asthma in mother (Mother)  Family history of cancer (Father): father      Vital Signs Last 24 Hrs  T(C): 36.3 (29 Jun 2018 04:45), Max: 39.6 (28 Jun 2018 10:05)  T(F): 97.4 (29 Jun 2018 04:45), Max: 103.3 (28 Jun 2018 10:05)  HR: 82 (29 Jun 2018 04:45) (71 - 110)  BP: 125/62 (29 Jun 2018 04:45) (112/56 - 132/60)  BP(mean): --  RR: 18 (29 Jun 2018 04:45) (18 - 27)  SpO2: 98% (29 Jun 2018 04:45) (88% - 100%)    REVIEW OF SYSTEMS:    CONSTITUTIONAL:  As per HPI.  SKIN: no rashes  HEENT:  Eyes:  No diplopia or blurred vision. ENT:  No earache, sore throat or runny nose.  CARDIOVASCULAR:  No pressure, squeezing, tightness, heaviness or aching about the chest, neck, axilla or epigastrium.  RESPIRATORY:  cough,sob  GASTROINTESTINAL:  No nausea, vomiting or diarrhea.  GENITOURINARY:  No dysuria, frequency or urgency.  MUSCULOSKELETAL:  As per HPI.  SKIN:  No change in skin, hair or nails.  NEUROLOGIC:  No paresthesias, fasciculations, seizures or weakness.  PSYCHIATRIC:  No disorder of thought or mood.  ENDOCRINE:  No heat or cold intolerance, polyuria or polydipsia.  HEMATOLOGICAL:  No easy bruising or bleedings:  .     PHYSICAL EXAMINATION:    GENERAL APPEARANCE:  Pt. is not currently dyspneic, in no distress. Pt. is alert, oriented, and pleasant.  HEENT:  Pupils are normal and react normally. No icterus. Mucous membranes well colored.  NECK:  Supple. No lymphadenopathy. Jugular venous pressure not elevated. Carotids equal.   HEART:  S1S2 reg with 2/6 systolic murmur  CHEST:  bilateral crackles R>L with decreased BS right 1/2  ABDOMEN:  Soft and nontender.   EXTREMITIES:  There is no cyanosis, clubbing or edema.   SKIN:  No rash or significant lesions are noted.  CNS: no focal deficits    LABS:                        10.2   24.40 )-----------( 219      ( 29 Jun 2018 06:27 )             31.0     06-29    144  |  109<H>  |  29<H>  ----------------------------<  142<H>  4.2   |  29  |  1.03    Ca    7.7<L>      29 Jun 2018 06:27    TPro  6.5  /  Alb  2.6<L>  /  TBili  0.3  /  DBili  x   /  AST  38<H>  /  ALT  39  /  AlkPhos  94  06-29    LIVER FUNCTIONS - ( 29 Jun 2018 06:27 )  Alb: 2.6 g/dL / Pro: 6.5 gm/dL / ALK PHOS: 94 U/L / ALT: 39 U/L / AST: 38 U/L / GGT: x           PT/INR - ( 28 Jun 2018 10:20 )   PT: 11.4 sec;   INR: 1.05 ratio         PTT - ( 28 Jun 2018 10:20 )  PTT:30.3 sec  CARDIAC MARKERS ( 28 Jun 2018 16:12 )  0.017 ng/mL / x     / x     / x     / x      CARDIAC MARKERS ( 28 Jun 2018 13:20 )  0.021 ng/mL / x     / x     / x     / x      CARDIAC MARKERS ( 28 Jun 2018 10:20 )  <0.015 ng/mL / x     / 121 U/L / x     / x                RADIOLOGY & ADDITIONAL STUDIES:

## 2018-06-29 NOTE — CONSULT NOTE ADULT - SUBJECTIVE AND OBJECTIVE BOX
History of Present Illness:  77y Male who presented to ED with fevers and SOB found to have right pleural effusion and underwent pigtail today with moderate residual air leak.    PMH/PSH:  Hematuria  BPH (benign prostatic hypertrophy)  Low back pain  Osteoarthritis  Pleural effusion: 2016  Hypercholesterolemia  Anxiety  Depression  COPD (chronic obstructive pulmonary disease)  HTN (hypertension)  Bladder tumor  No pertinent past medical history    Cataract extraction status: 2015  b/l      Relevant Family History  FAMILY HISTORY:  Family history of asthma in mother (Mother)  Family history of cancer (Father): father      MEDICATIONS  (STANDING):  ALBUTerol/ipratropium for Nebulization 3 milliLiter(s) Nebulizer every 6 hours  aspirin  chewable 81 milliGRAM(s) Oral daily  atorvastatin 20 milliGRAM(s) Oral at bedtime  azithromycin  IVPB 500 milliGRAM(s) IV Intermittent every 24 hours  cefTRIAXone Injectable. 1000 milliGRAM(s) IV Push every 24 hours  escitalopram 20 milliGRAM(s) Oral daily  finasteride 5 milliGRAM(s) Oral daily  furosemide    Tablet 20 milliGRAM(s) Oral daily  heparin  Injectable 5000 Unit(s) SubCutaneous every 12 hours  methylPREDNISolone sodium succinate Injectable 40 milliGRAM(s) IV Push daily  potassium chloride    Tablet ER 20 milliEquivalent(s) Oral daily  tamsulosin 0.4 milliGRAM(s) Oral at bedtime  traZODone 100 milliGRAM(s) Oral at bedtime    MEDICATIONS  (PRN):  clonazePAM Tablet 0.5 milliGRAM(s) Oral three times a day PRN anxiety  oxyCODONE    5 mG/acetaminophen 325 mG 1 Tablet(s) Oral every 6 hours PRN Moderate Pain (4 - 6)      Allergies: No Known Allergies                                                            LABS:                        10.2   24.40 )-----------( 219      ( 29 Jun 2018 06:27 )             31.0     06-29    144  |  109<H>  |  29<H>  ----------------------------<  142<H>  4.2   |  29  |  1.03    Ca    7.7<L>      29 Jun 2018 06:27    TPro  6.5  /  Alb  2.6<L>  /  TBili  0.3  /  DBili  x   /  AST  38<H>  /  ALT  39  /  AlkPhos  94  06-29    PT/INR - ( 28 Jun 2018 10:20 )   PT: 11.4 sec;   INR: 1.05 ratio         PTT - ( 28 Jun 2018 10:20 )  PTT:30.3 sec              Review of Systems             Constitutional: denies fever, chills, general malaise, weight loss, weight gain, diaphoresis   HEENT: denies dry mouth, sore throat, runny nose, photophobia, blurry vision, double vision, glasses, discharge, eye pain, difficulty hearing, vertigo, dysphagia, epistaxis, recent dental work    Respiratory: denies SOB, MATA, cough, phlegm, wheezing, hemoptysis  Cardiovascular: denies CP, palpitations, edema, diaphoresis   Gastrointestinal: denies nausea, vomiting, diarrhea, constipation, abdominal pain, melena   Genitourinary: denies dysuria, frequency, urgency, incontinence, hematuria   Skin/Breast: denies rash, hives, itching, masses, hair loss   Musculoskeletal: denies myalgias, arthritis, joint swelling, muscle weakness  Neurologic: denies syncope, LOC, headache, weakness, dizziness, parasthesias, numbness, seizures, confusion, dementia   Psychiatric: denies feeling anxious, depressed, suicidal, homicidal  Endocrine: denies increased fingerstick glucoses, cold or heat intolerance, polydipsia, polyuria, polyphagia   Hematology/Oncology: denies bruising, tender or enlarged lymph nodes   ROS negative x 10 systems except as noted above    T(C): 36.7 (06-29-18 @ 17:08), Max: 36.8 (06-28-18 @ 19:16)  HR: 85 (06-29-18 @ 17:08) (71 - 97)  BP: 135/56 (06-29-18 @ 17:08) (120/60 - 135/56)    RR: 18 (06-29-18 @ 17:08) (18 - 18)  SpO2: 100% (06-29-18 @ 17:08) (97% - 100%)      Physical Exam  General: WD, WN, NAD                                                         Neuro: A+O x 3, non-focal, speech clear and intact                     Eyes: PERRL, EOMI   ENT: Normal exam of nasal/oral mucosa with absence of cyanosis.   Neck: supple, no JVD, trachea midline   Chest: CTA, equal bilaterally, no wheezes/rales/rhonchi, normal excursion, no accessory muscle use note  CV: RRR, +S1S2  GI: soft, NT, ND, +BS, no organomegaly noted  Extremities: NELSON x 4, no C/C/E, distal motor/neuro/circ intact  SKIN: warm, dry, intact   Right CT serous drainage with moderate air leak.

## 2018-06-29 NOTE — BRIEF OPERATIVE NOTE - PROCEDURE
<<-----Click on this checkbox to enter Procedure Thoracentesis, with US guidance  06/29/2018    Active  NIKA

## 2018-06-29 NOTE — CONSULT NOTE ADULT - SUBJECTIVE AND OBJECTIVE BOX
Patient is a 77y old  Male who presents with a chief complaint of sob, fever (2018 13:43)    HPI:  77M w/PMH HTN, HLD, COPD on Home O2, BPH, admitted on  for evaluation of shortness of breath, cough and fever; history per medical record as patient is poor historian. On nebulizer treatment. No recent hospitalizations.          PMH: as above  PSH: as above  Meds: per reconciliation sheet, noted below  MEDICATIONS  (STANDING):  ALBUTerol/ipratropium for Nebulization 3 milliLiter(s) Nebulizer every 6 hours  aspirin  chewable 81 milliGRAM(s) Oral daily  atorvastatin 20 milliGRAM(s) Oral at bedtime  azithromycin  IVPB 500 milliGRAM(s) IV Intermittent every 24 hours  cefTRIAXone Injectable. 1000 milliGRAM(s) IV Push every 24 hours  escitalopram 20 milliGRAM(s) Oral daily  finasteride 5 milliGRAM(s) Oral daily  furosemide    Tablet 20 milliGRAM(s) Oral daily  heparin  Injectable 5000 Unit(s) SubCutaneous every 12 hours  methylPREDNISolone sodium succinate Injectable 40 milliGRAM(s) IV Push daily  potassium chloride    Tablet ER 20 milliEquivalent(s) Oral daily  tamsulosin 0.4 milliGRAM(s) Oral at bedtime  traZODone 100 milliGRAM(s) Oral at bedtime    MEDICATIONS  (PRN):  clonazePAM Tablet 0.5 milliGRAM(s) Oral three times a day PRN anxiety  oxyCODONE    5 mG/acetaminophen 325 mG 1 Tablet(s) Oral every 6 hours PRN Moderate Pain (4 - 6)    Allergies    No Known Allergies    Intolerances      Social: no smoking, no alcohol, no illegal drugs; no recent travel, no exposure to TB  FAMILY HISTORY:  Family history of asthma in mother (Mother)  Family history of cancer (Father): father     no history of premature cardiovascular disease in first degree relatives  ROS: unable to obtain secondary to patient medical condition     Vital Signs Last 24 Hrs  T(C): 36.2 (2018 09:43), Max: 36.9 (2018 14:15)  T(F): 97.1 (2018 09:43), Max: 98.4 (2018 14:15)  HR: 77 (2018 11:00) (71 - 108)  BP: 128/56 (2018 09:43) (120/60 - 132/60)  BP(mean): --  RR: 18 (2018 09:43) (18 - 27)  SpO2: 99% (2018 09:43) (97% - 100%)  Daily     Daily Weight in k.9 (2018 16:10)    PE:    Constitutional: frail looking  HEENT: NC/AT, EOMI, PERRLA, conjunctivae clear; ears and nose atraumatic; pharynx clear  Neck: supple; thyroid not palpable  Back: no tenderness  Respiratory: respiratory effort normal; scattered coarse breath sounds  Cardiovascular: S1S2 regular, no murmurs  Abdomen: soft, not tender, not distended, positive BS; no liver or spleen organomegaly  Genitourinary: no suprapubic tenderness  Musculoskeletal: no muscle tenderness, no joint swelling or tenderness  Neurological/ Psychiatric: AxOx3, judgement and insight normal;  moving all extremities  Skin: no rashes; no palpable lesions    Labs: all available labs reviewed                        10.2   24.40 )-----------( 219      ( 2018 06:27 )             31.0         144  |  109<H>  |  29<H>  ----------------------------<  142<H>  4.2   |  29  |  1.03    Ca    7.7<L>      2018 06:27    TPro  6.5  /  Alb  2.6<L>  /  TBili  0.3  /  DBili  x   /  AST  38<H>  /  ALT  39  /  AlkPhos  94       LIVER FUNCTIONS - ( 2018 06:27 )  Alb: 2.6 g/dL / Pro: 6.5 gm/dL / ALK PHOS: 94 U/L / ALT: 39 U/L / AST: 38 U/L / GGT: x             < from: CT Chest No Cont (18 @ 11:31) >    EXAM:  CT CHEST                            PROCEDURE DATE:  2018          INTERPRETATION:  Clinical information: Evaluate right pleural effusion    COMPARISON: 2017    PROCEDURE:   CT of the Chest was performed without intravenous contrast.  Sagittal and coronal reformats were performed.      FINDINGS:    LOWER NECK: Within normal limits.  AXILLA, MEDIASTINUM AND ZAID: No lymphadenopathy.  VESSELS: Atherosclerotic arterial calcifications, including the coronary   arteries.  Normal caliber aorta.  HEART: Heart size is normal.No pericardial effusion.  PLEURA: Moderate right pleural effusion, increased in size.  LUNGS AND LARGE AIRWAYS: Patchy opacities throughout the right upper,   middle and lower lobes compatible with pneumonia. No discrete nodule or   mass. Patent central airways.   VISUALIZED UPPER ABDOMEN: Within normal limits.  BONES: No acute abnormality.  CHEST WALL:  Unremarkable    IMPRESSION:     Multilobar right lung pneumonia.  Moderate right pleural effusion.        < end of copied text >      Radiology: all available radiological tests reviewed    Advanced directives addressed: full resuscitation

## 2018-06-29 NOTE — PROGRESS NOTE ADULT - SUBJECTIVE AND OBJECTIVE BOX
CC:  Patient is a 77y old  Male who presents with a chief complaint of sob, fever (28 Jun 2018 13:43)    SUBJECTIVE:  offers no new complaints at current time.    ROS:  all other review of systems are negative unless indicated above.    ALBUTerol/ipratropium for Nebulization 3 milliLiter(s) Nebulizer every 6 hours  aspirin  chewable 81 milliGRAM(s) Oral daily  atorvastatin 20 milliGRAM(s) Oral at bedtime  azithromycin  IVPB 500 milliGRAM(s) IV Intermittent every 24 hours  cefTRIAXone Injectable. 1000 milliGRAM(s) IV Push every 24 hours  clonazePAM Tablet 0.5 milliGRAM(s) Oral three times a day PRN  escitalopram 20 milliGRAM(s) Oral daily  finasteride 5 milliGRAM(s) Oral daily  furosemide    Tablet 20 milliGRAM(s) Oral daily  heparin  Injectable 5000 Unit(s) SubCutaneous every 12 hours  methylPREDNISolone sodium succinate Injectable 40 milliGRAM(s) IV Push daily  oxyCODONE    5 mG/acetaminophen 325 mG 1 Tablet(s) Oral every 6 hours PRN  potassium chloride    Tablet ER 20 milliEquivalent(s) Oral daily  tamsulosin 0.4 milliGRAM(s) Oral at bedtime  traZODone 100 milliGRAM(s) Oral at bedtime    T(C): 36.8 (06-29-18 @ 16:12), Max: 36.8 (06-28-18 @ 19:16)  HR: 89 (06-29-18 @ 16:12) (71 - 97)  BP: 122/62 (06-29-18 @ 16:12) (120/60 - 130/56)  RR: 18 (06-29-18 @ 16:12) (18 - 18)  SpO2: 99% (06-29-18 @ 16:12) (97% - 99%)    CC:  Patient is a 77y old  Male who presents with a chief complaint of sob, fever (28 Jun 2018 13:43)    SUBJECTIVE:  offers no new complaints at current time.    ROS:  all other review of systems are negative unless indicated above.    ALBUTerol/ipratropium for Nebulization 3 milliLiter(s) Nebulizer every 6 hours  aspirin  chewable 81 milliGRAM(s) Oral daily  atorvastatin 20 milliGRAM(s) Oral at bedtime  azithromycin  IVPB 500 milliGRAM(s) IV Intermittent every 24 hours  cefTRIAXone Injectable. 1000 milliGRAM(s) IV Push every 24 hours  clonazePAM Tablet 0.5 milliGRAM(s) Oral three times a day PRN  escitalopram 20 milliGRAM(s) Oral daily  finasteride 5 milliGRAM(s) Oral daily  furosemide    Tablet 20 milliGRAM(s) Oral daily  heparin  Injectable 5000 Unit(s) SubCutaneous every 12 hours  methylPREDNISolone sodium succinate Injectable 40 milliGRAM(s) IV Push daily  oxyCODONE    5 mG/acetaminophen 325 mG 1 Tablet(s) Oral every 6 hours PRN  potassium chloride    Tablet ER 20 milliEquivalent(s) Oral daily  tamsulosin 0.4 milliGRAM(s) Oral at bedtime  traZODone 100 milliGRAM(s) Oral at bedtime    T(C): 36.8 (06-29-18 @ 16:12), Max: 36.8 (06-28-18 @ 19:16)  HR: 89 (06-29-18 @ 16:12) (71 - 97)  BP: 122/62 (06-29-18 @ 16:12) (120/60 - 130/56)  RR: 18 (06-29-18 @ 16:12) (18 - 18)  SpO2: 99% (06-29-18 @ 16:12) (97% - 99%)    Constitutional: NAD, awake and alert, well-developed with good responses to questions, mentally intact.   HEENT: PERRL, EOMI, MMM.  Neck: Soft and supple, No carotid bruit, No JVD  Respiratory: scattered coarse breath sounds  Cardiovascular: S1 and S2, regular rate and rhythm, no murmur, rub or gallop.  Gastrointestinal: Bowel Sounds present, soft, nontender, nondistended, no guarding, no rebound, no mass.  Extremities: No peripheral edema  Vascular: 2+ peripheral pulses  Neurological: A/O x 3, no focal deficits  Musculoskeletal: 5/5 strength b/l upper and lower extremities  Skin:  no visible rashes.                         10.2   24.40 )-----------( 219      ( 29 Jun 2018 06:27 )             31.0     PT/INR - ( 28 Jun 2018 10:20 )   PT: 11.4 sec;   INR: 1.05 ratio         PTT - ( 28 Jun 2018 10:20 )  PTT:30.3 sec  06-29    144  |  109<H>  |  29<H>  ----------------------------<  142<H>  4.2   |  29  |  1.03    Ca    7.7<L>      29 Jun 2018 06:27    TPro  6.5  /  Alb  2.6<L>  /  TBili  0.3  /  DBili  x   /  AST  38<H>  /  ALT  39  /  AlkPhos  94  06-29                          10.2   24.40 )-----------( 219      ( 29 Jun 2018 06:27 )             31.0     PT/INR - ( 28 Jun 2018 10:20 )   PT: 11.4 sec;   INR: 1.05 ratio         PTT - ( 28 Jun 2018 10:20 )  PTT:30.3 sec  06-29    144  |  109<H>  |  29<H>  ----------------------------<  142<H>  4.2   |  29  |  1.03    Ca    7.7<L>      29 Jun 2018 06:27    TPro  6.5  /  Alb  2.6<L>  /  TBili  0.3  /  DBili  x   /  AST  38<H>  /  ALT  39  /  AlkPhos  94  06-29

## 2018-06-29 NOTE — PROGRESS NOTE ADULT - ASSESSMENT
77M.  admitted 06/28/18.  presented to ED c/o increasing SOB and fever.    PMHx:  HTN;  hyperlipidemia;  COPD-O2;  BPH.    CAP.  -f/u Cx.  -ceftriaxone + AZ.  -ID following.    pleural effusion.  -favor parapneumonic.  -IR.    COPD.  -O2.  -IV steroids.  -BD nebs.  -Pulmonology following.    DVT prophylaxis.  -UFH sq.    disposition.  -telemetry [place].    communication.  -d/w RN.  -d/w Case Management.

## 2018-06-29 NOTE — CONSULT NOTE ADULT - ASSESSMENT
77 M with parapneumonic effusion, with post procedural PTX.    - Daily CXR  - ABX as per ID/primary team  - OOB/ambulate  - DVT/GI ppx  - CT to suction

## 2018-06-29 NOTE — CONSULT NOTE ADULT - ASSESSMENT
- CT scan shows multifocal pneumonia with moderate right pleural effussion  - now on rocephin/zithromax. Would consider more broad spectrum abx including coverage for aspiration  - recommend right thoracentesis  - cont bronchodilatores  - on steroids  - dvt proph

## 2018-06-29 NOTE — BRIEF OPERATIVE NOTE - OPERATION/FINDINGS
1) Large R pleural effusion  2) 860cc clear yellow fluid removed from R pleural space  3) Resolution on post-US 1) Large R pleural effusion  2) 860cc clear yellow fluid removed from R pleural space  3) PTX noted on post-XR  4) 8F pleural pigtail placed in CT  5) 10min delayed CT with continuous suction showed partial expansion, especially adjacent to lung at base. 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

## 2018-06-29 NOTE — CONSULT NOTE ADULT - ASSESSMENT
77M w/PMH HTN, HLD, COPD on Home O2, BPH, admitted on 6/28 for evaluation of shortness of breath, cough and fever; history per medical record as patient is poor historian. On nebulizer treatment. No recent hospitalizations.  1. Patient admitted with multilobar pneumonia which will consider as community acquired pneumonia given no recent hospitalizations; also noted with leukocytosis most likely secondary to infection  - follow up cultures   - iv hydration and supportive care   - serial cbc and monitor temperature   - reviewed prior medical records to evaluate for resistant or atypical pathogens   - oxygen and nebs as needed   - agree with ceftriaxone and zithromax as ordered  - will check urine for legionella antigen  2. other issues:  HTN, HLD, COPD on Home O2, BPH  - per medicine

## 2018-06-30 LAB
ANION GAP SERPL CALC-SCNC: 6 MMOL/L — SIGNIFICANT CHANGE UP (ref 5–17)
BUN SERPL-MCNC: 31 MG/DL — HIGH (ref 7–23)
CALCIUM SERPL-MCNC: 7.5 MG/DL — LOW (ref 8.5–10.1)
CHLORIDE SERPL-SCNC: 108 MMOL/L — SIGNIFICANT CHANGE UP (ref 96–108)
CO2 SERPL-SCNC: 30 MMOL/L — SIGNIFICANT CHANGE UP (ref 22–31)
CREAT SERPL-MCNC: 0.97 MG/DL — SIGNIFICANT CHANGE UP (ref 0.5–1.3)
GLUCOSE SERPL-MCNC: 98 MG/DL — SIGNIFICANT CHANGE UP (ref 70–99)
HCT VFR BLD CALC: 30 % — LOW (ref 39–50)
HGB BLD-MCNC: 9.5 G/DL — LOW (ref 13–17)
MCHC RBC-ENTMCNC: 28.6 PG — SIGNIFICANT CHANGE UP (ref 27–34)
MCHC RBC-ENTMCNC: 31.7 GM/DL — LOW (ref 32–36)
MCV RBC AUTO: 90.4 FL — SIGNIFICANT CHANGE UP (ref 80–100)
NRBC # BLD: 0 /100 WBCS — SIGNIFICANT CHANGE UP (ref 0–0)
PLATELET # BLD AUTO: 238 K/UL — SIGNIFICANT CHANGE UP (ref 150–400)
POTASSIUM SERPL-MCNC: 3.9 MMOL/L — SIGNIFICANT CHANGE UP (ref 3.5–5.3)
POTASSIUM SERPL-SCNC: 3.9 MMOL/L — SIGNIFICANT CHANGE UP (ref 3.5–5.3)
RBC # BLD: 3.32 M/UL — LOW (ref 4.2–5.8)
RBC # FLD: 14.5 % — SIGNIFICANT CHANGE UP (ref 10.3–14.5)
SODIUM SERPL-SCNC: 144 MMOL/L — SIGNIFICANT CHANGE UP (ref 135–145)
WBC # BLD: 20.39 K/UL — HIGH (ref 3.8–10.5)
WBC # FLD AUTO: 20.39 K/UL — HIGH (ref 3.8–10.5)

## 2018-06-30 PROCEDURE — 99233 SBSQ HOSP IP/OBS HIGH 50: CPT

## 2018-06-30 PROCEDURE — 71045 X-RAY EXAM CHEST 1 VIEW: CPT | Mod: 26

## 2018-06-30 RX ADMIN — Medication 3 MILLILITER(S): at 01:42

## 2018-06-30 RX ADMIN — Medication 20 MILLIEQUIVALENT(S): at 11:44

## 2018-06-30 RX ADMIN — Medication 3 MILLILITER(S): at 07:45

## 2018-06-30 RX ADMIN — HEPARIN SODIUM 5000 UNIT(S): 5000 INJECTION INTRAVENOUS; SUBCUTANEOUS at 17:55

## 2018-06-30 RX ADMIN — Medication 3 MILLILITER(S): at 13:11

## 2018-06-30 RX ADMIN — Medication 3 MILLILITER(S): at 20:26

## 2018-06-30 RX ADMIN — Medication 0.5 MILLIGRAM(S): at 11:42

## 2018-06-30 RX ADMIN — Medication 100 MILLIGRAM(S): at 21:25

## 2018-06-30 RX ADMIN — OXYCODONE AND ACETAMINOPHEN 1 TABLET(S): 5; 325 TABLET ORAL at 18:10

## 2018-06-30 RX ADMIN — Medication 0.5 MILLIGRAM(S): at 16:36

## 2018-06-30 RX ADMIN — HEPARIN SODIUM 5000 UNIT(S): 5000 INJECTION INTRAVENOUS; SUBCUTANEOUS at 06:32

## 2018-06-30 RX ADMIN — ATORVASTATIN CALCIUM 20 MILLIGRAM(S): 80 TABLET, FILM COATED ORAL at 21:25

## 2018-06-30 RX ADMIN — Medication 0.5 MILLIGRAM(S): at 00:14

## 2018-06-30 RX ADMIN — Medication 40 MILLIGRAM(S): at 06:32

## 2018-06-30 RX ADMIN — AZITHROMYCIN 255 MILLIGRAM(S): 500 TABLET, FILM COATED ORAL at 11:43

## 2018-06-30 RX ADMIN — FINASTERIDE 5 MILLIGRAM(S): 5 TABLET, FILM COATED ORAL at 13:33

## 2018-06-30 RX ADMIN — TAMSULOSIN HYDROCHLORIDE 0.4 MILLIGRAM(S): 0.4 CAPSULE ORAL at 21:25

## 2018-06-30 RX ADMIN — ESCITALOPRAM OXALATE 20 MILLIGRAM(S): 10 TABLET, FILM COATED ORAL at 11:43

## 2018-06-30 RX ADMIN — CEFTRIAXONE 1000 MILLIGRAM(S): 500 INJECTION, POWDER, FOR SOLUTION INTRAMUSCULAR; INTRAVENOUS at 11:43

## 2018-06-30 RX ADMIN — Medication 20 MILLIGRAM(S): at 06:32

## 2018-06-30 RX ADMIN — OXYCODONE AND ACETAMINOPHEN 1 TABLET(S): 5; 325 TABLET ORAL at 17:55

## 2018-06-30 RX ADMIN — Medication 81 MILLIGRAM(S): at 11:43

## 2018-06-30 NOTE — PROGRESS NOTE ADULT - SUBJECTIVE AND OBJECTIVE BOX
CC:  Patient is a 77y old  Male who presents with a chief complaint of sob, fever (28 Jun 2018 13:43)    SUBJECTIVE:  somewhat anxious, better after clonazepam.  O2 saturation 100% via NC.    ROS:  all other review of systems are negative unless indicated above.    ALBUTerol/ipratropium for Nebulization 3 milliLiter(s) Nebulizer every 6 hours  aspirin  chewable 81 milliGRAM(s) Oral daily  atorvastatin 20 milliGRAM(s) Oral at bedtime  azithromycin  IVPB 500 milliGRAM(s) IV Intermittent every 24 hours  cefTRIAXone Injectable. 1000 milliGRAM(s) IV Push every 24 hours  clonazePAM Tablet 0.5 milliGRAM(s) Oral three times a day PRN  escitalopram 20 milliGRAM(s) Oral daily  finasteride 5 milliGRAM(s) Oral daily  furosemide    Tablet 20 milliGRAM(s) Oral daily  heparin  Injectable 5000 Unit(s) SubCutaneous every 12 hours  methylPREDNISolone sodium succinate Injectable 40 milliGRAM(s) IV Push daily  oxyCODONE    5 mG/acetaminophen 325 mG 1 Tablet(s) Oral every 6 hours PRN  potassium chloride    Tablet ER 20 milliEquivalent(s) Oral daily  tamsulosin 0.4 milliGRAM(s) Oral at bedtime  traZODone 100 milliGRAM(s) Oral at bedtime    T(C): 36.6 (06-30-18 @ 16:44), Max: 36.7 (06-29-18 @ 17:08)  HR: 91 (06-30-18 @ 16:44) (72 - 97)  BP: 140/60 (06-30-18 @ 16:44) (126/57 - 140/60)  RR: 19 (06-30-18 @ 16:44) (17 - 20)  SpO2: 92% (06-30-18 @ 16:44) (92% - 100%)    Constitutional: NAD, awake and alert, well-developed with good responses to questions, mentally intact.   HEENT: PERRL, EOMI, MMM.  Neck: Soft and supple, No carotid bruit, No JVD  Respiratory: diminished breath sounds.  Cardiovascular: S1 and S2, regular rate and rhythm, no murmur, rub or gallop.  Gastrointestinal: Bowel Sounds present, soft, nontender, nondistended, no guarding, no rebound, no mass.  Extremities: No peripheral edema  Vascular: 2+ peripheral pulses  Neurological: A/O x 3, no focal deficits  Musculoskeletal: 5/5 strength b/l upper and lower extremities  Skin:  no visible rashes.                         9.5    20.39 )-----------( 238      ( 30 Jun 2018 06:09 )             30.0     06-30    144  |  108  |  31<H>  ----------------------------<  98  3.9   |  30  |  0.97    Ca    7.5<L>      30 Jun 2018 06:09    TPro  6.5  /  Alb  2.6<L>  /  TBili  0.3  /  DBili  x   /  AST  38<H>  /  ALT  39  /  AlkPhos  94  06-29

## 2018-06-30 NOTE — PROGRESS NOTE ADULT - ASSESSMENT
77M.  admitted 06/28/18.  presented to ED c/o increasing SOB and fever.    PMHx:  HTN;  hyperlipidemia;  COPD-O2;  BPH.    CAP.  -f/u Cx.  -ceftriaxone + AZ.  -ID following.    parapneumonic pleural effusion.  -06/30, IR US guided thoracentesis.  900mL.  -post procedural PTx.  -CT to suction.  -serial CXR.  -thoracic surgery following.    COPD.  -O2.  -IV steroids.  -BD nebs.  -Pulmonology following.    DVT prophylaxis.  -UFH sq.    disposition.  -telemetry [place].    communication.  -d/w RN.

## 2018-06-30 NOTE — PROGRESS NOTE ADULT - ASSESSMENT
- CT scan shows multifocal pneumonia with moderate right pleural effusion.  s/p thoracentesis, pleural fluid exudate, pH 7.28, no org on gram stain.  - PTX R. 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 chest tube to low suction, thoracic following.  - continue rocephin/zithromax. ID appreciated.  - cont bronchodilators  - on steroids  - dvt proph

## 2018-06-30 NOTE — PROGRESS NOTE ADULT - SUBJECTIVE AND OBJECTIVE BOX
HPI:  77M w/PMH HTN, HLD, COPD on Home O2, BPH, presents w/ increased sob this AM and fever since last night.  Pt is fair historian, family n/a, history obtained from chart, EMS record.  Pt denies any chest pain, cough.  He does say he became very sob just today.    Patient is poor historian with dementia. CT scan shows multifocal pneumonia with right pleural effussion.    6/30: events noted. noted to have PTX post thoracentesis, may be trapped lung. no distress. comfortable appearing. CT put to low suction.       PAST MEDICAL & SURGICAL HISTORY:  Hematuria  BPH (benign prostatic hypertrophy)  Low back pain  Osteoarthritis  Pleural effusion: 2016  Hypercholesterolemia  Anxiety  Depression  COPD (chronic obstructive pulmonary disease)  HTN (hypertension)  Bladder tumor  Cataract extraction status: 2015  b/l      MEDICATIONS  (STANDING):  ALBUTerol/ipratropium for Nebulization 3 milliLiter(s) Nebulizer every 6 hours  aspirin  chewable 81 milliGRAM(s) Oral daily  atorvastatin 20 milliGRAM(s) Oral at bedtime  azithromycin  IVPB 500 milliGRAM(s) IV Intermittent every 24 hours  cefTRIAXone Injectable. 1000 milliGRAM(s) IV Push every 24 hours  escitalopram 20 milliGRAM(s) Oral daily  finasteride 5 milliGRAM(s) Oral daily  furosemide    Tablet 20 milliGRAM(s) Oral daily  heparin  Injectable 5000 Unit(s) SubCutaneous every 12 hours  methylPREDNISolone sodium succinate Injectable 40 milliGRAM(s) IV Push daily  potassium chloride    Tablet ER 20 milliEquivalent(s) Oral daily  tamsulosin 0.4 milliGRAM(s) Oral at bedtime  traZODone 100 milliGRAM(s) Oral at bedtime    MEDICATIONS  (PRN):  clonazePAM Tablet 0.5 milliGRAM(s) Oral three times a day PRN anxiety  oxyCODONE    5 mG/acetaminophen 325 mG 1 Tablet(s) Oral every 6 hours PRN Moderate Pain (4 - 6)      Allergies    No Known Allergies    Intolerances        SOCIAL HISTORY: Denies tobacco, etoh abuse or illicit drug use    FAMILY HISTORY:  Family history of asthma in mother (Mother)  Family history of cancer (Father): father      Vital Signs Last 24 Hrs  T(C): 36.3 (29 Jun 2018 04:45), Max: 39.6 (28 Jun 2018 10:05)  T(F): 97.4 (29 Jun 2018 04:45), Max: 103.3 (28 Jun 2018 10:05)  HR: 82 (29 Jun 2018 04:45) (71 - 110)  BP: 125/62 (29 Jun 2018 04:45) (112/56 - 132/60)  BP(mean): --  RR: 18 (29 Jun 2018 04:45) (18 - 27)  SpO2: 98% (29 Jun 2018 04:45) (88% - 100%)    REVIEW OF SYSTEMS:    CONSTITUTIONAL:  As per HPI.  SKIN: no rashes  HEENT:  Eyes:  No diplopia or blurred vision. ENT:  No earache, sore throat or runny nose.  CARDIOVASCULAR:  No pressure, squeezing, tightness, heaviness or aching about the chest, neck, axilla or epigastrium.  RESPIRATORY:  cough,sob  GASTROINTESTINAL:  No nausea, vomiting or diarrhea.  GENITOURINARY:  No dysuria, frequency or urgency.  MUSCULOSKELETAL:  As per HPI.  SKIN:  No change in skin, hair or nails.  NEUROLOGIC:  No paresthesias, fasciculations, seizures or weakness.  PSYCHIATRIC:  No disorder of thought or mood.  ENDOCRINE:  No heat or cold intolerance, polyuria or polydipsia.  HEMATOLOGICAL:  No easy bruising or bleedings:  .     PHYSICAL EXAMINATION:    GENERAL APPEARANCE:  Pt. is not currently dyspneic, in no distress. Pt. is alert, oriented, and pleasant.  HEENT:  Pupils are normal and react normally. No icterus. Mucous membranes well colored.  NECK:  Supple. No lymphadenopathy. Jugular venous pressure not elevated. Carotids equal.   HEART:  S1S2 reg with 2/6 systolic murmur  CHEST:  decreased BS's on right.  ABDOMEN:  Soft and nontender.   EXTREMITIES:  There is no cyanosis, clubbing or edema.   SKIN:  No rash or significant lesions are noted.  CNS: no focal deficits    LABS:                        10.2   24.40 )-----------( 219      ( 29 Jun 2018 06:27 )             31.0     06-29    144  |  109<H>  |  29<H>  ----------------------------<  142<H>  4.2   |  29  |  1.03    Ca    7.7<L>      29 Jun 2018 06:27    TPro  6.5  /  Alb  2.6<L>  /  TBili  0.3  /  DBili  x   /  AST  38<H>  /  ALT  39  /  AlkPhos  94  06-29    LIVER FUNCTIONS - ( 29 Jun 2018 06:27 )  Alb: 2.6 g/dL / Pro: 6.5 gm/dL / ALK PHOS: 94 U/L / ALT: 39 U/L / AST: 38 U/L / GGT: x           PT/INR - ( 28 Jun 2018 10:20 )   PT: 11.4 sec;   INR: 1.05 ratio         PTT - ( 28 Jun 2018 10:20 )  PTT:30.3 sec  CARDIAC MARKERS ( 28 Jun 2018 16:12 )  0.017 ng/mL / x     / x     / x     / x      CARDIAC MARKERS ( 28 Jun 2018 13:20 )  0.021 ng/mL / x     / x     / x     / x      CARDIAC MARKERS ( 28 Jun 2018 10:20 )  <0.015 ng/mL / x     / 121 U/L / x     / x                RADIOLOGY & ADDITIONAL STUDIES:       EXAM:  XR CHEST PORTABLE IMMED 1V                            PROCEDURE DATE:  06/29/2018          INTERPRETATION:  History: Pigtail catheter placement pneumothorax   follow-up    Chest:  one view.      Comparison: 6/29/1980    AP radiograph of the chest demonstrates decrease in size of loculated   RIGHT lower lobe pneumothorax with small residual. RIGHT pigtail catheter   is unchanged. The cardiac silhouette is normal in size. Osseous   structures are intact.    Impression:decrease in size of loculated RIGHT lower lobe pneumothorax   with small residual. RIGHT pigtail catheter is unchanged.

## 2018-07-01 LAB
-  CANDIDA ALBICANS: SIGNIFICANT CHANGE UP
-  CANDIDA GLABRATA: SIGNIFICANT CHANGE UP
-  CANDIDA KRUSEI: SIGNIFICANT CHANGE UP
-  CANDIDA PARAPSILOSIS: SIGNIFICANT CHANGE UP
-  CANDIDA TROPICALIS: SIGNIFICANT CHANGE UP
-  COAGULASE NEGATIVE STAPHYLOCOCCUS: SIGNIFICANT CHANGE UP
-  K. PNEUMONIAE GROUP: SIGNIFICANT CHANGE UP
-  KPC RESISTANCE GENE: SIGNIFICANT CHANGE UP
-  STREPTOCOCCUS SP. (NOT GRP A, B OR S PNEUMONIAE): SIGNIFICANT CHANGE UP
A BAUMANNII DNA SPEC QL NAA+PROBE: SIGNIFICANT CHANGE UP
ANION GAP SERPL CALC-SCNC: 5 MMOL/L — SIGNIFICANT CHANGE UP (ref 5–17)
BUN SERPL-MCNC: 31 MG/DL — HIGH (ref 7–23)
CALCIUM SERPL-MCNC: 7.9 MG/DL — LOW (ref 8.5–10.1)
CHLORIDE SERPL-SCNC: 107 MMOL/L — SIGNIFICANT CHANGE UP (ref 96–108)
CO2 SERPL-SCNC: 30 MMOL/L — SIGNIFICANT CHANGE UP (ref 22–31)
CREAT SERPL-MCNC: 0.91 MG/DL — SIGNIFICANT CHANGE UP (ref 0.5–1.3)
E CLOAC COMP DNA BLD POS QL NAA+PROBE: SIGNIFICANT CHANGE UP
E COLI DNA BLD POS QL NAA+NON-PROBE: SIGNIFICANT CHANGE UP
ENTEROCOC DNA BLD POS QL NAA+NON-PROBE: SIGNIFICANT CHANGE UP
ENTEROCOC DNA BLD POS QL NAA+NON-PROBE: SIGNIFICANT CHANGE UP
GLUCOSE SERPL-MCNC: 93 MG/DL — SIGNIFICANT CHANGE UP (ref 70–99)
GP B STREP DNA BLD POS QL NAA+NON-PROBE: SIGNIFICANT CHANGE UP
GRAM STN FLD: SIGNIFICANT CHANGE UP
HAEM INFLU DNA BLD POS QL NAA+NON-PROBE: SIGNIFICANT CHANGE UP
HCT VFR BLD CALC: 30.4 % — LOW (ref 39–50)
HGB BLD-MCNC: 9.8 G/DL — LOW (ref 13–17)
K OXYTOCA DNA BLD POS QL NAA+NON-PROBE: SIGNIFICANT CHANGE UP
L MONOCYTOG DNA BLD POS QL NAA+NON-PROBE: SIGNIFICANT CHANGE UP
LEGIONELLA AG UR QL: NEGATIVE — SIGNIFICANT CHANGE UP
MCHC RBC-ENTMCNC: 29.5 PG — SIGNIFICANT CHANGE UP (ref 27–34)
MCHC RBC-ENTMCNC: 32.2 GM/DL — SIGNIFICANT CHANGE UP (ref 32–36)
MCV RBC AUTO: 91.6 FL — SIGNIFICANT CHANGE UP (ref 80–100)
METHOD TYPE: SIGNIFICANT CHANGE UP
MRSA SPEC QL CULT: SIGNIFICANT CHANGE UP
MSSA DNA SPEC QL NAA+PROBE: SIGNIFICANT CHANGE UP
N MEN ISLT CULT: SIGNIFICANT CHANGE UP
NRBC # BLD: 0 /100 WBCS — SIGNIFICANT CHANGE UP (ref 0–0)
P AERUGINOSA DNA BLD POS NAA+NON-PROBE: SIGNIFICANT CHANGE UP
PLATELET # BLD AUTO: 224 K/UL — SIGNIFICANT CHANGE UP (ref 150–400)
POTASSIUM SERPL-MCNC: 4.1 MMOL/L — SIGNIFICANT CHANGE UP (ref 3.5–5.3)
POTASSIUM SERPL-SCNC: 4.1 MMOL/L — SIGNIFICANT CHANGE UP (ref 3.5–5.3)
RBC # BLD: 3.32 M/UL — LOW (ref 4.2–5.8)
RBC # FLD: 14.5 % — SIGNIFICANT CHANGE UP (ref 10.3–14.5)
S MARCESCENS DNA BLD POS NAA+NON-PROBE: SIGNIFICANT CHANGE UP
S PNEUM DNA BLD POS QL NAA+NON-PROBE: SIGNIFICANT CHANGE UP
S PYO DNA BLD POS QL NAA+NON-PROBE: SIGNIFICANT CHANGE UP
SODIUM SERPL-SCNC: 142 MMOL/L — SIGNIFICANT CHANGE UP (ref 135–145)
WBC # BLD: 13.92 K/UL — HIGH (ref 3.8–10.5)
WBC # FLD AUTO: 13.92 K/UL — HIGH (ref 3.8–10.5)

## 2018-07-01 PROCEDURE — 99233 SBSQ HOSP IP/OBS HIGH 50: CPT

## 2018-07-01 PROCEDURE — 71045 X-RAY EXAM CHEST 1 VIEW: CPT | Mod: 26

## 2018-07-01 RX ORDER — VANCOMYCIN HCL 1 G
1000 VIAL (EA) INTRAVENOUS ONCE
Qty: 0 | Refills: 0 | Status: COMPLETED | OUTPATIENT
Start: 2018-07-01 | End: 2018-07-01

## 2018-07-01 RX ADMIN — FINASTERIDE 5 MILLIGRAM(S): 5 TABLET, FILM COATED ORAL at 11:41

## 2018-07-01 RX ADMIN — Medication 250 MILLIGRAM(S): at 22:09

## 2018-07-01 RX ADMIN — Medication 81 MILLIGRAM(S): at 11:39

## 2018-07-01 RX ADMIN — TAMSULOSIN HYDROCHLORIDE 0.4 MILLIGRAM(S): 0.4 CAPSULE ORAL at 22:10

## 2018-07-01 RX ADMIN — Medication 40 MILLIGRAM(S): at 06:16

## 2018-07-01 RX ADMIN — Medication 3 MILLILITER(S): at 01:59

## 2018-07-01 RX ADMIN — ESCITALOPRAM OXALATE 20 MILLIGRAM(S): 10 TABLET, FILM COATED ORAL at 11:39

## 2018-07-01 RX ADMIN — Medication 0.5 MILLIGRAM(S): at 10:36

## 2018-07-01 RX ADMIN — HEPARIN SODIUM 5000 UNIT(S): 5000 INJECTION INTRAVENOUS; SUBCUTANEOUS at 17:32

## 2018-07-01 RX ADMIN — Medication 20 MILLIGRAM(S): at 06:16

## 2018-07-01 RX ADMIN — Medication 100 MILLIGRAM(S): at 22:10

## 2018-07-01 RX ADMIN — OXYCODONE AND ACETAMINOPHEN 1 TABLET(S): 5; 325 TABLET ORAL at 11:40

## 2018-07-01 RX ADMIN — ATORVASTATIN CALCIUM 20 MILLIGRAM(S): 80 TABLET, FILM COATED ORAL at 22:10

## 2018-07-01 RX ADMIN — OXYCODONE AND ACETAMINOPHEN 1 TABLET(S): 5; 325 TABLET ORAL at 11:30

## 2018-07-01 RX ADMIN — Medication 3 MILLILITER(S): at 19:53

## 2018-07-01 RX ADMIN — AZITHROMYCIN 255 MILLIGRAM(S): 500 TABLET, FILM COATED ORAL at 11:41

## 2018-07-01 RX ADMIN — Medication 20 MILLIEQUIVALENT(S): at 11:39

## 2018-07-01 RX ADMIN — HEPARIN SODIUM 5000 UNIT(S): 5000 INJECTION INTRAVENOUS; SUBCUTANEOUS at 06:16

## 2018-07-01 RX ADMIN — Medication 3 MILLILITER(S): at 13:14

## 2018-07-01 RX ADMIN — Medication 3 MILLILITER(S): at 07:54

## 2018-07-01 RX ADMIN — CEFTRIAXONE 1000 MILLIGRAM(S): 500 INJECTION, POWDER, FOR SOLUTION INTRAMUSCULAR; INTRAVENOUS at 11:40

## 2018-07-01 NOTE — PROGRESS NOTE ADULT - ASSESSMENT
77M.  admitted 06/28/18.  presented to ED c/o increasing SOB and fever.    PMHx:  HTN;  hyperlipidemia;  COPD-O2;  BPH.    CAP.  -Cx, no growth.  -ceftriaxone + AZ.  -ID following.    parapneumonic pleural effusion.  -06/30, IR US guided thoracentesis.  900mL.  -post procedural PTx.  -CT.  -serial CXR.  -thoracic surgery following.    COPD.  -O2.  -IV steroids.  -BD nebs.  -Pulmonology following.    DVT prophylaxis.  -UFH sq.    disposition.  -telemetry [place].    communication.  -d/w RN.

## 2018-07-01 NOTE — PROGRESS NOTE ADULT - SUBJECTIVE AND OBJECTIVE BOX
HPI:  77M w/PMH HTN, HLD, COPD on Home O2, BPH, presents w/ increased sob this AM and fever since last night.  Pt is fair historian, family n/a, history obtained from chart, EMS record.  Pt denies any chest pain, cough.  He does say he became very sob just today.    Patient is poor historian with dementia. CT scan shows multifocal pneumonia with right pleural effussion.    6/30: events noted. noted to have PTX post thoracentesis, may be trapped lung. no distress. comfortable appearing. CT put to low suction.   7/1: no distgress. pigtail cath to watrer seal now.      PAST MEDICAL & SURGICAL HISTORY:  Hematuria  BPH (benign prostatic hypertrophy)  Low back pain  Osteoarthritis  Pleural effusion: 2016  Hypercholesterolemia  Anxiety  Depression  COPD (chronic obstructive pulmonary disease)  HTN (hypertension)  Bladder tumor  Cataract extraction status: 2015  b/l      MEDICATIONS  (STANDING):  ALBUTerol/ipratropium for Nebulization 3 milliLiter(s) Nebulizer every 6 hours  aspirin  chewable 81 milliGRAM(s) Oral daily  atorvastatin 20 milliGRAM(s) Oral at bedtime  azithromycin  IVPB 500 milliGRAM(s) IV Intermittent every 24 hours  cefTRIAXone Injectable. 1000 milliGRAM(s) IV Push every 24 hours  escitalopram 20 milliGRAM(s) Oral daily  finasteride 5 milliGRAM(s) Oral daily  furosemide    Tablet 20 milliGRAM(s) Oral daily  heparin  Injectable 5000 Unit(s) SubCutaneous every 12 hours  methylPREDNISolone sodium succinate Injectable 40 milliGRAM(s) IV Push daily  potassium chloride    Tablet ER 20 milliEquivalent(s) Oral daily  tamsulosin 0.4 milliGRAM(s) Oral at bedtime  traZODone 100 milliGRAM(s) Oral at bedtime    MEDICATIONS  (PRN):  clonazePAM Tablet 0.5 milliGRAM(s) Oral three times a day PRN anxiety  oxyCODONE    5 mG/acetaminophen 325 mG 1 Tablet(s) Oral every 6 hours PRN Moderate Pain (4 - 6)      Allergies    No Known Allergies    Intolerances        SOCIAL HISTORY: Denies tobacco, etoh abuse or illicit drug use    FAMILY HISTORY:  Family history of asthma in mother (Mother)  Family history of cancer (Father): father      Vital Signs Last 24 Hrs  T(C): 36.3 (29 Jun 2018 04:45), Max: 39.6 (28 Jun 2018 10:05)  T(F): 97.4 (29 Jun 2018 04:45), Max: 103.3 (28 Jun 2018 10:05)  HR: 82 (29 Jun 2018 04:45) (71 - 110)  BP: 125/62 (29 Jun 2018 04:45) (112/56 - 132/60)  BP(mean): --  RR: 18 (29 Jun 2018 04:45) (18 - 27)  SpO2: 98% (29 Jun 2018 04:45) (88% - 100%)    REVIEW OF SYSTEMS:    CONSTITUTIONAL:  As per HPI.  SKIN: no rashes  HEENT:  Eyes:  No diplopia or blurred vision. ENT:  No earache, sore throat or runny nose.  CARDIOVASCULAR:  No pressure, squeezing, tightness, heaviness or aching about the chest, neck, axilla or epigastrium.  RESPIRATORY:  see above  GASTROINTESTINAL:  No nausea, vomiting or diarrhea.  GENITOURINARY:  No dysuria, frequency or urgency.  MUSCULOSKELETAL:  As per HPI.  SKIN:  No change in skin, hair or nails.  NEUROLOGIC:  No paresthesias, fasciculations, seizures or weakness.  PSYCHIATRIC:  No disorder of thought or mood.  ENDOCRINE:  No heat or cold intolerance, polyuria or polydipsia.  HEMATOLOGICAL:  No easy bruising or bleedings:  .     PHYSICAL EXAMINATION:    GENERAL APPEARANCE:  Pt. is not currently dyspneic, in no distress. Pt. is alert, oriented, and pleasant.  HEENT:  Pupils are normal and react normally. No icterus. Mucous membranes well colored.  NECK:  Supple. No lymphadenopathy. Jugular venous pressure not elevated. Carotids equal.   HEART:  S1S2 reg with 2/6 systolic murmur  CHEST:  mild decreased BS's bilat.  ABDOMEN:  Soft and nontender.   EXTREMITIES:  There is no cyanosis, clubbing or edema.   SKIN:  No rash or significant lesions are noted.  CNS: no focal deficits    LABS:                        10.2   24.40 )-----------( 219      ( 29 Jun 2018 06:27 )             31.0     06-29    144  |  109<H>  |  29<H>  ----------------------------<  142<H>  4.2   |  29  |  1.03    Ca    7.7<L>      29 Jun 2018 06:27    TPro  6.5  /  Alb  2.6<L>  /  TBili  0.3  /  DBili  x   /  AST  38<H>  /  ALT  39  /  AlkPhos  94  06-29    LIVER FUNCTIONS - ( 29 Jun 2018 06:27 )  Alb: 2.6 g/dL / Pro: 6.5 gm/dL / ALK PHOS: 94 U/L / ALT: 39 U/L / AST: 38 U/L / GGT: x           PT/INR - ( 28 Jun 2018 10:20 )   PT: 11.4 sec;   INR: 1.05 ratio         PTT - ( 28 Jun 2018 10:20 )  PTT:30.3 sec  CARDIAC MARKERS ( 28 Jun 2018 16:12 )  0.017 ng/mL / x     / x     / x     / x      CARDIAC MARKERS ( 28 Jun 2018 13:20 )  0.021 ng/mL / x     / x     / x     / x      CARDIAC MARKERS ( 28 Jun 2018 10:20 )  <0.015 ng/mL / x     / 121 U/L / x     / x                RADIOLOGY & ADDITIONAL STUDIES:       EXAM:  XR CHEST PORTABLE IMMED 1V                            PROCEDURE DATE:  06/29/2018          INTERPRETATION:  History: Pigtail catheter placement pneumothorax   follow-up    Chest:  one view.      Comparison: 6/29/1980    AP radiograph of the chest demonstrates decrease in size of loculated   RIGHT lower lobe pneumothorax with small residual. RIGHT pigtail catheter   is unchanged. The cardiac silhouette is normal in size. Osseous   structures are intact.    Impression:decrease in size of loculated RIGHT lower lobe pneumothorax   with small residual. RIGHT pigtail catheter is unchanged. HPI:  77M w/PMH HTN, HLD, COPD on Home O2, BPH, presents w/ increased sob this AM and fever since last night.  Pt is fair historian, family n/a, history obtained from chart, EMS record.  Pt denies any chest pain, cough.  He does say he became very sob just today.    Patient is poor historian with dementia. CT scan shows multifocal pneumonia with right pleural effussion.    6/30: events noted. noted to have PTX post thoracentesis, may be trapped lung. no distress. comfortable appearing. CT put to low suction.   7/1: no distress. pigtail cath to water seal now.      PAST MEDICAL & SURGICAL HISTORY:  Hematuria  BPH (benign prostatic hypertrophy)  Low back pain  Osteoarthritis  Pleural effusion: 2016  Hypercholesterolemia  Anxiety  Depression  COPD (chronic obstructive pulmonary disease)  HTN (hypertension)  Bladder tumor  Cataract extraction status: 2015  b/l      MEDICATIONS  (STANDING):  ALBUTerol/ipratropium for Nebulization 3 milliLiter(s) Nebulizer every 6 hours  aspirin  chewable 81 milliGRAM(s) Oral daily  atorvastatin 20 milliGRAM(s) Oral at bedtime  azithromycin  IVPB 500 milliGRAM(s) IV Intermittent every 24 hours  cefTRIAXone Injectable. 1000 milliGRAM(s) IV Push every 24 hours  escitalopram 20 milliGRAM(s) Oral daily  finasteride 5 milliGRAM(s) Oral daily  furosemide    Tablet 20 milliGRAM(s) Oral daily  heparin  Injectable 5000 Unit(s) SubCutaneous every 12 hours  methylPREDNISolone sodium succinate Injectable 40 milliGRAM(s) IV Push daily  potassium chloride    Tablet ER 20 milliEquivalent(s) Oral daily  tamsulosin 0.4 milliGRAM(s) Oral at bedtime  traZODone 100 milliGRAM(s) Oral at bedtime    MEDICATIONS  (PRN):  clonazePAM Tablet 0.5 milliGRAM(s) Oral three times a day PRN anxiety  oxyCODONE    5 mG/acetaminophen 325 mG 1 Tablet(s) Oral every 6 hours PRN Moderate Pain (4 - 6)      Allergies    No Known Allergies    Intolerances        SOCIAL HISTORY: Denies tobacco, etoh abuse or illicit drug use    FAMILY HISTORY:  Family history of asthma in mother (Mother)  Family history of cancer (Father): father      Vital Signs Last 24 Hrs  T(C): 36.3 (29 Jun 2018 04:45), Max: 39.6 (28 Jun 2018 10:05)  T(F): 97.4 (29 Jun 2018 04:45), Max: 103.3 (28 Jun 2018 10:05)  HR: 82 (29 Jun 2018 04:45) (71 - 110)  BP: 125/62 (29 Jun 2018 04:45) (112/56 - 132/60)  BP(mean): --  RR: 18 (29 Jun 2018 04:45) (18 - 27)  SpO2: 98% (29 Jun 2018 04:45) (88% - 100%)    REVIEW OF SYSTEMS:    CONSTITUTIONAL:  As per HPI.  SKIN: no rashes  HEENT:  Eyes:  No diplopia or blurred vision. ENT:  No earache, sore throat or runny nose.  CARDIOVASCULAR:  No pressure, squeezing, tightness, heaviness or aching about the chest, neck, axilla or epigastrium.  RESPIRATORY:  see above  GASTROINTESTINAL:  No nausea, vomiting or diarrhea.  GENITOURINARY:  No dysuria, frequency or urgency.  MUSCULOSKELETAL:  As per HPI.  SKIN:  No change in skin, hair or nails.  NEUROLOGIC:  No paresthesias, fasciculations, seizures or weakness.  PSYCHIATRIC:  No disorder of thought or mood.  ENDOCRINE:  No heat or cold intolerance, polyuria or polydipsia.  HEMATOLOGICAL:  No easy bruising or bleedings:  .     PHYSICAL EXAMINATION:    GENERAL APPEARANCE:  Pt. is not currently dyspneic, in no distress. Pt. is alert, oriented, and pleasant.  HEENT:  Pupils are normal and react normally. No icterus. Mucous membranes well colored.  NECK:  Supple. No lymphadenopathy. Jugular venous pressure not elevated. Carotids equal.   HEART:  S1S2 reg with 2/6 systolic murmur  CHEST:  mild decreased BS's bilat.  ABDOMEN:  Soft and nontender.   EXTREMITIES:  There is no cyanosis, clubbing or edema.   SKIN:  No rash or significant lesions are noted.  CNS: no focal deficits    LABS:                        10.2   24.40 )-----------( 219      ( 29 Jun 2018 06:27 )             31.0     06-29    144  |  109<H>  |  29<H>  ----------------------------<  142<H>  4.2   |  29  |  1.03    Ca    7.7<L>      29 Jun 2018 06:27    TPro  6.5  /  Alb  2.6<L>  /  TBili  0.3  /  DBili  x   /  AST  38<H>  /  ALT  39  /  AlkPhos  94  06-29    LIVER FUNCTIONS - ( 29 Jun 2018 06:27 )  Alb: 2.6 g/dL / Pro: 6.5 gm/dL / ALK PHOS: 94 U/L / ALT: 39 U/L / AST: 38 U/L / GGT: x           PT/INR - ( 28 Jun 2018 10:20 )   PT: 11.4 sec;   INR: 1.05 ratio         PTT - ( 28 Jun 2018 10:20 )  PTT:30.3 sec  CARDIAC MARKERS ( 28 Jun 2018 16:12 )  0.017 ng/mL / x     / x     / x     / x      CARDIAC MARKERS ( 28 Jun 2018 13:20 )  0.021 ng/mL / x     / x     / x     / x      CARDIAC MARKERS ( 28 Jun 2018 10:20 )  <0.015 ng/mL / x     / 121 U/L / x     / x                RADIOLOGY & ADDITIONAL STUDIES:       EXAM:  XR CHEST PORTABLE IMMED 1V                            PROCEDURE DATE:  06/29/2018          INTERPRETATION:  History: Pigtail catheter placement pneumothorax   follow-up    Chest:  one view.      Comparison: 6/29/1980    AP radiograph of the chest demonstrates decrease in size of loculated   RIGHT lower lobe pneumothorax with small residual. RIGHT pigtail catheter   is unchanged. The cardiac silhouette is normal in size. Osseous   structures are intact.    Impression:decrease in size of loculated RIGHT lower lobe pneumothorax   with small residual. RIGHT pigtail catheter is unchanged.

## 2018-07-01 NOTE — PROGRESS NOTE ADULT - PROBLEM SELECTOR PLAN 1
-Cont pigtail to -20 cm sxn  -CXR
-Will attempt to place pigtail on water seal today  -CXR today on water seal  -Plan otherwise as per medicine

## 2018-07-01 NOTE — CHART NOTE - NSCHARTNOTEFT_GEN_A_CORE
Notified by RN, prelim culture shows gram positive cocci in aerobic bottle.  Spoke with patient at bedside. Reports he has persistent shortness of breath.   Noted downtrending leukocytosis, patient afebrile, will give one dose vancomycin 1g as precaution.  F/U with ID in AM.

## 2018-07-01 NOTE — PROGRESS NOTE ADULT - ASSESSMENT
- multifocal pneumonia with right pleural effusion.  s/p thoracentesis, pleural fluid exudate, pH 7.28, no org on gram stain.  - PTX R.   water seal now.  CXR this AM shows improved expansion of lung on R.  thoracic following.  - continue rocephin/zithromax. ID appreciated.  - cont bronchodilators  - on steroids  - dvt proph - multifocal pneumonia with right pleural effusion.  s/p thoracentesis, pleural fluid exudate, pH 7.28, no org on gram stain.  - PTX R.   water seal now.  CXR this AM shows improved expansion of lung on R, medial PTX, opac R base.  thoracic following.  - continue rocephin/zithromax. ID appreciated.  - cont bronchodilators  - on steroids  - dvt proph

## 2018-07-02 DIAGNOSIS — R91.8 OTHER NONSPECIFIC ABNORMAL FINDING OF LUNG FIELD: ICD-10-CM

## 2018-07-02 LAB
ANION GAP SERPL CALC-SCNC: 4 MMOL/L — LOW (ref 5–17)
BUN SERPL-MCNC: 27 MG/DL — HIGH (ref 7–23)
CALCIUM SERPL-MCNC: 7.7 MG/DL — LOW (ref 8.5–10.1)
CHLORIDE SERPL-SCNC: 101 MMOL/L — SIGNIFICANT CHANGE UP (ref 96–108)
CO2 SERPL-SCNC: 35 MMOL/L — HIGH (ref 22–31)
CREAT SERPL-MCNC: 0.96 MG/DL — SIGNIFICANT CHANGE UP (ref 0.5–1.3)
CULTURE RESULTS: SIGNIFICANT CHANGE UP
GLUCOSE SERPL-MCNC: 155 MG/DL — HIGH (ref 70–99)
HCT VFR BLD CALC: 33.3 % — LOW (ref 39–50)
HGB BLD-MCNC: 10.5 G/DL — LOW (ref 13–17)
LACTATE SERPL-SCNC: 1.3 MMOL/L — SIGNIFICANT CHANGE UP (ref 0.7–2)
MCHC RBC-ENTMCNC: 28.3 PG — SIGNIFICANT CHANGE UP (ref 27–34)
MCHC RBC-ENTMCNC: 31.5 GM/DL — LOW (ref 32–36)
MCV RBC AUTO: 89.8 FL — SIGNIFICANT CHANGE UP (ref 80–100)
NRBC # BLD: 0 /100 WBCS — SIGNIFICANT CHANGE UP (ref 0–0)
ORGANISM # SPEC MICROSCOPIC CNT: SIGNIFICANT CHANGE UP
ORGANISM # SPEC MICROSCOPIC CNT: SIGNIFICANT CHANGE UP
PLATELET # BLD AUTO: 251 K/UL — SIGNIFICANT CHANGE UP (ref 150–400)
POTASSIUM SERPL-MCNC: 4.7 MMOL/L — SIGNIFICANT CHANGE UP (ref 3.5–5.3)
POTASSIUM SERPL-SCNC: 4.7 MMOL/L — SIGNIFICANT CHANGE UP (ref 3.5–5.3)
RBC # BLD: 3.71 M/UL — LOW (ref 4.2–5.8)
RBC # FLD: 14.1 % — SIGNIFICANT CHANGE UP (ref 10.3–14.5)
SODIUM SERPL-SCNC: 140 MMOL/L — SIGNIFICANT CHANGE UP (ref 135–145)
SPECIMEN SOURCE: SIGNIFICANT CHANGE UP
WBC # BLD: 8.89 K/UL — SIGNIFICANT CHANGE UP (ref 3.8–10.5)
WBC # FLD AUTO: 8.89 K/UL — SIGNIFICANT CHANGE UP (ref 3.8–10.5)

## 2018-07-02 PROCEDURE — 71045 X-RAY EXAM CHEST 1 VIEW: CPT | Mod: 26

## 2018-07-02 PROCEDURE — 99232 SBSQ HOSP IP/OBS MODERATE 35: CPT

## 2018-07-02 RX ORDER — VANCOMYCIN HCL 1 G
1000 VIAL (EA) INTRAVENOUS EVERY 12 HOURS
Qty: 0 | Refills: 0 | Status: DISCONTINUED | OUTPATIENT
Start: 2018-07-02 | End: 2018-07-03

## 2018-07-02 RX ORDER — ALPRAZOLAM 0.25 MG
0.25 TABLET ORAL ONCE
Qty: 0 | Refills: 0 | Status: DISCONTINUED | OUTPATIENT
Start: 2018-07-02 | End: 2018-07-02

## 2018-07-02 RX ADMIN — Medication 0.5 MILLIGRAM(S): at 13:21

## 2018-07-02 RX ADMIN — Medication 40 MILLIGRAM(S): at 07:43

## 2018-07-02 RX ADMIN — Medication 20 MILLIEQUIVALENT(S): at 13:06

## 2018-07-02 RX ADMIN — Medication 100 MILLIGRAM(S): at 21:50

## 2018-07-02 RX ADMIN — Medication 20 MILLIGRAM(S): at 06:16

## 2018-07-02 RX ADMIN — CEFTRIAXONE 1000 MILLIGRAM(S): 500 INJECTION, POWDER, FOR SOLUTION INTRAMUSCULAR; INTRAVENOUS at 13:07

## 2018-07-02 RX ADMIN — TAMSULOSIN HYDROCHLORIDE 0.4 MILLIGRAM(S): 0.4 CAPSULE ORAL at 21:50

## 2018-07-02 RX ADMIN — FINASTERIDE 5 MILLIGRAM(S): 5 TABLET, FILM COATED ORAL at 13:06

## 2018-07-02 RX ADMIN — HEPARIN SODIUM 5000 UNIT(S): 5000 INJECTION INTRAVENOUS; SUBCUTANEOUS at 17:37

## 2018-07-02 RX ADMIN — Medication 3 MILLILITER(S): at 10:00

## 2018-07-02 RX ADMIN — Medication 250 MILLIGRAM(S): at 17:36

## 2018-07-02 RX ADMIN — Medication 81 MILLIGRAM(S): at 13:06

## 2018-07-02 RX ADMIN — AZITHROMYCIN 255 MILLIGRAM(S): 500 TABLET, FILM COATED ORAL at 13:05

## 2018-07-02 RX ADMIN — Medication 3 MILLILITER(S): at 20:32

## 2018-07-02 RX ADMIN — HEPARIN SODIUM 5000 UNIT(S): 5000 INJECTION INTRAVENOUS; SUBCUTANEOUS at 06:19

## 2018-07-02 RX ADMIN — ESCITALOPRAM OXALATE 20 MILLIGRAM(S): 10 TABLET, FILM COATED ORAL at 13:08

## 2018-07-02 RX ADMIN — Medication 0.25 MILLIGRAM(S): at 18:19

## 2018-07-02 RX ADMIN — ATORVASTATIN CALCIUM 20 MILLIGRAM(S): 80 TABLET, FILM COATED ORAL at 21:50

## 2018-07-02 RX ADMIN — Medication 3 MILLILITER(S): at 01:27

## 2018-07-02 NOTE — PROGRESS NOTE ADULT - ASSESSMENT
77M w/PMH HTN, HLD, COPD on Home O2, BPH, admitted on 6/28 for evaluation of shortness of breath, cough and fever; history per medical record as patient is poor historian. On nebulizer treatment. No recent hospitalizations.  1. Patient admitted with multilobar pneumonia which will consider as community acquired pneumonia given no recent hospitalizations; also noted with leukocytosis most likely secondary to infection  - gram positive cocci in clusters seen in blood, identification pending  - will start vancomycin  - check vancomycin trough prior to fourth dose   - follow up cultures   - iv hydration and supportive care   - serial cbc and monitor temperature   - reviewed prior medical records to evaluate for resistant or atypical pathogens   - oxygen and nebs as needed   - day #4 ceftriaxone and zithromax  - tolerating antibiotics without rashes or side effects   - chest tube per CTS  2. other issues:  HTN, HLD, COPD on Home O2, BPH  - per medicine

## 2018-07-02 NOTE — PROGRESS NOTE ADULT - ASSESSMENT
77M.  admitted 06/28/18.  presented to ED c/o increasing SOB and fever.    PMHx:  HTN;  hyperlipidemia;  COPD-O2;  BPH.    CAP.  -Cx, no growth.  -ceftriaxone + AZ.  -ID following.    parapneumonic pleural effusion.  -06/30, IR US guided thoracentesis.  900mL.  -post procedural PTx.  -CT.  -serial CXR.  -thoracic surgery following.    COPD.  -O2.  -IV steroids.  -BD nebs.  -Pulmonology following.    DVT prophylaxis.  -UFH sq.    disposition.  -telemetry [place].    communication.  -RN.  -[place].

## 2018-07-02 NOTE — PROGRESS NOTE ADULT - SUBJECTIVE AND OBJECTIVE BOX
Subjective:  Called by bedside RN urgently for evaluation.  Stated the patient was short of breath and agitated and requiring increasing amounts of O2 (face mask from nasal cannula).    Vital Signs:  Vital Signs Last 24 Hrs  T(C): 37.1 (07-02-18 @ 09:53), Max: 37.1 (07-02-18 @ 09:53)  T(F): 98.8 (07-02-18 @ 09:53), Max: 98.8 (07-02-18 @ 09:53)  HR: 80 (07-02-18 @ 10:00) (72 - 102)  BP: 145/69 (07-02-18 @ 09:53) (140/51 - 145/69)  RR: 18 (07-02-18 @ 09:53) (18 - 18)  SpO2: 100% (07-02-18 @ 09:53) (98% - 100%) on (O2)    Telemetry/Alarms: RN states HR > 100-120     Relevant labs, radiology and Medications reviewed                        9.8    13.92 )-----------( 224      ( 01 Jul 2018 05:52 )             30.4     07-01    142  |  107  |  31<H>  ----------------------------<  93  4.1   |  30  |  0.91    Ca    7.9<L>      01 Jul 2018 05:52        MEDICATIONS  (STANDING):  ALBUTerol/ipratropium for Nebulization 3 milliLiter(s) Nebulizer every 6 hours  aspirin  chewable 81 milliGRAM(s) Oral daily  atorvastatin 20 milliGRAM(s) Oral at bedtime  azithromycin  IVPB 500 milliGRAM(s) IV Intermittent every 24 hours  cefTRIAXone Injectable. 1000 milliGRAM(s) IV Push every 24 hours  escitalopram 20 milliGRAM(s) Oral daily  finasteride 5 milliGRAM(s) Oral daily  furosemide    Tablet 20 milliGRAM(s) Oral daily  heparin  Injectable 5000 Unit(s) SubCutaneous every 12 hours  methylPREDNISolone sodium succinate Injectable 40 milliGRAM(s) IV Push daily  potassium chloride    Tablet ER 20 milliEquivalent(s) Oral daily  tamsulosin 0.4 milliGRAM(s) Oral at bedtime  traZODone 100 milliGRAM(s) Oral at bedtime  vancomycin  IVPB 1000 milliGRAM(s) IV Intermittent every 12 hours    MEDICATIONS  (PRN):  clonazePAM Tablet 0.5 milliGRAM(s) Oral three times a day PRN anxiety  oxyCODONE    5 mG/acetaminophen 325 mG 1 Tablet(s) Oral every 6 hours PRN Moderate Pain (4 - 6)      Physical exam  Gen: Mild distress, using abdominal muscles to breath  Neuro: awake and mildly agitated calling for his wife  Card: S1 S2  Pulm: Decreased sounds bilaterally  Abd: Soft NT ND    Tubes:  Right CT in place, mild SQ air.  + air leak.    Assessment:   77M who was admitted for SOB and fever on broad spectrum abx.  CT to suction without improvement to original CXR of trapped lung.    Plan:  * CBC, BMP, Lactate  * CXR to determine if pigtail is still in place  * Rectal temp and evaluation for worsening infection (SIRS)    Dr. Damico was called and informed of worsening clinical condition.

## 2018-07-02 NOTE — PROVIDER CONTACT NOTE (OTHER) - ACTION/TREATMENT ORDERED:
Patient given venti-8L respondwell. o2 stat 100 %, , 180/80, 99.7( rectal).  Xray, Blood gas and labs ordered. Patient had a decrease in anxiety. Will continue to monitor.

## 2018-07-02 NOTE — PROGRESS NOTE ADULT - SUBJECTIVE AND OBJECTIVE BOX
Patient is a 77y old  Male who presents with a chief complaint of sob, fever (28 Jun 2018 13:43)      Date of service: 07-02-18 @ 11:35  Patient had right sided chest tube placed  ROS: no fever or chills; denies dizziness, no HA,  no abdominal pain, no diarrhea or constipation; no dysuria, no urinary frequency, no legs pain, no rashes    MEDICATIONS  (STANDING):  ALBUTerol/ipratropium for Nebulization 3 milliLiter(s) Nebulizer every 6 hours  aspirin  chewable 81 milliGRAM(s) Oral daily  atorvastatin 20 milliGRAM(s) Oral at bedtime  azithromycin  IVPB 500 milliGRAM(s) IV Intermittent every 24 hours  cefTRIAXone Injectable. 1000 milliGRAM(s) IV Push every 24 hours  escitalopram 20 milliGRAM(s) Oral daily  finasteride 5 milliGRAM(s) Oral daily  furosemide    Tablet 20 milliGRAM(s) Oral daily  heparin  Injectable 5000 Unit(s) SubCutaneous every 12 hours  methylPREDNISolone sodium succinate Injectable 40 milliGRAM(s) IV Push daily  potassium chloride    Tablet ER 20 milliEquivalent(s) Oral daily  tamsulosin 0.4 milliGRAM(s) Oral at bedtime  traZODone 100 milliGRAM(s) Oral at bedtime    MEDICATIONS  (PRN):  clonazePAM Tablet 0.5 milliGRAM(s) Oral three times a day PRN anxiety  oxyCODONE    5 mG/acetaminophen 325 mG 1 Tablet(s) Oral every 6 hours PRN Moderate Pain (4 - 6)      Vital Signs Last 24 Hrs  T(C): 37.1 (02 Jul 2018 09:53), Max: 37.1 (02 Jul 2018 09:53)  T(F): 98.8 (02 Jul 2018 09:53), Max: 98.8 (02 Jul 2018 09:53)  HR: 80 (02 Jul 2018 10:00) (72 - 102)  BP: 145/69 (02 Jul 2018 09:53) (140/51 - 145/69)  BP(mean): --  RR: 18 (02 Jul 2018 09:53) (18 - 18)  SpO2: 100% (02 Jul 2018 09:53) (98% - 100%)    Physical Exam:      PE:    Constitutional: frail looking  HEENT: NC/AT, EOMI, PERRLA, conjunctivae clear; ears and nose atraumatic; pharynx clear  Neck: supple; thyroid not palpable  Back: no tenderness  Respiratory: respiratory effort normal; scattered coarse breath sounds; right sided chest tube  Cardiovascular: S1S2 regular, no murmurs  Abdomen: soft, not tender, not distended, positive BS; no liver or spleen organomegaly  Genitourinary: no suprapubic tenderness  Musculoskeletal: no muscle tenderness, no joint swelling or tenderness  Neurological/ Psychiatric: AxOx3, judgement and insight normal;  moving all extremities  Skin: no rashes; no palpable lesions    Labs: all available labs reviewed               Labs:                        9.8    13.92 )-----------( 224      ( 01 Jul 2018 05:52 )             30.4     07-01    142  |  107  |  31<H>  ----------------------------<  93  4.1   |  30  |  0.91    Ca    7.9<L>      01 Jul 2018 05:52             Cultures:       Culture - Body Fluid with Gram Stain (collected 06-29-18 @ 14:50)  Source: Pleural Fl Pleural Fluid  Gram Stain (06-29-18 @ 23:46):    polymorphonuclear leukocytes seen    No organisms seen    by cytocentrifuge  Preliminary Report (06-30-18 @ 17:47):    No growth    Culture - Blood (collected 06-28-18 @ 10:20)  Source: .Blood None  Preliminary Report (06-29-18 @ 15:02):    No growth to date.    Culture - Blood (collected 06-28-18 @ 10:20)  Source: .Blood None  Gram Stain (07-01-18 @ 19:28):    Growth in aerobic bottle: Gram Positive Cocci in Clusters  Preliminary Report (07-01-18 @ 19:28):    Growth in aerobic bottle: Gram Positive Cocci in Clusters    "Due to technical problems, Proteus sp. will Not be reported as part of    the BCID panel until further notice"    ***Blood Panel PCR results on this specimen are available    approximately 3 hours after the Gram stain result.***    Gram stain, PCR, and/or culture results may not always    correspond due to difference in methodologies.    ************************************************************    This PCR assay was performed using [employer].    The following targets are tested for: Enterococcus,    vancomycin resistant enterococci, Listeria monocytogenes,    coagulase negative staphylococci, S. aureus,    methicillin resistant S. aureus, Streptococcus agalactiae    (Group B), S. pneumoniae, S.pyogenes (Group A),    Acinetobacter baumannii, Enterobacter cloacae, E. coli,    Klebsiella oxytoca, K. pneumoniae, Proteus sp.,    Serratia marcescens, Haemophilus influenzae,    Neisseria meningitidis, Pseudomonas aeruginosa, Candida    albicans, C. glabrata, C krusei, C parapsilosis,    C. tropicalis and the KPC resistance gene.  Organism: Blood Culture PCR (07-01-18 @ 22:13)  Organism: Blood Culture PCR (07-01-18 @ 22:13)      -  Acinetobacter baumanii: N[facility]      -  Candida albicans: Nondet      -  Candida glabrata: Nondet      -  Candida krusei: Nondet      -  Candida parapsilosis: Nondet      -  Candida tropicalis: Nondet      -  Coagulase negative Staphylococcus: N[facility]et      -  Enterobacter cloacae complex: Nondet      -  Enterococcus species: Nondet      -  Escherichia coli: Nondet      -  Haemophilus influenzae: Nondet      -  Klebsiella oxytoca: Nondet      -  Klebsiella pneumoniae: Nondet      -  Listeria monocytogenes: Nondet      -  Methicillin resistant Staphylococcus aureus (MRSA): Nondet      -  Multidrug (KPC pos) resistant organism: Nondet      -  Neisseria meningitidis: Nondet      -  Pseudomonas aeruginosa: Nondet      -  Serratia marcescens: Nondet      -  Staphylococcus aureus: Nondet      -  Streptococcus agalactiae (Group B): Nondet      -  Streptococcus pneumoniae: Nondet      -  Streptococcus pyogenes (Group A): Nondet      -  Streptococcus sp. (Not Grp A, B or S pneumoniae): Nondet      -  Vancomycin resistant Enterococcus sp.: Nondet      Method Type: PCR      Urine for legionella antigen- negative                < from: CT Chest No Cont (06.28.18 @ 11:31) >    EXAM:  CT CHEST                            PROCEDURE DATE:  06/28/2018          INTERPRETATION:  Clinical information: Evaluate right pleural effusion    COMPARISON: August 24, 2017    PROCEDURE:   CT of the Chest was performed without intravenous contrast.  Sagittal and coronal reformats were performed.      FINDINGS:    LOWER NECK: Within normal limits.  AXILLA, MEDIASTINUM AND ZAID: No lymphadenopathy.  VESSELS: Atherosclerotic arterial calcifications, including the coronary   arteries.  Normal caliber aorta.  HEART: Heart size is normal.No pericardial effusion.  PLEURA: Moderate right pleural effusion, increased in size.  LUNGS AND LARGE AIRWAYS: Patchy opacities throughout the right upper,   middle and lower lobes compatible with pneumonia. No discrete nodule or   mass. Patent central airways.   VISUALIZED UPPER ABDOMEN: Within normal limits.  BONES: No acute abnormality.  CHEST WALL:  Unremarkable    IMPRESSION:     Multilobar right lung pneumonia.  Moderate right pleural effusion.        < end of copied text >      Radiology: all available radiological tests reviewed    Advanced directives addressed: full resuscitation

## 2018-07-02 NOTE — PROGRESS NOTE ADULT - SUBJECTIVE AND OBJECTIVE BOX
Subjective:  awake and alert  right chest tube    MEDICATIONS  (STANDING):  ALBUTerol/ipratropium for Nebulization 3 milliLiter(s) Nebulizer every 6 hours  aspirin  chewable 81 milliGRAM(s) Oral daily  atorvastatin 20 milliGRAM(s) Oral at bedtime  azithromycin  IVPB 500 milliGRAM(s) IV Intermittent every 24 hours  cefTRIAXone Injectable. 1000 milliGRAM(s) IV Push every 24 hours  escitalopram 20 milliGRAM(s) Oral daily  finasteride 5 milliGRAM(s) Oral daily  furosemide    Tablet 20 milliGRAM(s) Oral daily  heparin  Injectable 5000 Unit(s) SubCutaneous every 12 hours  methylPREDNISolone sodium succinate Injectable 40 milliGRAM(s) IV Push daily  potassium chloride    Tablet ER 20 milliEquivalent(s) Oral daily  tamsulosin 0.4 milliGRAM(s) Oral at bedtime  traZODone 100 milliGRAM(s) Oral at bedtime    MEDICATIONS  (PRN):  clonazePAM Tablet 0.5 milliGRAM(s) Oral three times a day PRN anxiety  oxyCODONE    5 mG/acetaminophen 325 mG 1 Tablet(s) Oral every 6 hours PRN Moderate Pain (4 - 6)      Allergies    No Known Allergies    Intolerances        REVIEW OF SYSTEMS:    CONSTITUTIONAL:  As per HPI.  HEENT:  Eyes:  No diplopia or blurred vision. ENT:  No earache, sore throat or runny nose.  CARDIOVASCULAR:  No pressure, squeezing, tightness, heaviness or aching about the chest, neck, axilla or epigastrium.  RESPIRATORY:  No cough, shortness of breath, PND or orthopnea.  GASTROINTESTINAL:  No nausea, vomiting or diarrhea.  GENITOURINARY:  No dysuria, frequency or urgency.  MUSCULOSKELETAL:  no joint pain, deformity, tenderness  EXTREMITIES: no clubbing cyanosis,edema  SKIN:  No change in skin, hair or nails.  NEUROLOGIC:  No paresthesias, fasciculations, seizures or weakness.  PSYCHIATRIC:  No disorder of thought or mood.  ENDOCRINE:  No heat or cold intolerance, polyuria or polydipsia.  HEMATOLOGICAL:  No easy bruising or bleedings:    Vital Signs Last 24 Hrs  T(C): 36.4 (01 Jul 2018 18:49), Max: 36.5 (01 Jul 2018 11:08)  T(F): 97.5 (01 Jul 2018 18:49), Max: 97.7 (01 Jul 2018 11:08)  HR: 84 (02 Jul 2018 01:28) (72 - 93)  BP: 140/51 (01 Jul 2018 18:49) (136/66 - 140/51)  BP(mean): --  RR: 18 (01 Jul 2018 18:49) (18 - 18)  SpO2: 98% (02 Jul 2018 01:28) (96% - 99%)    PHYSICAL EXAMINATION:  SKIN: no rashes  HEAD: NC/AT  EYES: PERRLA, EOMI  EARS: TM's intact  NOSE: no abnormalities  NECK:  Supple. No lymphadenopathy. Jugular venous pressure not elevated. Carotids equal.   HEART:   The cardiac impulse has a normal quality. Reg., Nl S1 and S2.  There are no murmurs, rubs or gallops noted  CHEST:  bilateral ronchi R>L  ABDOMEN:  Soft and nontender.   EXTREMITIES:  no C/C/E  NEURO: AAO x 3, no focal deficts       LABS:                        9.8    13.92 )-----------( 224      ( 01 Jul 2018 05:52 )             30.4     07-01    142  |  107  |  31<H>  ----------------------------<  93  4.1   |  30  |  0.91    Ca    7.9<L>      01 Jul 2018 05:52            RADIOLOGY & ADDITIONAL TESTS:

## 2018-07-02 NOTE — PROGRESS NOTE ADULT - ASSESSMENT
- right PTX s/p right thoracentesis  - chest tube no leak with minimal drainage  - has 2 upper lobe densities  - will need to repeat CT scan  - on rocephin/zithromax  - dvt proph

## 2018-07-02 NOTE — PROGRESS NOTE ADULT - SUBJECTIVE AND OBJECTIVE BOX
CC:  Patient is a 77y old  Male who presents with a chief complaint of sob, fever (28 Jun 2018 13:43)    SUBJECTIVE:  appears comfortable and pleasant.  offers no new complaints at current time.    ROS:  all other review of systems are negative unless indicated above.    ALBUTerol/ipratropium for Nebulization 3 milliLiter(s) Nebulizer every 6 hours  aspirin  chewable 81 milliGRAM(s) Oral daily  atorvastatin 20 milliGRAM(s) Oral at bedtime  azithromycin  IVPB 500 milliGRAM(s) IV Intermittent every 24 hours  cefTRIAXone Injectable. 1000 milliGRAM(s) IV Push every 24 hours  clonazePAM Tablet 0.5 milliGRAM(s) Oral three times a day PRN  escitalopram 20 milliGRAM(s) Oral daily  finasteride 5 milliGRAM(s) Oral daily  furosemide    Tablet 20 milliGRAM(s) Oral daily  heparin  Injectable 5000 Unit(s) SubCutaneous every 12 hours  methylPREDNISolone sodium succinate Injectable 40 milliGRAM(s) IV Push daily  oxyCODONE    5 mG/acetaminophen 325 mG 1 Tablet(s) Oral every 6 hours PRN  potassium chloride    Tablet ER 20 milliEquivalent(s) Oral daily  tamsulosin 0.4 milliGRAM(s) Oral at bedtime  traZODone 100 milliGRAM(s) Oral at bedtime  vancomycin  IVPB 1000 milliGRAM(s) IV Intermittent every 12 hours    T(C): 36.8 (07-02-18 @ 16:30), Max: 37.1 (07-02-18 @ 09:53)  HR: 80 (07-02-18 @ 16:30) (72 - 102)  BP: 141/59 (07-02-18 @ 16:30) (140/51 - 145/69)  RR: 18 (07-02-18 @ 16:30) (18 - 18)  SpO2: 99% (07-02-18 @ 16:30) (98% - 100%)    Constitutional: NAD, awake and alert, well-developed with good responses to questions, mentally intact.   HEENT: PERRL, EOMI, MMM.  Neck: Soft and supple, No carotid bruit, No JVD  Respiratory: + CT.  diminished breath sounds.  Cardiovascular: S1 and S2, regular rate and rhythm, no murmur, rub or gallop.  Gastrointestinal: Bowel Sounds present, soft, nontender, nondistended, no guarding, no rebound, no mass.  Extremities: No peripheral edema  Vascular: 2+ peripheral pulses  Neurological: A/O x 3, no focal deficits  Musculoskeletal: 5/5 strength b/l upper and lower extremities  Skin:  no visible rashes.                                 10.5   8.89  )-----------( 251      ( 02 Jul 2018 15:52 )             33.3       07-02    140  |  101  |  27<H>  ----------------------------<  155<H>  4.7   |  35<H>  |  0.96    Ca    7.7<L>      02 Jul 2018 15:52

## 2018-07-02 NOTE — PROGRESS NOTE ADULT - SUBJECTIVE AND OBJECTIVE BOX
Subjective: Patient has developing RLL infiltrate, with persistent PTX with CT on water seal.  Blood cultures are positive.    Vital Signs:  Vital Signs Last 24 Hrs  T(C): 37.1 (07-02-18 @ 09:53), Max: 37.1 (07-02-18 @ 09:53)  T(F): 98.8 (07-02-18 @ 09:53), Max: 98.8 (07-02-18 @ 09:53)  HR: 80 (07-02-18 @ 10:00) (72 - 102)  BP: 145/69 (07-02-18 @ 09:53) (140/51 - 145/69)  RR: 18 (07-02-18 @ 09:53) (18 - 18)  SpO2: 100% (07-02-18 @ 09:53) (98% - 100%) on (O2)      Relevant labs, radiology and Medications reviewed                        9.8    13.92 )-----------( 224      ( 01 Jul 2018 05:52 )             30.4     07-01    142  |  107  |  31<H>  ----------------------------<  93  4.1   |  30  |  0.91    Ca    7.9<L>      01 Jul 2018 05:52        MEDICATIONS  (STANDING):  ALBUTerol/ipratropium for Nebulization 3 milliLiter(s) Nebulizer every 6 hours  aspirin  chewable 81 milliGRAM(s) Oral daily  atorvastatin 20 milliGRAM(s) Oral at bedtime  azithromycin  IVPB 500 milliGRAM(s) IV Intermittent every 24 hours  cefTRIAXone Injectable. 1000 milliGRAM(s) IV Push every 24 hours  escitalopram 20 milliGRAM(s) Oral daily  finasteride 5 milliGRAM(s) Oral daily  furosemide    Tablet 20 milliGRAM(s) Oral daily  heparin  Injectable 5000 Unit(s) SubCutaneous every 12 hours  methylPREDNISolone sodium succinate Injectable 40 milliGRAM(s) IV Push daily  potassium chloride    Tablet ER 20 milliEquivalent(s) Oral daily  tamsulosin 0.4 milliGRAM(s) Oral at bedtime  traZODone 100 milliGRAM(s) Oral at bedtime    MEDICATIONS  (PRN):  clonazePAM Tablet 0.5 milliGRAM(s) Oral three times a day PRN anxiety  oxyCODONE    5 mG/acetaminophen 325 mG 1 Tablet(s) Oral every 6 hours PRN Moderate Pain (4 - 6)      Physical exam  Gen: NAD, mild respiratory distress  Neuro: Awake and alert  Card: S1 S2  Pulm: breath sounds decreased right side  Abd: soft NT ND  Ext: no edema    Tubes: 210 mL of serous drainage with moderate air leak.  Tube placed back on suction due to PTX on CXR.    I&O's Summary    02 Jul 2018 07:01  -  02 Jul 2018 11:35  --------------------------------------------------------  IN: 0 mL / OUT: 210 mL / NET: -210 mL        Assessment  77y Male  w/ PAST MEDICAL & SURGICAL HISTORY:  Hematuria  BPH (benign prostatic hypertrophy)  Low back pain  Osteoarthritis  Pleural effusion: 2016  Hypercholesterolemia  Anxiety  Depression  COPD (chronic obstructive pulmonary disease)  HTN (hypertension)  Bladder tumor  Cataract extraction status: 2015  b/l  admitted with sob, fever (28 Jun 2018 13:43)  .  On (Date), patient underwent Thoracentesis, with US guidance  . Postoperative course/issues:  * Leukocytosis  * Bacteremia  * Persistent air leak from CT    PLAN  Follow up BCxs, appropriate abx coverage as per primary team or ID.  CT to suction with serial CXRs for resolution of PTX.

## 2018-07-03 LAB
ANION GAP SERPL CALC-SCNC: 6 MMOL/L — SIGNIFICANT CHANGE UP (ref 5–17)
BUN SERPL-MCNC: 26 MG/DL — HIGH (ref 7–23)
CALCIUM SERPL-MCNC: 7.9 MG/DL — LOW (ref 8.5–10.1)
CHLORIDE SERPL-SCNC: 102 MMOL/L — SIGNIFICANT CHANGE UP (ref 96–108)
CO2 SERPL-SCNC: 32 MMOL/L — HIGH (ref 22–31)
COMMENT - FLUIDS: SIGNIFICANT CHANGE UP
COMMENT - FLUIDS: SIGNIFICANT CHANGE UP
CREAT SERPL-MCNC: 0.86 MG/DL — SIGNIFICANT CHANGE UP (ref 0.5–1.3)
CULTURE RESULTS: SIGNIFICANT CHANGE UP
GLUCOSE SERPL-MCNC: 87 MG/DL — SIGNIFICANT CHANGE UP (ref 70–99)
HCT VFR BLD CALC: 30.7 % — LOW (ref 39–50)
HGB BLD-MCNC: 9.9 G/DL — LOW (ref 13–17)
MCHC RBC-ENTMCNC: 28.5 PG — SIGNIFICANT CHANGE UP (ref 27–34)
MCHC RBC-ENTMCNC: 32.2 GM/DL — SIGNIFICANT CHANGE UP (ref 32–36)
MCV RBC AUTO: 88.5 FL — SIGNIFICANT CHANGE UP (ref 80–100)
NON-GYN CYTOLOGY SPEC: SIGNIFICANT CHANGE UP
NRBC # BLD: 0 /100 WBCS — SIGNIFICANT CHANGE UP (ref 0–0)
PLATELET # BLD AUTO: 243 K/UL — SIGNIFICANT CHANGE UP (ref 150–400)
POTASSIUM SERPL-MCNC: 4.1 MMOL/L — SIGNIFICANT CHANGE UP (ref 3.5–5.3)
POTASSIUM SERPL-SCNC: 4.1 MMOL/L — SIGNIFICANT CHANGE UP (ref 3.5–5.3)
RBC # BLD: 3.47 M/UL — LOW (ref 4.2–5.8)
RBC # FLD: 14 % — SIGNIFICANT CHANGE UP (ref 10.3–14.5)
SODIUM SERPL-SCNC: 140 MMOL/L — SIGNIFICANT CHANGE UP (ref 135–145)
SPECIMEN SOURCE: SIGNIFICANT CHANGE UP
WBC # BLD: 10.33 K/UL — SIGNIFICANT CHANGE UP (ref 3.8–10.5)
WBC # FLD AUTO: 10.33 K/UL — SIGNIFICANT CHANGE UP (ref 3.8–10.5)

## 2018-07-03 PROCEDURE — 99232 SBSQ HOSP IP/OBS MODERATE 35: CPT

## 2018-07-03 PROCEDURE — 88108 CYTOPATH CONCENTRATE TECH: CPT | Mod: 26

## 2018-07-03 PROCEDURE — 71045 X-RAY EXAM CHEST 1 VIEW: CPT | Mod: 26

## 2018-07-03 RX ADMIN — AZITHROMYCIN 255 MILLIGRAM(S): 500 TABLET, FILM COATED ORAL at 11:47

## 2018-07-03 RX ADMIN — Medication 250 MILLIGRAM(S): at 05:42

## 2018-07-03 RX ADMIN — Medication 100 MILLIGRAM(S): at 22:34

## 2018-07-03 RX ADMIN — CEFTRIAXONE 1000 MILLIGRAM(S): 500 INJECTION, POWDER, FOR SOLUTION INTRAMUSCULAR; INTRAVENOUS at 11:47

## 2018-07-03 RX ADMIN — HEPARIN SODIUM 5000 UNIT(S): 5000 INJECTION INTRAVENOUS; SUBCUTANEOUS at 05:42

## 2018-07-03 RX ADMIN — FINASTERIDE 5 MILLIGRAM(S): 5 TABLET, FILM COATED ORAL at 11:47

## 2018-07-03 RX ADMIN — Medication 0.5 MILLIGRAM(S): at 13:01

## 2018-07-03 RX ADMIN — Medication 40 MILLIGRAM(S): at 05:42

## 2018-07-03 RX ADMIN — HEPARIN SODIUM 5000 UNIT(S): 5000 INJECTION INTRAVENOUS; SUBCUTANEOUS at 17:20

## 2018-07-03 RX ADMIN — TAMSULOSIN HYDROCHLORIDE 0.4 MILLIGRAM(S): 0.4 CAPSULE ORAL at 22:34

## 2018-07-03 RX ADMIN — Medication 3 MILLILITER(S): at 13:08

## 2018-07-03 RX ADMIN — Medication 3 MILLILITER(S): at 08:23

## 2018-07-03 RX ADMIN — Medication 20 MILLIGRAM(S): at 05:42

## 2018-07-03 RX ADMIN — Medication 3 MILLILITER(S): at 20:12

## 2018-07-03 RX ADMIN — ATORVASTATIN CALCIUM 20 MILLIGRAM(S): 80 TABLET, FILM COATED ORAL at 22:34

## 2018-07-03 RX ADMIN — Medication 20 MILLIEQUIVALENT(S): at 11:47

## 2018-07-03 RX ADMIN — Medication 81 MILLIGRAM(S): at 11:47

## 2018-07-03 RX ADMIN — ESCITALOPRAM OXALATE 20 MILLIGRAM(S): 10 TABLET, FILM COATED ORAL at 11:47

## 2018-07-03 NOTE — PROGRESS NOTE ADULT - ASSESSMENT
77M w/PMH HTN, HLD, COPD on Home O2, BPH, admitted on 6/28 for evaluation of shortness of breath, cough and fever; history per medical record as patient is poor historian. On nebulizer treatment. No recent hospitalizations.  1. Patient admitted with multilobar pneumonia which will consider as community acquired pneumonia given no recent hospitalizations; also noted with leukocytosis most likely secondary to infection  - blood culture is coagulase negative staph, consistent with skin contaminant  - will stop vancomycin  - iv hydration and supportive care   - serial cbc and monitor temperature   - reviewed prior medical records to evaluate for resistant or atypical pathogens   - oxygen and nebs as needed   - day #5 ceftriaxone and zithromax; will stop zithromax today  - tolerating antibiotics without rashes or side effects   - chest tube per CTS  2. other issues:  HTN, HLD, COPD on Home O2, BPH  - per medicine

## 2018-07-03 NOTE — PHYSICAL THERAPY INITIAL EVALUATION ADULT - PERTINENT HX OF CURRENT PROBLEM, REHAB EVAL
Pt presents with a chief complaint of sob, fever on june 28 ,parapneumonic pleural effusion.  IR US guided thoracentesis.  900mL., -post procedural PTx., thoracic surgery following.

## 2018-07-03 NOTE — PROGRESS NOTE ADULT - SUBJECTIVE AND OBJECTIVE BOX
Subjective:  Patient looks and feels much better.  OOB and ambulating, no acute events over night.    Vital Signs:  Vital Signs Last 24 Hrs  T(C): 36.4 (07-03-18 @ 10:07), Max: 36.9 (07-03-18 @ 04:49)  T(F): 97.6 (07-03-18 @ 10:07), Max: 98.4 (07-03-18 @ 04:49)  HR: 89 (07-03-18 @ 10:07) (63 - 89)  BP: 107/47 (07-03-18 @ 10:07) (104/61 - 158/56)  RR: 16 (07-03-18 @ 10:07) (16 - 18)  SpO2: 96% (07-03-18 @ 10:07) (96% - 99%) on (O2)      Relevant labs, radiology and Medications reviewed                        9.9    10.33 )-----------( 243      ( 03 Jul 2018 06:11 )             30.7     07-03    140  |  102  |  26<H>  ----------------------------<  87  4.1   |  32<H>  |  0.86    Ca    7.9<L>      03 Jul 2018 06:11        MEDICATIONS  (STANDING):  ALBUTerol/ipratropium for Nebulization 3 milliLiter(s) Nebulizer every 6 hours  aspirin  chewable 81 milliGRAM(s) Oral daily  atorvastatin 20 milliGRAM(s) Oral at bedtime  azithromycin  IVPB 500 milliGRAM(s) IV Intermittent every 24 hours  cefTRIAXone Injectable. 1000 milliGRAM(s) IV Push every 24 hours  escitalopram 20 milliGRAM(s) Oral daily  finasteride 5 milliGRAM(s) Oral daily  furosemide    Tablet 20 milliGRAM(s) Oral daily  heparin  Injectable 5000 Unit(s) SubCutaneous every 12 hours  methylPREDNISolone sodium succinate Injectable 40 milliGRAM(s) IV Push daily  potassium chloride    Tablet ER 20 milliEquivalent(s) Oral daily  tamsulosin 0.4 milliGRAM(s) Oral at bedtime  traZODone 100 milliGRAM(s) Oral at bedtime    MEDICATIONS  (PRN):  clonazePAM Tablet 0.5 milliGRAM(s) Oral three times a day PRN anxiety  oxyCODONE    5 mG/acetaminophen 325 mG 1 Tablet(s) Oral every 6 hours PRN Moderate Pain (4 - 6)      Physical exam  Gen: NAD, breathing comfortably.  Neuro: Alert and awake, pleasantly confused.  Card: S1 S2.  Pulm: Decreased breath sounds on the right.  Abd: Soft NT ND.  Ext: Mild edema.    Tubes: Right pigtail dressing c/d/i.  Serous fluid without air leak.    I&O's Summary    02 Jul 2018 07:01  -  03 Jul 2018 07:00  --------------------------------------------------------  IN: 0 mL / OUT: 210 mL / NET: -210 mL        Assessment  77y Male  w/ PAST MEDICAL & SURGICAL HISTORY:  Hematuria  BPH (benign prostatic hypertrophy)  Low back pain  Osteoarthritis  Pleural effusion: 2016  Hypercholesterolemia  Anxiety  Depression  COPD (chronic obstructive pulmonary disease)  HTN (hypertension)  Bladder tumor  Cataract extraction status: 2015  b/l  admitted with sob, fever (28 Jun 2018 13:43)  .  patient underwent Thoracentesis, with US guidance        PLAN  CT to water seal.  Serial CXRs.  Record CT output every 4 hours.  OOB/ambulate.  Abx as per primary team.

## 2018-07-03 NOTE — PHYSICAL THERAPY INITIAL EVALUATION ADULT - DIAGNOSIS, PT EVAL
Patient admitted with c/o sob,fever,  has developing RLL infiltrate, with persistent PTX with CT on water seal.

## 2018-07-03 NOTE — PROGRESS NOTE ADULT - SUBJECTIVE AND OBJECTIVE BOX
CC:  Patient is a 77y old  Male who presents with a chief complaint of sob, fever (28 Jun 2018 13:43)    78 yo WM h/oHTN;  hyperlipidemia;  COPD-on home O2;  BPH.  admitted 06/28/18.  presented to ED c/o increasing SOB and fever,  found to have Pna and parapneumonic effusion.  -s/p Rt Ctube placement     7.3: CT placed under water seal      REVIEW OF SYSTEMS:    CONSTITUTIONAL: +gen weakness, No fevers or chills  ENT: No ear ache, No sorethroat  NECK: No pain, No stiffness  RESPIRATORY: No cough, No wheezing, No hemoptysis; No dyspnea  CARDIOVASCULAR: No chest pain, No palpitations  GASTROINTESTINAL: No abd pain, No nausea, No vomiting, No hematemesis, No diarrhea or constipation. No melena, No hematochezia.  GENITOURINARY: No dysuria, No  hematuria  NEUROLOGICAL: No diplopia, No paresthesia, No motor dysfunction  MUSCULOSKELETAL: No arthralgia, No myalgia  SKIN: No rashes, or lesions   PSYCH: no anxiety, no suicidal ideation    All other review of systems is negative unless indicated above    Vital Signs Last 24 Hrs  T(C): 36.4 (03 Jul 2018 16:34), Max: 36.9 (03 Jul 2018 04:49)  T(F): 97.6 (03 Jul 2018 16:34), Max: 98.4 (03 Jul 2018 04:49)  HR: 98 (03 Jul 2018 16:34) (63 - 124)  BP: 126/51 (03 Jul 2018 16:34) (104/61 - 158/56)  BP(mean): --  RR: 16 (03 Jul 2018 16:34) (16 - 18)  SpO2: 100% (03 Jul 2018 16:34) (95% - 100%)    PHYSICAL EXAM:    GENERAL: NAD, Well nourished  HEENT:  NC/AT, EOMI, PERRLA, No scleral icterus, Moist mucous membranes  NECK: Supple, No JVD  CNS:  Alert & Oriented X3, Motor Strength 5/5 B/L upper and lower extremities; DTRs 2+ intact   LUNG: Decreased Rt Breath sounds, Rt Ctube present  HEART: RRR; No murmurs, No rubs  ABDOMEN: +BS, ST/ND/NT  GENITOURINARY: Voiding, Bladder not distended  EXTREMITIES:  2+ Peripheral Pulses, No clubbing, No cyanosis, No tibial edema  MUSCULOSKELTAL: Joints normal ROM, No TTP, No effusion  VAGINAL: deferred  SKIN: no rashes  RECTAL: deferred, not indicated  BREAST: deferred                          9.9    10.33 )-----------( 243      ( 03 Jul 2018 06:11 )             30.7     07-03    140  |  102  |  26<H>  ----------------------------<  87  4.1   |  32<H>  |  0.86    Ca    7.9<L>      03 Jul 2018 06:11      Vancomycin levels:   Cultures:     MEDICATIONS  (STANDING):  ALBUTerol/ipratropium for Nebulization 3 milliLiter(s) Nebulizer every 6 hours  aspirin  chewable 81 milliGRAM(s) Oral daily  atorvastatin 20 milliGRAM(s) Oral at bedtime  azithromycin  IVPB 500 milliGRAM(s) IV Intermittent every 24 hours  cefTRIAXone Injectable. 1000 milliGRAM(s) IV Push every 24 hours  escitalopram 20 milliGRAM(s) Oral daily  finasteride 5 milliGRAM(s) Oral daily  furosemide    Tablet 20 milliGRAM(s) Oral daily  heparin  Injectable 5000 Unit(s) SubCutaneous every 12 hours  methylPREDNISolone sodium succinate Injectable 40 milliGRAM(s) IV Push daily  potassium chloride    Tablet ER 20 milliEquivalent(s) Oral daily  tamsulosin 0.4 milliGRAM(s) Oral at bedtime  traZODone 100 milliGRAM(s) Oral at bedtime    MEDICATIONS  (PRN):  clonazePAM Tablet 0.5 milliGRAM(s) Oral three times a day PRN anxiety  oxyCODONE    5 mG/acetaminophen 325 mG 1 Tablet(s) Oral every 6 hours PRN Moderate Pain (4 - 6)      all labs reviewed  all imaging reviewed          Assessment and Plan:    1. CAP.  -Cx, no growth.  -Ceftriaxone + Zithromax IV  -ID following.    2. Parapneumonic pleural effusion.  -06/30, IR US guided thoracentesis.  900mL.  -post procedural PTx slightly bigger  -CT back on suction    3. COPD.  -O2.  -IV steroids.  -BD nebs.  -Pulmonology following.

## 2018-07-03 NOTE — PHYSICAL THERAPY INITIAL EVALUATION ADULT - PRECAUTIONS/LIMITATIONS, REHAB EVAL
fall precautions/oxygen therapy device and L/min/on 4 lits of o2, chest tube precs/cardiac precautions

## 2018-07-03 NOTE — PROGRESS NOTE ADULT - SUBJECTIVE AND OBJECTIVE BOX
Patient is a 77y old  Male who presents with a chief complaint of sob, fever (28 Jun 2018 13:43)    Date of service: 07-03-18 @ 14:21  Patient comfortable, afebrile  ROS: no fever or chills; denies dizziness, no HA, no SOB or cough, no abdominal pain, no diarrhea or constipation; no dysuria, no urinary frequency, no legs pain, no rashes    MEDICATIONS  (STANDING):  ALBUTerol/ipratropium for Nebulization 3 milliLiter(s) Nebulizer every 6 hours  aspirin  chewable 81 milliGRAM(s) Oral daily  atorvastatin 20 milliGRAM(s) Oral at bedtime  azithromycin  IVPB 500 milliGRAM(s) IV Intermittent every 24 hours  cefTRIAXone Injectable. 1000 milliGRAM(s) IV Push every 24 hours  escitalopram 20 milliGRAM(s) Oral daily  finasteride 5 milliGRAM(s) Oral daily  furosemide    Tablet 20 milliGRAM(s) Oral daily  heparin  Injectable 5000 Unit(s) SubCutaneous every 12 hours  methylPREDNISolone sodium succinate Injectable 40 milliGRAM(s) IV Push daily  potassium chloride    Tablet ER 20 milliEquivalent(s) Oral daily  tamsulosin 0.4 milliGRAM(s) Oral at bedtime  traZODone 100 milliGRAM(s) Oral at bedtime    MEDICATIONS  (PRN):  clonazePAM Tablet 0.5 milliGRAM(s) Oral three times a day PRN anxiety  oxyCODONE    5 mG/acetaminophen 325 mG 1 Tablet(s) Oral every 6 hours PRN Moderate Pain (4 - 6)      Vital Signs Last 24 Hrs  T(C): 36.4 (03 Jul 2018 10:07), Max: 36.9 (03 Jul 2018 04:49)  T(F): 97.6 (03 Jul 2018 10:07), Max: 98.4 (03 Jul 2018 04:49)  HR: 112 (03 Jul 2018 13:58) (63 - 124)  BP: 107/47 (03 Jul 2018 10:07) (104/61 - 158/56)  BP(mean): --  RR: 16 (03 Jul 2018 10:07) (16 - 18)  SpO2: 95% (03 Jul 2018 13:58) (95% - 99%)    Physical Exam:    PE:    Constitutional: frail looking  HEENT: NC/AT, EOMI, PERRLA, conjunctivae clear; ears and nose atraumatic; pharynx clear  Neck: supple; thyroid not palpable  Back: no tenderness  Respiratory: respiratory effort normal; scattered coarse breath sounds; right sided chest tube  Cardiovascular: S1S2 regular, no murmurs  Abdomen: soft, not tender, not distended, positive BS; no liver or spleen organomegaly  Genitourinary: no suprapubic tenderness  Musculoskeletal: no muscle tenderness, no joint swelling or tenderness  Neurological/ Psychiatric: AxOx3, judgement and insight normal;  moving all extremities  Skin: no rashes; no palpable lesions    Labs: all available labs reviewed               Labs:                Labs:                        9.9    10.33 )-----------( 243      ( 03 Jul 2018 06:11 )             30.7     07-03    140  |  102  |  26<H>  ----------------------------<  87  4.1   |  32<H>  |  0.86    Ca    7.9<L>      03 Jul 2018 06:11             Cultures:       Culture - Body Fluid with Gram Stain (collected 06-29-18 @ 14:50)  Source: Pleural Fl Pleural Fluid  Gram Stain (06-29-18 @ 23:46):    polymorphonuclear leukocytes seen    No organisms seen    by cytocentrifuge  Preliminary Report (06-30-18 @ 17:47):    No growth    Culture - Blood (collected 06-28-18 @ 10:20)  Source: .Blood None  Preliminary Report (06-29-18 @ 15:02):    No growth to date.    Culture - Blood (collected 06-28-18 @ 10:20)  Source: .Blood None  Gram Stain (07-01-18 @ 19:28):    Growth in aerobic bottle: Gram Positive Cocci in Clusters  Final Report (07-02-18 @ 22:05):    Growth in aerobic bottle:    Coag Negative Staphylococcus    Single set isolate, possible contaminant. Contact    Microbiology if susceptibility testing clinically    indicated.    "Due to technical problems, Proteus sp. will Not be reported as part of    the BCID panel until further notice"    ***Blood Panel PCR results on this specimen are available    approximately 3 hours after the Gram stain result.***    Gram stain, PCR, and/or culture results may not always    correspond due to difference in methodologies.    ************************************************************    This PCR assay was performed using [employer].    The following targets are tested for: Enterococcus,    vancomycin resistant enterococci, Listeria monocytogenes,    coagulase negative staphylococci, S. aureus,    methicillin resistant S. aureus, Streptococcus agalactiae    (Group B), S. pneumoniae, S. pyogenes (Group A),    Acinetobacter baumannii, Enterobacter cloacae, E. coli,    Klebsiella oxytoca, K. pneumoniae, Proteus sp.,    Serratia marcescens, Haemophilus influenzae,    Neisseria meningitidis, Pseudomonas aeruginosa, Candida    albicans, C. glabrata, C krusei, C parapsilosis,    C. tropicalis and the KPC resistance gene.  Organism: Blood Culture PCR (07-02-18 @ 22:05)  Organism: Blood Culture PCR (07-02-18 @ 22:05)      -  Acinetobacter baumanii: Nondet      -  Candida albicans: Nondet      -  Candida glabrata: Nondet      -  Candida krusei: Nondet      -  Candida parapsilosis: Nondet      -  Candida tropicalis: Nondet      -  Coagulase negative Staphylococcus: Nondet      -  Enterobacter cloacae complex: Nondet      -  Enterococcus species: Nondet      -  Escherichia coli: Nondet      -  Haemophilus influenzae: Nondet      -  Klebsiella oxytoca: Nondet      -  Klebsiella pneumoniae: Nondet      -  Listeria monocytogenes: Nondet      -  Methicillin resistant Staphylococcus aureus (MRSA): Nondet      -  Multidrug (KPC pos) resistant organism: Nondet      -  Neisseria meningitidis: Nondet      -  Pseudomonas aeruginosa: Nondet      -  Serratia marcescens: Nondet      -  Staphylococcus aureus: Nondet      -  Streptococcus agalactiae (Group B): Nondet      -  Streptococcus pneumoniae: Nondet      -  Streptococcus pyogenes (Group A): Nondet      -  Streptococcus sp. (Not Grp A, B or S pneumoniae): Nondet      -  Vancomycin resistant Enterococcus sp.: Nondet      Method Type: PCR                         Urine for legionella antigen- negative                < from: CT Chest No Cont (06.28.18 @ 11:31) >    EXAM:  CT CHEST                            PROCEDURE DATE:  06/28/2018          INTERPRETATION:  Clinical information: Evaluate right pleural effusion    COMPARISON: August 24, 2017    PROCEDURE:   CT of the Chest was performed without intravenous contrast.  Sagittal and coronal reformats were performed.      FINDINGS:    LOWER NECK: Within normal limits.  AXILLA, MEDIASTINUM AND ZAID: No lymphadenopathy.  VESSELS: Atherosclerotic arterial calcifications, including the coronary   arteries.  Normal caliber aorta.  HEART: Heart size is normal.No pericardial effusion.  PLEURA: Moderate right pleural effusion, increased in size.  LUNGS AND LARGE AIRWAYS: Patchy opacities throughout the right upper,   middle and lower lobes compatible with pneumonia. No discrete nodule or   mass. Patent central airways.   VISUALIZED UPPER ABDOMEN: Within normal limits.  BONES: No acute abnormality.  CHEST WALL:  Unremarkable    IMPRESSION:     Multilobar right lung pneumonia.  Moderate right pleural effusion.        < end of copied text >      Radiology: all available radiological tests reviewed    Advanced directives addressed: full resuscitation

## 2018-07-04 LAB
CULTURE RESULTS: SIGNIFICANT CHANGE UP
SPECIMEN SOURCE: SIGNIFICANT CHANGE UP

## 2018-07-04 PROCEDURE — 71045 X-RAY EXAM CHEST 1 VIEW: CPT | Mod: 26

## 2018-07-04 PROCEDURE — 99233 SBSQ HOSP IP/OBS HIGH 50: CPT

## 2018-07-04 RX ADMIN — Medication 81 MILLIGRAM(S): at 11:24

## 2018-07-04 RX ADMIN — HEPARIN SODIUM 5000 UNIT(S): 5000 INJECTION INTRAVENOUS; SUBCUTANEOUS at 17:50

## 2018-07-04 RX ADMIN — Medication 100 MILLIGRAM(S): at 21:30

## 2018-07-04 RX ADMIN — Medication 3 MILLILITER(S): at 01:34

## 2018-07-04 RX ADMIN — HEPARIN SODIUM 5000 UNIT(S): 5000 INJECTION INTRAVENOUS; SUBCUTANEOUS at 05:52

## 2018-07-04 RX ADMIN — Medication 20 MILLIGRAM(S): at 05:52

## 2018-07-04 RX ADMIN — TAMSULOSIN HYDROCHLORIDE 0.4 MILLIGRAM(S): 0.4 CAPSULE ORAL at 21:30

## 2018-07-04 RX ADMIN — Medication 3 MILLILITER(S): at 14:06

## 2018-07-04 RX ADMIN — Medication 3 MILLILITER(S): at 19:34

## 2018-07-04 RX ADMIN — Medication 40 MILLIGRAM(S): at 05:52

## 2018-07-04 RX ADMIN — ESCITALOPRAM OXALATE 20 MILLIGRAM(S): 10 TABLET, FILM COATED ORAL at 11:24

## 2018-07-04 RX ADMIN — CEFTRIAXONE 1000 MILLIGRAM(S): 500 INJECTION, POWDER, FOR SOLUTION INTRAMUSCULAR; INTRAVENOUS at 11:25

## 2018-07-04 RX ADMIN — Medication 20 MILLIEQUIVALENT(S): at 11:24

## 2018-07-04 RX ADMIN — Medication 3 MILLILITER(S): at 08:28

## 2018-07-04 RX ADMIN — Medication 0.5 MILLIGRAM(S): at 11:24

## 2018-07-04 RX ADMIN — FINASTERIDE 5 MILLIGRAM(S): 5 TABLET, FILM COATED ORAL at 11:24

## 2018-07-04 RX ADMIN — ATORVASTATIN CALCIUM 20 MILLIGRAM(S): 80 TABLET, FILM COATED ORAL at 21:30

## 2018-07-04 RX ADMIN — AZITHROMYCIN 255 MILLIGRAM(S): 500 TABLET, FILM COATED ORAL at 11:25

## 2018-07-04 NOTE — DIETITIAN INITIAL EVALUATION ADULT. - OTHER INFO
Pt seen for LOS. Pt's pmhx noted below. Pt h/x sig for copd, osteoarthritis, PE, Hypercholesterolemia, COPD, HTN, bladder, Pt states PO intake is unchanged and appetite is unchanged (unsure if accurate, pt appears to have lost weight and is [place]).  pt denies chewing and swallowing difficulty, denies n/v/d/c. pt with KAREN, bettie 16.  no edema or skin break down.  NFPE performed on pt with moderate muscle wasting in temple, clavicle and deltoid, moderate fat wasting in triceps.  Recommend checking pt's weight, appears stable but physical examine suggests some wasting.  Recommend ensure enlive BID, encourage Po intake, will continue to monitor pt's nutritional status.

## 2018-07-04 NOTE — CHART NOTE - NSCHARTNOTEFT_GEN_A_CORE
Upon Nutritional Assessment by the Registered Dietitian your patient was determined to meet criteria / has evidence of the following diagnosis/diagnoses:          [ ]  Mild Protein Calorie Malnutrition        [x]  Moderate Protein Calorie Malnutrition        [ ] Severe Protein Calorie Malnutrition        [ ] Unspecified Protein Calorie Malnutrition        [ ] Underweight / BMI <19        [ ] Morbid Obesity / BMI > 40      Findings as based on:  •  Comprehensive nutrition assessment and consultation  •  Calorie counts (nutrient intake analysis)  •  Food acceptance and intake status from observations by staff  •  Follow up  •  Patient education  •  Intervention secondary to interdisciplinary rounds  •   concerns      Treatment:    The following diet has been recommended:  -Encourage Po Intake  -Ensure enlive BID    PROVIDER Section:     By signing this assessment you are acknowledging and agree with the diagnosis/diagnoses assigned by the Registered Dietitian    Comments:

## 2018-07-04 NOTE — PROGRESS NOTE ADULT - SUBJECTIVE AND OBJECTIVE BOX
CC:  Patient is a 77y old  Male who presents with a chief complaint of sob, fever (28 Jun 2018 13:43)    76 yo WM h/o HTN;  hyperlipidemia;  COPD-on home O2;  BPH, presented to ED c/o increasing SOB and fever,  found to have Pna and parapneumonic effusion.  -s/p Rt Ctube placement. Hosp course complicated by post op PTX    7.3: CT placed under water seal  7.4: CT replaced under suction      REVIEW OF SYSTEMS:    CONSTITUTIONAL: +gen weakness, No fevers or chills  ENT: No ear ache, No sorethroat  NECK: No pain, No stiffness  RESPIRATORY: No cough, No wheezing, No hemoptysis; No dyspnea  CARDIOVASCULAR: No chest pain, No palpitations  GASTROINTESTINAL: No abd pain, No nausea, No vomiting, No hematemesis, No diarrhea or constipation. No melena, No hematochezia.  GENITOURINARY: No dysuria, No  hematuria  NEUROLOGICAL: No diplopia, No paresthesia, No motor dysfunction  MUSCULOSKELETAL: No arthralgia, No myalgia  SKIN: No rashes, or lesions   PSYCH: no anxiety, no suicidal ideation    All other review of systems is negative unless indicated above    Vital Signs Last 24 Hrs  T(C): 36.7 (04 Jul 2018 09:45), Max: 36.9 (04 Jul 2018 04:30)  T(F): 98.1 (04 Jul 2018 09:45), Max: 98.4 (04 Jul 2018 04:30)  HR: 100 (04 Jul 2018 09:45) (76 - 105)  BP: 157/58 (04 Jul 2018 09:45) (126/51 - 157/58)  RR: 18 (04 Jul 2018 09:45) (16 - 18)  SpO2: 92% (04 Jul 2018 09:45) (92% - 100%)    PHYSICAL EXAM:    GENERAL: NAD, Well nourished  HEENT:  NC/AT, EOMI, PERRLA, No scleral icterus, Moist mucous membranes  NECK: Supple, No JVD  CNS:  Alert & Oriented X3, Motor Strength 5/5 B/L upper and lower extremities; DTRs 2+ intact   LUNG: Decreased Rt Breath sounds, Rt Ctube present  HEART: RRR; No murmurs, No rubs  ABDOMEN: +BS, ST/ND/NT  GENITOURINARY: Voiding, Bladder not distended  EXTREMITIES:  2+ Peripheral Pulses, No clubbing, No cyanosis, No tibial edema  MUSCULOSKELTAL: Joints normal ROM, No TTP, No effusion  VAGINAL: deferred  SKIN: no rashes  RECTAL: deferred, not indicated  BREAST: deferred               Vital Signs Last 24 Hrs  T(C): 36.7 (04 Jul 2018 09:45), Max: 36.9 (04 Jul 2018 04:30)  T(F): 98.1 (04 Jul 2018 09:45), Max: 98.4 (04 Jul 2018 04:30)  HR: 100 (04 Jul 2018 09:45) (76 - 105)  BP: 157/58 (04 Jul 2018 09:45) (126/51 - 157/58)  RR: 18 (04 Jul 2018 09:45) (16 - 18)  SpO2: 92% (04 Jul 2018 09:45) (92% - 100%)    Vancomycin levels:   Cultures:     MEDICATIONS  (STANDING):  ALBUTerol/ipratropium for Nebulization 3 milliLiter(s) Nebulizer every 6 hours  aspirin  chewable 81 milliGRAM(s) Oral daily  atorvastatin 20 milliGRAM(s) Oral at bedtime  azithromycin  IVPB 500 milliGRAM(s) IV Intermittent every 24 hours  cefTRIAXone Injectable. 1000 milliGRAM(s) IV Push every 24 hours  escitalopram 20 milliGRAM(s) Oral daily  finasteride 5 milliGRAM(s) Oral daily  furosemide    Tablet 20 milliGRAM(s) Oral daily  heparin  Injectable 5000 Unit(s) SubCutaneous every 12 hours  methylPREDNISolone sodium succinate Injectable 40 milliGRAM(s) IV Push daily  potassium chloride    Tablet ER 20 milliEquivalent(s) Oral daily  tamsulosin 0.4 milliGRAM(s) Oral at bedtime  traZODone 100 milliGRAM(s) Oral at bedtime    MEDICATIONS  (PRN):  clonazePAM Tablet 0.5 milliGRAM(s) Oral three times a day PRN anxiety  oxyCODONE    5 mG/acetaminophen 325 mG 1 Tablet(s) Oral every 6 hours PRN Moderate Pain (4 - 6)      all labs reviewed  all imaging reviewed          Assessment and Plan:    1. CAP.  -Cx, no growth.  -Ceftriaxone + Zithromax  -ID following.    2. Parapneumonic pleural effusion.  -06/30, IR US guided thoracentesis.  900mL.  -post procedural PTx slightly bigger  -CT back on suction; PTX still present, suction increased to -40    3. COPD.  -O2.  -IV steroids.  -BD nebs.  -Pulmonology following.

## 2018-07-04 NOTE — PROGRESS NOTE ADULT - SUBJECTIVE AND OBJECTIVE BOX
Subjective:  awake and alert  right chest tube    MEDICATIONS  (STANDING):  ALBUTerol/ipratropium for Nebulization 3 milliLiter(s) Nebulizer every 6 hours  aspirin  chewable 81 milliGRAM(s) Oral daily  atorvastatin 20 milliGRAM(s) Oral at bedtime  azithromycin  IVPB 500 milliGRAM(s) IV Intermittent every 24 hours  cefTRIAXone Injectable. 1000 milliGRAM(s) IV Push every 24 hours  escitalopram 20 milliGRAM(s) Oral daily  finasteride 5 milliGRAM(s) Oral daily  furosemide    Tablet 20 milliGRAM(s) Oral daily  heparin  Injectable 5000 Unit(s) SubCutaneous every 12 hours  methylPREDNISolone sodium succinate Injectable 40 milliGRAM(s) IV Push daily  potassium chloride    Tablet ER 20 milliEquivalent(s) Oral daily  tamsulosin 0.4 milliGRAM(s) Oral at bedtime  traZODone 100 milliGRAM(s) Oral at bedtime    MEDICATIONS  (PRN):  clonazePAM Tablet 0.5 milliGRAM(s) Oral three times a day PRN anxiety  oxyCODONE    5 mG/acetaminophen 325 mG 1 Tablet(s) Oral every 6 hours PRN Moderate Pain (4 - 6)      Allergies    No Known Allergies    Intolerances        REVIEW OF SYSTEMS:    CONSTITUTIONAL:  As per HPI.  HEENT:  Eyes:  No diplopia or blurred vision. ENT:  No earache, sore throat or runny nose.  CARDIOVASCULAR:  No pressure, squeezing, tightness, heaviness or aching about the chest, neck, axilla or epigastrium.  RESPIRATORY:  No cough, shortness of breath, PND or orthopnea.  GASTROINTESTINAL:  No nausea, vomiting or diarrhea.  GENITOURINARY:  No dysuria, frequency or urgency.  MUSCULOSKELETAL:  no joint pain, deformity, tenderness  EXTREMITIES: no clubbing cyanosis,edema  SKIN:  No change in skin, hair or nails.  NEUROLOGIC:  No paresthesias, fasciculations, seizures or weakness.  PSYCHIATRIC:  No disorder of thought or mood.  ENDOCRINE:  No heat or cold intolerance, polyuria or polydipsia.  HEMATOLOGICAL:  No easy bruising or bleedings:    Vital Signs Last 24 Hrs  T(C): 36.4 (01 Jul 2018 18:49), Max: 36.5 (01 Jul 2018 11:08)  T(F): 97.5 (01 Jul 2018 18:49), Max: 97.7 (01 Jul 2018 11:08)  HR: 84 (02 Jul 2018 01:28) (72 - 93)  BP: 140/51 (01 Jul 2018 18:49) (136/66 - 140/51)  BP(mean): --  RR: 18 (01 Jul 2018 18:49) (18 - 18)  SpO2: 98% (02 Jul 2018 01:28) (96% - 99%)    PHYSICAL EXAMINATION:  SKIN: no rashes  HEAD: NC/AT  EYES: PERRLA, EOMI  EARS: TM's intact  NOSE: no abnormalities  NECK:  Supple. No lymphadenopathy. Jugular venous pressure not elevated. Carotids equal.   HEART:   The cardiac impulse has a normal quality. Reg., Nl S1 and S2.  There are no murmurs, rubs or gallops noted  CHEST:  mild rhonchi bilat.  ABDOMEN:  Soft and nontender.   EXTREMITIES:  no C/C/E  NEURO: AAO x 3, no focal deficts       LABS:                        9.8    13.92 )-----------( 224      ( 01 Jul 2018 05:52 )             30.4     07-01    142  |  107  |  31<H>  ----------------------------<  93  4.1   |  30  |  0.91    Ca    7.9<L>      01 Jul 2018 05:52            RADIOLOGY & ADDITIONAL TESTS:    PROCEDURE DATE:  07/03/2018          INTERPRETATION:  History: Chest tube on waterseal, evaluate pneumothorax    Portable AP radiograph of the chest is performed. comparison is made to   7/2/2018.    Pigtail thoracotomy tube is seen again in the right lower chest. There is   worseningof the right pneumothorax and persistent density at the right   lung base. The left lung is clear. There is degenerative change of the   bony structures    Impression: Worsening appearance of the pneumothorax in the presence of   chest tube on waterseal

## 2018-07-04 NOTE — PROGRESS NOTE ADULT - ASSESSMENT
- right PTX, mild increase.  chest tube on low suction.  s/p right thoracentesis.   - chest tube no leak with minimal drainage.  throracic surgery following.   - has 2 upper lobe densities  - will need to repeat CT scan  - on rocephin/zithromax  - dvt proph - right PTX, mild increase.  chest tube on low suction.  s/p right thoracentesis.   - chest tube.  thoracic surgery following.   - has 2 upper lobe densities  - will need to repeat CT scan  - on rocephin/zithromax  - dvt proph

## 2018-07-04 NOTE — PROGRESS NOTE ADULT - SUBJECTIVE AND OBJECTIVE BOX
Pt seen in fu.  pigtail in place  AVSS  CT minimal output, (+) leak  Increase sx to 40  FU CXR in am

## 2018-07-05 PROCEDURE — 71045 X-RAY EXAM CHEST 1 VIEW: CPT | Mod: 26,76

## 2018-07-05 PROCEDURE — 99232 SBSQ HOSP IP/OBS MODERATE 35: CPT

## 2018-07-05 PROCEDURE — 99233 SBSQ HOSP IP/OBS HIGH 50: CPT

## 2018-07-05 RX ADMIN — Medication 0.5 MILLIGRAM(S): at 18:04

## 2018-07-05 RX ADMIN — Medication 81 MILLIGRAM(S): at 11:48

## 2018-07-05 RX ADMIN — HEPARIN SODIUM 5000 UNIT(S): 5000 INJECTION INTRAVENOUS; SUBCUTANEOUS at 05:33

## 2018-07-05 RX ADMIN — ESCITALOPRAM OXALATE 20 MILLIGRAM(S): 10 TABLET, FILM COATED ORAL at 11:48

## 2018-07-05 RX ADMIN — Medication 40 MILLIGRAM(S): at 05:33

## 2018-07-05 RX ADMIN — TAMSULOSIN HYDROCHLORIDE 0.4 MILLIGRAM(S): 0.4 CAPSULE ORAL at 22:55

## 2018-07-05 RX ADMIN — Medication 20 MILLIEQUIVALENT(S): at 11:48

## 2018-07-05 RX ADMIN — Medication 3 MILLILITER(S): at 07:39

## 2018-07-05 RX ADMIN — Medication 3 MILLILITER(S): at 13:31

## 2018-07-05 RX ADMIN — HEPARIN SODIUM 5000 UNIT(S): 5000 INJECTION INTRAVENOUS; SUBCUTANEOUS at 18:01

## 2018-07-05 RX ADMIN — CEFTRIAXONE 1000 MILLIGRAM(S): 500 INJECTION, POWDER, FOR SOLUTION INTRAMUSCULAR; INTRAVENOUS at 11:48

## 2018-07-05 RX ADMIN — OXYCODONE AND ACETAMINOPHEN 1 TABLET(S): 5; 325 TABLET ORAL at 23:00

## 2018-07-05 RX ADMIN — Medication 3 MILLILITER(S): at 01:08

## 2018-07-05 RX ADMIN — Medication 100 MILLIGRAM(S): at 22:55

## 2018-07-05 RX ADMIN — Medication 0.5 MILLIGRAM(S): at 12:01

## 2018-07-05 RX ADMIN — ATORVASTATIN CALCIUM 20 MILLIGRAM(S): 80 TABLET, FILM COATED ORAL at 22:56

## 2018-07-05 RX ADMIN — FINASTERIDE 5 MILLIGRAM(S): 5 TABLET, FILM COATED ORAL at 11:48

## 2018-07-05 RX ADMIN — Medication 3 MILLILITER(S): at 19:24

## 2018-07-05 RX ADMIN — Medication 20 MILLIGRAM(S): at 05:33

## 2018-07-05 NOTE — PROGRESS NOTE ADULT - SUBJECTIVE AND OBJECTIVE BOX
CC:  Patient is a 77y old  Male who presents with a chief complaint of sob, fever (28 Jun 2018 13:43)    76 yo WM h/o HTN;  hyperlipidemia;  COPD-on home O2;  BPH, presented to ED c/o increasing SOB and fever,  found to have Pna and parapneumonic effusion.  -s/p Rt Ctube placement. Hosp course complicated by post op PTX    7.3: CT placed under water seal  7.4: CT replaced under suction  7.5: patient feels well, wants to go home...CT under suction -40      REVIEW OF SYSTEMS:    CONSTITUTIONAL: +gen weakness, No fevers or chills  ENT: No ear ache, No sorethroat  NECK: No pain, No stiffness  RESPIRATORY: No cough, No wheezing, No hemoptysis; No dyspnea  CARDIOVASCULAR: No chest pain, No palpitations  GASTROINTESTINAL: No abd pain, No nausea, No vomiting, No hematemesis, No diarrhea or constipation. No melena, No hematochezia.  GENITOURINARY: No dysuria, No  hematuria  NEUROLOGICAL: No diplopia, No paresthesia, No motor dysfunction  MUSCULOSKELETAL: No arthralgia, No myalgia  SKIN: No rashes, or lesions   PSYCH: no anxiety, no suicidal ideation    All other review of systems is negative unless indicated above    Vital Signs Last 24 Hrs  T(C): 37 (05 Jul 2018 10:40), Max: 37 (05 Jul 2018 10:40)  T(F): 98.6 (05 Jul 2018 10:40), Max: 98.6 (05 Jul 2018 10:40)  HR: 74 (05 Jul 2018 13:31) (70 - 96)  BP: 119/52 (05 Jul 2018 10:40) (113/51 - 152/68)  RR: 17 (05 Jul 2018 10:40) (17 - 18)  SpO2: 92% (05 Jul 2018 10:40) (92% - 100%)    PHYSICAL EXAM:    GENERAL: NAD, Well nourished  HEENT:  NC/AT, EOMI, PERRLA, No scleral icterus, Moist mucous membranes  NECK: Supple, No JVD  CNS:  Alert & Oriented X3, Motor Strength 5/5 B/L upper and lower extremities; DTRs 2+ intact   LUNG: Decreased Rt Breath sounds, Rt Ctube present  HEART: RRR; No murmurs, No rubs  ABDOMEN: +BS, ST/ND/NT  GENITOURINARY: Voiding, Bladder not distended  EXTREMITIES:  2+ Peripheral Pulses, No clubbing, No cyanosis, No tibial edema  MUSCULOSKELTAL: Joints normal ROM, No TTP, No effusion  VAGINAL: deferred  SKIN: no rashes  RECTAL: deferred, not indicated  BREAST: deferred               Vital Signs Last 24 Hrs  T(C): 36.7 (04 Jul 2018 09:45), Max: 36.9 (04 Jul 2018 04:30)  T(F): 98.1 (04 Jul 2018 09:45), Max: 98.4 (04 Jul 2018 04:30)  HR: 100 (04 Jul 2018 09:45) (76 - 105)  BP: 157/58 (04 Jul 2018 09:45) (126/51 - 157/58)  RR: 18 (04 Jul 2018 09:45) (16 - 18)  SpO2: 92% (04 Jul 2018 09:45) (92% - 100%)    Vancomycin levels:   Cultures:     MEDICATIONS  (STANDING):  ALBUTerol/ipratropium for Nebulization 3 milliLiter(s) Nebulizer every 6 hours  aspirin  chewable 81 milliGRAM(s) Oral daily  atorvastatin 20 milliGRAM(s) Oral at bedtime  cefTRIAXone Injectable. 1000 milliGRAM(s) IV Push every 24 hours  escitalopram 20 milliGRAM(s) Oral daily  finasteride 5 milliGRAM(s) Oral daily  furosemide    Tablet 20 milliGRAM(s) Oral daily  heparin  Injectable 5000 Unit(s) SubCutaneous every 12 hours  methylPREDNISolone sodium succinate Injectable 40 milliGRAM(s) IV Push daily  potassium chloride    Tablet ER 20 milliEquivalent(s) Oral daily  tamsulosin 0.4 milliGRAM(s) Oral at bedtime  traZODone 100 milliGRAM(s) Oral at bedtime        all labs reviewed  all imaging reviewed          Assessment and Plan:    1. CAP.  -Cx, no growth.  -Ceftriaxone IV day# 8;  Zithromax day#5    2. Parapneumonic pleural effusion.  -06/30, IR US guided thoracentesis.  900mL pleural fluid  -post procedural PTx slightly bigger....CT back on suction; PTX still present, suction increased to -40  CT surgery f/u    3. AECOPD: improving  -O2.  -IV steroids: Solumedrol taper  -Albuterol/Atrovent nebs.  -Pulmonology following.    4. BPH: stable  cw Flomax/Proscar    5. Chronic diastolic L Chf:  stable  cw oral Lasix

## 2018-07-05 NOTE — PROGRESS NOTE ADULT - SUBJECTIVE AND OBJECTIVE BOX
Subjective:  77M w/PMH HTN, HLD, COPD on Home O2, BPH, admitted 6/28 w/ Multilobar RT sided pna w/ Mod pleural effusion, started on IV ABX and s/p placement of  8F pigtail 6/29/18 w 860cc clear yellow fluid removed from R pleural space. Post procedure noted to have PTX on post-XR and 10min delayed CT with continuous suction showed partial expansion, especially adjacent to lung at base. 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 Pending CXr this am.  Pt states breathing is much improved. Denies SOB currently, resting.    Vital Signs:  Vital Signs Last 24 Hrs  T(C): 36.5 (07-05-18 @ 04:59), Max: 36.9 (07-04-18 @ 17:01)  T(F): 97.7 (07-05-18 @ 04:59), Max: 98.4 (07-04-18 @ 17:01)  HR: 77 (07-05-18 @ 08:30) (70 - 100)  BP: 113/51 (07-05-18 @ 04:59) (113/51 - 157/58)  RR: 18 (07-05-18 @ 04:59) (18 - 18)  SpO2: 98% (07-05-18 @ 04:59) (92% - 100%) on (O2)      Relevant labs, radiology and Medications reviewed            MEDICATIONS  (STANDING):  ALBUTerol/ipratropium for Nebulization 3 milliLiter(s) Nebulizer every 6 hours  aspirin  chewable 81 milliGRAM(s) Oral daily  atorvastatin 20 milliGRAM(s) Oral at bedtime  cefTRIAXone Injectable. 1000 milliGRAM(s) IV Push every 24 hours  escitalopram 20 milliGRAM(s) Oral daily  finasteride 5 milliGRAM(s) Oral daily  furosemide    Tablet 20 milliGRAM(s) Oral daily  heparin  Injectable 5000 Unit(s) SubCutaneous every 12 hours  methylPREDNISolone sodium succinate Injectable 40 milliGRAM(s) IV Push daily  potassium chloride    Tablet ER 20 milliEquivalent(s) Oral daily  tamsulosin 0.4 milliGRAM(s) Oral at bedtime  traZODone 100 milliGRAM(s) Oral at bedtime    MEDICATIONS  (PRN):  clonazePAM Tablet 0.5 milliGRAM(s) Oral three times a day PRN anxiety  oxyCODONE    5 mG/acetaminophen 325 mG 1 Tablet(s) Oral every 6 hours PRN Moderate Pain (4 - 6)      Physical exam  Gen NAD  Neuro AAOx3  Card RRR  Pulm decreased right base  Abd soft NT  Ext warm, no edema    Tubes: right pigtail to suction, no AL, draining serosang    I&O's Summary      Assessment  77y Male  w/ PAST MEDICAL & SURGICAL HISTORY:  Hematuria  BPH (benign prostatic hypertrophy)  Low back pain  Osteoarthritis  Pleural effusion: 2016  Hypercholesterolemia  Anxiety  Depression  COPD (chronic obstructive pulmonary disease)  HTN (hypertension)  Bladder tumor  Cataract extraction status: 2015  b/l  admitted 6/28 w/ Multilobar RT sided pna w/ Mod pleural effusion, started on IV ABX and s/p placement of  8F pigtail 6/29/18. Post procedure noted to have PTX on post-XR, trapped lung.    PLAN    f/u CXR today 7/5/18,   continue suction to -40mmHG  If increase size of PTX, may require second tube.  cont care as per medicine/pulmonary    Discussed with Cardiothoracic Team at AM rounds.

## 2018-07-05 NOTE — CHART NOTE - NSCHARTNOTEFT_GEN_A_CORE
CXR reviewed on -20mmHG, no change in size of PTX.   WIll cont suction for today and check CXR in am, if no change will attempt waterseal 7/6/18.  D/W DR. Chauhan

## 2018-07-05 NOTE — PROGRESS NOTE ADULT - SUBJECTIVE AND OBJECTIVE BOX
Patient is a 77y old  Male who presents with a chief complaint of sob, fever (28 Jun 2018 13:43)    Date of service: 07-05-18 @ 10:44  Patient still with right sided chest tube, is comfortable,afebrile  ROS: no fever or chills; denies dizziness, no HA, no SOB or cough, no abdominal pain, no diarrhea or constipation; no dysuria, no urinary frequency, no legs pain, no rashes    MEDICATIONS  (STANDING):  ALBUTerol/ipratropium for Nebulization 3 milliLiter(s) Nebulizer every 6 hours  aspirin  chewable 81 milliGRAM(s) Oral daily  atorvastatin 20 milliGRAM(s) Oral at bedtime  cefTRIAXone Injectable. 1000 milliGRAM(s) IV Push every 24 hours  escitalopram 20 milliGRAM(s) Oral daily  finasteride 5 milliGRAM(s) Oral daily  furosemide    Tablet 20 milliGRAM(s) Oral daily  heparin  Injectable 5000 Unit(s) SubCutaneous every 12 hours  methylPREDNISolone sodium succinate Injectable 40 milliGRAM(s) IV Push daily  potassium chloride    Tablet ER 20 milliEquivalent(s) Oral daily  tamsulosin 0.4 milliGRAM(s) Oral at bedtime  traZODone 100 milliGRAM(s) Oral at bedtime    MEDICATIONS  (PRN):  clonazePAM Tablet 0.5 milliGRAM(s) Oral three times a day PRN anxiety  oxyCODONE    5 mG/acetaminophen 325 mG 1 Tablet(s) Oral every 6 hours PRN Moderate Pain (4 - 6)      Vital Signs Last 24 Hrs  T(C): 36.5 (05 Jul 2018 04:59), Max: 36.9 (04 Jul 2018 17:01)  T(F): 97.7 (05 Jul 2018 04:59), Max: 98.4 (04 Jul 2018 17:01)  HR: 77 (05 Jul 2018 08:30) (70 - 91)  BP: 113/51 (05 Jul 2018 04:59) (113/51 - 152/68)  BP(mean): --  RR: 18 (05 Jul 2018 04:59) (18 - 18)  SpO2: 98% (05 Jul 2018 04:59) (98% - 100%)    Physical Exam:    PE:    Constitutional: frail looking  HEENT: NC/AT, EOMI, PERRLA, conjunctivae clear; ears and nose atraumatic; pharynx clear  Neck: supple; thyroid not palpable  Back: no tenderness  Respiratory: respiratory effort normal; scattered coarse breath sounds; right sided chest tube  Cardiovascular: S1S2 regular, no murmurs  Abdomen: soft, not tender, not distended, positive BS; no liver or spleen organomegaly  Genitourinary: no suprapubic tenderness  Musculoskeletal: no muscle tenderness, no joint swelling or tenderness  Neurological/ Psychiatric: AxOx3, judgement and insight normal;  moving all extremities  Skin: no rashes; no palpable lesions    Labs: all available labs reviewed             Labs:                 Cultures:       Culture - Body Fluid with Gram Stain (collected 06-29-18 @ 14:50)  Source: Pleural Fl Pleural Fluid  Gram Stain (06-29-18 @ 23:46):    polymorphonuclear leukocytes seen    No organisms seen    by cytocentrifuge  Final Report (07-04-18 @ 19:08):    No growth at 5 days              Urine for legionella antigen- negative                < from: CT Chest No Cont (06.28.18 @ 11:31) >    EXAM:  CT CHEST                            PROCEDURE DATE:  06/28/2018          INTERPRETATION:  Clinical information: Evaluate right pleural effusion    COMPARISON: August 24, 2017    PROCEDURE:   CT of the Chest was performed without intravenous contrast.  Sagittal and coronal reformats were performed.      FINDINGS:    LOWER NECK: Within normal limits.  AXILLA, MEDIASTINUM AND ZAID: No lymphadenopathy.  VESSELS: Atherosclerotic arterial calcifications, including the coronary   arteries.  Normal caliber aorta.  HEART: Heart size is normal.No pericardial effusion.  PLEURA: Moderate right pleural effusion, increased in size.  LUNGS AND LARGE AIRWAYS: Patchy opacities throughout the right upper,   middle and lower lobes compatible with pneumonia. No discrete nodule or   mass. Patent central airways.   VISUALIZED UPPER ABDOMEN: Within normal limits.  BONES: No acute abnormality.  CHEST WALL:  Unremarkable    IMPRESSION:     Multilobar right lung pneumonia.  Moderate right pleural effusion.        < end of copied text >      Radiology: all available radiological tests reviewed    Advanced directives addressed: full resuscitation

## 2018-07-05 NOTE — PROGRESS NOTE ADULT - SUBJECTIVE AND OBJECTIVE BOX
Subjective:  awake and alert, resting.  right chest tube    MEDICATIONS  (STANDING):  ALBUTerol/ipratropium for Nebulization 3 milliLiter(s) Nebulizer every 6 hours  aspirin  chewable 81 milliGRAM(s) Oral daily  atorvastatin 20 milliGRAM(s) Oral at bedtime  azithromycin  IVPB 500 milliGRAM(s) IV Intermittent every 24 hours  cefTRIAXone Injectable. 1000 milliGRAM(s) IV Push every 24 hours  escitalopram 20 milliGRAM(s) Oral daily  finasteride 5 milliGRAM(s) Oral daily  furosemide    Tablet 20 milliGRAM(s) Oral daily  heparin  Injectable 5000 Unit(s) SubCutaneous every 12 hours  methylPREDNISolone sodium succinate Injectable 40 milliGRAM(s) IV Push daily  potassium chloride    Tablet ER 20 milliEquivalent(s) Oral daily  tamsulosin 0.4 milliGRAM(s) Oral at bedtime  traZODone 100 milliGRAM(s) Oral at bedtime    MEDICATIONS  (PRN):  clonazePAM Tablet 0.5 milliGRAM(s) Oral three times a day PRN anxiety  oxyCODONE    5 mG/acetaminophen 325 mG 1 Tablet(s) Oral every 6 hours PRN Moderate Pain (4 - 6)      Allergies    No Known Allergies    Intolerances        REVIEW OF SYSTEMS:    CONSTITUTIONAL:  As per HPI.  HEENT:  Eyes:  No diplopia or blurred vision. ENT:  No earache, sore throat or runny nose.  CARDIOVASCULAR:  No pressure, squeezing, tightness, heaviness or aching about the chest, neck, axilla or epigastrium.  RESPIRATORY:  No cough, shortness of breath, PND or orthopnea.  GASTROINTESTINAL:  No nausea, vomiting or diarrhea.  GENITOURINARY:  No dysuria, frequency or urgency.  MUSCULOSKELETAL:  no joint pain, deformity, tenderness  EXTREMITIES: no clubbing cyanosis,edema  SKIN:  No change in skin, hair or nails.  NEUROLOGIC:  No paresthesias, fasciculations, seizures or weakness.  PSYCHIATRIC:  No disorder of thought or mood.  ENDOCRINE:  No heat or cold intolerance, polyuria or polydipsia.  HEMATOLOGICAL:  No easy bruising or bleedings:    Vital Signs Last 24 Hrs  T(C): 36.4 (01 Jul 2018 18:49), Max: 36.5 (01 Jul 2018 11:08)  T(F): 97.5 (01 Jul 2018 18:49), Max: 97.7 (01 Jul 2018 11:08)  HR: 84 (02 Jul 2018 01:28) (72 - 93)  BP: 140/51 (01 Jul 2018 18:49) (136/66 - 140/51)  BP(mean): --  RR: 18 (01 Jul 2018 18:49) (18 - 18)  SpO2: 98% (02 Jul 2018 01:28) (96% - 99%)    PHYSICAL EXAMINATION:  SKIN: no rashes  HEAD: NC/AT  EYES: PERRLA, EOMI  EARS: TM's intact  NOSE: no abnormalities  NECK:  Supple. No lymphadenopathy. Jugular venous pressure not elevated. Carotids equal.   HEART:   The cardiac impulse has a normal quality. Reg., Nl S1 and S2.  There are no murmurs, rubs or gallops noted  CHEST:  mild rhonchi bilat.  ABDOMEN:  Soft and nontender.   EXTREMITIES:  no C/C/E  NEURO: AAO x 3, no focal deficts       LABS:                        9.8    13.92 )-----------( 224      ( 01 Jul 2018 05:52 )             30.4     07-01    142  |  107  |  31<H>  ----------------------------<  93  4.1   |  30  |  0.91    Ca    7.9<L>      01 Jul 2018 05:52        EXAM:  XR CHEST PORTABLE ROUTINE 1V                            PROCEDURE DATE:  07/04/2018          INTERPRETATION:  History: Dyspnea    Chest:  one view.      Comparison: 7/3/2018    AP radiograph of the chest demonstrates unchanged loculated RIGHT   pneumothorax with RIGHT pigtail catheter in the lower chest. The cardiac   silhouette is normal in size. Osseous structures are intact.    Impression:No interval change.

## 2018-07-05 NOTE — PROGRESS NOTE ADULT - ASSESSMENT
77M w/PMH HTN, HLD, COPD on Home O2, BPH, admitted on 6/28 for evaluation of shortness of breath, cough and fever; history per medical record as patient is poor historian. On nebulizer treatment. No recent hospitalizations.  1. Patient admitted with multilobar pneumonia which will consider as community acquired pneumonia given no recent hospitalizations; also noted with leukocytosis most likely secondary to infection  - blood culture is coagulase negative staph, consistent with skin contaminant  - stopped vancomycin  - iv hydration and supportive care   - serial cbc and monitor temperature   - reviewed prior medical records to evaluate for resistant or atypical pathogens   - oxygen and nebs as needed   - day #7 ceftriaxone and zithromax; completed 5 days zithromax  - tolerating antibiotics without rashes or side effects   - chest tube per CTS  2. other issues:  HTN, HLD, COPD on Home O2, BPH  - per medicine

## 2018-07-05 NOTE — PROGRESS NOTE ADULT - ASSESSMENT
- right PTX, chest tube on low suction.  s/p right thoracentesis.   - chest tube.  thoracic surgery following.   - has 2 upper lobe densities  - will need to repeat CT scan  - on rocephin  - dvt proph

## 2018-07-06 LAB
ANION GAP SERPL CALC-SCNC: 5 MMOL/L — SIGNIFICANT CHANGE UP (ref 5–17)
BUN SERPL-MCNC: 31 MG/DL — HIGH (ref 7–23)
CALCIUM SERPL-MCNC: 7.7 MG/DL — LOW (ref 8.5–10.1)
CHLORIDE SERPL-SCNC: 101 MMOL/L — SIGNIFICANT CHANGE UP (ref 96–108)
CO2 SERPL-SCNC: 34 MMOL/L — HIGH (ref 22–31)
CREAT SERPL-MCNC: 0.83 MG/DL — SIGNIFICANT CHANGE UP (ref 0.5–1.3)
GLUCOSE SERPL-MCNC: 87 MG/DL — SIGNIFICANT CHANGE UP (ref 70–99)
HCT VFR BLD CALC: 30.7 % — LOW (ref 39–50)
HGB BLD-MCNC: 9.7 G/DL — LOW (ref 13–17)
MCHC RBC-ENTMCNC: 28.8 PG — SIGNIFICANT CHANGE UP (ref 27–34)
MCHC RBC-ENTMCNC: 31.6 GM/DL — LOW (ref 32–36)
MCV RBC AUTO: 91.1 FL — SIGNIFICANT CHANGE UP (ref 80–100)
NRBC # BLD: 0 /100 WBCS — SIGNIFICANT CHANGE UP (ref 0–0)
PLATELET # BLD AUTO: 243 K/UL — SIGNIFICANT CHANGE UP (ref 150–400)
POTASSIUM SERPL-MCNC: 4 MMOL/L — SIGNIFICANT CHANGE UP (ref 3.5–5.3)
POTASSIUM SERPL-SCNC: 4 MMOL/L — SIGNIFICANT CHANGE UP (ref 3.5–5.3)
RBC # BLD: 3.37 M/UL — LOW (ref 4.2–5.8)
RBC # FLD: 14.1 % — SIGNIFICANT CHANGE UP (ref 10.3–14.5)
SODIUM SERPL-SCNC: 140 MMOL/L — SIGNIFICANT CHANGE UP (ref 135–145)
WBC # BLD: 16 K/UL — HIGH (ref 3.8–10.5)
WBC # FLD AUTO: 16 K/UL — HIGH (ref 3.8–10.5)

## 2018-07-06 PROCEDURE — 99232 SBSQ HOSP IP/OBS MODERATE 35: CPT

## 2018-07-06 PROCEDURE — 99233 SBSQ HOSP IP/OBS HIGH 50: CPT

## 2018-07-06 PROCEDURE — 71045 X-RAY EXAM CHEST 1 VIEW: CPT | Mod: 26

## 2018-07-06 PROCEDURE — 71045 X-RAY EXAM CHEST 1 VIEW: CPT | Mod: 26,77

## 2018-07-06 RX ADMIN — Medication 81 MILLIGRAM(S): at 11:44

## 2018-07-06 RX ADMIN — Medication 3 MILLILITER(S): at 13:42

## 2018-07-06 RX ADMIN — Medication 3 MILLILITER(S): at 01:03

## 2018-07-06 RX ADMIN — Medication 3 MILLILITER(S): at 20:34

## 2018-07-06 RX ADMIN — TAMSULOSIN HYDROCHLORIDE 0.4 MILLIGRAM(S): 0.4 CAPSULE ORAL at 21:16

## 2018-07-06 RX ADMIN — FINASTERIDE 5 MILLIGRAM(S): 5 TABLET, FILM COATED ORAL at 11:44

## 2018-07-06 RX ADMIN — Medication 100 MILLIGRAM(S): at 21:16

## 2018-07-06 RX ADMIN — Medication 20 MILLIGRAM(S): at 06:41

## 2018-07-06 RX ADMIN — Medication 40 MILLIGRAM(S): at 06:40

## 2018-07-06 RX ADMIN — Medication 3 MILLILITER(S): at 07:55

## 2018-07-06 RX ADMIN — Medication 20 MILLIGRAM(S): at 17:31

## 2018-07-06 RX ADMIN — HEPARIN SODIUM 5000 UNIT(S): 5000 INJECTION INTRAVENOUS; SUBCUTANEOUS at 06:40

## 2018-07-06 RX ADMIN — HEPARIN SODIUM 5000 UNIT(S): 5000 INJECTION INTRAVENOUS; SUBCUTANEOUS at 17:32

## 2018-07-06 RX ADMIN — Medication 20 MILLIEQUIVALENT(S): at 11:45

## 2018-07-06 RX ADMIN — ESCITALOPRAM OXALATE 20 MILLIGRAM(S): 10 TABLET, FILM COATED ORAL at 11:44

## 2018-07-06 RX ADMIN — ATORVASTATIN CALCIUM 20 MILLIGRAM(S): 80 TABLET, FILM COATED ORAL at 21:16

## 2018-07-06 NOTE — PROGRESS NOTE ADULT - ASSESSMENT
77M w/PMH HTN, HLD, COPD on Home O2, BPH, admitted on 6/28 for evaluation of shortness of breath, cough and fever; history per medical record as patient is poor historian. On nebulizer treatment. No recent hospitalizations.  1. Patient admitted with multilobar pneumonia which will consider as community acquired pneumonia given no recent hospitalizations; also noted with leukocytosis most likely secondary to infection  - blood culture is coagulase negative staph, consistent with skin contaminant  - stopped vancomycin  - iv hydration and supportive care   - serial cbc and monitor temperature   - reviewed prior medical records to evaluate for resistant or atypical pathogens   - oxygen and nebs as needed   - day #8 ceftriaxone; completed 5 days zithromax  - will stop antibiotics today and observe off antibiotics  - chest tube per CTS  2. other issues:  HTN, HLD, COPD on Home O2, BPH  - per medicine

## 2018-07-06 NOTE — PROGRESS NOTE ADULT - SUBJECTIVE AND OBJECTIVE BOX
CC:  Patient is a 77y old  Male who presents with a chief complaint of sob, fever (28 Jun 2018 13:43)    76 yo WM h/o HTN;  hyperlipidemia;  COPD-on home O2;  BPH, presented to ED c/o increasing SOB and fever,  found to have Pna and parapneumonic effusion.  -s/p Rt Ctube placement. Hosp course complicated by post op PTX    7.3: CT placed under water seal  7.4: CT replaced under suction  7.5: patient feels well, wants to go home...CT under suction -40  7.6: c/o dyspnea on exertion, comfortable at rest.....tolerated diet  CT  placed under water seal      REVIEW OF SYSTEMS:    CONSTITUTIONAL: +gen weakness, No fevers or chills  ENT: No ear ache, No sorethroat  NECK: No pain, No stiffness  RESPIRATORY: No cough, No wheezing, No hemoptysis; No dyspnea  CARDIOVASCULAR: No chest pain, No palpitations  GASTROINTESTINAL: No abd pain, No nausea, No vomiting, No hematemesis, No diarrhea or constipation. No melena, No hematochezia.  GENITOURINARY: No dysuria, No  hematuria  NEUROLOGICAL: No diplopia, No paresthesia, No motor dysfunction  MUSCULOSKELETAL: No arthralgia, No myalgia  SKIN: No rashes, or lesions   PSYCH: no anxiety, no suicidal ideation    All other review of systems is negative unless indicated above    Vital Signs Last 24 Hrs  T(C): 36.7 (06 Jul 2018 10:33), Max: 36.7 (06 Jul 2018 10:33)  T(F): 98 (06 Jul 2018 10:33), Max: 98 (06 Jul 2018 10:33)  HR: 78 (06 Jul 2018 13:43) (70 - 95)  BP: 95/62 (06 Jul 2018 10:33) (95/62 - 157/69)  RR: 17 (06 Jul 2018 10:33) (17 - 18)  SpO2: 92% (06 Jul 2018 10:33) (91% - 100%)    PHYSICAL EXAM:    GENERAL: NAD, Well nourished  HEENT:  NC/AT, EOMI, PERRLA, No scleral icterus, Moist mucous membranes  NECK: Supple, No JVD  CNS:  Alert & Oriented X3, Motor Strength 5/5 B/L upper and lower extremities; DTRs 2+ intact   LUNG: Decreased Rt Breath sounds, Rt Ctube present  HEART: RRR; No murmurs, No rubs  ABDOMEN: +BS, ST/ND/NT  GENITOURINARY: Voiding, Bladder not distended  EXTREMITIES:  2+ Peripheral Pulses, No clubbing, No cyanosis, No tibial edema  MUSCULOSKELTAL: Joints normal ROM, No TTP, No effusion  VAGINAL: deferred  SKIN: no rashes  RECTAL: deferred, not indicated  BREAST: deferred               Labs:                        9.7    16.00 )-----------( 243      ( 06 Jul 2018 07:11 )             30.7     07-06    140  |  101  |  31<H>  ----------------------------<  87  4.0   |  34<H>  |  0.83    Ca    7.7<L>      06 Jul 2018 07:11           MEDICATIONS  (STANDING):  ALBUTerol/ipratropium for Nebulization 3 milliLiter(s) Nebulizer every 6 hours  aspirin  chewable 81 milliGRAM(s) Oral daily  atorvastatin 20 milliGRAM(s) Oral at bedtime  escitalopram 20 milliGRAM(s) Oral daily  finasteride 5 milliGRAM(s) Oral daily  furosemide    Tablet 20 milliGRAM(s) Oral daily  heparin  Injectable 5000 Unit(s) SubCutaneous every 12 hours  potassium chloride    Tablet ER 20 milliEquivalent(s) Oral daily  predniSONE   Tablet 20 milliGRAM(s) Oral two times a day  tamsulosin 0.4 milliGRAM(s) Oral at bedtime  traZODone 100 milliGRAM(s) Oral at bedtime          Assessment and Plan:    1. CAP.  -Cx, no growth.  -Ceftriaxone IV day# 8;  Zithromax day#5  will observe off Abx per ID    2. Parapneumonic pleural effusion.  -06/30, IR US guided thoracentesis.  900mL pleural fluid  -post procedural PTx slightly bigger...  Chest tube placed under water seal  CT surgery f/u to d/c tube    3. AECOPD: improving  -O2.  Steroid taper as tolerated  -Albuterol/Atrovent nebs.  -Pulmonology following.    4. BPH: stable  cw Flomax/Proscar    5. Chronic diastolic L Chf:  stable  cw oral Lasix    plan to d/c home after CT removed

## 2018-07-06 NOTE — PROGRESS NOTE ADULT - SUBJECTIVE AND OBJECTIVE BOX
Subjective:    77M w/PMH HTN, HLD, COPD on Home O2, BPH, admitted 6/28 w/ Multilobar RT sided pna w/ Mod pleural effusion, started on IV ABX and s/p placement of  8F pigtail 6/29/18 w 860cc clear yellow fluid removed from R pleural space. Post procedure noted to have PTX on post-XR and 10min delayed CT with continuous suction showed partial expansion, especially adjacent to lung at base. 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   Pt states breathing is good. Denies SOB currently, resting. Wishes to go home.    Vital Signs:  Vital Signs Last 24 Hrs  T(C): 36.2 (07-06-18 @ 05:24), Max: 37 (07-05-18 @ 10:40)  T(F): 97.2 (07-06-18 @ 05:24), Max: 98.6 (07-05-18 @ 10:40)  HR: 75 (07-06-18 @ 05:24) (70 - 96)  BP: 110/49 (07-06-18 @ 05:24) (110/49 - 157/69)  RR: 18 (07-06-18 @ 05:24) (17 - 18)  SpO2: 100% (07-06-18 @ 05:24) (91% - 100%) on (O2)    Telemetry/Alarms:    Relevant labs, radiology and Medications reviewed                        9.7    16.00 )-----------( 243      ( 06 Jul 2018 07:11 )             30.7     07-06    140  |  101  |  31<H>  ----------------------------<  87  4.0   |  34<H>  |  0.83    Ca    7.7<L>      06 Jul 2018 07:11        MEDICATIONS  (STANDING):  ALBUTerol/ipratropium for Nebulization 3 milliLiter(s) Nebulizer every 6 hours  aspirin  chewable 81 milliGRAM(s) Oral daily  atorvastatin 20 milliGRAM(s) Oral at bedtime  cefTRIAXone Injectable. 1000 milliGRAM(s) IV Push every 24 hours  escitalopram 20 milliGRAM(s) Oral daily  finasteride 5 milliGRAM(s) Oral daily  furosemide    Tablet 20 milliGRAM(s) Oral daily  heparin  Injectable 5000 Unit(s) SubCutaneous every 12 hours  methylPREDNISolone sodium succinate Injectable 40 milliGRAM(s) IV Push daily  potassium chloride    Tablet ER 20 milliEquivalent(s) Oral daily  tamsulosin 0.4 milliGRAM(s) Oral at bedtime  traZODone 100 milliGRAM(s) Oral at bedtime    MEDICATIONS  (PRN):      Physical exam  Gen NAD  Neuro AAOx3  Card RRR  Pulm decreased right base  Abd soft, NT  Ext warm, edema    Tubes: right pigtail to suction, -20, no AL    I&O's Summary    05 Jul 2018 07:01  -  06 Jul 2018 07:00  --------------------------------------------------------  IN: 0 mL / OUT: 0 mL / NET: 0 mL        Assessment  77y Male  w/ PAST MEDICAL & SURGICAL HISTORY:  Hematuria  BPH (benign prostatic hypertrophy)  Low back pain  Osteoarthritis  Pleural effusion: 2016  Hypercholesterolemia  Anxiety  Depression  COPD (chronic obstructive pulmonary disease)  HTN (hypertension)  Bladder tumor  Cataract extraction status: 2015  b/l    admitted 6/28 w/ Multilobar RT sided pna w/ Mod pleural effusion, started on IV ABX and s/p placement of  8F pigtail 6/29/18. Post procedure noted to have PTX on post-XR, trapped lung.    PLAN    CT to waterseal  check CXR  if no change will consider d/c tube, as no AL present.    Discussed with Cardiothoracic Team at AM rounds.

## 2018-07-06 NOTE — CHART NOTE - NSCHARTNOTEFT_GEN_A_CORE
Pigtail pulled after trial of waterseal and then clamping tube for 2 hours with no obvious change in CXR.  No airleak noted.  Pt tolerated well.  Post pull CXR unchanged PTX.  Leave dressing in place for 48 hours, may remove 7/8/18.  F/u Dr. Terrell [phone]  If pt remains in hospital, would obtain CXr in am.

## 2018-07-06 NOTE — PROGRESS NOTE ADULT - ASSESSMENT
- right PTX s/p right thoracentesis  - has persistent PTX  - right pigtail catheter; no leak. Thoracic following  - can d/c rocephin  - change steroids to po  - dvt proph

## 2018-07-06 NOTE — PROGRESS NOTE ADULT - SUBJECTIVE AND OBJECTIVE BOX
Patient is a 77y old  Male who presents with a chief complaint of sob, fever (28 Jun 2018 13:43)      Date of service: 07-06-18 @ 09:48  Patient comfortable, still with chest tube, afebrile, no complaints  ROS: no fever or chills; denies dizziness, no HA, no SOB or cough, no abdominal pain, no diarrhea or constipation; no dysuria, no urinary frequency, no legs pain, no rashes    MEDICATIONS  (STANDING):  ALBUTerol/ipratropium for Nebulization 3 milliLiter(s) Nebulizer every 6 hours  aspirin  chewable 81 milliGRAM(s) Oral daily  atorvastatin 20 milliGRAM(s) Oral at bedtime  cefTRIAXone Injectable. 1000 milliGRAM(s) IV Push every 24 hours  escitalopram 20 milliGRAM(s) Oral daily  finasteride 5 milliGRAM(s) Oral daily  furosemide    Tablet 20 milliGRAM(s) Oral daily  heparin  Injectable 5000 Unit(s) SubCutaneous every 12 hours  methylPREDNISolone sodium succinate Injectable 40 milliGRAM(s) IV Push daily  potassium chloride    Tablet ER 20 milliEquivalent(s) Oral daily  tamsulosin 0.4 milliGRAM(s) Oral at bedtime  traZODone 100 milliGRAM(s) Oral at bedtime    MEDICATIONS  (PRN):      Vital Signs Last 24 Hrs  T(C): 36.2 (06 Jul 2018 05:24), Max: 37 (05 Jul 2018 10:40)  T(F): 97.2 (06 Jul 2018 05:24), Max: 98.6 (05 Jul 2018 10:40)  HR: 75 (06 Jul 2018 05:24) (70 - 96)  BP: 110/49 (06 Jul 2018 05:24) (110/49 - 157/69)  BP(mean): --  RR: 18 (06 Jul 2018 05:24) (17 - 18)  SpO2: 100% (06 Jul 2018 05:24) (91% - 100%)    Physical Exam:          Constitutional: frail looking  HEENT: NC/AT, EOMI, PERRLA, conjunctivae clear; ears and nose atraumatic; pharynx clear  Neck: supple; thyroid not palpable  Back: no tenderness  Respiratory: respiratory effort normal; scattered coarse breath sounds; right sided chest tube  Cardiovascular: S1S2 regular, no murmurs  Abdomen: soft, not tender, not distended, positive BS; no liver or spleen organomegaly  Genitourinary: no suprapubic tenderness  Musculoskeletal: no muscle tenderness, no joint swelling or tenderness  Neurological/ Psychiatric: AxOx3, judgement and insight normal;  moving all extremities  Skin: no rashes; no palpable lesions    Labs: all available labs reviewed             Labs:                 Cultures:       Culture - Body Fluid with Gram Stain (collected 06-29-18 @ 14:50)  Source: Pleural Fl Pleural Fluid  Gram Stain (06-29-18 @ 23:46):    polymorphonuclear leukocytes seen    No organisms seen    by cytocentrifuge  Final Report (07-04-18 @ 19:08):    No growth at 5 days    Labs:                        9.7    16.00 )-----------( 243      ( 06 Jul 2018 07:11 )             30.7     07-06    140  |  101  |  31<H>  ----------------------------<  87  4.0   |  34<H>  |  0.83    Ca    7.7<L>      06 Jul 2018 07:11             Cultures:       Culture - Body Fluid with Gram Stain (collected 06-29-18 @ 14:50)  Source: Pleural Fl Pleural Fluid  Gram Stain (06-29-18 @ 23:46):    polymorphonuclear leukocytes seen    No organisms seen    by cytocentrifuge  Final Report (07-04-18 @ 19:08):    No growth at 5 days              Urine for legionella antigen- negative                < from: CT Chest No Cont (06.28.18 @ 11:31) >    EXAM:  CT CHEST                            PROCEDURE DATE:  06/28/2018          INTERPRETATION:  Clinical information: Evaluate right pleural effusion    COMPARISON: August 24, 2017    PROCEDURE:   CT of the Chest was performed without intravenous contrast.  Sagittal and coronal reformats were performed.      FINDINGS:    LOWER NECK: Within normal limits.  AXILLA, MEDIASTINUM AND ZAID: No lymphadenopathy.  VESSELS: Atherosclerotic arterial calcifications, including the coronary   arteries.  Normal caliber aorta.  HEART: Heart size is normal.No pericardial effusion.  PLEURA: Moderate right pleural effusion, increased in size.  LUNGS AND LARGE AIRWAYS: Patchy opacities throughout the right upper,   middle and lower lobes compatible with pneumonia. No discrete nodule or   mass. Patent central airways.   VISUALIZED UPPER ABDOMEN: Within normal limits.  BONES: No acute abnormality.  CHEST WALL:  Unremarkable    IMPRESSION:     Multilobar right lung pneumonia.  Moderate right pleural effusion.        < end of copied text >      Radiology: all available radiological tests reviewed    Advanced directives addressed: full resuscitation

## 2018-07-06 NOTE — PROGRESS NOTE ADULT - PROBLEM SELECTOR PROBLEM 1
R/O Pneumothorax on right
Pneumonia involving right lung
Pneumonia of both lower lobes
Pneumonia of both lower lobes
R/O Pneumothorax on right

## 2018-07-07 PROCEDURE — 99232 SBSQ HOSP IP/OBS MODERATE 35: CPT

## 2018-07-07 PROCEDURE — 71045 X-RAY EXAM CHEST 1 VIEW: CPT | Mod: 26

## 2018-07-07 RX ORDER — ALPRAZOLAM 0.25 MG
0.25 TABLET ORAL ONCE
Qty: 0 | Refills: 0 | Status: DISCONTINUED | OUTPATIENT
Start: 2018-07-07 | End: 2018-07-07

## 2018-07-07 RX ADMIN — Medication 3 MILLILITER(S): at 07:53

## 2018-07-07 RX ADMIN — Medication 20 MILLIGRAM(S): at 18:07

## 2018-07-07 RX ADMIN — Medication 20 MILLIEQUIVALENT(S): at 12:14

## 2018-07-07 RX ADMIN — Medication 20 MILLIGRAM(S): at 05:37

## 2018-07-07 RX ADMIN — Medication 100 MILLIGRAM(S): at 21:37

## 2018-07-07 RX ADMIN — HEPARIN SODIUM 5000 UNIT(S): 5000 INJECTION INTRAVENOUS; SUBCUTANEOUS at 05:37

## 2018-07-07 RX ADMIN — ATORVASTATIN CALCIUM 20 MILLIGRAM(S): 80 TABLET, FILM COATED ORAL at 21:37

## 2018-07-07 RX ADMIN — ESCITALOPRAM OXALATE 20 MILLIGRAM(S): 10 TABLET, FILM COATED ORAL at 12:14

## 2018-07-07 RX ADMIN — Medication 0.25 MILLIGRAM(S): at 12:40

## 2018-07-07 RX ADMIN — Medication 81 MILLIGRAM(S): at 12:14

## 2018-07-07 RX ADMIN — Medication 3 MILLILITER(S): at 14:28

## 2018-07-07 RX ADMIN — FINASTERIDE 5 MILLIGRAM(S): 5 TABLET, FILM COATED ORAL at 12:14

## 2018-07-07 RX ADMIN — HEPARIN SODIUM 5000 UNIT(S): 5000 INJECTION INTRAVENOUS; SUBCUTANEOUS at 18:07

## 2018-07-07 RX ADMIN — Medication 3 MILLILITER(S): at 20:43

## 2018-07-07 RX ADMIN — TAMSULOSIN HYDROCHLORIDE 0.4 MILLIGRAM(S): 0.4 CAPSULE ORAL at 21:37

## 2018-07-07 NOTE — PROGRESS NOTE ADULT - SUBJECTIVE AND OBJECTIVE BOX
CC:  Patient is a 77y old  Male who presents with a chief complaint of sob, fever (28 Jun 2018 13:43)    78 yo WM h/o HTN;  hyperlipidemia;  COPD-on home O2;  BPH, presented to ED c/o increasing SOB and fever,  found to have Pna and parapneumonic effusion.  -s/p Rt Ctube placement. Hosp course complicated by post op PTX    7.3: CT placed under water seal  7.4: CT replaced under suction  7.5: patient feels well, wants to go home...CT under suction -40  7.6: c/o dyspnea on exertion, comfortable at rest.....tolerated diet  CT  placed under water seal  7/7 - chest tube removed and pt cleared by ct surg for dc but pt is very weak and family agreeable to rehab.  + anxiety. breathing better    ROS:   All 10 systems reviewed and found to be negative with the exception of what has been described above.      ICU Vital Signs Last 24 Hrs  T(C): 36.4 (07 Jul 2018 11:28), Max: 36.4 (07 Jul 2018 05:29)  T(F): 97.6 (07 Jul 2018 11:28), Max: 97.6 (07 Jul 2018 05:29)  HR: 90 (07 Jul 2018 11:28) (78 - 102)  BP: 130/57 (07 Jul 2018 11:28) (109/49 - 130/57)  BP(mean): --  ABP: --  ABP(mean): --  RR: 18 (07 Jul 2018 11:28) (17 - 18)  SpO2: 98% (07 Jul 2018 11:28) (96% - 98%)    PHYSICAL EXAM:    GEN: lying in bed, NAD  HEENT:   NC/AT, pupils equal and reactive, EOMI  CV:  +S1, +S2, RRR  RESP:    right side dressing in place, right side BS decreased   BREAST:  not examined  GI:  abdomen soft, non-tender, non-distended, normoactive BS  RECTAL:  not examined  :  not examined  MSK:   normal muscle tone  EXT:  no edema  NEURO:  AAOX3, no focal neurological deficits  SKIN:  no rashes               Labs:                                       9.7    16.00 )-----------( 243      ( 06 Jul 2018 07:11 )             30.7     07-06    140  |  101  |  31<H>  ----------------------------<  87  4.0   |  34<H>  |  0.83    Ca    7.7<L>      06 Jul 2018 07:11      Assessment and Plan:    1. CAP.  -Cx, no growth.  -Ceftriaxone IV day# 8;  Zithromax day#5 (completed)  will observe off Abx per ID    2. Parapneumonic pleural effusion.  -06/30, IR US guided thoracentesis.  900mL pleural fluid  -post procedural PTx slightly bigger...  Chest tube placed under water seal and removed 7/7  ct surg cleared for dc     3. AECOPD: improving  -O2.  Steroid taper as tolerated  -Albuterol/Atrovent nebs.  -Pulmonology following.    4. BPH: stable  cw Flomax/Proscar    5. Chronic diastolic L Chf:  stable  cw oral Lasix    plan to dc to rehab on monday given gen weakness  pt eval

## 2018-07-07 NOTE — PROGRESS NOTE ADULT - SUBJECTIVE AND OBJECTIVE BOX
Subjective:    pat better, lying in bed, no respiratory distress. s/p removal of R pigtail cath, with moderate R PTX.    Home Medications:  Advair  mcg-21 mcg/inh inhalation aerosol: 2 puff(s) inhaled 2 times a day (28 Jun 2018 13:37)  atorvastatin 20 mg oral tablet: 1 tab(s) orally once a day (28 Jun 2018 13:37)  cholecalciferol 1000 intl units oral tablet: 1 tab(s) orally once a day (28 Jun 2018 13:37)  clonazePAM 0.5 mg oral tablet: 1 tab(s) orally 3 times a day, As Needed for tremor (28 Jun 2018 13:37)  escitalopram 20 mg oral tablet: 1 tab(s) orally once a day (28 Jun 2018 13:37)  folic acid 1 mg oral tablet: 1 tab(s) orally once a day (28 Jun 2018 13:37)  Percocet 5/325 oral tablet: 1 tab(s) orally every 6 hours, As Needed (28 Jun 2018 13:37)  ProAir HFA 90 mcg/inh inhalation aerosol: 2 puff(s) inhaled 4 times a day, As Needed (28 Jun 2018 13:37)  Proscar 5 mg oral tablet: 1 tab(s) orally once a day (28 Jun 2018 13:37)  tamsulosin 0.4 mg oral capsule: 2 cap(s) orally once a day (at bedtime) (28 Jun 2018 13:37)  tiotropium 18 mcg inhalation capsule: 1 cap(s) inhaled once a day (28 Jun 2018 13:37)  traZODone 50 mg oral tablet: 2 tab(s) orally once a day (at bedtime) (28 Jun 2018 13:37)    MEDICATIONS  (STANDING):  ALBUTerol/ipratropium for Nebulization 3 milliLiter(s) Nebulizer every 6 hours  aspirin  chewable 81 milliGRAM(s) Oral daily  atorvastatin 20 milliGRAM(s) Oral at bedtime  escitalopram 20 milliGRAM(s) Oral daily  finasteride 5 milliGRAM(s) Oral daily  furosemide    Tablet 20 milliGRAM(s) Oral daily  heparin  Injectable 5000 Unit(s) SubCutaneous every 12 hours  potassium chloride    Tablet ER 20 milliEquivalent(s) Oral daily  predniSONE   Tablet 20 milliGRAM(s) Oral two times a day  tamsulosin 0.4 milliGRAM(s) Oral at bedtime  traZODone 100 milliGRAM(s) Oral at bedtime    MEDICATIONS  (PRN):      Allergies    No Known Allergies    Intolerances        Vital Signs Last 24 Hrs  T(C): 36.4 (07 Jul 2018 11:28), Max: 36.4 (07 Jul 2018 05:29)  T(F): 97.6 (07 Jul 2018 11:28), Max: 97.6 (07 Jul 2018 05:29)  HR: 90 (07 Jul 2018 11:28) (78 - 102)  BP: 130/57 (07 Jul 2018 11:28) (109/49 - 130/57)  BP(mean): --  RR: 18 (07 Jul 2018 11:28) (17 - 18)  SpO2: 98% (07 Jul 2018 11:28) (96% - 98%)      PHYSICAL EXAMINATION:    NECK:  Supple. No lymphadenopathy. Jugular venous pressure not elevated. Carotids equal.   HEART:   The cardiac impulse has a normal quality. Reg., Nl S1 and S2.  There are no murmurs, rubs or gallops noted  CHEST:  Chest is clear to auscultation. Normal respiratory effort.  ABDOMEN:  Soft and nontender.   EXTREMITIES:  There is no edema.       LABS:                        9.7    16.00 )-----------( 243      ( 06 Jul 2018 07:11 )             30.7     07-06    140  |  101  |  31<H>  ----------------------------<  87  4.0   |  34<H>  |  0.83    Ca    7.7<L>      06 Jul 2018 07:11        Chest 1 View- PORTABLE-Routine (07.07.18 @ 09:34) >  There is anunchanged moderate right pneumothorax with increasing opacity   at the loculated basilar component. There is unchanged mild airspace   opacity at the right lung base. The left lung is clear. The heart size is   within normal limits.

## 2018-07-07 NOTE — PROGRESS NOTE ADULT - ASSESSMENT
PROBLEMS:    Pneumonia involving right lung  Pleural effusion on right  Atelectasis of right lung  Lung nodules  Right PTX s/p right thoracentesis    PLAN:    persistent PTX  OFF rocephin  PO prednisone  dvt proph

## 2018-07-07 NOTE — PROGRESS NOTE ADULT - SUBJECTIVE AND OBJECTIVE BOX
Subjective: Patient is in no distress.  Asking when he will be discharged.  Denies shortness of breath, difficulty breathing.  CXR shows PTX but unchanged.    Vital Signs:  Vital Signs Last 24 Hrs  T(C): 36.4 (07-07-18 @ 05:29), Max: 36.7 (07-06-18 @ 10:33)  T(F): 97.6 (07-07-18 @ 05:29), Max: 98 (07-06-18 @ 10:33)  HR: 78 (07-07-18 @ 05:29) (78 - 102)  BP: 109/49 (07-07-18 @ 05:29) (95/62 - 120/52)  RR: 17 (07-07-18 @ 05:29) (17 - 17)  SpO2: 96% (07-07-18 @ 05:29) (92% - 96%) on (O2)      Relevant labs, radiology and Medications reviewed                        9.7    16.00 )-----------( 243      ( 06 Jul 2018 07:11 )             30.7     07-06    140  |  101  |  31<H>  ----------------------------<  87  4.0   |  34<H>  |  0.83    Ca    7.7<L>      06 Jul 2018 07:11        MEDICATIONS  (STANDING):  ALBUTerol/ipratropium for Nebulization 3 milliLiter(s) Nebulizer every 6 hours  aspirin  chewable 81 milliGRAM(s) Oral daily  atorvastatin 20 milliGRAM(s) Oral at bedtime  escitalopram 20 milliGRAM(s) Oral daily  finasteride 5 milliGRAM(s) Oral daily  furosemide    Tablet 20 milliGRAM(s) Oral daily  heparin  Injectable 5000 Unit(s) SubCutaneous every 12 hours  potassium chloride    Tablet ER 20 milliEquivalent(s) Oral daily  predniSONE   Tablet 20 milliGRAM(s) Oral two times a day  tamsulosin 0.4 milliGRAM(s) Oral at bedtime  traZODone 100 milliGRAM(s) Oral at bedtime    Physical exam  Gen: NAD, breathing comfortably.  Neuro: Awake and alert.  Card: S1 S2.  Pulm: Course breath sounds bilaterally.  Abd: Soft NT ND.  Ext: No edema.  CT site dressing is c/d/i.        Assessment  77y Male  w/ PAST MEDICAL & SURGICAL HISTORY:  Hematuria  BPH (benign prostatic hypertrophy)  Low back pain  Osteoarthritis  Pleural effusion: 2016  Hypercholesterolemia  Anxiety  Depression  COPD (chronic obstructive pulmonary disease)  HTN (hypertension)  Bladder tumor  Cataract extraction status: 2015  b/l  admitted with complaints of sob, fever (28 Jun 2018 13:43)  .  On (Date), patient underwent Thoracentesis, with US guidance      PLAN  CT site dressing can be removed after another 24 hours.   Remove dressing and shower normally after 48 hours, clean area with soap and water.   Dry non-occlusive dressing twice daily until healed.  Patient may follow up with Dr. Terrell in 7-10 days for any continuing symptoms.  Would await official CXR read from radiology, if unchanged would d/c home.

## 2018-07-08 RX ORDER — ALPRAZOLAM 0.25 MG
0.25 TABLET ORAL DAILY
Qty: 0 | Refills: 0 | Status: DISCONTINUED | OUTPATIENT
Start: 2018-07-08 | End: 2018-07-09

## 2018-07-08 RX ADMIN — FINASTERIDE 5 MILLIGRAM(S): 5 TABLET, FILM COATED ORAL at 12:26

## 2018-07-08 RX ADMIN — TAMSULOSIN HYDROCHLORIDE 0.4 MILLIGRAM(S): 0.4 CAPSULE ORAL at 23:02

## 2018-07-08 RX ADMIN — Medication 20 MILLIEQUIVALENT(S): at 12:26

## 2018-07-08 RX ADMIN — ATORVASTATIN CALCIUM 20 MILLIGRAM(S): 80 TABLET, FILM COATED ORAL at 23:02

## 2018-07-08 RX ADMIN — HEPARIN SODIUM 5000 UNIT(S): 5000 INJECTION INTRAVENOUS; SUBCUTANEOUS at 17:49

## 2018-07-08 RX ADMIN — Medication 3 MILLILITER(S): at 11:33

## 2018-07-08 RX ADMIN — Medication 20 MILLIGRAM(S): at 06:05

## 2018-07-08 RX ADMIN — HEPARIN SODIUM 5000 UNIT(S): 5000 INJECTION INTRAVENOUS; SUBCUTANEOUS at 06:07

## 2018-07-08 RX ADMIN — Medication 600 MILLIGRAM(S): at 17:49

## 2018-07-08 RX ADMIN — Medication 100 MILLIGRAM(S): at 23:02

## 2018-07-08 RX ADMIN — ESCITALOPRAM OXALATE 20 MILLIGRAM(S): 10 TABLET, FILM COATED ORAL at 12:26

## 2018-07-08 RX ADMIN — Medication 20 MILLIGRAM(S): at 06:04

## 2018-07-08 RX ADMIN — Medication 0.25 MILLIGRAM(S): at 13:18

## 2018-07-08 RX ADMIN — Medication 81 MILLIGRAM(S): at 12:26

## 2018-07-08 RX ADMIN — Medication 3 MILLILITER(S): at 20:38

## 2018-07-08 NOTE — PROGRESS NOTE ADULT - PROVIDER SPECIALTY LIST ADULT
CT Surgery
Hospitalist
Infectious Disease
Pulmonology
Surgery
Thoracic Surgery
Hospitalist
Hospitalist
Infectious Disease
CT Surgery

## 2018-07-08 NOTE — PROGRESS NOTE ADULT - ASSESSMENT
PROBLEMS:    Pneumonia involving right lung  Pleural effusion on right  Atelectasis of right lung  Lung nodules  Right PTX s/p right thoracentesis    PLAN:    waiting for placement  persistent PTX  OFF rocephin  PO prednisone  dvt proph

## 2018-07-08 NOTE — PROGRESS NOTE ADULT - SUBJECTIVE AND OBJECTIVE BOX
CC:  Patient is a 77y old  Male who presents with a chief complaint of sob, fever (28 Jun 2018 13:43)    76 yo WM h/o HTN;  hyperlipidemia;  COPD-on home O2;  BPH, presented to ED c/o increasing SOB and fever,  found to have Pna and parapneumonic effusion.  -s/p Rt Ctube placement. Hosp course complicated by post op PTX    7.3: CT placed under water seal  7.4: CT replaced under suction  7.5: patient feels well, wants to go home...CT under suction -40  7.6: c/o dyspnea on exertion, comfortable at rest.....tolerated diet  CT  placed under water seal  7/7 - chest tube removed and pt cleared by ct surg for dc but pt is very weak and family agreeable to rehab.  + anxiety. breathing better  7/8 - anxious at times as per RN.  no cp, occ productive cough.      ROS:   All 10 systems reviewed and found to be negative with the exception of what has been described above.    Vital Signs Last 24 Hrs  T(C): 36.6 (08 Jul 2018 11:23), Max: 37.1 (07 Jul 2018 17:29)  T(F): 97.9 (08 Jul 2018 11:23), Max: 98.7 (07 Jul 2018 17:29)  HR: 89 (08 Jul 2018 11:23) (81 - 98)  BP: 117/61 (08 Jul 2018 11:23) (117/61 - 149/57)  BP(mean): --  RR: 18 (08 Jul 2018 11:23) (18 - 18)  SpO2: 97% (08 Jul 2018 11:23) (96% - 99%)    PHYSICAL EXAM:    GEN: lying in bed, NAD  HEENT:   NC/AT, pupils equal and reactive, EOMI  CV:  +S1, +S2, RRR  RESP:    right side dressing in place, right side BS decreased with scattered rhonchi   BREAST:  not examined  GI:  abdomen soft, non-tender, non-distended, normoactive BS  RECTAL:  not examined  :  not examined  MSK:   normal muscle tone  EXT:  no edema  NEURO:  AAOX3, no focal neurological deficits  SKIN:  no rashes    MEDICATIONS  (STANDING):  ALBUTerol/ipratropium for Nebulization 3 milliLiter(s) Nebulizer every 6 hours  aspirin  chewable 81 milliGRAM(s) Oral daily  atorvastatin 20 milliGRAM(s) Oral at bedtime  escitalopram 20 milliGRAM(s) Oral daily  finasteride 5 milliGRAM(s) Oral daily  furosemide    Tablet 20 milliGRAM(s) Oral daily  heparin  Injectable 5000 Unit(s) SubCutaneous every 12 hours  potassium chloride    Tablet ER 20 milliEquivalent(s) Oral daily  predniSONE   Tablet 20 milliGRAM(s) Oral two times a day  tamsulosin 0.4 milliGRAM(s) Oral at bedtime  traZODone 100 milliGRAM(s) Oral at bedtime    MEDICATIONS  (PRN):             Labs:                                       9.7    16.00 )-----------( 243      ( 06 Jul 2018 07:11 )             30.7     07-06    140  |  101  |  31<H>  ----------------------------<  87  4.0   |  34<H>  |  0.83    Ca    7.7<L>      06 Jul 2018 07:11      Assessment and Plan:    1. CAP.  -Cx, no growth.  -Ceftriaxone IV day# 8;  Zithromax day#5 (completed)  will observe off Abx per ID    2. Parapneumonic pleural effusion.  -06/30, IR US guided thoracentesis.  900mL pleural fluid  -post procedural PTx slightly bigger -Chest tube placed under water seal and removed 7/7  ct surg cleared for dc     3. AECOPD: improving  -O2.  Steroid taper as tolerated  -Albuterol/Atrovent nebs.  -Pulmonology following.    4. BPH: stable  cw Flomax/Proscar    5. Chronic diastolic L Chf:  stable  cw oral Lasix    plan to dc to rehab on monday given gen weakness  pt eval

## 2018-07-08 NOTE — PROGRESS NOTE ADULT - SUBJECTIVE AND OBJECTIVE BOX
Subjective:    pat better, lying in bed, no respiratory distress.    Home Medications:  Advair  mcg-21 mcg/inh inhalation aerosol: 2 puff(s) inhaled 2 times a day (28 Jun 2018 13:37)  atorvastatin 20 mg oral tablet: 1 tab(s) orally once a day (28 Jun 2018 13:37)  cholecalciferol 1000 intl units oral tablet: 1 tab(s) orally once a day (28 Jun 2018 13:37)  clonazePAM 0.5 mg oral tablet: 1 tab(s) orally 3 times a day, As Needed for tremor (28 Jun 2018 13:37)  escitalopram 20 mg oral tablet: 1 tab(s) orally once a day (28 Jun 2018 13:37)  folic acid 1 mg oral tablet: 1 tab(s) orally once a day (28 Jun 2018 13:37)  Percocet 5/325 oral tablet: 1 tab(s) orally every 6 hours, As Needed (28 Jun 2018 13:37)  ProAir HFA 90 mcg/inh inhalation aerosol: 2 puff(s) inhaled 4 times a day, As Needed (28 Jun 2018 13:37)  Proscar 5 mg oral tablet: 1 tab(s) orally once a day (28 Jun 2018 13:37)  tamsulosin 0.4 mg oral capsule: 2 cap(s) orally once a day (at bedtime) (28 Jun 2018 13:37)  tiotropium 18 mcg inhalation capsule: 1 cap(s) inhaled once a day (28 Jun 2018 13:37)  traZODone 50 mg oral tablet: 2 tab(s) orally once a day (at bedtime) (28 Jun 2018 13:37)    MEDICATIONS  (STANDING):  ALBUTerol/ipratropium for Nebulization 3 milliLiter(s) Nebulizer every 6 hours  aspirin  chewable 81 milliGRAM(s) Oral daily  atorvastatin 20 milliGRAM(s) Oral at bedtime  escitalopram 20 milliGRAM(s) Oral daily  finasteride 5 milliGRAM(s) Oral daily  furosemide    Tablet 20 milliGRAM(s) Oral daily  guaiFENesin  milliGRAM(s) Oral every 12 hours  heparin  Injectable 5000 Unit(s) SubCutaneous every 12 hours  potassium chloride    Tablet ER 20 milliEquivalent(s) Oral daily  tamsulosin 0.4 milliGRAM(s) Oral at bedtime  traZODone 100 milliGRAM(s) Oral at bedtime    MEDICATIONS  (PRN):  ALPRAZolam 0.25 milliGRAM(s) Oral daily PRN anxiety      Allergies    No Known Allergies    Intolerances        Vital Signs Last 24 Hrs  T(C): 36.6 (08 Jul 2018 11:23), Max: 37.1 (07 Jul 2018 17:29)  T(F): 97.9 (08 Jul 2018 11:23), Max: 98.7 (07 Jul 2018 17:29)  HR: 89 (08 Jul 2018 11:23) (81 - 98)  BP: 117/61 (08 Jul 2018 11:23) (117/61 - 149/57)  BP(mean): --  RR: 18 (08 Jul 2018 11:23) (18 - 18)  SpO2: 97% (08 Jul 2018 11:23) (96% - 99%)      PHYSICAL EXAMINATION:    NECK:  Supple. No lymphadenopathy. Jugular venous pressure not elevated. Carotids equal.   HEART:   The cardiac impulse has a normal quality. Reg., Nl S1 and S2.  There are no murmurs, rubs or gallops noted  CHEST:  Chest is clear to auscultation. Normal respiratory effort.  ABDOMEN:  Soft and nontender.   EXTREMITIES:  There is no edema.       LABS:

## 2018-07-09 ENCOUNTER — TRANSCRIPTION ENCOUNTER (OUTPATIENT)
Age: 78
End: 2018-07-09

## 2018-07-09 VITALS
OXYGEN SATURATION: 98 % | DIASTOLIC BLOOD PRESSURE: 79 MMHG | HEART RATE: 92 BPM | SYSTOLIC BLOOD PRESSURE: 143 MMHG | TEMPERATURE: 98 F | RESPIRATION RATE: 18 BRPM

## 2018-07-09 LAB
ANION GAP SERPL CALC-SCNC: 8 MMOL/L — SIGNIFICANT CHANGE UP (ref 5–17)
BUN SERPL-MCNC: 28 MG/DL — HIGH (ref 7–23)
CALCIUM SERPL-MCNC: 8 MG/DL — LOW (ref 8.5–10.1)
CHLORIDE SERPL-SCNC: 100 MMOL/L — SIGNIFICANT CHANGE UP (ref 96–108)
CO2 SERPL-SCNC: 30 MMOL/L — SIGNIFICANT CHANGE UP (ref 22–31)
CREAT SERPL-MCNC: 1.02 MG/DL — SIGNIFICANT CHANGE UP (ref 0.5–1.3)
GLUCOSE SERPL-MCNC: 146 MG/DL — HIGH (ref 70–99)
HCT VFR BLD CALC: 35.1 % — LOW (ref 39–50)
HGB BLD-MCNC: 11.5 G/DL — LOW (ref 13–17)
MAGNESIUM SERPL-MCNC: 2 MG/DL — SIGNIFICANT CHANGE UP (ref 1.6–2.6)
MCHC RBC-ENTMCNC: 28.7 PG — SIGNIFICANT CHANGE UP (ref 27–34)
MCHC RBC-ENTMCNC: 32.8 GM/DL — SIGNIFICANT CHANGE UP (ref 32–36)
MCV RBC AUTO: 87.5 FL — SIGNIFICANT CHANGE UP (ref 80–100)
NRBC # BLD: 0 /100 WBCS — SIGNIFICANT CHANGE UP (ref 0–0)
PLATELET # BLD AUTO: 311 K/UL — SIGNIFICANT CHANGE UP (ref 150–400)
POTASSIUM SERPL-MCNC: 3.9 MMOL/L — SIGNIFICANT CHANGE UP (ref 3.5–5.3)
POTASSIUM SERPL-SCNC: 3.9 MMOL/L — SIGNIFICANT CHANGE UP (ref 3.5–5.3)
RBC # BLD: 4.01 M/UL — LOW (ref 4.2–5.8)
RBC # FLD: 14.1 % — SIGNIFICANT CHANGE UP (ref 10.3–14.5)
SODIUM SERPL-SCNC: 138 MMOL/L — SIGNIFICANT CHANGE UP (ref 135–145)
WBC # BLD: 23.42 K/UL — HIGH (ref 3.8–10.5)
WBC # FLD AUTO: 23.42 K/UL — HIGH (ref 3.8–10.5)

## 2018-07-09 RX ORDER — FUROSEMIDE 40 MG
1 TABLET ORAL
Qty: 0 | Refills: 0 | COMMUNITY
Start: 2018-07-09

## 2018-07-09 RX ORDER — POTASSIUM CHLORIDE 20 MEQ
1 PACKET (EA) ORAL
Qty: 0 | Refills: 0 | COMMUNITY
Start: 2018-07-09

## 2018-07-09 RX ADMIN — Medication 3 MILLILITER(S): at 14:19

## 2018-07-09 RX ADMIN — FINASTERIDE 5 MILLIGRAM(S): 5 TABLET, FILM COATED ORAL at 12:17

## 2018-07-09 RX ADMIN — Medication 600 MILLIGRAM(S): at 06:42

## 2018-07-09 RX ADMIN — Medication 81 MILLIGRAM(S): at 12:17

## 2018-07-09 RX ADMIN — Medication 3 MILLILITER(S): at 01:18

## 2018-07-09 RX ADMIN — ESCITALOPRAM OXALATE 20 MILLIGRAM(S): 10 TABLET, FILM COATED ORAL at 12:17

## 2018-07-09 RX ADMIN — Medication 20 MILLIGRAM(S): at 06:42

## 2018-07-09 RX ADMIN — Medication 3 MILLILITER(S): at 08:37

## 2018-07-09 RX ADMIN — HEPARIN SODIUM 5000 UNIT(S): 5000 INJECTION INTRAVENOUS; SUBCUTANEOUS at 06:42

## 2018-07-09 RX ADMIN — Medication 20 MILLIEQUIVALENT(S): at 12:17

## 2018-07-09 NOTE — DISCHARGE NOTE ADULT - PATIENT PORTAL LINK FT
You can access the [facility] Patient Portal, offered by [facility], by registering with the following website: [email or web address]

## 2018-07-09 NOTE — DISCHARGE NOTE ADULT - HOSPITAL COURSE
76 yo WM h/o HTN;  hyperlipidemia;  COPD-on home O2;  BPH, presented to ED c/o increasing SOB and fever,  pt admitted with CAP and found to have parapneumonic effusion. pt is s/p Rt Chest tube placement. Hosp course complicated by post op PTX.  pt had chest tube placement and managed by cardiothoracic surgery and chest tube removed 7/7 with sterile dressing in place and should be changed daily.   pt celared from CT surg perspective.     Vital Signs Last 24 Hrs  T(C): 36.4 (09 Jul 2018 05:10), Max: 36.9 (08 Jul 2018 17:00)  T(F): 97.5 (09 Jul 2018 05:10), Max: 98.4 (08 Jul 2018 17:00)  HR: 78 (09 Jul 2018 14:20) (76 - 94)  BP: 134/56 (09 Jul 2018 05:10) (130/53 - 148/68)  BP(mean): --  RR: 18 (08 Jul 2018 23:10) (18 - 18)  SpO2: 96% (09 Jul 2018 05:10) (91% - 99%)    PHYSICAL EXAM:    GEN: lying in bed, NAD  HEENT:   NC/AT, pupils equal and reactive, EOMI  CV:  +S1, +S2, RRR  RESP:    right side dressing in place, right side BS decreased with scattered rhonchi   BREAST:  not examined  GI:  abdomen soft, non-tender, non-distended, normoactive BS  RECTAL:  not examined  :  not examined  MSK:   normal muscle tone  EXT:  no edema  NEURO:  AAOX3, no focal neurological deficits  SKIN:  no rashes  1. CAP. completed ceftriaxone 8 days and 5 days of zithro. no further abx as per ID  2. Parapneumonic pleural effusion.  -06/30, IR US guided thoracentesis.  900mL pleural fluid  -post procedural PTx slightly bigger -Chest tube placed under water seal and removed 7/7  ct surg cleared for dc and f/u with dr meyer as outpt    3. AECOPD: prednisone 10 mg for 3 more days  -Albuterol/Atrovent nebs.  outpt f/u with pmd/pulm dr gomez     4. BPH: stable  cw Flomax/Proscar    5. Chronic diastolic L Chf:  stable  cw oral Lasix    discharge time spent 35 mins

## 2018-07-09 NOTE — DISCHARGE NOTE ADULT - CARE PROVIDER_API CALL
Dg Glover), Internal Medicine; Pulmonary Disease  [address] [place] [address]  Phone: [phone]  Fax: [phone]    Darian Terrell), Thoracic Surgery  [address] [place] [address]  Phone: [phone]  Fax: [phone]

## 2018-07-09 NOTE — DISCHARGE NOTE ADULT - CARE PLAN
Principal Discharge DX:	Pneumonia of right lung due to infectious organism, unspecified part of lung  Goal:	clear infection  Assessment and plan of treatment:	rehab and follow up with pulmonary and cardithoracic surg after rehab

## 2018-07-09 NOTE — DISCHARGE NOTE ADULT - MEDICATION SUMMARY - MEDICATIONS TO TAKE
I will START or STAY ON the medications listed below when I get home from the hospital:    Proscar 5 mg oral tablet  -- 1 tab(s) by mouth once a day  -- Indication: For Prostate    predniSONE 20 mg oral tablet  -- 0.5 tab(s) by mouth once a day X 3 days then stop   -- Indication: For for lungs    Percocet 5/325 oral tablet  -- 1 tab(s) by mouth every 6 hours, As Needed  -- Indication: For Pain    aspirin 81 mg oral tablet, chewable  -- 1 tab(s) by mouth once a day  -- Indication: For Heart medicine    tamsulosin 0.4 mg oral capsule  -- 2 cap(s) by mouth once a day (at bedtime)  -- Indication: For Prostate medicine    clonazePAM 0.5 mg oral tablet  -- 1 tab(s) by mouth 3 times a day, As Needed for tremor  -- Indication: For for anxiety    escitalopram 20 mg oral tablet  -- 1 tab(s) by mouth once a day  -- Indication: For for mood    traZODone 50 mg oral tablet  -- 2 tab(s) by mouth once a day (at bedtime)  -- Indication: For for mood    atorvastatin 20 mg oral tablet  -- 1 tab(s) by mouth once a day  -- Indication: For Heart medicine    Advair  mcg-21 mcg/inh inhalation aerosol  -- 2 puff(s) inhaled 2 times a day  -- Indication: For for lung    tiotropium 18 mcg inhalation capsule  -- 1 cap(s) inhaled once a day  -- Indication: For for lung    ProAir HFA 90 mcg/inh inhalation aerosol  -- 2 puff(s) inhaled 4 times a day, As Needed  -- Indication: For for lung    furosemide 20 mg oral tablet  -- 1 tab(s) by mouth once a day  -- Indication: For water pill    guaiFENesin 600 mg oral tablet, extended release  -- 1 tab(s) by mouth every 12 hours  -- Indication: For COugh med    potassium chloride 20 mEq oral tablet, extended release  -- 1 tab(s) by mouth once a day  -- Indication: For Supplement    folic acid 1 mg oral tablet  -- 1 tab(s) by mouth once a day  -- Indication: For vitamin    cholecalciferol 1000 intl units oral tablet  -- 1 tab(s) by mouth once a day  -- Indication: For vitamin

## 2018-07-09 NOTE — DISCHARGE NOTE ADULT - CARE PROVIDERS DIRECT ADDRESSES
[email or web address]

## 2018-07-13 DIAGNOSIS — I11.0 HYPERTENSIVE HEART DISEASE WITH HEART FAILURE: ICD-10-CM

## 2018-07-13 DIAGNOSIS — R91.8 OTHER NONSPECIFIC ABNORMAL FINDING OF LUNG FIELD: ICD-10-CM

## 2018-07-13 DIAGNOSIS — Z99.81 DEPENDENCE ON SUPPLEMENTAL OXYGEN: ICD-10-CM

## 2018-07-13 DIAGNOSIS — F03.90 UNSPECIFIED DEMENTIA WITHOUT BEHAVIORAL DISTURBANCE: ICD-10-CM

## 2018-07-13 DIAGNOSIS — J90 PLEURAL EFFUSION, NOT ELSEWHERE CLASSIFIED: ICD-10-CM

## 2018-07-13 DIAGNOSIS — J44.0 CHRONIC OBSTRUCTIVE PULMONARY DISEASE WITH (ACUTE) LOWER RESPIRATORY INFECTION: ICD-10-CM

## 2018-07-13 DIAGNOSIS — Z87.891 PERSONAL HISTORY OF NICOTINE DEPENDENCE: ICD-10-CM

## 2018-07-13 DIAGNOSIS — J98.11 ATELECTASIS: ICD-10-CM

## 2018-07-13 DIAGNOSIS — R06.02 SHORTNESS OF BREATH: ICD-10-CM

## 2018-07-13 DIAGNOSIS — Z79.899 OTHER LONG TERM (CURRENT) DRUG THERAPY: ICD-10-CM

## 2018-07-13 DIAGNOSIS — Z79.82 LONG TERM (CURRENT) USE OF ASPIRIN: ICD-10-CM

## 2018-07-13 DIAGNOSIS — I50.32 CHRONIC DIASTOLIC (CONGESTIVE) HEART FAILURE: ICD-10-CM

## 2018-07-13 DIAGNOSIS — E87.6 HYPOKALEMIA: ICD-10-CM

## 2018-07-13 DIAGNOSIS — E44.0 MODERATE PROTEIN-CALORIE MALNUTRITION: ICD-10-CM

## 2018-07-13 DIAGNOSIS — J18.9 PNEUMONIA, UNSPECIFIED ORGANISM: ICD-10-CM

## 2018-07-13 DIAGNOSIS — E78.5 HYPERLIPIDEMIA, UNSPECIFIED: ICD-10-CM

## 2018-07-13 DIAGNOSIS — J95.811 POSTPROCEDURAL PNEUMOTHORAX: ICD-10-CM

## 2018-07-13 DIAGNOSIS — J44.1 CHRONIC OBSTRUCTIVE PULMONARY DISEASE WITH (ACUTE) EXACERBATION: ICD-10-CM

## 2018-07-13 DIAGNOSIS — N40.0 BENIGN PROSTATIC HYPERPLASIA WITHOUT LOWER URINARY TRACT SYMPTOMS: ICD-10-CM

## 2018-07-13 DIAGNOSIS — A41.9 SEPSIS, UNSPECIFIED ORGANISM: ICD-10-CM
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
## 2018-07-23 NOTE — ED ADULT NURSE NOTE - CAS TRG GENERAL NORM CIRC DET
[No studies available for review at this time.] : No studies available for review at this time. Strong peripheral pulses

## 2018-07-28 PROBLEM — Z78.9 ALCOHOL USE: Status: ACTIVE | Noted: 2017-04-26

## 2018-08-14 ENCOUNTER — INPATIENT (INPATIENT)
Facility: HOSPITAL | Age: 78
LOS: 5 days | Discharge: HOME HEALTH | End: 2018-08-20
Attending: FAMILY MEDICINE | Admitting: FAMILY MEDICINE
Payer: MEDICARE

## 2018-08-14 VITALS
TEMPERATURE: 98 F | DIASTOLIC BLOOD PRESSURE: 56 MMHG | SYSTOLIC BLOOD PRESSURE: 114 MMHG | OXYGEN SATURATION: 99 % | RESPIRATION RATE: 18 BRPM | HEART RATE: 101 BPM

## 2018-08-14 DIAGNOSIS — Z98.49 CATARACT EXTRACTION STATUS, UNSPECIFIED EYE: Chronic | ICD-10-CM

## 2018-08-14 PROBLEM — I10 ESSENTIAL (PRIMARY) HYPERTENSION: Chronic | Status: ACTIVE | Noted: 2017-06-06

## 2018-08-14 PROBLEM — M54.5 LOW BACK PAIN: Chronic | Status: ACTIVE | Noted: 2017-06-06

## 2018-08-14 PROBLEM — J90 PLEURAL EFFUSION, NOT ELSEWHERE CLASSIFIED: Chronic | Status: ACTIVE | Noted: 2017-06-06

## 2018-08-14 PROBLEM — M19.90 UNSPECIFIED OSTEOARTHRITIS, UNSPECIFIED SITE: Chronic | Status: ACTIVE | Noted: 2017-06-06

## 2018-08-14 PROBLEM — N40.0 BENIGN PROSTATIC HYPERPLASIA WITHOUT LOWER URINARY TRACT SYMPTOMS: Chronic | Status: ACTIVE | Noted: 2017-06-06

## 2018-08-14 PROBLEM — J44.9 CHRONIC OBSTRUCTIVE PULMONARY DISEASE, UNSPECIFIED: Chronic | Status: ACTIVE | Noted: 2017-06-06

## 2018-08-14 PROBLEM — R31.9 HEMATURIA, UNSPECIFIED: Chronic | Status: ACTIVE | Noted: 2017-06-06

## 2018-08-14 PROBLEM — F41.9 ANXIETY DISORDER, UNSPECIFIED: Chronic | Status: ACTIVE | Noted: 2017-06-06

## 2018-08-14 PROBLEM — F32.9 MAJOR DEPRESSIVE DISORDER, SINGLE EPISODE, UNSPECIFIED: Chronic | Status: ACTIVE | Noted: 2017-06-06

## 2018-08-14 PROBLEM — D49.4 NEOPLASM OF UNSPECIFIED BEHAVIOR OF BLADDER: Chronic | Status: ACTIVE | Noted: 2017-06-06

## 2018-08-14 PROBLEM — E78.00 PURE HYPERCHOLESTEROLEMIA, UNSPECIFIED: Chronic | Status: ACTIVE | Noted: 2017-06-06

## 2018-08-14 LAB
ALBUMIN SERPL ELPH-MCNC: 2.7 G/DL — LOW (ref 3.3–5)
ALP SERPL-CCNC: 113 U/L — SIGNIFICANT CHANGE UP (ref 40–120)
ALT FLD-CCNC: 17 U/L — SIGNIFICANT CHANGE UP (ref 12–78)
ANION GAP SERPL CALC-SCNC: 9 MMOL/L — SIGNIFICANT CHANGE UP (ref 5–17)
APPEARANCE UR: CLEAR — SIGNIFICANT CHANGE UP
APTT BLD: 29.6 SEC — SIGNIFICANT CHANGE UP (ref 27.5–37.4)
AST SERPL-CCNC: 17 U/L — SIGNIFICANT CHANGE UP (ref 15–37)
BASOPHILS # BLD AUTO: 0.02 K/UL — SIGNIFICANT CHANGE UP (ref 0–0.2)
BASOPHILS NFR BLD AUTO: 0.1 % — SIGNIFICANT CHANGE UP (ref 0–2)
BILIRUB SERPL-MCNC: 0.4 MG/DL — SIGNIFICANT CHANGE UP (ref 0.2–1.2)
BILIRUB UR-MCNC: NEGATIVE — SIGNIFICANT CHANGE UP
BUN SERPL-MCNC: 19 MG/DL — SIGNIFICANT CHANGE UP (ref 7–23)
CALCIUM SERPL-MCNC: 7.9 MG/DL — LOW (ref 8.5–10.1)
CHLORIDE SERPL-SCNC: 101 MMOL/L — SIGNIFICANT CHANGE UP (ref 96–108)
CO2 SERPL-SCNC: 32 MMOL/L — HIGH (ref 22–31)
COLOR SPEC: YELLOW — SIGNIFICANT CHANGE UP
CREAT SERPL-MCNC: 1.25 MG/DL — SIGNIFICANT CHANGE UP (ref 0.5–1.3)
DIFF PNL FLD: ABNORMAL
EOSINOPHIL # BLD AUTO: 0.05 K/UL — SIGNIFICANT CHANGE UP (ref 0–0.5)
EOSINOPHIL NFR BLD AUTO: 0.3 % — SIGNIFICANT CHANGE UP (ref 0–6)
GLUCOSE SERPL-MCNC: 145 MG/DL — HIGH (ref 70–99)
GLUCOSE UR QL: NEGATIVE MG/DL — SIGNIFICANT CHANGE UP
HCT VFR BLD CALC: 30.3 % — LOW (ref 39–50)
HGB BLD-MCNC: 9.7 G/DL — LOW (ref 13–17)
IMM GRANULOCYTES NFR BLD AUTO: 0.4 % — SIGNIFICANT CHANGE UP (ref 0–1.5)
INR BLD: 1.14 RATIO — SIGNIFICANT CHANGE UP (ref 0.88–1.16)
KETONES UR-MCNC: NEGATIVE — SIGNIFICANT CHANGE UP
LACTATE SERPL-SCNC: 2 MMOL/L — SIGNIFICANT CHANGE UP (ref 0.7–2)
LEUKOCYTE ESTERASE UR-ACNC: NEGATIVE — SIGNIFICANT CHANGE UP
LYMPHOCYTES # BLD AUTO: 0.85 K/UL — LOW (ref 1–3.3)
LYMPHOCYTES # BLD AUTO: 5.9 % — LOW (ref 13–44)
MCHC RBC-ENTMCNC: 29.1 PG — SIGNIFICANT CHANGE UP (ref 27–34)
MCHC RBC-ENTMCNC: 32 GM/DL — SIGNIFICANT CHANGE UP (ref 32–36)
MCV RBC AUTO: 91 FL — SIGNIFICANT CHANGE UP (ref 80–100)
MONOCYTES # BLD AUTO: 2.34 K/UL — HIGH (ref 0–0.9)
MONOCYTES NFR BLD AUTO: 16.2 % — HIGH (ref 2–14)
NEUTROPHILS # BLD AUTO: 11.14 K/UL — HIGH (ref 1.8–7.4)
NEUTROPHILS NFR BLD AUTO: 77.1 % — HIGH (ref 43–77)
NITRITE UR-MCNC: NEGATIVE — SIGNIFICANT CHANGE UP
PH UR: 6 — SIGNIFICANT CHANGE UP (ref 5–8)
PLATELET # BLD AUTO: 274 K/UL — SIGNIFICANT CHANGE UP (ref 150–400)
POTASSIUM SERPL-MCNC: 3 MMOL/L — LOW (ref 3.5–5.3)
POTASSIUM SERPL-SCNC: 3 MMOL/L — LOW (ref 3.5–5.3)
PROT SERPL-MCNC: 7.4 GM/DL — SIGNIFICANT CHANGE UP (ref 6–8.3)
PROT UR-MCNC: 30 MG/DL
PROTHROM AB SERPL-ACNC: 12.4 SEC — SIGNIFICANT CHANGE UP (ref 9.8–12.7)
RBC # BLD: 3.33 M/UL — LOW (ref 4.2–5.8)
RBC # FLD: 13.1 % — SIGNIFICANT CHANGE UP (ref 10.3–14.5)
SODIUM SERPL-SCNC: 142 MMOL/L — SIGNIFICANT CHANGE UP (ref 135–145)
SP GR SPEC: 1.01 — SIGNIFICANT CHANGE UP (ref 1.01–1.02)
UROBILINOGEN FLD QL: 1 MG/DL
WBC # BLD: 14.46 K/UL — HIGH (ref 3.8–10.5)
WBC # FLD AUTO: 14.46 K/UL — HIGH (ref 3.8–10.5)

## 2018-08-14 PROCEDURE — 71250 CT THORAX DX C-: CPT | Mod: 26

## 2018-08-14 PROCEDURE — 99285 EMERGENCY DEPT VISIT HI MDM: CPT

## 2018-08-14 PROCEDURE — 93010 ELECTROCARDIOGRAM REPORT: CPT

## 2018-08-14 PROCEDURE — 71045 X-RAY EXAM CHEST 1 VIEW: CPT | Mod: 26

## 2018-08-14 RX ORDER — PIPERACILLIN AND TAZOBACTAM 4; .5 G/20ML; G/20ML
3.38 INJECTION, POWDER, LYOPHILIZED, FOR SOLUTION INTRAVENOUS ONCE
Qty: 0 | Refills: 0 | Status: COMPLETED | OUTPATIENT
Start: 2018-08-14 | End: 2018-08-14

## 2018-08-14 RX ORDER — TAMSULOSIN HYDROCHLORIDE 0.4 MG/1
0.8 CAPSULE ORAL AT BEDTIME
Qty: 0 | Refills: 0 | Status: DISCONTINUED | OUTPATIENT
Start: 2018-08-14 | End: 2018-08-20

## 2018-08-14 RX ORDER — CLONAZEPAM 1 MG
0.5 TABLET ORAL THREE TIMES A DAY
Qty: 0 | Refills: 0 | Status: DISCONTINUED | OUTPATIENT
Start: 2018-08-14 | End: 2018-08-17

## 2018-08-14 RX ORDER — VANCOMYCIN HCL 1 G
1000 VIAL (EA) INTRAVENOUS ONCE
Qty: 0 | Refills: 0 | Status: COMPLETED | OUTPATIENT
Start: 2018-08-14 | End: 2018-08-14

## 2018-08-14 RX ORDER — CHOLECALCIFEROL (VITAMIN D3) 125 MCG
1 CAPSULE ORAL
Qty: 0 | Refills: 0 | COMMUNITY

## 2018-08-14 RX ORDER — SENNA PLUS 8.6 MG/1
2 TABLET ORAL AT BEDTIME
Qty: 0 | Refills: 0 | Status: DISCONTINUED | OUTPATIENT
Start: 2018-08-14 | End: 2018-08-20

## 2018-08-14 RX ORDER — FINASTERIDE 5 MG/1
1 TABLET, FILM COATED ORAL
Qty: 0 | Refills: 0 | COMMUNITY

## 2018-08-14 RX ORDER — POTASSIUM CHLORIDE 20 MEQ
40 PACKET (EA) ORAL ONCE
Qty: 0 | Refills: 0 | Status: COMPLETED | OUTPATIENT
Start: 2018-08-14 | End: 2018-08-14

## 2018-08-14 RX ORDER — ACETAMINOPHEN 500 MG
650 TABLET ORAL EVERY 6 HOURS
Qty: 0 | Refills: 0 | Status: DISCONTINUED | OUTPATIENT
Start: 2018-08-14 | End: 2018-08-20

## 2018-08-14 RX ORDER — FOLIC ACID 0.8 MG
1 TABLET ORAL
Qty: 0 | Refills: 0 | COMMUNITY

## 2018-08-14 RX ORDER — FINASTERIDE 5 MG/1
5 TABLET, FILM COATED ORAL DAILY
Qty: 0 | Refills: 0 | Status: DISCONTINUED | OUTPATIENT
Start: 2018-08-14 | End: 2018-08-20

## 2018-08-14 RX ORDER — ASPIRIN/CALCIUM CARB/MAGNESIUM 324 MG
81 TABLET ORAL DAILY
Qty: 0 | Refills: 0 | Status: DISCONTINUED | OUTPATIENT
Start: 2018-08-14 | End: 2018-08-20

## 2018-08-14 RX ORDER — ATORVASTATIN CALCIUM 80 MG/1
20 TABLET, FILM COATED ORAL AT BEDTIME
Qty: 0 | Refills: 0 | Status: DISCONTINUED | OUTPATIENT
Start: 2018-08-14 | End: 2018-08-20

## 2018-08-14 RX ORDER — ATORVASTATIN CALCIUM 80 MG/1
1 TABLET, FILM COATED ORAL
Qty: 0 | Refills: 0 | COMMUNITY

## 2018-08-14 RX ORDER — ONDANSETRON 8 MG/1
4 TABLET, FILM COATED ORAL EVERY 6 HOURS
Qty: 0 | Refills: 0 | Status: DISCONTINUED | OUTPATIENT
Start: 2018-08-14 | End: 2018-08-20

## 2018-08-14 RX ORDER — SODIUM CHLORIDE 9 MG/ML
2100 INJECTION INTRAMUSCULAR; INTRAVENOUS; SUBCUTANEOUS ONCE
Qty: 0 | Refills: 0 | Status: COMPLETED | OUTPATIENT
Start: 2018-08-14 | End: 2018-08-14

## 2018-08-14 RX ORDER — PIPERACILLIN AND TAZOBACTAM 4; .5 G/20ML; G/20ML
3.38 INJECTION, POWDER, LYOPHILIZED, FOR SOLUTION INTRAVENOUS EVERY 8 HOURS
Qty: 0 | Refills: 0 | Status: DISCONTINUED | OUTPATIENT
Start: 2018-08-14 | End: 2018-08-20

## 2018-08-14 RX ORDER — BUDESONIDE AND FORMOTEROL FUMARATE DIHYDRATE 160; 4.5 UG/1; UG/1
2 AEROSOL RESPIRATORY (INHALATION)
Qty: 0 | Refills: 0 | Status: DISCONTINUED | OUTPATIENT
Start: 2018-08-14 | End: 2018-08-20

## 2018-08-14 RX ORDER — FLUTICASONE PROPIONATE AND SALMETEROL 50; 250 UG/1; UG/1
2 POWDER ORAL; RESPIRATORY (INHALATION)
Qty: 0 | Refills: 0 | COMMUNITY

## 2018-08-14 RX ORDER — TIOTROPIUM BROMIDE 18 UG/1
1 CAPSULE ORAL; RESPIRATORY (INHALATION)
Qty: 0 | Refills: 0 | COMMUNITY

## 2018-08-14 RX ORDER — ESCITALOPRAM OXALATE 10 MG/1
20 TABLET, FILM COATED ORAL DAILY
Qty: 0 | Refills: 0 | Status: DISCONTINUED | OUTPATIENT
Start: 2018-08-14 | End: 2018-08-20

## 2018-08-14 RX ORDER — AZITHROMYCIN 500 MG/1
500 TABLET, FILM COATED ORAL ONCE
Qty: 0 | Refills: 0 | Status: COMPLETED | OUTPATIENT
Start: 2018-08-14 | End: 2018-08-14

## 2018-08-14 RX ORDER — ACETAMINOPHEN 500 MG
650 TABLET ORAL ONCE
Qty: 0 | Refills: 0 | Status: COMPLETED | OUTPATIENT
Start: 2018-08-14 | End: 2018-08-14

## 2018-08-14 RX ORDER — TRAZODONE HCL 50 MG
2 TABLET ORAL
Qty: 0 | Refills: 0 | COMMUNITY

## 2018-08-14 RX ORDER — CLONAZEPAM 1 MG
1 TABLET ORAL
Qty: 0 | Refills: 0 | COMMUNITY

## 2018-08-14 RX ORDER — IPRATROPIUM/ALBUTEROL SULFATE 18-103MCG
3 AEROSOL WITH ADAPTER (GRAM) INHALATION EVERY 6 HOURS
Qty: 0 | Refills: 0 | Status: DISCONTINUED | OUTPATIENT
Start: 2018-08-14 | End: 2018-08-20

## 2018-08-14 RX ORDER — ALBUTEROL 90 UG/1
2 AEROSOL, METERED ORAL
Qty: 0 | Refills: 0 | COMMUNITY

## 2018-08-14 RX ORDER — HEPARIN SODIUM 5000 [USP'U]/ML
5000 INJECTION INTRAVENOUS; SUBCUTANEOUS EVERY 8 HOURS
Qty: 0 | Refills: 0 | Status: DISCONTINUED | OUTPATIENT
Start: 2018-08-14 | End: 2018-08-20

## 2018-08-14 RX ADMIN — Medication 40 MILLIEQUIVALENT(S): at 18:31

## 2018-08-14 RX ADMIN — HEPARIN SODIUM 5000 UNIT(S): 5000 INJECTION INTRAVENOUS; SUBCUTANEOUS at 22:13

## 2018-08-14 RX ADMIN — Medication 250 MILLIGRAM(S): at 17:42

## 2018-08-14 RX ADMIN — SODIUM CHLORIDE 2100 MILLILITER(S): 9 INJECTION INTRAMUSCULAR; INTRAVENOUS; SUBCUTANEOUS at 16:33

## 2018-08-14 RX ADMIN — AZITHROMYCIN 255 MILLIGRAM(S): 500 TABLET, FILM COATED ORAL at 17:42

## 2018-08-14 RX ADMIN — PIPERACILLIN AND TAZOBACTAM 200 GRAM(S): 4; .5 INJECTION, POWDER, LYOPHILIZED, FOR SOLUTION INTRAVENOUS at 16:51

## 2018-08-14 RX ADMIN — Medication 650 MILLIGRAM(S): at 16:42

## 2018-08-14 RX ADMIN — Medication 0.5 MILLIGRAM(S): at 22:12

## 2018-08-14 RX ADMIN — PIPERACILLIN AND TAZOBACTAM 25 GRAM(S): 4; .5 INJECTION, POWDER, LYOPHILIZED, FOR SOLUTION INTRAVENOUS at 22:11

## 2018-08-14 RX ADMIN — TAMSULOSIN HYDROCHLORIDE 0.8 MILLIGRAM(S): 0.4 CAPSULE ORAL at 22:12

## 2018-08-14 RX ADMIN — ATORVASTATIN CALCIUM 20 MILLIGRAM(S): 80 TABLET, FILM COATED ORAL at 22:12

## 2018-08-14 NOTE — ED PROVIDER NOTE - NS ED ROS FT
Constitutional: +fever  Eyes: No visual changes  HEENT: No throat pain  CV: No chest pain  Resp: No SOB +productive cough  GI: No abd pain, nausea or vomiting  : No dysuria  MSK: No musculoskeletal pain  Skin: No rash  Neuro: No headache

## 2018-08-14 NOTE — ED PROVIDER NOTE - PROGRESS NOTE DETAILS
pt with hr 90 and bp 110/73 - xray shows pna - pt endorsed to Dr. Salinas who accepts patient. Lorne Hurt M.D., Attending Physician

## 2018-08-14 NOTE — H&P ADULT - PMH
Anxiety    Bladder tumor    BPH (benign prostatic hypertrophy)    Chronic respiratory failure    COPD (chronic obstructive pulmonary disease)    Depression    Hematuria    HTN (hypertension)    Hypercholesterolemia    Low back pain    Osteoarthritis    Pleural effusion  2016

## 2018-08-14 NOTE — H&P ADULT - NSHPLABSRESULTS_GEN_ALL_CORE
9.7    14.46 )-----------( 274      ( 14 Aug 2018 16:17 )             30.3     08-14    142  |  101  |  19  ----------------------------<  145<H>  3.0<L>   |  32<H>  |  1.25    Ca    7.9<L>      14 Aug 2018 16:17    TPro  7.4  /  Alb  2.7<L>  /  TBili  0.4  /  DBili  x   /  AST  17  /  ALT  17  /  AlkPhos  113  08-14    CAPILLARY BLOOD GLUCOSE        LIVER FUNCTIONS - ( 14 Aug 2018 16:17 )  Alb: 2.7 g/dL / Pro: 7.4 gm/dL / ALK PHOS: 113 U/L / ALT: 17 U/L / AST: 17 U/L / GGT: x           PT/INR - ( 14 Aug 2018 16:17 )   PT: 12.4 sec;   INR: 1.14 ratio         PTT - ( 14 Aug 2018 16:17 )  PTT:29.6 sec      < from: Xray Chest 1 View-PORTABLE IMMEDIATE (08.14.18 @ 16:31) >        Impression:     Moderate right pleural effusion with right lower lobe atelectasis and or   pneumonia increased in size from prior exam      < end of copied text >

## 2018-08-14 NOTE — ED PROVIDER NOTE - OBJECTIVE STATEMENT
79 y/o male with a PMHx of Prostate CA, anxiety, COPD, HTN, HLD, Depression presents to the ED c/o productive cough (ranjeet colored sputum), fever. Pt was previously admitted to the ED for PNA for three weeks. Pt is on 3 liters of O2 at home. Denies weakness, abd pain, N/V. NKDA. PCP: Dr. Glover 79 y/o male with a PMHx of Prostate CA, anxiety, COPD, HTN, HLD, Depression presents to the ED c/o productive cough (ranjeet colored sputum), fever. Pt was previously admitted to the ED for PNA for three weeks. Pt is on 3 liters of O2 at home. Denies weakness, abd pain, N/V. today home health aide noticed fever so sent pt to the ED. Here pt febrile tachycardic - code sepsis initiated. NKDA. PCP: Dr. Glover

## 2018-08-14 NOTE — ED PROVIDER NOTE - MEDICAL DECISION MAKING DETAILS
79 y/o male. hx of HTN, HLD, COPD. Recent admission to ED 1 month ago for pna. Pt presenting with fever, cough productive with ranjeet sputum, mild weakness. Exam reveals rhonchi on right base with diminished breath sounds b/l. Concerned for PNA 2/2 sepesis. Will order septic w/u, antibiotics and admit. 77 y/o male. hx of HTN, HLD, COPD. Recent admission to ED 1 month ago for pna. Pt presenting with fever, cough productive with ranjeet sputum, mild weakness. Exam reveals rhonchi on right base with diminished breath sounds b/l. Concerned for PNA 2/2 sepsis. Will order septic w/u, antibiotics and admit.

## 2018-08-14 NOTE — H&P ADULT - NSHPPHYSICALEXAM_GEN_ALL_CORE
Vital Signs Last 24 Hrs  T(C): 37.1 (14 Aug 2018 18:30), Max: 38.6 (14 Aug 2018 15:59)  T(F): 98.8 (14 Aug 2018 18:30), Max: 101.4 (14 Aug 2018 15:59)  HR: 93 (14 Aug 2018 18:30) (93 - 101)  BP: 108/55 (14 Aug 2018 18:30) (108/55 - 114/56)  BP(mean): --  RR: 20 (14 Aug 2018 18:30) (18 - 20)  SpO2: 98% (14 Aug 2018 18:30) (88% - 99%)    PHYSICAL EXAM:    Constitutional: NAD, awake and alert, well-developed  HEENT: PERR, EOMI, Normal Hearing, MMM  Neck: Soft and supple  Respiratory: Breath sounds are clear bilaterally, No wheezing, rales or rhonchi  Cardiovascular: S1 and S2, regular rate and rhythm, no Murmurs, gallops or rubs  Gastrointestinal: Bowel Sounds present, soft, nontender, nondistended, no guarding, no rebound  Extremities: No peripheral edema  Neurological: A/O x 3, no focal deficits in my limited exam  Skin: No rashes Vital Signs Last 24 Hrs  T(C): 37.1 (14 Aug 2018 18:30), Max: 38.6 (14 Aug 2018 15:59)  T(F): 98.8 (14 Aug 2018 18:30), Max: 101.4 (14 Aug 2018 15:59)  HR: 93 (14 Aug 2018 18:30) (93 - 101)  BP: 108/55 (14 Aug 2018 18:30) (108/55 - 114/56)  BP(mean): --  RR: 20 (14 Aug 2018 18:30) (18 - 20)  SpO2: 98% (14 Aug 2018 18:30) (88% - 99%)    PHYSICAL EXAM:    Constitutional: NAD, awake and alert, frail, elderly  HEENT: PERR, EOMI, Normal Hearing, MMM  Neck: Soft and supple  Respiratory: Breath sounds diminished on right with rales   Cardiovascular: S1 and S2, regular rate and rhythm, no Murmurs, gallops or rubs  Gastrointestinal: Bowel Sounds present, soft, nontender, nondistended, no guarding, no rebound  Extremities: No peripheral edema  Neurological: A/O x 3, no focal deficits in my limited exam  Skin: No rashes

## 2018-08-14 NOTE — ED ADULT NURSE NOTE - NSIMPLEMENTINTERV_GEN_ALL_ED
Implemented All Fall with Harm Risk Interventions:  [place] to call system. Call bell, personal items and telephone within reach. Instruct patient to call for assistance. Room bathroom lighting operational. Non-slip footwear when patient is off stretcher. Physically safe environment: no spills, clutter or unnecessary equipment. Stretcher in lowest position, wheels locked, appropriate side rails in place. Provide visual cue, wrist band, yellow gown, etc. Monitor gait and stability. Monitor for mental status changes and reorient to person, place, and time. Review medications for side effects contributing to fall risk. Reinforce activity limits and safety measures with patient and family. Provide visual clues: red socks.

## 2018-08-14 NOTE — ED ADULT NURSE NOTE - OBJECTIVE STATEMENT
Patient comes to ED for increase in mucous production by visiting nurse. pt has pna pt was admitted 3 weeks ago for PNA . pt denies any chest pain or SOB. pt wears 3L nasal cannula at home

## 2018-08-14 NOTE — ED PROVIDER NOTE - PHYSICAL EXAMINATION
Constitutional: mild distress AAOx3  Eyes: PERRLA EOMI  Head: Normocephalic atraumatic  Mouth: MMM  Cardiac: regular rate   Resp: +diminished breath sounds b/l, rhonchi at right base  GI: Abd s/nt/nd  Neuro: CN2-12 intact  Skin: No rashes Constitutional: mild distress AAOx3  Eyes: PERRLA EOMI  Head: Normocephalic atraumatic  Mouth: MMM  Cardiac: tachycardic  Resp: +diminished breath sounds b/l, rhonchi at right base  GI: Abd s/nt/nd  Neuro: CN2-12 intact  Skin: No rashes

## 2018-08-14 NOTE — ED PROVIDER NOTE - NS_ ATTENDINGSCRIBEDETAILS _ED_A_ED_FT
I, Lorne Hurt MD,  performed the initial face to face bedside interview with this patient regarding history of present illness, review of symptoms and relevant past medical, social and family history.  I completed an independent physical examination.  I was the initial provider who evaluated this patient.  The history, relevant review of systems, past medical and surgical history, medical decision making, and physical examination was documented by the scribe in my presence and I attest to the accuracy of the documentation.

## 2018-08-14 NOTE — H&P ADULT - HISTORY OF PRESENT ILLNESS
78M w/PMH HTN, HLD, COPD with chronic respiratory failure (Home O2 3L), BPH, recently treated last month for CAP with parapneumonic effusion s/p Rt Chest tube placement with hospital course complicated by post op PTX now presents with cough and fever.    In ED: Febrile, xray concerning for right sided effusion.  Code sepsis called, and he was given azithromycin, zosyn, vanco, and 2L NS. 78M w/PMH HTN, HLD, COPD with chronic respiratory failure (Home O2 3L), BPH, recently treated last month for CAP with parapneumonic effusion s/p Rt Chest tube placement with hospital course complicated by post op PTX now presents with cough and fever.      In ED: Febrile, xray concerning for right sided effusion.  Code sepsis called, and he was given azithromycin, zosyn, vanco, and 2L NS.    At bedside pt is poor historian states he does not know why he came to the hospital.  I asked if his breathing is worse and he states yes and he says yes when i ask if he has a cough.  Otherwise he has no specific complaints at this time.

## 2018-08-14 NOTE — H&P ADULT - ASSESSMENT
77M w/PMH HTN, HLD, COPD, chronic respiratory failure, recently admitted for PNA p/w sepsis secondary to PNA.    Problem/Plan - 1:  ·  Problem: Pneumonia.  Plan: possibly CAP, aspiration  will start on IV azithro, rocephin  check sputum cs  request Pulm cs  check speech/swallow cs to eval for aspiration.     Problem/Plan - 2:  ·  Problem: Sepsis.  Plan: likely 2/2 pna  check BCx  request ID cs  further as above tx.     Problem/Plan - 3:  ·  Problem: Pleural effusion.  Plan: 2/2 Pneumonia  pt does not appear to be clinically in CHF exacerbation- in fact appears dry  will c/w po lasix as per home dose - consider IV lasix if not improvement or worsens  monitor for resolution - check repeat CXR as indicated.     Problem/Plan - 4:  ·  Problem: COPD exacerbation.  Plan: 2/2 Pneumonia/pleural effusion  c/w oxygen, monitor O2 sats and titrate to keep sats>90%  received solumedrol in ED  will start on solumedrol 40mg daily  duonebs  pulm cs for further recs.     Problem/Plan - 5:  ·  Problem: Hypokalemia.  Plan: will replete and check am labs  will need to be on daily supplement as pt on lasix.     Problem/Plan - 6:  Problem: Essential hypertension. Plan: hypotensive earlier, but now improving bp   c/w home meds- monitor bp and titrate as needed.    Problem/Plan - 7:  ·  Problem: Prophylactic measure.  Plan: sq heparin. 77M w/PMH HTN, HLD, COPD, chronic respiratory failure, recently admitted for PNA p/w sepsis secondary to PNA.    Sepsis/HCAP/right sided effusion  -s/p azithro, zosyn, vanco  -c/w zosyn  -f/u cultures  -urine legionella  -CT chest  -ID and pulm eval    Chronic diastolic CHF:   -hold lasix in setting of sepsis and mild CAMILLE    Acute renal failure:  -s/p 2L NS  -hold lasix  -f/u AM labs    COPD:  -c/w oxygen, monitor O2 sats and titrate to keep sats>90%  -duonebs  -inhaled corticosteroids    Hypokalemia.    -replete and check am labs    Essential hypertension:   -hypotensive earlier, but now improving bp   -c/w home meds- monitor bp and titrate as needed.    Prophylactic measure  -sq heparin. 77M w/PMH HTN, HLD, COPD, chronic respiratory failure, recently admitted for PNA p/w sepsis secondary to PNA.    Sepsis/HCAP/right sided effusion  -s/p azithro, zosyn, vanco  -c/w zosyn  -f/u cultures  -urine legionella  -CT chest  -ID and pulm eval    Chronic diastolic CHF:   -hold lasix in setting of sepsis and mild CAMILLE    Acute renal failure:  -s/p 2L NS  -hold lasix  -f/u AM labs    COPD:  -c/w oxygen, monitor O2 sats and titrate to keep sats>90%  -duonebs  -inhaled corticosteroids    anemia: Stable  -monitor    hypoalbuminemia:  -nutrition eval    Hypokalemia.    -replete and check am labs    Essential hypertension:   -hypotensive earlier, but now improving bp   -c/w home meds- monitor bp and titrate as needed.    Prophylactic measure  -sq heparin.

## 2018-08-15 ENCOUNTER — RESULT REVIEW (OUTPATIENT)
Age: 78
End: 2018-08-15

## 2018-08-15 DIAGNOSIS — J18.9 PNEUMONIA, UNSPECIFIED ORGANISM: ICD-10-CM

## 2018-08-15 LAB
ALBUMIN FLD-MCNC: 1.7 G/DL — SIGNIFICANT CHANGE UP
ANION GAP SERPL CALC-SCNC: 8 MMOL/L — SIGNIFICANT CHANGE UP (ref 5–17)
B PERT IGG+IGM PNL SER: ABNORMAL
BUN SERPL-MCNC: 13 MG/DL — SIGNIFICANT CHANGE UP (ref 7–23)
CALCIUM SERPL-MCNC: 7 MG/DL — LOW (ref 8.5–10.1)
CHLORIDE SERPL-SCNC: 108 MMOL/L — SIGNIFICANT CHANGE UP (ref 96–108)
CO2 SERPL-SCNC: 29 MMOL/L — SIGNIFICANT CHANGE UP (ref 22–31)
COLOR FLD: YELLOW — SIGNIFICANT CHANGE UP
CREAT SERPL-MCNC: 1 MG/DL — SIGNIFICANT CHANGE UP (ref 0.5–1.3)
CULTURE RESULTS: SIGNIFICANT CHANGE UP
FLUID INTAKE SUBSTANCE CLASS: SIGNIFICANT CHANGE UP
GLUCOSE FLD-MCNC: 115 MG/DL — SIGNIFICANT CHANGE UP
GLUCOSE SERPL-MCNC: 126 MG/DL — HIGH (ref 70–99)
GRAM STN FLD: SIGNIFICANT CHANGE UP
GRAM STN FLD: SIGNIFICANT CHANGE UP
LDH SERPL L TO P-CCNC: 183 U/L — SIGNIFICANT CHANGE UP
PH FLD: 7.37 — SIGNIFICANT CHANGE UP
POTASSIUM SERPL-MCNC: 3.1 MMOL/L — LOW (ref 3.5–5.3)
POTASSIUM SERPL-SCNC: 3.1 MMOL/L — LOW (ref 3.5–5.3)
PROT FLD-MCNC: 2.9 G/DL — SIGNIFICANT CHANGE UP
RCV VOL RI: 2000 /UL — HIGH (ref 0–5)
SODIUM SERPL-SCNC: 145 MMOL/L — SIGNIFICANT CHANGE UP (ref 135–145)
SPECIMEN SOURCE FLD: SIGNIFICANT CHANGE UP
SPECIMEN SOURCE: SIGNIFICANT CHANGE UP
TOTAL NUCLEATED CELL COUNT, BODY FLUID: 1388 /UL — HIGH (ref 0–5)
TSH SERPL-MCNC: 0.43 UU/ML — SIGNIFICANT CHANGE UP (ref 0.34–4.82)
TUBE TYPE: SIGNIFICANT CHANGE UP

## 2018-08-15 PROCEDURE — 99222 1ST HOSP IP/OBS MODERATE 55: CPT | Mod: 25

## 2018-08-15 PROCEDURE — 88305 TISSUE EXAM BY PATHOLOGIST: CPT | Mod: 26

## 2018-08-15 PROCEDURE — 32557 INSERT CATH PLEURA W/ IMAGE: CPT

## 2018-08-15 PROCEDURE — 99223 1ST HOSP IP/OBS HIGH 75: CPT

## 2018-08-15 PROCEDURE — 71045 X-RAY EXAM CHEST 1 VIEW: CPT | Mod: 26

## 2018-08-15 PROCEDURE — 88108 CYTOPATH CONCENTRATE TECH: CPT | Mod: 26

## 2018-08-15 RX ORDER — VANCOMYCIN HCL 1 G
750 VIAL (EA) INTRAVENOUS EVERY 12 HOURS
Qty: 0 | Refills: 0 | Status: DISCONTINUED | OUTPATIENT
Start: 2018-08-15 | End: 2018-08-20

## 2018-08-15 RX ORDER — MORPHINE SULFATE 50 MG/1
2 CAPSULE, EXTENDED RELEASE ORAL ONCE
Qty: 0 | Refills: 0 | Status: DISCONTINUED | OUTPATIENT
Start: 2018-08-15 | End: 2018-08-15

## 2018-08-15 RX ORDER — LIDOCAINE HCL 20 MG/ML
20 VIAL (ML) INJECTION ONCE
Qty: 0 | Refills: 0 | Status: COMPLETED | OUTPATIENT
Start: 2018-08-15 | End: 2018-08-15

## 2018-08-15 RX ADMIN — FINASTERIDE 5 MILLIGRAM(S): 5 TABLET, FILM COATED ORAL at 11:55

## 2018-08-15 RX ADMIN — Medication 200 MILLIGRAM(S): at 19:17

## 2018-08-15 RX ADMIN — HEPARIN SODIUM 5000 UNIT(S): 5000 INJECTION INTRAVENOUS; SUBCUTANEOUS at 13:52

## 2018-08-15 RX ADMIN — PIPERACILLIN AND TAZOBACTAM 25 GRAM(S): 4; .5 INJECTION, POWDER, LYOPHILIZED, FOR SOLUTION INTRAVENOUS at 22:08

## 2018-08-15 RX ADMIN — HEPARIN SODIUM 5000 UNIT(S): 5000 INJECTION INTRAVENOUS; SUBCUTANEOUS at 22:21

## 2018-08-15 RX ADMIN — Medication 0.5 MILLIGRAM(S): at 14:45

## 2018-08-15 RX ADMIN — Medication 650 MILLIGRAM(S): at 19:18

## 2018-08-15 RX ADMIN — BUDESONIDE AND FORMOTEROL FUMARATE DIHYDRATE 2 PUFF(S): 160; 4.5 AEROSOL RESPIRATORY (INHALATION) at 20:34

## 2018-08-15 RX ADMIN — Medication 3 MILLILITER(S): at 20:34

## 2018-08-15 RX ADMIN — PIPERACILLIN AND TAZOBACTAM 25 GRAM(S): 4; .5 INJECTION, POWDER, LYOPHILIZED, FOR SOLUTION INTRAVENOUS at 05:14

## 2018-08-15 RX ADMIN — Medication 3 MILLILITER(S): at 00:11

## 2018-08-15 RX ADMIN — Medication 200 MILLIGRAM(S): at 13:52

## 2018-08-15 RX ADMIN — Medication 250 MILLIGRAM(S): at 17:34

## 2018-08-15 RX ADMIN — HEPARIN SODIUM 5000 UNIT(S): 5000 INJECTION INTRAVENOUS; SUBCUTANEOUS at 05:13

## 2018-08-15 RX ADMIN — Medication 600 MILLIGRAM(S): at 05:13

## 2018-08-15 RX ADMIN — Medication 81 MILLIGRAM(S): at 11:20

## 2018-08-15 RX ADMIN — Medication 600 MILLIGRAM(S): at 17:34

## 2018-08-15 RX ADMIN — ESCITALOPRAM OXALATE 20 MILLIGRAM(S): 10 TABLET, FILM COATED ORAL at 11:55

## 2018-08-15 RX ADMIN — MORPHINE SULFATE 2 MILLIGRAM(S): 50 CAPSULE, EXTENDED RELEASE ORAL at 22:08

## 2018-08-15 RX ADMIN — PIPERACILLIN AND TAZOBACTAM 25 GRAM(S): 4; .5 INJECTION, POWDER, LYOPHILIZED, FOR SOLUTION INTRAVENOUS at 13:06

## 2018-08-15 RX ADMIN — Medication 3 MILLILITER(S): at 08:29

## 2018-08-15 RX ADMIN — Medication 20 MILLILITER(S): at 16:28

## 2018-08-15 NOTE — DIETITIAN INITIAL EVALUATION ADULT. - OTHER INFO
Nutrition consult for unspecified assessment. Pt is a 77yo male admitted with PNA and sepsis. History of chronic respitory failure, HTN, HLD, and COPD. Pt reports appetite good and consuming ~% of meals. Skin intact w/ no edema documented. Juan Antonio score= 17 (high risk of skin breakdown). No weight obtained upon admission, utilized last admission weight for ENN (7/4/.9#). Pt denies any difficulty chewing or swallowing. NFPE indicates moderate muscle wasting: Temporal, scapula, clavicle, patella. Moderate fat loss: Ocular, triceps, and ribs. Pt meets criteria for moderate protein/calorie malnutrition in context of chronic illness secondary to moderate muscle/fat wasting. Recommendations: 1) Continue with regular diet and offer food preferences for optimal intake. 2) prosource 1oz po TID mixed with juice for additional protein. 3) Obtain current weight. 4) monitor labs/electrolytes.

## 2018-08-15 NOTE — DIETITIAN INITIAL EVALUATION ADULT. - NS AS NUTRI DX NUTRIENT
Malnutrition/Pt meets criteria for moderate protein/calorie malnutrition in context of chronic illness secondary to moderate muscle/fat wasting.

## 2018-08-15 NOTE — DIETITIAN INITIAL EVALUATION ADULT. - ENERGY NEEDS
Ht. 67     "        Wt. 139.9   #              BMI  21.9                IBW  145  #               Pt is at   96 %  IBW

## 2018-08-15 NOTE — PHYSICAL THERAPY INITIAL EVALUATION ADULT - GENERAL OBSERVATIONS, REHAB EVAL
pre and post session: seated in chair with chair alarm on. O2 via nc and IV in place. No c/o pain. increased confusion and unable to answer questions appropriately. Would answer the question and then f/u with " I am not sure, ask my wife". Tolerated well and would benefit from further skilled PT for functional mobility training.

## 2018-08-15 NOTE — PROCEDURE NOTE - NSPROCDETAILS_GEN_ALL_CORE
thoracostomy tube placed percutaneously/Seldinger technique/dressing applied/secured in place/sterile dressing applied/ultrasound assessment of fluid (location)/ultrasound assessment of fluid (size)

## 2018-08-15 NOTE — PROGRESS NOTE ADULT - SUBJECTIVE AND OBJECTIVE BOX
PCP- Dr Glover     History of Present Illness:  Reason for Admission: cough, fever    History of Present Illness:   78M w/PMH HTN, HLD, COPD with chronic respiratory failure (Home O2 3L), BPH, recently treated last month for CAP with parapneumonic effusion s/p Rt Chest tube placement with hospital course complicated by post op PTX now presents with cough and fever.    In ED: Febrile, xray concerning for right sided effusion.  Code sepsis called, and he was given azithromycin, zosyn, vanco, and 2L NS.  At bedside pt is poor historian states he does not know why he came to the hospital.  I asked if his breathing is worse and he states yes and he says yes when i ask if he has a cough.  Otherwise he has no specific complaints at this time.  8/15 - c/o cough but no chest pain, sob, n/v/d    ROS:   All 10 systems reviewed and found to be negative with the exception of what has been described above.  Vital Signs Last 24 Hrs  T(C): 36.4 (15 Aug 2018 11:23), Max: 38.6 (14 Aug 2018 15:59)  T(F): 97.6 (15 Aug 2018 11:23), Max: 101.4 (14 Aug 2018 15:59)  HR: 93 (15 Aug 2018 11:23) (72 - 111)  BP: 127/48 (15 Aug 2018 11:23) (102/61 - 139/47)  BP(mean): --  RR: 18 (15 Aug 2018 11:23) (18 - 24)  SpO2: 97% (15 Aug 2018 11:23) (88% - 99%)  GEN: lying in bed, NAD  HEENT:   NC/AT, pupils equal and reactive, EOMI  CV:  +S1, +S2, RRR  RESP:   decreased BS at right base no wheezing or rhonchi  GI:  abdomen soft, non-tender, non-distended, normoactive BS  RECTAL:  not examined  :  not examined  MSK:   normal muscle tone  EXT:  no edema  NEURO:  AAOX3, no focal neurological deficits  SKIN:  no rashes                                9.7    14.46 )-----------( 274      ( 14 Aug 2018 16:17 )             30.3     08-15    145  |  108  |  13  ----------------------------<  126<H>  3.1<L>   |  29  |  1.00    Ca    7.0<L>      15 Aug 2018 07:21    TPro  7.4  /  Alb  2.7<L>  /  TBili  0.4  /  DBili  x   /  AST  17  /  ALT  17  /  AlkPhos  113  08-14        LIVER FUNCTIONS - ( 14 Aug 2018 16:17 )  Alb: 2.7 g/dL / Pro: 7.4 gm/dL / ALK PHOS: 113 U/L / ALT: 17 U/L / AST: 17 U/L / GGT: x           PT/INR - ( 14 Aug 2018 16:17 )   PT: 12.4 sec;   INR: 1.14 ratio         PTT - ( 14 Aug 2018 16:17 )  PTT:29.6 sec  Urinalysis Basic - ( 14 Aug 2018 19:30 )    Color: Yellow / Appearance: Clear / S.010 / pH: x  Gluc: x / Ketone: Negative  / Bili: Negative / Urobili: 1 mg/dL   Blood: x / Protein: 30 mg/dL / Nitrite: Negative   Leuk Esterase: Negative / RBC: 3-5 /HPF / WBC 0-2   Sq Epi: x / Non Sq Epi: x / Bacteria: Few        Lactate, Blood: 2.0 mmol/L ( @ 16:17)    	< from: Xray Chest 1 View-PORTABLE IMMEDIATE (18 @ 16:31) >        	Impression:    	 Moderate right pleural effusion with right lower lobe atelectasis and or   	pneumonia increased in size from prior exam      	< end of copied text >    < from: CT Chest No Cont (18 @ 20:29) >  Mild right pleural effusion with right middle lobe and right lower lobe   consolidative changes similar appearance to prior exam may represent   atelectasis versus pneumonia. Interval resolution of previously noted   right apical and upper lobe nodular densities     Tiny nodularity scattered throughout the bilateral lung fields may   represent improvement of the previously noted nodular densities versus   interval development of tiny nodules may represent an infectious or   inflammatory etiology.    Trace left pleural effusion with left basilar atelectasis.        < end of copied text >      Assessment and Plan:   	  77M w/PMH HTN, HLD, COPD, chronic respiratory failure, recently admitted for PNA p/w sepsis secondary to PNA.     # sepsis/HCAP/right sided effusion  - continue vanco zosyn (started 8/15) as per ID recs  - f/u cx  - CT surg note appreciated and plan for thoracentesis will f/u fluid eval  - CT chest noted  -ID and pulm eval    # Chronic diastolic CHF: will resume lasix in 24-48 hrs    # Acute renal failure: resolving  -s/p 2L NS  -hold lasix  -f/u AM labs    # COPD:  -c/w oxygen, monitor O2 sats and titrate to keep sats>90%  -duonebs  -inhaled corticosteroids    # anemia: Stable  -monitor    # hypoalbuminemia:  -nutrition eval - mod protein fausto malnutrition    # Hypokalemia.    -replete and check am labs    # Essential hypertension:   -hypotensive earlier, but now improving bp   -c/w home meds- monitor bp and titrate as needed.    Prophylactic measure  -sq heparin.

## 2018-08-15 NOTE — CONSULT NOTE ADULT - ASSESSMENT
78M w/PMH HTN, HLD, COPD with chronic respiratory failure (Home O2 3L), BPH, recently hospitalized at  for pneumonia admitted on 8/14 for evaluation of worsening shortness of breath and cough associated with fever and chills; history per medical record as patient unable to provide history.   1. Patient admitted with pneumonia which will treat as health care associated pneumonia given recent hospitalization  - patient at risk for gram negative rods and other resistant bacteria   - iv hydration and supportive care   - oxygen and nebs as needed   - serial cbc and monitor temperature   - reviewed prior medical records to evaluate for resistant or atypical pathogens   - agree with zosyn as ordered   - will add vancomycin to treat resistant bacteria   - check vancomycin trough prior to fourth dose   2. other issues:  HTN, HLD, COPD with chronic respiratory failure (Home O2 3L), BPH  - per medicine 78M w/PMH HTN, HLD, COPD with chronic respiratory failure (Home O2 3L), BPH, recently hospitalized at  for pneumonia admitted on 8/14 for evaluation of worsening shortness of breath and cough associated with fever and chills; history per medical record as patient unable to provide history.   1. Patient admitted with pneumonia which will treat as health care associated pneumonia given recent hospitalization; also noted with leukocytosis most likely reactive to infection  - patient at risk for gram negative rods and other resistant bacteria   - iv hydration and supportive care   - oxygen and nebs as needed   - serial cbc and monitor temperature   - reviewed prior medical records to evaluate for resistant or atypical pathogens   - agree with zosyn as ordered   - will add vancomycin to treat resistant bacteria   - check vancomycin trough prior to fourth dose   2. other issues:  HTN, HLD, COPD with chronic respiratory failure (Home O2 3L), BPH  - per medicine

## 2018-08-15 NOTE — CHART NOTE - NSCHARTNOTEFT_GEN_A_CORE
Upon Nutritional Assessment by the Registered Dietitian your patient was determined to meet criteria / has evidence of the following diagnosis/diagnoses:          [ ]  Mild Protein Calorie Malnutrition        [x ]  Moderate Protein Calorie Malnutrition        [ ] Severe Protein Calorie Malnutrition        [ ] Unspecified Protein Calorie Malnutrition        [ ] Underweight / BMI <19        [ ] Morbid Obesity / BMI > 40      Findings as based on:  •  Comprehensive nutrition assessment and consultation  •  Calorie counts (nutrient intake analysis)  •  Food acceptance and intake status from observations by staff  •  Follow up  •  Patient education  •  Intervention secondary to interdisciplinary rounds  •   concerns      Treatment:     1) Continue with regular diet and offer food preferences for optimal intake.   2) prosource 1oz po TID mixed with juice for additional protein.   3) Obtain current weight.   4) monitor labs/electrolytes.       The following diet has been recommended:  Continue with regular diet    PROVIDER Section:     By signing this assessment you are acknowledging and agree with the diagnosis/diagnoses assigned by the Registered Dietitian    Comments:

## 2018-08-15 NOTE — CONSULT NOTE ADULT - ASSESSMENT
CT reviewed w/ CT radiologist. R upper and R middle nodular/infiltrative findings are improved, R Lower infil findings about same c/w 6/28 CT.  R effusion is loculated anteriorly, smaller c/w prev CT chest. Pleural thickening.  New mild mucous/? infectious findings L lower lobe.     PNA. HCAP. IV zosyn. ID consult.     R effusion, parapneumonic.  Recent R parapneumonic effusion.  h.o. PTX after prev thoracentesis 6/28, w/ pigtail cath placement after.  Thoracic surgery consulted.      COPD with chr resp failure, on home O2 3 l/min.  continue nebs, symbicort, NC O2.    Chr diastolic CHF.  Lasix on hold currently.

## 2018-08-15 NOTE — PHYSICAL THERAPY INITIAL EVALUATION ADULT - PERTINENT HX OF CURRENT PROBLEM, REHAB EVAL
recently treated last month for CAP with parapneumonic effusion s/p Rt Chest tube placement with hospital course complicated by post op PTX now presents with cough and fever.In ED: Febrile, xray concerning for right sided effusion.  Code sepsis called,

## 2018-08-15 NOTE — PHYSICAL THERAPY INITIAL EVALUATION ADULT - ADDITIONAL COMMENTS
Poor historian but stated Lives with wife. 9 steps to access bedroom and bathroom. Independent with ADl and has friends that transport him to MD appts.

## 2018-08-15 NOTE — CONSULT NOTE ADULT - PROBLEM SELECTOR RECOMMENDATION 9
Thoracentesis bedside with possible pigtail catheter placement.  Fluid for analysis, gram stain and culture, cytology.  Continue antibiotics.  Daily CXR.

## 2018-08-15 NOTE — PROCEDURE NOTE - PROCEDURE
<<-----Click on this checkbox to enter Procedure Thoracentesis and chest tube insertion with imaging guidance  08/15/2018    Active  [id number]

## 2018-08-15 NOTE — CONSULT NOTE ADULT - SUBJECTIVE AND OBJECTIVE BOX
History of Present Illness:  78y Male who was admitted in past for pneumonia and pleural effusion.  Returns with productive cough found to have right parapneumonic effusion.    PMH/PSH:  Chronic respiratory failure  Hematuria  BPH (benign prostatic hypertrophy)  Low back pain  Osteoarthritis  Pleural effusion: 2016  Hypercholesterolemia  Anxiety  Depression  COPD (chronic obstructive pulmonary disease)  HTN (hypertension)  Bladder tumor  No pertinent past medical history    Cataract extraction status:   b/l      Relevant Family History  FAMILY HISTORY:  Family history of asthma in mother (Mother)  Family history of cancer (Father): father      SOCIAL HISTORY:  Occupation: retired  Live with: wife  Assist device use: O2 dependent.    MEDICATIONS  (STANDING):  ALBUTerol/ipratropium for Nebulization 3 milliLiter(s) Nebulizer every 6 hours  aspirin  chewable 81 milliGRAM(s) Oral daily  atorvastatin 20 milliGRAM(s) Oral at bedtime  buDESOnide 160 MICROgram(s)/formoterol 4.5 MICROgram(s) Inhaler 2 Puff(s) Inhalation two times a day  escitalopram 20 milliGRAM(s) Oral daily  finasteride 5 milliGRAM(s) Oral daily  guaiFENesin  milliGRAM(s) Oral every 12 hours  heparin  Injectable 5000 Unit(s) SubCutaneous every 8 hours  lidocaine 1% Injectable 20 milliLiter(s) Local Injection once  LORazepam   Injectable 0.5 milliGRAM(s) IV Push once  piperacillin/tazobactam IVPB. 3.375 Gram(s) IV Intermittent every 8 hours  tamsulosin 0.8 milliGRAM(s) Oral at bedtime  vancomycin  IVPB 750 milliGRAM(s) IV Intermittent every 12 hours    MEDICATIONS  (PRN):  acetaminophen   Tablet 650 milliGRAM(s) Oral every 6 hours PRN For Temp greater than 38.5 C (101.3 F)  acetaminophen   Tablet. 650 milliGRAM(s) Oral every 6 hours PRN Moderate Pain (4 - 6)  clonazePAM Tablet 0.5 milliGRAM(s) Oral three times a day PRN tremors  guaiFENesin    Syrup 200 milliGRAM(s) Oral every 6 hours PRN Cough  ondansetron Injectable 4 milliGRAM(s) IV Push every 6 hours PRN Nausea  senna 2 Tablet(s) Oral at bedtime PRN Constipation      Allergies: No Known Allergies                                                            LABS:                        9.7    14.46 )-----------( 274      ( 14 Aug 2018 16:17 )             30.3     08-15    145  |  108  |  13  ----------------------------<  126<H>  3.1<L>   |  29  |  1.00    Ca    7.0<L>      15 Aug 2018 07:21    TPro  7.4  /  Alb  2.7<L>  /  TBili  0.4  /  DBili  x   /  AST  17  /  ALT  17  /  AlkPhos  113      PT/INR - ( 14 Aug 2018 16:17 )   PT: 12.4 sec;   INR: 1.14 ratio         PTT - ( 14 Aug 2018 16:17 )  PTT:29.6 sec  Urinalysis Basic - ( 14 Aug 2018 19:30 )    Color: Yellow / Appearance: Clear / S.010 / pH: x  Gluc: x / Ketone: Negative  / Bili: Negative / Urobili: 1 mg/dL   Blood: x / Protein: 30 mg/dL / Nitrite: Negative   Leuk Esterase: Negative / RBC: 3-5 /HPF / WBC 0-2   Sq Epi: x / Non Sq Epi: x / Bacteria: Few      Review of Systems         Constitutional: denies fever, chills, general malaise, weight loss, weight gain, diaphoresis   HEENT: denies dry mouth, sore throat, runny nose, photophobia, blurry vision, double vision, glasses, discharge, eye pain, difficulty hearing, vertigo, dysphagia, epistaxis, recent dental work    Respiratory: denies SOB, MATA, wheezing, hemoptysis  Cardiovascular: denies CP, palpitations, edema, diaphoresis   Gastrointestinal: denies nausea, vomiting, diarrhea, constipation, abdominal pain, melena   Genitourinary: denies dysuria, frequency, urgency, incontinence, hematuria   Skin/Breast: denies rash, hives, itching, masses, hair loss   Musculoskeletal: denies myalgias, arthritis, joint swelling, muscle weakness  Neurologic: denies syncope, LOC, headache, weakness, dizziness, parasthesias, numbness, seizures, confusion, dementia   Psychiatric: denies feeling anxious, depressed, suicidal, homicidal  Endocrine: denies increased fingerstick glucoses, cold or heat intolerance, polydipsia, polyuria, polyphagia   Hematology/Oncology: denies bruising, tender or enlarged lymph nodes   ROS negative x 10 systems except as noted above    T(C): 36.4 (08-15-18 @ 11:23), Max: 38.6 (18 @ 15:59)  HR: 93 (08-15-18 @ 11:23) (72 - 111)  BP: 127/48 (08-15-18 @ 11:23) (102/61 - 139/47)  RR: 18 (08-15-18 @ 11:23) (18 - 24)  SpO2: 97% (08-15-18 @ :23) (88% - 99%)    Physical Exam  General: WD, WN, NAD                                                         Neuro: Awake and alert, non-focal, speech clear and intact                     Eyes: PERRL, EOMI   ENT: Normal exam of nasal/oral mucosa with absence of cyanosis.   Neck: supple, no JVD, trachea midline   Chest: Decreased breath sounds on the right, no wheezes/rales/rhonchi, normal excursion, no accessory muscle use note  CV: RRR, +S1S2  GI: soft, NT, ND, +BS, no organomegaly noted  Extremities: NELSON x 4, no C/C/E, distal motor/neuro/circ intact  SKIN: warm, dry, intact
Patient is a 78y old  Male who presents with a chief complaint of cough, fever (14 Aug 2018 18:53)    HPI:  78M w/PMH HTN, HLD, COPD with chronic respiratory failure (Home O2 3L), BPH, recently hospitalized at  for pneumonia admitted on  for evaluation of worsening shortness of breath and cough associated with fever and chills; history per medical record as patient unable to provide history.               PMH: as above  PSH: as above  Meds: per reconciliation sheet, noted below  MEDICATIONS  (STANDING):  ALBUTerol/ipratropium for Nebulization 3 milliLiter(s) Nebulizer every 6 hours  aspirin  chewable 81 milliGRAM(s) Oral daily  atorvastatin 20 milliGRAM(s) Oral at bedtime  buDESOnide 160 MICROgram(s)/formoterol 4.5 MICROgram(s) Inhaler 2 Puff(s) Inhalation two times a day  escitalopram 20 milliGRAM(s) Oral daily  finasteride 5 milliGRAM(s) Oral daily  guaiFENesin  milliGRAM(s) Oral every 12 hours  heparin  Injectable 5000 Unit(s) SubCutaneous every 8 hours  piperacillin/tazobactam IVPB. 3.375 Gram(s) IV Intermittent every 8 hours  tamsulosin 0.8 milliGRAM(s) Oral at bedtime    MEDICATIONS  (PRN):  acetaminophen   Tablet 650 milliGRAM(s) Oral every 6 hours PRN For Temp greater than 38.5 C (101.3 F)  acetaminophen   Tablet. 650 milliGRAM(s) Oral every 6 hours PRN Moderate Pain (4 - 6)  clonazePAM Tablet 0.5 milliGRAM(s) Oral three times a day PRN tremors  guaiFENesin    Syrup 200 milliGRAM(s) Oral every 6 hours PRN Cough  ondansetron Injectable 4 milliGRAM(s) IV Push every 6 hours PRN Nausea  senna 2 Tablet(s) Oral at bedtime PRN Constipation    Allergies    No Known Allergies    Intolerances      Social: no smoking, no alcohol, no illegal drugs; no recent travel, no exposure to TB  FAMILY HISTORY:  Family history of asthma in mother (Mother)  Family history of cancer (Father): father     no history of premature cardiovascular disease in first degree relatives  ROS: unable to obtain secondary to patient medical condition     Vital Signs Last 24 Hrs  T(C): 36.4 (15 Aug 2018 11:23), Max: 38.6 (14 Aug 2018 15:59)  T(F): 97.6 (15 Aug 2018 11:23), Max: 101.4 (14 Aug 2018 15:59)  HR: 93 (15 Aug 2018 11:23) (72 - 111)  BP: 127/48 (15 Aug 2018 11:23) (102/61 - 139/47)  BP(mean): --  RR: 18 (15 Aug 2018 11:23) (18 - 24)  SpO2: 97% (15 Aug 2018 11:23) (88% - 99%)  Daily     Daily Weight in k.4 (15 Aug 2018 10:37)    PE:    Constitutional: frail looking  HEENT: NC/AT, EOMI, PERRLA, conjunctivae clear; ears and nose atraumatic; pharynx clear  Neck: supple; thyroid not palpable  Back: no tenderness  Respiratory: respiratory effort normal; bilateral rhonchi  Cardiovascular: S1S2 regular, no murmurs  Abdomen: soft, not tender, not distended, positive BS; no liver or spleen organomegaly  Genitourinary: no suprapubic tenderness  Musculoskeletal: no muscle tenderness, no joint swelling or tenderness  Neurological/ Psychiatric: AxOx3, judgement and insight normal;  moving all extremities  Skin: no rashes; no palpable lesions    Labs: all available labs reviewed                        9.7    14.46 )-----------( 274      ( 14 Aug 2018 16:17 )             30.3     08-15    145  |  108  |  13  ----------------------------<  126<H>  3.1<L>   |  29  |  1.00    Ca    7.0<L>      15 Aug 2018 07:21    TPro  7.4  /  Alb  2.7<L>  /  TBili  0.4  /  DBili  x   /  AST  17  /  ALT  17  /  AlkPhos  113  08-14     LIVER FUNCTIONS - ( 14 Aug 2018 16:17 )  Alb: 2.7 g/dL / Pro: 7.4 gm/dL / ALK PHOS: 113 U/L / ALT: 17 U/L / AST: 17 U/L / GGT: x           Urinalysis Basic - ( 14 Aug 2018 19:30 )    Color: Yellow / Appearance: Clear / S.010 / pH: x  Gluc: x / Ketone: Negative  / Bili: Negative / Urobili: 1 mg/dL   Blood: x / Protein: 30 mg/dL / Nitrite: Negative   Leuk Esterase: Negative / RBC: 3-5 /HPF / WBC 0-2   Sq Epi: x / Non Sq Epi: x / Bacteria: Few      < from: CT Chest No Cont (18 @ 20:29) >    EXAM:  CT CHEST                            PROCEDURE DATE:  2018          INTERPRETATION:  Exam Date: 2018 8:29 PM  Clinical Information: Fever  Technique:       CT of the chest was performed with axial images obtained   from the thoracic to the inlet to the bilateral adrenal glands       without IV contrast. Maximum intensity projection images were obtained.  Comparison:  CT chest 2018, 2017, 2017, 2015    FINDINGS:    Lungs, Airways and Pleura: Central tracheobronchial tree is grossly   patent. No endobronchial lesion. Mild centrilobular emphysema. No   pneumothorax. Mild to moderate right pleural effusion with right middle   lobe and right lower lobe consolidations may represent atelectasis   including rounded atelectasis versus pneumonia. Previously noted right   apical and upper lobe nodular opacities have resolved. Tiny nodularity   scattered throughout the bilateral lung fields may represent improvement   of the previously noted nodular densities versus interval development of   tiny nodules may represent an infectious or inflammatory etiology. Trace   left pleural effusion with minimal left basilar atelectasis.     Heart and Great Vessels: Atherosclerotic changes of the aorta and   coronary vasculature. Heart is normal in size. Trace pericardial effusion.    Mediastinum and Fay: Subcentimeter mediastinal lymph nodes.    Neck and Chest Wall: Minimal bilateral gynecomastia.    Bones: Degenerative changes and osteopenia.    Upper Abdomen:  Gallstones.    IMPRESSION:         Mild right pleural effusion with right middle lobe and right lower lobe   consolidative changes similar appearance to prior exam may represent   atelectasis versus pneumonia. Interval resolution of previously noted   right apical and upper lobe nodular densities     Tiny nodularity scattered throughout the bilateral lung fields may   represent improvement of the previously noted nodular densities versus   interval development of tiny nodules may represent an infectious or   inflammatory etiology.    Trace left pleural effusion with left basilar atelectasis.    < end of copied text >      Radiology: all available radiological tests reviewed    Advanced directives addressed: full resuscitation
HPI:  78M w/PMH HTN, HLD, COPD with chronic respiratory failure (Home O2 3L), BPH, recently treated last month for CAP with parapneumonic effusion s/p Rt Chest tube placement with hospital course complicated by post op PTX now presents with cough and fever.      In ED: Febrile, xray concerning for right sided effusion.  Code sepsis called, and he was given azithromycin, zosyn, vanco, and 2L NS.    At bedside pt is poor historian states he does not know why he came to the hospital.  I asked if his breathing is worse and he states yes and he says yes when i ask if he has a cough.  Otherwise he has no specific complaints at this time. (14 Aug 2018 18:53)    : pt states is feeling better this AM.  afebrile. denies chills.  noted to have congested sounding cough. no distress. not SOB appearing wearing NC O2.      PAST MEDICAL & SURGICAL HISTORY:  Chronic respiratory failure  Hematuria  BPH (benign prostatic hypertrophy)  Low back pain  Osteoarthritis  Pleural effusion: 2016  Hypercholesterolemia  Anxiety  Depression  COPD (chronic obstructive pulmonary disease)  HTN (hypertension)  Bladder tumor  Cataract extraction status: 2015  b/l      MEDICATIONS  (STANDING):  ALBUTerol/ipratropium for Nebulization 3 milliLiter(s) Nebulizer every 6 hours  aspirin  chewable 81 milliGRAM(s) Oral daily  atorvastatin 20 milliGRAM(s) Oral at bedtime  buDESOnide 160 MICROgram(s)/formoterol 4.5 MICROgram(s) Inhaler 2 Puff(s) Inhalation two times a day  escitalopram 20 milliGRAM(s) Oral daily  finasteride 5 milliGRAM(s) Oral daily  guaiFENesin  milliGRAM(s) Oral every 12 hours  heparin  Injectable 5000 Unit(s) SubCutaneous every 8 hours  piperacillin/tazobactam IVPB. 3.375 Gram(s) IV Intermittent every 8 hours  tamsulosin 0.8 milliGRAM(s) Oral at bedtime    MEDICATIONS  (PRN):  acetaminophen   Tablet 650 milliGRAM(s) Oral every 6 hours PRN For Temp greater than 38.5 C (101.3 F)  acetaminophen   Tablet. 650 milliGRAM(s) Oral every 6 hours PRN Moderate Pain (4 - 6)  clonazePAM Tablet 0.5 milliGRAM(s) Oral three times a day PRN tremors  ondansetron Injectable 4 milliGRAM(s) IV Push every 6 hours PRN Nausea  senna 2 Tablet(s) Oral at bedtime PRN Constipation      Allergies    No Known Allergies    Intolerances        SOCIAL HISTORY: Denies tobacco, etoh abuse or illicit drug use    FAMILY HISTORY:  Family history of asthma in mother (Mother)  Family history of cancer (Father): father      Vital Signs Last 24 Hrs  T(C): 37.1 (15 Aug 2018 06:12), Max: 38.6 (14 Aug 2018 15:59)  T(F): 98.8 (15 Aug 2018 06:12), Max: 101.4 (14 Aug 2018 15:59)  HR: 111 (15 Aug 2018 06:12) (76 - 111)  BP: 139/47 (15 Aug 2018 06:12) (102/61 - 139/47)  BP(mean): --  RR: 19 (15 Aug 2018 06:12) (18 - 24)  SpO2: 94% (15 Aug 2018 06:12) (88% - 99%)    REVIEW OF SYSTEMS:    CONSTITUTIONAL:  As per HPI.  HEENT:  Eyes:  No diplopia or blurred vision. ENT:  No earache, sore throat or runny nose.  CARDIOVASCULAR:  No pressure, squeezing, tightness, heaviness or aching about the chest, neck, axilla or epigastrium.  RESPIRATORY:  see above.  GASTROINTESTINAL:  No nausea, vomiting or diarrhea.  GENITOURINARY:  No dysuria, frequency or urgency.  MUSCULOSKELETAL:  As per HPI.  SKIN:  No change in skin, hair or nails.  NEUROLOGIC:  No paresthesias, fasciculations, seizures or weakness.  PSYCHIATRIC:  No disorder of thought or mood.  ENDOCRINE:  No heat or cold intolerance, polyuria or polydipsia.  HEMATOLOGICAL:  No easy bruising or bleedings:  .     PHYSICAL EXAMINATION:    GENERAL APPEARANCE:  Pt. is not currently dyspneic, in no distress. Pt. is alert, oriented, and pleasant.  HEENT:  Pupils are normal and react normally. No icterus. Mucous membranes well colored.  NECK:  Supple. No lymphadenopathy. Jugular venous pressure not elevated. Carotids equal.   HEART:   The cardiac impulse has a normal quality. HR 96.  CHEST:  few rhonchi and crackles bilat.  decreased BS's R base.  ABDOMEN:  Soft and nontender.   EXTREMITIES:  There is no cyanosis, clubbing or edema.   SKIN:  No rash or significant lesions are noted.  Neuro: Alert, awake.      LABS:                        9.7    14.46 )-----------( 274      ( 14 Aug 2018 16:17 )             30.3     -15    145  |  108  |  13  ----------------------------<  126<H>  3.1<L>   |  29  |  1.00    Ca    7.0<L>      15 Aug 2018 07:21    TPro  7.4  /  Alb  2.7<L>  /  TBili  0.4  /  DBili  x   /  AST  17  /  ALT  17  /  AlkPhos  113  -    LIVER FUNCTIONS - ( 14 Aug 2018 16:17 )  Alb: 2.7 g/dL / Pro: 7.4 gm/dL / ALK PHOS: 113 U/L / ALT: 17 U/L / AST: 17 U/L / GGT: x           PT/INR - ( 14 Aug 2018 16:17 )   PT: 12.4 sec;   INR: 1.14 ratio         PTT - ( 14 Aug 2018 16:17 )  PTT:29.6 sec      Urinalysis Basic - ( 14 Aug 2018 19:30 )    Color: Yellow / Appearance: Clear / S.010 / pH: x  Gluc: x / Ketone: Negative  / Bili: Negative / Urobili: 1 mg/dL   Blood: x / Protein: 30 mg/dL / Nitrite: Negative   Leuk Esterase: Negative / RBC: 3-5 /HPF / WBC 0-2   Sq Epi: x / Non Sq Epi: x / Bacteria: Few          RADIOLOGY & ADDITIONAL STUDIES:     EXAM:  CT CHEST                            PROCEDURE DATE:  2018          INTERPRETATION:  Exam Date: 2018 8:29 PM  Clinical Information: Fever  Technique:       CT of the chest was performed with axial images obtained   from the thoracic to the inlet to the bilateral adrenal glands       without IV contrast. Maximum intensity projection images were obtained.  Comparison:  CT chest 2018, 2017, 2017, 2015    FINDINGS:    Lungs, Airways and Pleura: Central tracheobronchial tree is grossly   patent. No endobronchial lesion. Mild centrilobular emphysema. No   pneumothorax. Mild to moderate right pleural effusion with right middle   lobe and right lower lobe consolidations may represent atelectasis   including rounded atelectasis versus pneumonia. Previously noted right   apical and upper lobe nodular opacities have resolved. Tiny nodularity   scattered throughout the bilateral lung fields may represent improvement   of the previously noted nodular densities versus interval development of   tiny nodules may represent an infectious or inflammatory etiology. Trace   left pleural effusion with minimal left basilar atelectasis.     Heart and Great Vessels: Atherosclerotic changes of the aorta and   coronary vasculature. Heart is normal in size. Trace pericardial effusion.    Mediastinum and Fay: Subcentimeter mediastinal lymph nodes.    Neck and Chest Wall: Minimal bilateral gynecomastia.    Bones: Degenerative changes and osteopenia.    Upper Abdomen:  Gallstones.    IMPRESSION:         Mild right pleural effusion with right middle lobe and right lower lobe   consolidative changes similar appearance to prior exam may represent   atelectasis versus pneumonia. Interval resolution of previously noted   right apical and upper lobe nodular densities     Tiny nodularity scattered throughout the bilateral lung fields may   represent improvement of the previously noted nodular densities versus   interval development of tiny nodules may represent an infectious or   inflammatory etiology.    Trace left pleural effusion with left basilar atelectasis.

## 2018-08-16 DIAGNOSIS — J96.11 CHRONIC RESPIRATORY FAILURE WITH HYPOXIA: ICD-10-CM

## 2018-08-16 DIAGNOSIS — J18.9 PNEUMONIA, UNSPECIFIED ORGANISM: ICD-10-CM

## 2018-08-16 DIAGNOSIS — I10 ESSENTIAL (PRIMARY) HYPERTENSION: ICD-10-CM

## 2018-08-16 LAB
FLUID SEGMENTED GRANULOCYTES: 13 % — SIGNIFICANT CHANGE UP
LEGIONELLA AG UR QL: NEGATIVE — SIGNIFICANT CHANGE UP
LYMPHOCYTES # FLD: 39 % — SIGNIFICANT CHANGE UP
MONOS+MACROS # FLD: 48 % — SIGNIFICANT CHANGE UP
NIGHT BLUE STAIN TISS: SIGNIFICANT CHANGE UP
SPECIMEN SOURCE: SIGNIFICANT CHANGE UP

## 2018-08-16 PROCEDURE — 71045 X-RAY EXAM CHEST 1 VIEW: CPT | Mod: 26

## 2018-08-16 PROCEDURE — 99232 SBSQ HOSP IP/OBS MODERATE 35: CPT

## 2018-08-16 PROCEDURE — 99233 SBSQ HOSP IP/OBS HIGH 50: CPT

## 2018-08-16 RX ORDER — FUROSEMIDE 40 MG
20 TABLET ORAL DAILY
Qty: 0 | Refills: 0 | Status: DISCONTINUED | OUTPATIENT
Start: 2018-08-16 | End: 2018-08-20

## 2018-08-16 RX ADMIN — ATORVASTATIN CALCIUM 20 MILLIGRAM(S): 80 TABLET, FILM COATED ORAL at 21:12

## 2018-08-16 RX ADMIN — HEPARIN SODIUM 5000 UNIT(S): 5000 INJECTION INTRAVENOUS; SUBCUTANEOUS at 21:12

## 2018-08-16 RX ADMIN — HEPARIN SODIUM 5000 UNIT(S): 5000 INJECTION INTRAVENOUS; SUBCUTANEOUS at 12:52

## 2018-08-16 RX ADMIN — Medication 200 MILLIGRAM(S): at 05:37

## 2018-08-16 RX ADMIN — Medication 250 MILLIGRAM(S): at 16:39

## 2018-08-16 RX ADMIN — PIPERACILLIN AND TAZOBACTAM 25 GRAM(S): 4; .5 INJECTION, POWDER, LYOPHILIZED, FOR SOLUTION INTRAVENOUS at 21:13

## 2018-08-16 RX ADMIN — Medication 81 MILLIGRAM(S): at 11:02

## 2018-08-16 RX ADMIN — Medication 3 MILLILITER(S): at 02:21

## 2018-08-16 RX ADMIN — BUDESONIDE AND FORMOTEROL FUMARATE DIHYDRATE 2 PUFF(S): 160; 4.5 AEROSOL RESPIRATORY (INHALATION) at 20:37

## 2018-08-16 RX ADMIN — ESCITALOPRAM OXALATE 20 MILLIGRAM(S): 10 TABLET, FILM COATED ORAL at 11:02

## 2018-08-16 RX ADMIN — Medication 3 MILLILITER(S): at 20:37

## 2018-08-16 RX ADMIN — Medication 650 MILLIGRAM(S): at 10:30

## 2018-08-16 RX ADMIN — HEPARIN SODIUM 5000 UNIT(S): 5000 INJECTION INTRAVENOUS; SUBCUTANEOUS at 05:36

## 2018-08-16 RX ADMIN — Medication 600 MILLIGRAM(S): at 16:39

## 2018-08-16 RX ADMIN — PIPERACILLIN AND TAZOBACTAM 25 GRAM(S): 4; .5 INJECTION, POWDER, LYOPHILIZED, FOR SOLUTION INTRAVENOUS at 12:52

## 2018-08-16 RX ADMIN — Medication 0.5 MILLIGRAM(S): at 16:38

## 2018-08-16 RX ADMIN — Medication 250 MILLIGRAM(S): at 04:10

## 2018-08-16 RX ADMIN — FINASTERIDE 5 MILLIGRAM(S): 5 TABLET, FILM COATED ORAL at 11:02

## 2018-08-16 RX ADMIN — TAMSULOSIN HYDROCHLORIDE 0.8 MILLIGRAM(S): 0.4 CAPSULE ORAL at 21:12

## 2018-08-16 RX ADMIN — Medication 200 MILLIGRAM(S): at 11:02

## 2018-08-16 RX ADMIN — Medication 3 MILLILITER(S): at 13:45

## 2018-08-16 RX ADMIN — Medication 0.5 MILLIGRAM(S): at 05:40

## 2018-08-16 RX ADMIN — BUDESONIDE AND FORMOTEROL FUMARATE DIHYDRATE 2 PUFF(S): 160; 4.5 AEROSOL RESPIRATORY (INHALATION) at 09:21

## 2018-08-16 RX ADMIN — Medication 200 MILLIGRAM(S): at 16:39

## 2018-08-16 RX ADMIN — Medication 650 MILLIGRAM(S): at 09:46

## 2018-08-16 RX ADMIN — Medication 20 MILLIGRAM(S): at 16:38

## 2018-08-16 RX ADMIN — Medication 600 MILLIGRAM(S): at 05:36

## 2018-08-16 RX ADMIN — PIPERACILLIN AND TAZOBACTAM 25 GRAM(S): 4; .5 INJECTION, POWDER, LYOPHILIZED, FOR SOLUTION INTRAVENOUS at 05:37

## 2018-08-16 RX ADMIN — Medication 3 MILLILITER(S): at 09:20

## 2018-08-16 NOTE — PROGRESS NOTE ADULT - SUBJECTIVE AND OBJECTIVE BOX
Patient is a 78y old  Male who presents with a chief complaint of cough, fever (14 Aug 2018 18:53)    Date of service: 08-16-18 @ 15:19    Patient had chest tube placed  Afebrile, still with cough  frail appearing        ROS: no fever or chills; denies dizziness, no HA, no abdominal pain, no diarrhea or constipation; no dysuria, no urinary frequency, no legs pain, no rashes    MEDICATIONS  (STANDING):  ALBUTerol/ipratropium for Nebulization 3 milliLiter(s) Nebulizer every 6 hours  aspirin  chewable 81 milliGRAM(s) Oral daily  atorvastatin 20 milliGRAM(s) Oral at bedtime  buDESOnide 160 MICROgram(s)/formoterol 4.5 MICROgram(s) Inhaler 2 Puff(s) Inhalation two times a day  escitalopram 20 milliGRAM(s) Oral daily  finasteride 5 milliGRAM(s) Oral daily  guaiFENesin  milliGRAM(s) Oral every 12 hours  heparin  Injectable 5000 Unit(s) SubCutaneous every 8 hours  piperacillin/tazobactam IVPB. 3.375 Gram(s) IV Intermittent every 8 hours  tamsulosin 0.8 milliGRAM(s) Oral at bedtime  vancomycin  IVPB 750 milliGRAM(s) IV Intermittent every 12 hours    MEDICATIONS  (PRN):  acetaminophen   Tablet 650 milliGRAM(s) Oral every 6 hours PRN For Temp greater than 38.5 C (101.3 F)  acetaminophen   Tablet. 650 milliGRAM(s) Oral every 6 hours PRN Moderate Pain (4 - 6)  clonazePAM Tablet 0.5 milliGRAM(s) Oral three times a day PRN tremors  guaiFENesin    Syrup 200 milliGRAM(s) Oral every 6 hours PRN Cough  ondansetron Injectable 4 milliGRAM(s) IV Push every 6 hours PRN Nausea  senna 2 Tablet(s) Oral at bedtime PRN Constipation      Vital Signs Last 24 Hrs  T(C): 36.7 (16 Aug 2018 11:02), Max: 37 (15 Aug 2018 21:30)  T(F): 98.1 (16 Aug 2018 11:02), Max: 98.6 (15 Aug 2018 21:30)  HR: 104 (16 Aug 2018 13:56) (89 - 108)  BP: 115/45 (16 Aug 2018 11:02) (107/54 - 130/47)  BP(mean): --  RR: 18 (16 Aug 2018 11:02) (17 - 18)  SpO2: 96% (16 Aug 2018 11:02) (96% - 99%)    Physical Exam:          PE:    Constitutional: frail looking  HEENT: NC/AT, EOMI, PERRLA, conjunctivae clear; ears and nose atraumatic; pharynx clear  Neck: supple; thyroid not palpable  Back: no tenderness  Respiratory: respiratory effort normal; bilateral rhonchi; right side chest tube  Cardiovascular: S1S2 regular, no murmurs  Abdomen: soft, not tender, not distended, positive BS; no liver or spleen organomegaly  Genitourinary: no suprapubic tenderness  Musculoskeletal: no muscle tenderness, no joint swelling or tenderness  Neurological/ Psychiatric: AxOx3, judgement and insight normal;  moving all extremities  Skin: no rashes; no palpable lesions    Labs: all available labs reviewed                       Labs:                        9.7    14.46 )-----------( 274      ( 14 Aug 2018 16:17 )             30.3     08-15    145  |  108  |  13  ----------------------------<  126<H>  3.1<L>   |  29  |  1.00    Ca    7.0<L>      15 Aug 2018 07:21    TPro  7.4  /  Alb  2.7<L>  /  TBili  0.4  /  DBili  x   /  AST  17  /  ALT  17  /  AlkPhos  113  08-14           Cultures:       Culture - Body Fluid with Gram Stain (collected 08-15-18 @ 12:56)  Source: Pleural Fl Pleural Fluid  Gram Stain (08-15-18 @ 22:43):    polymorphonuclear leukocytes seen    No organisms seen by cytocentrifuge    Culture - Acid Fast - Body Fluid w/Smear (collected 08-15-18 @ 12:56)  Source: Pleural Fl Pleural Fluid    Culture - Sputum (collected 08-15-18 @ 06:00)  Source: .Sputum Sputum  Gram Stain (08-15-18 @ 12:18):    Numerous polymorphonuclear leukocytes    Few Squamous epithelial cells per low power field    Numerous Gram Negative Rods    Few Gram Positive Rods    Few Gram positive cocci in pairs    Rare Gram Negative Diplococci per oil power field  Preliminary Report (08-16-18 @ 09:28):    Normal Respiratory Brian present    Culture - Urine (collected 08-14-18 @ 19:30)  Source: .Urine None  Final Report (08-15-18 @ 23:36):    <10,000 CFU/ml Normal Urogenital brian present    Culture - Blood (collected 08-14-18 @ 16:39)  Source: .Blood None  Preliminary Report (08-15-18 @ 22:01):    No growth to date.    Culture - Blood (collected 08-14-18 @ 16:17)  Source: .Blood None  Preliminary Report (08-15-18 @ 22:01):    No growth to date.            < from: CT Chest No Cont (08.14.18 @ 20:29) >    EXAM:  CT CHEST                            PROCEDURE DATE:  08/14/2018          INTERPRETATION:  Exam Date: 8/14/2018 8:29 PM  Clinical Information: Fever  Technique:       CT of the chest was performed with axial images obtained   from the thoracic to the inlet to the bilateral adrenal glands       without IV contrast. Maximum intensity projection images were obtained.  Comparison:  CT chest 6/18/2018, 8/24/2017, 6/24/2017, 7/4/2015    FINDINGS:    Lungs, Airways and Pleura: Central tracheobronchial tree is grossly   patent. No endobronchial lesion. Mild centrilobular emphysema. No   pneumothorax. Mild to moderate right pleural effusion with right middle   lobe and right lower lobe consolidations may represent atelectasis   including rounded atelectasis versus pneumonia. Previously noted right   apical and upper lobe nodular opacities have resolved. Tiny nodularity   scattered throughout the bilateral lung fields may represent improvement   of the previously noted nodular densities versus interval development of   tiny nodules may represent an infectious or inflammatory etiology. Trace   left pleural effusion with minimal left basilar atelectasis.     Heart and Great Vessels: Atherosclerotic changes of the aorta and   coronary vasculature. Heart is normal in size. Trace pericardial effusion.    Mediastinum and Fay: Subcentimeter mediastinal lymph nodes.    Neck and Chest Wall: Minimal bilateral gynecomastia.    Bones: Degenerative changes and osteopenia.    Upper Abdomen:  Gallstones.    IMPRESSION:         Mild right pleural effusion with right middle lobe and right lower lobe   consolidative changes similar appearance to prior exam may represent   atelectasis versus pneumonia. Interval resolution of previously noted   right apical and upper lobe nodular densities     Tiny nodularity scattered throughout the bilateral lung fields may   represent improvement of the previously noted nodular densities versus   interval development of tiny nodules may represent an infectious or   inflammatory etiology.    Trace left pleural effusion with left basilar atelectasis.    < end of copied text >      Radiology: all available radiological tests reviewed    Advanced directives addressed: full resuscitation

## 2018-08-16 NOTE — PROGRESS NOTE ADULT - ASSESSMENT
- s/p right thoracentesis and pigtail catheter  - on zosyn/vanco  - cont O2/bronchodilators/pulmicort  - dvt proph

## 2018-08-16 NOTE — PROGRESS NOTE ADULT - SUBJECTIVE AND OBJECTIVE BOX
PCP- Dr Glover     History of Present Illness:  Reason for Admission: cough, fever    History of Present Illness:   78M w/PMH HTN, HLD, COPD with chronic respiratory failure (Home O2 3L), BPH, recently treated last month for CAP with parapneumonic effusion s/p Rt Chest tube placement with hospital course complicated by post op PTX now presents with cough and fever.    In ED: Febrile, xray concerning for right sided effusion.  Code sepsis called, and he was given azithromycin, zosyn, vanco, and 2L NS.  At bedside pt is poor historian states he does not know why he came to the hospital.  I asked if his breathing is worse and he states yes and he says yes when i ask if he has a cough.  Otherwise he has no specific complaints at this time.  8/15 - c/o cough but no chest pain, sob, n/v/d   - c/o minor pain around chest tube insertion site which was placed yesterday without complications     ROS:   All 10 systems reviewed and found to be negative with the exception of what has been described above.    ICU Vital Signs Last 24 Hrs  T(C): 36.7 (16 Aug 2018 11:02), Max: 37 (15 Aug 2018 21:30)  T(F): 98.1 (16 Aug 2018 11:02), Max: 98.6 (15 Aug 2018 21:30)  HR: 104 (16 Aug 2018 13:56) (89 - 108)  BP: 115/45 (16 Aug 2018 11:02) (107/54 - 130/47)  BP(mean): --  ABP: --  ABP(mean): --  RR: 18 (16 Aug 2018 11:02) (17 - 18)  SpO2: 96% (16 Aug 2018 11:02) (96% - 99%)      GEN: lying in bed, NAD  HEENT:   NC/AT, pupils equal and reactive, EOMI  CV:  +S1, +S2, RRR  RESP:   decreased BS at right base no wheezing or rhonchi, right side chest tube   GI:  abdomen soft, non-tender, non-distended, normoactive BS  RECTAL:  not examined  :  not examined  MSK:   normal muscle tone  EXT:  no edema  NEURO:  AAOX3, no focal neurological deficits  SKIN:  no rashes                                9.7    14.46 )-----------( 274      ( 14 Aug 2018 16:17 )             30.3     08-15    145  |  108  |  13  ----------------------------<  126<H>  3.1<L>   |  29  |  1.00    Ca    7.0<L>      15 Aug 2018 07:21    TPro  7.4  /  Alb  2.7<L>  /  TBili  0.4  /  DBili  x   /  AST  17  /  ALT  17  /  AlkPhos  113          LIVER FUNCTIONS - ( 14 Aug 2018 16:17 )  Alb: 2.7 g/dL / Pro: 7.4 gm/dL / ALK PHOS: 113 U/L / ALT: 17 U/L / AST: 17 U/L / GGT: x           PT/INR - ( 14 Aug 2018 16:17 )   PT: 12.4 sec;   INR: 1.14 ratio         PTT - ( 14 Aug 2018 16:17 )  PTT:29.6 sec  Urinalysis Basic - ( 14 Aug 2018 19:30 )    Color: Yellow / Appearance: Clear / S.010 / pH: x  Gluc: x / Ketone: Negative  / Bili: Negative / Urobili: 1 mg/dL   Blood: x / Protein: 30 mg/dL / Nitrite: Negative   Leuk Esterase: Negative / RBC: 3-5 /HPF / WBC 0-2   Sq Epi: x / Non Sq Epi: x / Bacteria: Few        Lactate, Blood: 2.0 mmol/L ( @ 16:17)    	< from: Xray Chest 1 View-PORTABLE IMMEDIATE (18 @ 16:31) >        	Impression:    	 Moderate right pleural effusion with right lower lobe atelectasis and or   	pneumonia increased in size from prior exam      	< end of copied text >    < from: CT Chest No Cont (18 @ 20:29) >  Mild right pleural effusion with right middle lobe and right lower lobe   consolidative changes similar appearance to prior exam may represent   atelectasis versus pneumonia. Interval resolution of previously noted   right apical and upper lobe nodular densities     Tiny nodularity scattered throughout the bilateral lung fields may   represent improvement of the previously noted nodular densities versus   interval development of tiny nodules may represent an infectious or   inflammatory etiology.    Trace left pleural effusion with left basilar atelectasis.        < end of copied text >      Assessment and Plan:   	  77M w/PMH HTN, HLD, COPD, chronic respiratory failure, recently admitted for PNA p/w sepsis secondary to PNA.     # sepsis/HCAP/right sided effusion  - continue vanco zosyn (started 8/15) as per ID recs  - f/u cx  - CT surg note appreciated and plan for thoracentesis will f/u fluid eval  - CT chest noted  -ID and pulm eval appreciated    # Chronic diastolic CHF: will resume lasix 20 mg daily    # Acute renal failure: resolving  -s/p 2L NS  - f/u BMP in AM     # COPD:  -c/w oxygen, monitor O2 sats and titrate to keep sats>90%  -duonebs  -inhaled corticosteroids    # anemia: Stable  -monitor    # hypoalbuminemia:  -nutrition eval - mod protein fausto malnutrition    # Hypokalemia.    -replete and check am labs    # Essential hypertension:   -hypotensive earlier, but now improving bp   -c/w home meds- monitor bp and titrate as needed.    Prophylactic measure  -sq heparin.

## 2018-08-16 NOTE — PROGRESS NOTE ADULT - SUBJECTIVE AND OBJECTIVE BOX
Subjective: 78M pmhx: HTN, HLD, COPD, BPH, recurrent right pleural effusion s/p IR PTC 6/28 complicated by trapped lung readmitted with recurrence s/p PTC 8/15.    Pt without complaints. PTC drained ~1liter, CXR inmproved    Vital Signs:  Vital Signs Last 24 Hrs  T(C): 36.4 (08-16-18 @ 05:19), Max: 37 (08-15-18 @ 21:30)  T(F): 97.5 (08-16-18 @ 05:19), Max: 98.6 (08-15-18 @ 21:30)  HR: 89 (08-16-18 @ 05:19) (89 - 99)  BP: 107/54 (08-16-18 @ 05:19) (107/54 - 130/47)  RR: 17 (08-16-18 @ 05:19) (17 - 18)  SpO2: 99% (08-16-18 @ 05:19) (97% - 99%) on (O2)    Telemetry/Alarms:  General:  NAD  Neurology: Awake, nonfocal, NELSON x 4  Eyes: Scleras clear, PERRLA/ EOMI, Gross vision intact  ENT:Gross hearing intact, grossly patent pharynx, no stridor  Neck: Neck supple, trachea midline, No JVD,   Respiratory: CTA B/L, No wheezing, rales, rhonchi  CV: RRR, S1S2, no murmurs, rubs or gallops  Abdominal: Soft, NT, ND +BS,   Extremities: No edema, + peripheral pulses  Skin: No Rashes, Hematoma, Ecchymosis  Lymphatic: No Neck, axilla, groin LAD  Psych: Oriented x 3, normal affect  Tubes: Right PTC to suction, No AL, 1L serosang  Relevant labs, radiology and Medications reviewed                        9.7    14.46 )-----------( 274      ( 14 Aug 2018 16:17 )             30.3     08-15    145  |  108  |  13  ----------------------------<  126<H>  3.1<L>   |  29  |  1.00    Ca    7.0<L>      15 Aug 2018 07:21    TPro  7.4  /  Alb  2.7<L>  /  TBili  0.4  /  DBili  x   /  AST  17  /  ALT  17  /  AlkPhos  113  08-14    PT/INR - ( 14 Aug 2018 16:17 )   PT: 12.4 sec;   INR: 1.14 ratio         PTT - ( 14 Aug 2018 16:17 )  PTT:29.6 sec  MEDICATIONS  (STANDING):  ALBUTerol/ipratropium for Nebulization 3 milliLiter(s) Nebulizer every 6 hours  aspirin  chewable 81 milliGRAM(s) Oral daily  atorvastatin 20 milliGRAM(s) Oral at bedtime  buDESOnide 160 MICROgram(s)/formoterol 4.5 MICROgram(s) Inhaler 2 Puff(s) Inhalation two times a day  escitalopram 20 milliGRAM(s) Oral daily  finasteride 5 milliGRAM(s) Oral daily  guaiFENesin  milliGRAM(s) Oral every 12 hours  heparin  Injectable 5000 Unit(s) SubCutaneous every 8 hours  piperacillin/tazobactam IVPB. 3.375 Gram(s) IV Intermittent every 8 hours  tamsulosin 0.8 milliGRAM(s) Oral at bedtime  vancomycin  IVPB 750 milliGRAM(s) IV Intermittent every 12 hours    MEDICATIONS  (PRN):  acetaminophen   Tablet 650 milliGRAM(s) Oral every 6 hours PRN For Temp greater than 38.5 C (101.3 F)  acetaminophen   Tablet. 650 milliGRAM(s) Oral every 6 hours PRN Moderate Pain (4 - 6)  clonazePAM Tablet 0.5 milliGRAM(s) Oral three times a day PRN tremors  guaiFENesin    Syrup 200 milliGRAM(s) Oral every 6 hours PRN Cough  ondansetron Injectable 4 milliGRAM(s) IV Push every 6 hours PRN Nausea  senna 2 Tablet(s) Oral at bedtime PRN Constipation      Physical exam      I&O's Summary    15 Aug 2018 07:01  -  16 Aug 2018 07:00  --------------------------------------------------------  IN: 350 mL / OUT: 991 mL / NET: -641 mL        Assessment  78y Male  w/ PAST MEDICAL & SURGICAL HISTORY:  Chronic respiratory failure  Hematuria  BPH (benign prostatic hypertrophy)  Low back pain  Osteoarthritis  Pleural effusion: 2016  Hypercholesterolemia  Anxiety  Depression  COPD (chronic obstructive pulmonary disease)  HTN (hypertension)  Bladder tumor  Cataract extraction status: 2015  b/l  admitted with complaints of Patient is a 78y old  Male who presents with a chief complaint of cough, fever (14 Aug 2018 18:53)  78M pmhx: HTN, HLD, COPD, BPH, recurrent right pleural effusion s/p IR PTC 6/28 complicated by trapped lung readmitted with recurrence s/p PTC 8/15.    PLAN  Cont PTC to suction  possible need for PleurX in the future if persisstent space  daily CXR  Discussed with Cardiothoracic Team at AM rounds.

## 2018-08-16 NOTE — PROGRESS NOTE ADULT - ASSESSMENT
78M w/PMH HTN, HLD, COPD with chronic respiratory failure (Home O2 3L), BPH, recently hospitalized at  for pneumonia admitted on 8/14 for evaluation of worsening shortness of breath and cough associated with fever and chills; history per medical record as patient unable to provide history.   1. Patient admitted with pneumonia which will treat as health care associated pneumonia given recent hospitalization; also noted with leukocytosis most likely reactive to infection  - patient at risk for gram negative rods and other resistant bacteria   - iv hydration and supportive care   - oxygen and nebs as needed   - serial cbc and monitor temperature   - reviewed prior medical records to evaluate for resistant or atypical pathogens   - day #2 zosyn and vancomycin  - tolerating antibiotics without rashes or side effects   - check vancomycin trough prior to fourth dose   2. other issues:  HTN, HLD, COPD with chronic respiratory failure (Home O2 3L), BPH  - per medicine

## 2018-08-16 NOTE — PROGRESS NOTE ADULT - SUBJECTIVE AND OBJECTIVE BOX
Subjective:  awake and alert  has left chest tube    MEDICATIONS  (STANDING):  ALBUTerol/ipratropium for Nebulization 3 milliLiter(s) Nebulizer every 6 hours  aspirin  chewable 81 milliGRAM(s) Oral daily  atorvastatin 20 milliGRAM(s) Oral at bedtime  buDESOnide 160 MICROgram(s)/formoterol 4.5 MICROgram(s) Inhaler 2 Puff(s) Inhalation two times a day  escitalopram 20 milliGRAM(s) Oral daily  finasteride 5 milliGRAM(s) Oral daily  guaiFENesin  milliGRAM(s) Oral every 12 hours  heparin  Injectable 5000 Unit(s) SubCutaneous every 8 hours  piperacillin/tazobactam IVPB. 3.375 Gram(s) IV Intermittent every 8 hours  tamsulosin 0.8 milliGRAM(s) Oral at bedtime  vancomycin  IVPB 750 milliGRAM(s) IV Intermittent every 12 hours    MEDICATIONS  (PRN):  acetaminophen   Tablet 650 milliGRAM(s) Oral every 6 hours PRN For Temp greater than 38.5 C (101.3 F)  acetaminophen   Tablet. 650 milliGRAM(s) Oral every 6 hours PRN Moderate Pain (4 - 6)  clonazePAM Tablet 0.5 milliGRAM(s) Oral three times a day PRN tremors  guaiFENesin    Syrup 200 milliGRAM(s) Oral every 6 hours PRN Cough  ondansetron Injectable 4 milliGRAM(s) IV Push every 6 hours PRN Nausea  senna 2 Tablet(s) Oral at bedtime PRN Constipation      Allergies    No Known Allergies    Intolerances        REVIEW OF SYSTEMS:    CONSTITUTIONAL:  As per HPI.  HEENT:  Eyes:  No diplopia or blurred vision. ENT:  No earache, sore throat or runny nose.  CARDIOVASCULAR:  No pressure, squeezing, tightness, heaviness or aching about the chest, neck, axilla or epigastrium.  RESPIRATORY:  No cough, shortness of breath, PND or orthopnea.  GASTROINTESTINAL:  No nausea, vomiting or diarrhea.  GENITOURINARY:  No dysuria, frequency or urgency.  MUSCULOSKELETAL:  no joint pain, deformity, tenderness  EXTREMITIES: no clubbing cyanosis,edema  SKIN:  No change in skin, hair or nails.  NEUROLOGIC:  No paresthesias, fasciculations, seizures or weakness.  PSYCHIATRIC:  No disorder of thought or mood.  ENDOCRINE:  No heat or cold intolerance, polyuria or polydipsia.  HEMATOLOGICAL:  No easy bruising or bleedings:    Vital Signs Last 24 Hrs  T(C): 36.4 (16 Aug 2018 05:19), Max: 37 (15 Aug 2018 21:30)  T(F): 97.5 (16 Aug 2018 05:19), Max: 98.6 (15 Aug 2018 21:30)  HR: 89 (16 Aug 2018 05:19) (89 - 99)  BP: 107/54 (16 Aug 2018 05:19) (107/54 - 130/47)  BP(mean): --  RR: 17 (16 Aug 2018 05:19) (17 - 18)  SpO2: 99% (16 Aug 2018 05:19) (97% - 99%)    PHYSICAL EXAMINATION:  SKIN: no rashes  HEAD: NC/AT  EYES: PERRLA, EOMI  EARS: TM's intact  NOSE: no abnormalities  NECK:  Supple. No lymphadenopathy. Jugular venous pressure not elevated. Carotids equal.   HEART:   The cardiac impulse has a normal quality. Reg., Nl S1 and S2.  There are no murmurs, rubs or gallops noted  CHEST:  decreased BS right base  ABDOMEN:  Soft and nontender.   EXTREMITIES:  no C/C/E  NEURO: AAO x 3, no focal deficts       LABS:                        9.7    14.46 )-----------( 274      ( 14 Aug 2018 16:17 )             30.3     08-15    145  |  108  |  13  ----------------------------<  126<H>  3.1<L>   |  29  |  1.00    Ca    7.0<L>      15 Aug 2018 07:21    TPro  7.4  /  Alb  2.7<L>  /  TBili  0.4  /  DBili  x   /  AST  17  /  ALT  17  /  AlkPhos  113  08-14    PT/INR - ( 14 Aug 2018 16:17 )   PT: 12.4 sec;   INR: 1.14 ratio         PTT - ( 14 Aug 2018 16:17 )  PTT:29.6 sec  Urinalysis Basic - ( 14 Aug 2018 19:30 )    Color: Yellow / Appearance: Clear / S.010 / pH: x  Gluc: x / Ketone: Negative  / Bili: Negative / Urobili: 1 mg/dL   Blood: x / Protein: 30 mg/dL / Nitrite: Negative   Leuk Esterase: Negative / RBC: 3-5 /HPF / WBC 0-2   Sq Epi: x / Non Sq Epi: x / Bacteria: Few        RADIOLOGY & ADDITIONAL TESTS:

## 2018-08-17 LAB
ANION GAP SERPL CALC-SCNC: 8 MMOL/L — SIGNIFICANT CHANGE UP (ref 5–17)
BUN SERPL-MCNC: 15 MG/DL — SIGNIFICANT CHANGE UP (ref 7–23)
CALCIUM SERPL-MCNC: 7.9 MG/DL — LOW (ref 8.5–10.1)
CHLORIDE SERPL-SCNC: 103 MMOL/L — SIGNIFICANT CHANGE UP (ref 96–108)
CO2 SERPL-SCNC: 31 MMOL/L — SIGNIFICANT CHANGE UP (ref 22–31)
CREAT SERPL-MCNC: 0.99 MG/DL — SIGNIFICANT CHANGE UP (ref 0.5–1.3)
CULTURE RESULTS: SIGNIFICANT CHANGE UP
GLUCOSE SERPL-MCNC: 107 MG/DL — HIGH (ref 70–99)
HCT VFR BLD CALC: 27.1 % — LOW (ref 39–50)
HGB BLD-MCNC: 8.5 G/DL — LOW (ref 13–17)
MAGNESIUM SERPL-MCNC: 2.1 MG/DL — SIGNIFICANT CHANGE UP (ref 1.6–2.6)
MCHC RBC-ENTMCNC: 28.8 PG — SIGNIFICANT CHANGE UP (ref 27–34)
MCHC RBC-ENTMCNC: 31.4 GM/DL — LOW (ref 32–36)
MCV RBC AUTO: 91.9 FL — SIGNIFICANT CHANGE UP (ref 80–100)
NON-GYN CYTOLOGY SPEC: SIGNIFICANT CHANGE UP
NRBC # BLD: 0 /100 WBCS — SIGNIFICANT CHANGE UP (ref 0–0)
PLATELET # BLD AUTO: 257 K/UL — SIGNIFICANT CHANGE UP (ref 150–400)
POTASSIUM SERPL-MCNC: 3.1 MMOL/L — LOW (ref 3.5–5.3)
POTASSIUM SERPL-SCNC: 3.1 MMOL/L — LOW (ref 3.5–5.3)
RBC # BLD: 2.95 M/UL — LOW (ref 4.2–5.8)
RBC # FLD: 13.2 % — SIGNIFICANT CHANGE UP (ref 10.3–14.5)
SODIUM SERPL-SCNC: 142 MMOL/L — SIGNIFICANT CHANGE UP (ref 135–145)
SPECIMEN SOURCE: SIGNIFICANT CHANGE UP
VANCOMYCIN TROUGH SERPL-MCNC: 11.4 UG/ML — SIGNIFICANT CHANGE UP (ref 10–20)
WBC # BLD: 13.85 K/UL — HIGH (ref 3.8–10.5)
WBC # FLD AUTO: 13.85 K/UL — HIGH (ref 3.8–10.5)

## 2018-08-17 PROCEDURE — 99233 SBSQ HOSP IP/OBS HIGH 50: CPT

## 2018-08-17 PROCEDURE — 99232 SBSQ HOSP IP/OBS MODERATE 35: CPT

## 2018-08-17 PROCEDURE — 71045 X-RAY EXAM CHEST 1 VIEW: CPT | Mod: 26

## 2018-08-17 RX ORDER — CLONAZEPAM 1 MG
0.5 TABLET ORAL THREE TIMES A DAY
Qty: 0 | Refills: 0 | Status: DISCONTINUED | OUTPATIENT
Start: 2018-08-17 | End: 2018-08-20

## 2018-08-17 RX ORDER — POTASSIUM CHLORIDE 20 MEQ
40 PACKET (EA) ORAL ONCE
Qty: 0 | Refills: 0 | Status: COMPLETED | OUTPATIENT
Start: 2018-08-17 | End: 2018-08-17

## 2018-08-17 RX ADMIN — HEPARIN SODIUM 5000 UNIT(S): 5000 INJECTION INTRAVENOUS; SUBCUTANEOUS at 21:38

## 2018-08-17 RX ADMIN — BUDESONIDE AND FORMOTEROL FUMARATE DIHYDRATE 2 PUFF(S): 160; 4.5 AEROSOL RESPIRATORY (INHALATION) at 20:57

## 2018-08-17 RX ADMIN — TAMSULOSIN HYDROCHLORIDE 0.8 MILLIGRAM(S): 0.4 CAPSULE ORAL at 21:38

## 2018-08-17 RX ADMIN — Medication 600 MILLIGRAM(S): at 17:16

## 2018-08-17 RX ADMIN — HEPARIN SODIUM 5000 UNIT(S): 5000 INJECTION INTRAVENOUS; SUBCUTANEOUS at 14:13

## 2018-08-17 RX ADMIN — Medication 250 MILLIGRAM(S): at 06:07

## 2018-08-17 RX ADMIN — HEPARIN SODIUM 5000 UNIT(S): 5000 INJECTION INTRAVENOUS; SUBCUTANEOUS at 06:09

## 2018-08-17 RX ADMIN — Medication 81 MILLIGRAM(S): at 11:37

## 2018-08-17 RX ADMIN — Medication 3 MILLILITER(S): at 20:57

## 2018-08-17 RX ADMIN — ATORVASTATIN CALCIUM 20 MILLIGRAM(S): 80 TABLET, FILM COATED ORAL at 21:39

## 2018-08-17 RX ADMIN — Medication 250 MILLIGRAM(S): at 21:27

## 2018-08-17 RX ADMIN — PIPERACILLIN AND TAZOBACTAM 25 GRAM(S): 4; .5 INJECTION, POWDER, LYOPHILIZED, FOR SOLUTION INTRAVENOUS at 11:48

## 2018-08-17 RX ADMIN — Medication 0.5 MILLIGRAM(S): at 15:08

## 2018-08-17 RX ADMIN — Medication 3 MILLILITER(S): at 15:19

## 2018-08-17 RX ADMIN — FINASTERIDE 5 MILLIGRAM(S): 5 TABLET, FILM COATED ORAL at 11:37

## 2018-08-17 RX ADMIN — ESCITALOPRAM OXALATE 20 MILLIGRAM(S): 10 TABLET, FILM COATED ORAL at 11:37

## 2018-08-17 RX ADMIN — Medication 0.5 MILLIGRAM(S): at 00:33

## 2018-08-17 RX ADMIN — Medication 20 MILLIGRAM(S): at 06:09

## 2018-08-17 RX ADMIN — Medication 600 MILLIGRAM(S): at 06:07

## 2018-08-17 RX ADMIN — Medication 650 MILLIGRAM(S): at 00:58

## 2018-08-17 RX ADMIN — Medication 40 MILLIEQUIVALENT(S): at 11:37

## 2018-08-17 RX ADMIN — Medication 3 MILLILITER(S): at 02:18

## 2018-08-17 RX ADMIN — PIPERACILLIN AND TAZOBACTAM 25 GRAM(S): 4; .5 INJECTION, POWDER, LYOPHILIZED, FOR SOLUTION INTRAVENOUS at 16:55

## 2018-08-17 NOTE — PROGRESS NOTE ADULT - SUBJECTIVE AND OBJECTIVE BOX
Subjective:  awake  mild dyspnea    MEDICATIONS  (STANDING):  ALBUTerol/ipratropium for Nebulization 3 milliLiter(s) Nebulizer every 6 hours  aspirin  chewable 81 milliGRAM(s) Oral daily  atorvastatin 20 milliGRAM(s) Oral at bedtime  buDESOnide 160 MICROgram(s)/formoterol 4.5 MICROgram(s) Inhaler 2 Puff(s) Inhalation two times a day  escitalopram 20 milliGRAM(s) Oral daily  finasteride 5 milliGRAM(s) Oral daily  furosemide    Tablet 20 milliGRAM(s) Oral daily  guaiFENesin  milliGRAM(s) Oral every 12 hours  heparin  Injectable 5000 Unit(s) SubCutaneous every 8 hours  piperacillin/tazobactam IVPB. 3.375 Gram(s) IV Intermittent every 8 hours  potassium chloride    Tablet ER 40 milliEquivalent(s) Oral once  tamsulosin 0.8 milliGRAM(s) Oral at bedtime  vancomycin  IVPB 750 milliGRAM(s) IV Intermittent every 12 hours    MEDICATIONS  (PRN):  acetaminophen   Tablet 650 milliGRAM(s) Oral every 6 hours PRN For Temp greater than 38.5 C (101.3 F)  acetaminophen   Tablet. 650 milliGRAM(s) Oral every 6 hours PRN Moderate Pain (4 - 6)  clonazePAM Tablet 0.5 milliGRAM(s) Oral three times a day PRN tremors  guaiFENesin    Syrup 200 milliGRAM(s) Oral every 6 hours PRN Cough  ondansetron Injectable 4 milliGRAM(s) IV Push every 6 hours PRN Nausea  senna 2 Tablet(s) Oral at bedtime PRN Constipation      Allergies    No Known Allergies    Intolerances        REVIEW OF SYSTEMS:    CONSTITUTIONAL:  As per HPI.  HEENT:  Eyes:  No diplopia or blurred vision. ENT:  No earache, sore throat or runny nose.  CARDIOVASCULAR:  No pressure, squeezing, tightness, heaviness or aching about the chest, neck, axilla or epigastrium.  RESPIRATORY:  No cough, shortness of breath, PND or orthopnea.  GASTROINTESTINAL:  No nausea, vomiting or diarrhea.  GENITOURINARY:  No dysuria, frequency or urgency.  MUSCULOSKELETAL:  no joint pain, deformity, tenderness  EXTREMITIES: no clubbing cyanosis,edema  SKIN:  No change in skin, hair or nails.  NEUROLOGIC:  No paresthesias, fasciculations, seizures or weakness.  PSYCHIATRIC:  No disorder of thought or mood.  ENDOCRINE:  No heat or cold intolerance, polyuria or polydipsia.  HEMATOLOGICAL:  No easy bruising or bleedings:    Vital Signs Last 24 Hrs  T(C): 36.8 (17 Aug 2018 05:26), Max: 37.1 (16 Aug 2018 20:37)  T(F): 98.2 (17 Aug 2018 05:26), Max: 98.7 (16 Aug 2018 20:37)  HR: 96 (17 Aug 2018 05:26) (89 - 105)  BP: 128/53 (17 Aug 2018 05:26) (115/45 - 128/53)  BP(mean): --  RR: 17 (17 Aug 2018 05:26) (16 - 18)  SpO2: 99% (17 Aug 2018 05:26) (95% - 99%)    PHYSICAL EXAMINATION:  SKIN: no rashes  HEAD: NC/AT  EYES: PERRLA, EOMI  EARS: TM's intact  NOSE: no abnormalities  NECK:  Supple. No lymphadenopathy. Jugular venous pressure not elevated. Carotids equal.   HEART:   The cardiac impulse has a normal quality. Reg., Nl S1 and S2.  2/6 systolic murmur  CHEST:  bilat ronchi  ABDOMEN:  Soft and nontender.   EXTREMITIES:  no C/C/E  NEURO: AAO x 3, no focal deficts       LABS:                        8.5    13.85 )-----------( 257      ( 17 Aug 2018 05:21 )             27.1     08-17    142  |  103  |  15  ----------------------------<  107<H>  3.1<L>   |  31  |  0.99    Ca    7.9<L>      17 Aug 2018 05:21  Mg     2.1     08-17            RADIOLOGY & ADDITIONAL TESTS:

## 2018-08-17 NOTE — PROGRESS NOTE ADULT - ASSESSMENT
78M w/PMH HTN, HLD, COPD with chronic respiratory failure (Home O2 3L), BPH, recently hospitalized at  for pneumonia admitted on 8/14 for evaluation of worsening shortness of breath and cough associated with fever and chills; history per medical record as patient unable to provide history.   1. Patient admitted with pneumonia which will treat as health care associated pneumonia given recent hospitalization; also noted with leukocytosis most likely reactive to infection  - patient at risk for gram negative rods and other resistant bacteria   - iv hydration and supportive care   - oxygen and nebs as needed   - serial cbc and monitor temperature   - reviewed prior medical records to evaluate for resistant or atypical pathogens   - day #3 zosyn and vancomycin  - tolerating antibiotics without rashes or side effects   - vancomycin trough is appropriate  - chest tube per CTS  2. other issues:  HTN, HLD, COPD with chronic respiratory failure (Home O2 3L), BPH  - per medicine

## 2018-08-17 NOTE — PROGRESS NOTE ADULT - SUBJECTIVE AND OBJECTIVE BOX
Subjective:  78M pmhx: HTN, HLD, COPD, BPH, recurrent right pleural effusion s/p IR PTC 6/28 complicated by trapped lung readmitted with recurrence s/p PTC 8/15.    Pt without complaints. PTC drained ~1liter, CXR inmproved  8/17 Pt w/o complaints, no overnight events. PTC drained 500cc serous.        Vital Signs:  Vital Signs Last 24 Hrs  T(C): 36.8 (08-17-18 @ 05:26), Max: 37.1 (08-16-18 @ 20:37)  T(F): 98.2 (08-17-18 @ 05:26), Max: 98.7 (08-16-18 @ 20:37)  HR: 96 (08-17-18 @ 05:26) (89 - 108)  BP: 128/53 (08-17-18 @ 05:26) (115/45 - 128/53)  RR: 17 (08-17-18 @ 05:26) (16 - 18)  SpO2: 99% (08-17-18 @ 05:26) (95% - 99%) on (O2)  General:  NAD  Neurology: Awake, nonfocal, NELSON x 4  Eyes: Scleras clear, PERRLA/ EOMI, Gross vision intact  ENT:Gross hearing intact, grossly patent pharynx, no stridor  Neck: Neck supple, trachea midline, No JVD,   Respiratory: CTA B/L, No wheezing, rales, rhonchi  CV: RRR, S1S2, no murmurs, rubs or gallops  Abdominal: Soft, NT, ND +BS,   Extremities: No edema, + peripheral pulses  Skin: No Rashes, Hematoma, Ecchymosis  Lymphatic: No Neck, axilla, groin LAD  Psych: Oriented x 3, normal affect  Tubes: Right PTC to suction, No AL, 500L sero no AL  Relevant labs, radiology and Medications reviewed    Relevant labs, radiology and Medications reviewed                        8.5    13.85 )-----------( 257      ( 17 Aug 2018 05:21 )             27.1     08-17    142  |  103  |  15  ----------------------------<  107<H>  3.1<L>   |  31  |  0.99    Ca    7.9<L>      17 Aug 2018 05:21  Mg     2.1     08-17        MEDICATIONS  (STANDING):  ALBUTerol/ipratropium for Nebulization 3 milliLiter(s) Nebulizer every 6 hours  aspirin  chewable 81 milliGRAM(s) Oral daily  atorvastatin 20 milliGRAM(s) Oral at bedtime  buDESOnide 160 MICROgram(s)/formoterol 4.5 MICROgram(s) Inhaler 2 Puff(s) Inhalation two times a day  escitalopram 20 milliGRAM(s) Oral daily  finasteride 5 milliGRAM(s) Oral daily  furosemide    Tablet 20 milliGRAM(s) Oral daily  guaiFENesin  milliGRAM(s) Oral every 12 hours  heparin  Injectable 5000 Unit(s) SubCutaneous every 8 hours  piperacillin/tazobactam IVPB. 3.375 Gram(s) IV Intermittent every 8 hours  potassium chloride    Tablet ER 40 milliEquivalent(s) Oral once  tamsulosin 0.8 milliGRAM(s) Oral at bedtime  vancomycin  IVPB 750 milliGRAM(s) IV Intermittent every 12 hours    MEDICATIONS  (PRN):  acetaminophen   Tablet 650 milliGRAM(s) Oral every 6 hours PRN For Temp greater than 38.5 C (101.3 F)  acetaminophen   Tablet. 650 milliGRAM(s) Oral every 6 hours PRN Moderate Pain (4 - 6)  clonazePAM Tablet 0.5 milliGRAM(s) Oral three times a day PRN tremors  guaiFENesin    Syrup 200 milliGRAM(s) Oral every 6 hours PRN Cough  ondansetron Injectable 4 milliGRAM(s) IV Push every 6 hours PRN Nausea  senna 2 Tablet(s) Oral at bedtime PRN Constipation          I&O's Summary    16 Aug 2018 07:01  -  17 Aug 2018 07:00  --------------------------------------------------------  IN: 350 mL / OUT: 1525 mL / NET: -1175 mL    17 Aug 2018 07:01  -  17 Aug 2018 09:09  --------------------------------------------------------  IN: 0 mL / OUT: 180 mL / NET: -180 mL        Assessment  78y Male  w/ PAST MEDICAL & SURGICAL HISTORY:  Chronic respiratory failure  Hematuria  BPH (benign prostatic hypertrophy)  Low back pain  Osteoarthritis  Pleural effusion: 2016  Hypercholesterolemia  Anxiety  Depression  COPD (chronic obstructive pulmonary disease)  HTN (hypertension)  Bladder tumor  Cataract extraction status: 2015  b/l  admitted with complaints of Patient is a 78y old  Male who presents with a chief complaint of cough, fever (14 Aug 2018 18:53)  78M pmhx: HTN, HLD, COPD, BPH, recurrent right pleural effusion s/p IR PTC 6/28 complicated by trapped lung readmitted with recurrence s/p PTC 8/15.    PLAN  PTC to [facility]  possible need for PleurX in the future if persisstent space  daily CXR  Discussed with Cardiothoracic Team at AM rounds.

## 2018-08-17 NOTE — PROGRESS NOTE ADULT - SUBJECTIVE AND OBJECTIVE BOX
HOSPITAL COURSE AND ASSESSMENT  1M w/PMH HTN, HLD, COPD with chronic respiratory failure (Home O2 3L), BPH, recently treated for CAP with parapneumonic effusion s/p Rt Chest tube placement with hospital course complicated by post op PTX now presents with cough and fever.      In ED: Febrile, xray concerning for recurrent right sided effusion.  Code sepsis called, and he was given azithromycin, zosyn, vanco, and 2L NS. s/p PTC 8/15. CTS, Pulmonary following. ID managment of abx.        # sepsis/HCAP/right sided effusion s/p PTC  - vanco zosyn (started 8/15) as per ID recs  - CT chest noted  -ID and pulm eval appreciated    # Chronic diastolic CHF: will resume lasix 20 mg daily    # Acute renal failure: resolved  -s/p 2L NS    # COPD:  -c/w oxygen, monitor O2 sats and titrate to keep sats>90%  -duonebs  -inhaled corticosteroids    # anemia: Stable  -monitor    # hypoalbuminemia:  -nutrition eval - mod protein fausto malnutrition    # Hypokalemia.    -replete and check am labs      Prophylactic measure  -sq heparin.          ----------------------------------------------------------------------------------------------  CHIEF COMPLAINT:  breathing difficulty    SUBJECTIVE:     tolerating PO. some anxiety and shortness of breath. feels better today than before    REVIEW OF SYSTEMS:  All other review of systems is negative unless indicated above    Vital Signs Last 24 Hrs  T(C): 37.2 (17 Aug 2018 15:07), Max: 37.2 (17 Aug 2018 15:07)  T(F): 99 (17 Aug 2018 15:07), Max: 99 (17 Aug 2018 15:07)  HR: 111 (17 Aug 2018 15:07) (94 - 111)  BP: 166/65 (17 Aug 2018 15:07) (116/48 - 166/65)  BP(mean): --  RR: 20 (17 Aug 2018 15:07) (16 - 20)  SpO2: 96% (17 Aug 2018 15:07) (95% - 99%)  CAPILLARY BLOOD GLUCOSE          PHYSICAL EXAM:  Constitutional: NAD, NCAT  HEENT: EOMI, Normal Hearing, MMM, fair dentition  Neck: trachea midline, No JVD  Respiratory: Breath sounds are rhonchorus, unlabored respiration, chest tube secure  Cardiovascular: S1 and S2, regular rate   Gastrointestinal: Bowel Sounds present, soft, nontender, nondistended  Extremities: No peripheral edema  Vascular: 2+ radial pulse  Neurological: awake, alert, follows commands, sensation grossly intact  Psych: appropriate affect,  insight  Musculoskeletal: moves all extremities  Skin: No rashes    ALLERGIES  No Known Allergies      MEDICATIONS  (STANDING):  ALBUTerol/ipratropium for Nebulization 3 milliLiter(s) Nebulizer every 6 hours  aspirin  chewable 81 milliGRAM(s) Oral daily  atorvastatin 20 milliGRAM(s) Oral at bedtime  buDESOnide 160 MICROgram(s)/formoterol 4.5 MICROgram(s) Inhaler 2 Puff(s) Inhalation two times a day  escitalopram 20 milliGRAM(s) Oral daily  finasteride 5 milliGRAM(s) Oral daily  furosemide    Tablet 20 milliGRAM(s) Oral daily  guaiFENesin  milliGRAM(s) Oral every 12 hours  heparin  Injectable 5000 Unit(s) SubCutaneous every 8 hours  piperacillin/tazobactam IVPB. 3.375 Gram(s) IV Intermittent every 8 hours  tamsulosin 0.8 milliGRAM(s) Oral at bedtime  vancomycin  IVPB 750 milliGRAM(s) IV Intermittent every 12 hours      LABS: All Labs Reviewed:                        8.5    13.85 )-----------( 257      ( 17 Aug 2018 05:21 )             27.1     08-17    142  |  103  |  15  ----------------------------<  107<H>  3.1<L>   |  31  |  0.99    Ca    7.9<L>      17 Aug 2018 05:21  Mg     2.1     08-17            Blood Culture: 08-15 @ 12:56  Organism --  Gram Stain Blood -- Gram Stain   polymorphonuclear leukocytes seen  No organisms seen by cytocentrifuge  Specimen Source Pleural Fl Pleural Fluid  Culture-Blood --    08-15 @ 06:00  Organism --  Gram Stain Blood -- Gram Stain   Numerous polymorphonuclear leukocytes  Few Squamous epithelial cells per low power field  Numerous Gram Negative Rods  Few Gram Positive Rods  Few Gram positive cocci in pairs  Rare Gram Negative Diplococci per oil power field  Specimen Source .Sputum Sputum  Culture-Blood --    08-14 @ 19:30  Organism --  Gram Stain Blood -- Gram Stain --  Specimen Source .Urine None  Culture-Blood --    08-14 @ 16:39  Organism --  Gram Stain Blood -- Gram Stain --  Specimen Source .Blood None  Culture-Blood --    08-14 @ 16:17  Organism --  Gram Stain Blood -- Gram Stain --  Specimen Source .Blood None  Culture-Blood --

## 2018-08-17 NOTE — PROGRESS NOTE ADULT - SUBJECTIVE AND OBJECTIVE BOX
Patient is a 78y old  Male who presents with a chief complaint of cough, fever (14 Aug 2018 18:53)  Date of service: 08-17-18 @ 09:53      Patient lying in bed  Less cough  Afebrile      ROS: no fever or chills; denies dizziness, no HA, no SOB or cough, no abdominal pain, no diarrhea or constipation; no dysuria, no urinary frequency, no legs pain, no rashes    MEDICATIONS  (STANDING):  ALBUTerol/ipratropium for Nebulization 3 milliLiter(s) Nebulizer every 6 hours  aspirin  chewable 81 milliGRAM(s) Oral daily  atorvastatin 20 milliGRAM(s) Oral at bedtime  buDESOnide 160 MICROgram(s)/formoterol 4.5 MICROgram(s) Inhaler 2 Puff(s) Inhalation two times a day  escitalopram 20 milliGRAM(s) Oral daily  finasteride 5 milliGRAM(s) Oral daily  furosemide    Tablet 20 milliGRAM(s) Oral daily  guaiFENesin  milliGRAM(s) Oral every 12 hours  heparin  Injectable 5000 Unit(s) SubCutaneous every 8 hours  piperacillin/tazobactam IVPB. 3.375 Gram(s) IV Intermittent every 8 hours  potassium chloride    Tablet ER 40 milliEquivalent(s) Oral once  tamsulosin 0.8 milliGRAM(s) Oral at bedtime  vancomycin  IVPB 750 milliGRAM(s) IV Intermittent every 12 hours    MEDICATIONS  (PRN):  acetaminophen   Tablet 650 milliGRAM(s) Oral every 6 hours PRN For Temp greater than 38.5 C (101.3 F)  acetaminophen   Tablet. 650 milliGRAM(s) Oral every 6 hours PRN Moderate Pain (4 - 6)  clonazePAM Tablet 0.5 milliGRAM(s) Oral three times a day PRN tremors  guaiFENesin    Syrup 200 milliGRAM(s) Oral every 6 hours PRN Cough  ondansetron Injectable 4 milliGRAM(s) IV Push every 6 hours PRN Nausea  senna 2 Tablet(s) Oral at bedtime PRN Constipation      Vital Signs Last 24 Hrs  T(C): 36.8 (17 Aug 2018 05:26), Max: 37.1 (16 Aug 2018 20:37)  T(F): 98.2 (17 Aug 2018 05:26), Max: 98.7 (16 Aug 2018 20:37)  HR: 96 (17 Aug 2018 05:26) (89 - 105)  BP: 128/53 (17 Aug 2018 05:26) (115/45 - 128/53)  BP(mean): --  RR: 17 (17 Aug 2018 05:26) (16 - 18)  SpO2: 99% (17 Aug 2018 05:26) (95% - 99%)    Physical Exam:        PE:    Constitutional: frail looking  HEENT: NC/AT, EOMI, PERRLA, conjunctivae clear; ears and nose atraumatic; pharynx clear  Neck: supple; thyroid not palpable  Back: no tenderness  Respiratory: respiratory effort normal; bilateral rhonchi; right side chest tube  Cardiovascular: S1S2 regular, no murmurs  Abdomen: soft, not tender, not distended, positive BS; no liver or spleen organomegaly  Genitourinary: no suprapubic tenderness  Musculoskeletal: no muscle tenderness, no joint swelling or tenderness  Neurological/ Psychiatric: AxOx3, judgement and insight normal;  moving all extremities  Skin: no rashes; no palpable lesions    Labs: all available labs reviewed                     Labs:                        8.5    13.85 )-----------( 257      ( 17 Aug 2018 05:21 )             27.1     08-17    142  |  103  |  15  ----------------------------<  107<H>  3.1<L>   |  31  |  0.99    Ca    7.9<L>      17 Aug 2018 05:21  Mg     2.1     08-17         Vancomycin Level, Trough: 11.4 ug/mL (08-17 @ 05:21)      Cultures:       Culture - Fungal, Body Fluid (collected 08-15-18 @ 12:56)  Source: Pleural Fl Pleural Fluid  Preliminary Report (08-17-18 @ 06:49):    Testing in progress    Culture - Body Fluid with Gram Stain (collected 08-15-18 @ 12:56)  Source: Pleural Fl Pleural Fluid  Gram Stain (08-15-18 @ 22:43):    polymorphonuclear leukocytes seen    No organisms seen by cytocentrifuge  Preliminary Report (08-16-18 @ 18:03):    No growth    Culture - Acid Fast - Body Fluid w/Smear (collected 08-15-18 @ 12:56)  Source: Pleural Fl Pleural Fluid    Culture - Sputum (collected 08-15-18 @ 06:00)  Source: .Sputum Sputum  Gram Stain (08-15-18 @ 12:18):    Numerous polymorphonuclear leukocytes    Few Squamous epithelial cells per low power field    Numerous Gram Negative Rods    Few Gram Positive Rods    Few Gram positive cocci in pairs    Rare Gram Negative Diplococci per oil power field  Preliminary Report (08-16-18 @ 09:28):    Normal Respiratory Brian present    Culture - Urine (collected 08-14-18 @ 19:30)  Source: .Urine None  Final Report (08-15-18 @ 23:36):    <10,000 CFU/ml Normal Urogenital brian present    Culture - Blood (collected 08-14-18 @ 16:39)  Source: .Blood None  Preliminary Report (08-15-18 @ 22:01):    No growth to date.    Culture - Blood (collected 08-14-18 @ 16:17)  Source: .Blood None  Preliminary Report (08-15-18 @ 22:01):    No growth to date.      Vancomycin Level, Trough (08.17.18 @ 05:21)    Vancomycin Level, Trough: 11.4: Vancomycin trough levels should be rapidly reached and maintained at  15-20 ug/ml for life threatening MRSA  infections such as sepsis, endocarditis, osteomyelitis and pneumonia. A  first trough level should be drawn  before the 3rd or 4th dose.  Risk of renal toxicity is increased for levels >15 ug/ml, in patients on  other nephrotoxic drugs, who are  hemodynamically unstable, have unstable renal function, or are on  Vancomycin therapy for >14 days. Renal function with  creatinine levels should be monitored for those patients. ug/mL            < from: CT Chest No Cont (08.14.18 @ 20:29) >    EXAM:  CT CHEST                            PROCEDURE DATE:  08/14/2018          INTERPRETATION:  Exam Date: 8/14/2018 8:29 PM  Clinical Information: Fever  Technique:       CT of the chest was performed with axial images obtained   from the thoracic to the inlet to the bilateral adrenal glands       without IV contrast. Maximum intensity projection images were obtained.  Comparison:  CT chest 6/18/2018, 8/24/2017, 6/24/2017, 7/4/2015    FINDINGS:    Lungs, Airways and Pleura: Central tracheobronchial tree is grossly   patent. No endobronchial lesion. Mild centrilobular emphysema. No   pneumothorax. Mild to moderate right pleural effusion with right middle   lobe and right lower lobe consolidations may represent atelectasis   including rounded atelectasis versus pneumonia. Previously noted right   apical and upper lobe nodular opacities have resolved. Tiny nodularity   scattered throughout the bilateral lung fields may represent improvement   of the previously noted nodular densities versus interval development of   tiny nodules may represent an infectious or inflammatory etiology. Trace   left pleural effusion with minimal left basilar atelectasis.     Heart and Great Vessels: Atherosclerotic changes of the aorta and   coronary vasculature. Heart is normal in size. Trace pericardial effusion.    Mediastinum and Fay: Subcentimeter mediastinal lymph nodes.    Neck and Chest Wall: Minimal bilateral gynecomastia.    Bones: Degenerative changes and osteopenia.    Upper Abdomen:  Gallstones.    IMPRESSION:         Mild right pleural effusion with right middle lobe and right lower lobe   consolidative changes similar appearance to prior exam may represent   atelectasis versus pneumonia. Interval resolution of previously noted   right apical and upper lobe nodular densities     Tiny nodularity scattered throughout the bilateral lung fields may   represent improvement of the previously noted nodular densities versus   interval development of tiny nodules may represent an infectious or   inflammatory etiology.    Trace left pleural effusion with left basilar atelectasis.    < end of copied text >      Radiology: all available radiological tests reviewed    Advanced directives addressed: full resuscitation

## 2018-08-18 DIAGNOSIS — J90 PLEURAL EFFUSION, NOT ELSEWHERE CLASSIFIED: ICD-10-CM

## 2018-08-18 LAB — GLUCOSE BLDC GLUCOMTR-MCNC: 106 MG/DL — HIGH (ref 70–99)

## 2018-08-18 PROCEDURE — 71045 X-RAY EXAM CHEST 1 VIEW: CPT | Mod: 26

## 2018-08-18 PROCEDURE — 99233 SBSQ HOSP IP/OBS HIGH 50: CPT

## 2018-08-18 RX ADMIN — Medication 250 MILLIGRAM(S): at 05:46

## 2018-08-18 RX ADMIN — Medication 600 MILLIGRAM(S): at 05:46

## 2018-08-18 RX ADMIN — Medication 600 MILLIGRAM(S): at 18:44

## 2018-08-18 RX ADMIN — TAMSULOSIN HYDROCHLORIDE 0.8 MILLIGRAM(S): 0.4 CAPSULE ORAL at 22:59

## 2018-08-18 RX ADMIN — Medication 0.5 MILLIGRAM(S): at 00:03

## 2018-08-18 RX ADMIN — HEPARIN SODIUM 5000 UNIT(S): 5000 INJECTION INTRAVENOUS; SUBCUTANEOUS at 05:46

## 2018-08-18 RX ADMIN — PIPERACILLIN AND TAZOBACTAM 25 GRAM(S): 4; .5 INJECTION, POWDER, LYOPHILIZED, FOR SOLUTION INTRAVENOUS at 13:51

## 2018-08-18 RX ADMIN — FINASTERIDE 5 MILLIGRAM(S): 5 TABLET, FILM COATED ORAL at 11:43

## 2018-08-18 RX ADMIN — HEPARIN SODIUM 5000 UNIT(S): 5000 INJECTION INTRAVENOUS; SUBCUTANEOUS at 22:59

## 2018-08-18 RX ADMIN — Medication 3 MILLILITER(S): at 19:47

## 2018-08-18 RX ADMIN — Medication 0.5 MILLIGRAM(S): at 23:31

## 2018-08-18 RX ADMIN — Medication 3 MILLILITER(S): at 11:20

## 2018-08-18 RX ADMIN — PIPERACILLIN AND TAZOBACTAM 25 GRAM(S): 4; .5 INJECTION, POWDER, LYOPHILIZED, FOR SOLUTION INTRAVENOUS at 07:12

## 2018-08-18 RX ADMIN — BUDESONIDE AND FORMOTEROL FUMARATE DIHYDRATE 2 PUFF(S): 160; 4.5 AEROSOL RESPIRATORY (INHALATION) at 23:45

## 2018-08-18 RX ADMIN — Medication 81 MILLIGRAM(S): at 11:43

## 2018-08-18 RX ADMIN — PIPERACILLIN AND TAZOBACTAM 25 GRAM(S): 4; .5 INJECTION, POWDER, LYOPHILIZED, FOR SOLUTION INTRAVENOUS at 22:59

## 2018-08-18 RX ADMIN — BUDESONIDE AND FORMOTEROL FUMARATE DIHYDRATE 2 PUFF(S): 160; 4.5 AEROSOL RESPIRATORY (INHALATION) at 11:23

## 2018-08-18 RX ADMIN — HEPARIN SODIUM 5000 UNIT(S): 5000 INJECTION INTRAVENOUS; SUBCUTANEOUS at 13:51

## 2018-08-18 RX ADMIN — Medication 20 MILLIGRAM(S): at 05:46

## 2018-08-18 RX ADMIN — ESCITALOPRAM OXALATE 20 MILLIGRAM(S): 10 TABLET, FILM COATED ORAL at 11:42

## 2018-08-18 RX ADMIN — PIPERACILLIN AND TAZOBACTAM 25 GRAM(S): 4; .5 INJECTION, POWDER, LYOPHILIZED, FOR SOLUTION INTRAVENOUS at 00:03

## 2018-08-18 RX ADMIN — Medication 3 MILLILITER(S): at 01:29

## 2018-08-18 RX ADMIN — ATORVASTATIN CALCIUM 20 MILLIGRAM(S): 80 TABLET, FILM COATED ORAL at 22:59

## 2018-08-18 RX ADMIN — Medication 250 MILLIGRAM(S): at 18:40

## 2018-08-18 NOTE — PROGRESS NOTE ADULT - SUBJECTIVE AND OBJECTIVE BOX
Subjective:  no distress  sitting in chair    MEDICATIONS  (STANDING):  ALBUTerol/ipratropium for Nebulization 3 milliLiter(s) Nebulizer every 6 hours  aspirin  chewable 81 milliGRAM(s) Oral daily  atorvastatin 20 milliGRAM(s) Oral at bedtime  buDESOnide 160 MICROgram(s)/formoterol 4.5 MICROgram(s) Inhaler 2 Puff(s) Inhalation two times a day  escitalopram 20 milliGRAM(s) Oral daily  finasteride 5 milliGRAM(s) Oral daily  furosemide    Tablet 20 milliGRAM(s) Oral daily  guaiFENesin  milliGRAM(s) Oral every 12 hours  heparin  Injectable 5000 Unit(s) SubCutaneous every 8 hours  piperacillin/tazobactam IVPB. 3.375 Gram(s) IV Intermittent every 8 hours  tamsulosin 0.8 milliGRAM(s) Oral at bedtime  vancomycin  IVPB 750 milliGRAM(s) IV Intermittent every 12 hours    MEDICATIONS  (PRN):  acetaminophen   Tablet 650 milliGRAM(s) Oral every 6 hours PRN For Temp greater than 38.5 C (101.3 F)  acetaminophen   Tablet. 650 milliGRAM(s) Oral every 6 hours PRN Moderate Pain (4 - 6)  clonazePAM Tablet 0.5 milliGRAM(s) Oral three times a day PRN tremors or anxiety  guaiFENesin    Syrup 200 milliGRAM(s) Oral every 6 hours PRN Cough  ondansetron Injectable 4 milliGRAM(s) IV Push every 6 hours PRN Nausea  senna 2 Tablet(s) Oral at bedtime PRN Constipation      Allergies    No Known Allergies    Intolerances        REVIEW OF SYSTEMS:    CONSTITUTIONAL:  As per HPI.  HEENT:  Eyes:  No diplopia or blurred vision. ENT:  No earache, sore throat or runny nose.  CARDIOVASCULAR:  No pressure, squeezing, tightness, heaviness or aching about the chest, neck, axilla or epigastrium.  RESPIRATORY:  No cough, shortness of breath, PND or orthopnea.  GASTROINTESTINAL:  No nausea, vomiting or diarrhea.  GENITOURINARY:  No dysuria, frequency or urgency.  MUSCULOSKELETAL:  no joint pain, deformity, tenderness  EXTREMITIES: no clubbing cyanosis,edema  SKIN:  No change in skin, hair or nails.  NEUROLOGIC:  No paresthesias, fasciculations, seizures or weakness.  PSYCHIATRIC:  No disorder of thought or mood.  ENDOCRINE:  No heat or cold intolerance, polyuria or polydipsia.  HEMATOLOGICAL:  No easy bruising or bleedings:    Vital Signs Last 24 Hrs  T(C): 36.9 (18 Aug 2018 04:38), Max: 37.2 (17 Aug 2018 15:07)  T(F): 98.4 (18 Aug 2018 04:38), Max: 99 (17 Aug 2018 15:07)  HR: 80 (18 Aug 2018 10:20) (80 - 111)  BP: 139/60 (18 Aug 2018 04:38) (138/56 - 166/65)  BP(mean): --  RR: 18 (18 Aug 2018 04:38) (17 - 20)  SpO2: 98% (18 Aug 2018 10:20) (94% - 98%)    PHYSICAL EXAMINATION:  SKIN: no rashes  HEAD: NC/AT  EYES: PERRLA, EOMI  EARS: TM's intact  NOSE: no abnormalities  NECK:  Supple. No lymphadenopathy. Jugular venous pressure not elevated. Carotids equal.   HEART:  S1S2 reg  CHEST:  bibasilar ronchi. right pigtail catheter  ABDOMEN:  Soft and nontender.   EXTREMITIES:  no C/C/E  NEURO: AAO x 3, no focal deficts       LABS:                        8.5    13.85 )-----------( 257      ( 17 Aug 2018 05:21 )             27.1     08-17    142  |  103  |  15  ----------------------------<  107<H>  3.1<L>   |  31  |  0.99    Ca    7.9<L>      17 Aug 2018 05:21  Mg     2.1     08-17            RADIOLOGY & ADDITIONAL TESTS:

## 2018-08-18 NOTE — PROGRESS NOTE ADULT - ASSESSMENT
- s/p right thoracentesis and pigtail catheter  - drainage decreasing  - thoracic f/u noted  - on zosyn/vanco  - cont O2/bronchodilators/pulmicort  - dvt proph

## 2018-08-18 NOTE — PROGRESS NOTE ADULT - SUBJECTIVE AND OBJECTIVE BOX
HOSPITAL COURSE AND ASSESSMENT  78M w/PMH HTN, HLD, COPD with chronic respiratory failure (Home O2 3L), BPH, recently treated for CAP with parapneumonic effusion s/p Rt Chest tube placement with hospital course complicated by post op PTX now presents with cough and fever.      In ED: Febrile, xray concerning for recurrent right sided effusion.  Code sepsis called, and he was given azithromycin, zosyn, vanco, and 2L NS. s/p PTC 8/15. CTS, Pulmonary following. ID managment of abx.        # sepsis/HCAP/right sided effusion s/p PTC  - vanco zosyn (started 8/15) as per ID recs  - CT chest noted  -pigtail to water seal, hope for removal tomorrow  -ID and pulm eval appreciated    # Chronic diastolic CHF: will resume lasix 20 mg daily    # Acute renal failure: resolved  -s/p 2L NS    # COPD:  -c/w oxygen, monitor O2 sats and titrate to keep sats>90%  -duonebs  -inhaled corticosteroids    # anemia: Stable  -monitor    # hypoalbuminemia:  -nutrition eval - mod protein fausto malnutrition    # Hypokalemia.    -replete and check am labs      Prophylactic measure  -sq heparin.          ----------------------------------------------------------------------------------------------  CHIEF COMPLAINT:  dyspnea    SUBJECTIVE:     tolerating PO. OOB. required klonopin for anxiety yesterday    REVIEW OF SYSTEMS:  All other review of systems is negative unless indicated above    Vital Signs Last 24 Hrs  T(C): 36.9 (18 Aug 2018 04:38), Max: 37.2 (17 Aug 2018 15:07)  T(F): 98.4 (18 Aug 2018 04:38), Max: 99 (17 Aug 2018 15:07)  HR: 80 (18 Aug 2018 10:20) (80 - 111)  BP: 139/60 (18 Aug 2018 04:38) (138/56 - 166/65)  BP(mean): --  RR: 18 (18 Aug 2018 04:38) (17 - 20)  SpO2: 98% (18 Aug 2018 10:20) (94% - 98%)  CAPILLARY BLOOD GLUCOSE          PHYSICAL EXAM:  Constitutional: NAD, NCAT  HEENT: EOMI, Normal Hearing, MMM, fair dentition  Neck: trachea midline, No JVD  Respiratory: Breath sounds are coarse, unlabored respiration, pigtail to water seal  Cardiovascular: S1 and S2, regular rate   Gastrointestinal: Bowel Sounds present, soft, nontender, nondistended  Extremities: No peripheral edema  Vascular: 2+ radial pulse  Neurological: awake, alert, follows commands, sensation grossly intact  Psych: appropriate affect, decreased insight  Musculoskeletal: moves all extremities  Skin: No rashes    ALLERGIES  No Known Allergies      MEDICATIONS  (STANDING):  ALBUTerol/ipratropium for Nebulization 3 milliLiter(s) Nebulizer every 6 hours  aspirin  chewable 81 milliGRAM(s) Oral daily  atorvastatin 20 milliGRAM(s) Oral at bedtime  buDESOnide 160 MICROgram(s)/formoterol 4.5 MICROgram(s) Inhaler 2 Puff(s) Inhalation two times a day  escitalopram 20 milliGRAM(s) Oral daily  finasteride 5 milliGRAM(s) Oral daily  furosemide    Tablet 20 milliGRAM(s) Oral daily  guaiFENesin  milliGRAM(s) Oral every 12 hours  heparin  Injectable 5000 Unit(s) SubCutaneous every 8 hours  piperacillin/tazobactam IVPB. 3.375 Gram(s) IV Intermittent every 8 hours  tamsulosin 0.8 milliGRAM(s) Oral at bedtime  vancomycin  IVPB 750 milliGRAM(s) IV Intermittent every 12 hours      LABS: All Labs Reviewed:                        8.5    13.85 )-----------( 257      ( 17 Aug 2018 05:21 )             27.1     08-17    142  |  103  |  15  ----------------------------<  107<H>  3.1<L>   |  31  |  0.99    Ca    7.9<L>      17 Aug 2018 05:21  Mg     2.1     08-17            Blood Culture: 08-15 @ 12:56  Organism --  Gram Stain Blood -- Gram Stain   polymorphonuclear leukocytes seen  No organisms seen by cytocentrifuge  Specimen Source Pleural Fl Pleural Fluid  Culture-Blood --    08-15 @ 06:00  Organism --  Gram Stain Blood -- Gram Stain   Numerous polymorphonuclear leukocytes  Few Squamous epithelial cells per low power field  Numerous Gram Negative Rods  Few Gram Positive Rods  Few Gram positive cocci in pairs  Rare Gram Negative Diplococci per oil power field  Specimen Source .Sputum Sputum  Culture-Blood --    08-14 @ 19:30  Organism --  Gram Stain Blood -- Gram Stain --  Specimen Source .Urine None  Culture-Blood --    08-14 @ 16:39  Organism --  Gram Stain Blood -- Gram Stain --  Specimen Source .Blood None  Culture-Blood --    08-14 @ 16:17  Organism --  Gram Stain Blood -- Gram Stain --  Specimen Source .Blood None  Culture-Blood --

## 2018-08-18 NOTE — PROGRESS NOTE ADULT - SUBJECTIVE AND OBJECTIVE BOX
Pt resting comfortably.    Lungs-B/L BS+  Chest-Right pigtail catheter-drainage decreasing, no air leak

## 2018-08-18 NOTE — PROGRESS NOTE ADULT - PROBLEM SELECTOR PLAN 1
-Drainage from pigtail decreasing, will leave to water seal today, possible d/c pigtail tomorrow if drainage remains low  -CXR today

## 2018-08-19 PROCEDURE — 71045 X-RAY EXAM CHEST 1 VIEW: CPT | Mod: 26

## 2018-08-19 PROCEDURE — 99232 SBSQ HOSP IP/OBS MODERATE 35: CPT

## 2018-08-19 RX ADMIN — BUDESONIDE AND FORMOTEROL FUMARATE DIHYDRATE 2 PUFF(S): 160; 4.5 AEROSOL RESPIRATORY (INHALATION) at 10:02

## 2018-08-19 RX ADMIN — ESCITALOPRAM OXALATE 20 MILLIGRAM(S): 10 TABLET, FILM COATED ORAL at 10:35

## 2018-08-19 RX ADMIN — Medication 650 MILLIGRAM(S): at 00:30

## 2018-08-19 RX ADMIN — Medication 250 MILLIGRAM(S): at 05:27

## 2018-08-19 RX ADMIN — Medication 250 MILLIGRAM(S): at 17:36

## 2018-08-19 RX ADMIN — Medication 20 MILLIGRAM(S): at 06:15

## 2018-08-19 RX ADMIN — HEPARIN SODIUM 5000 UNIT(S): 5000 INJECTION INTRAVENOUS; SUBCUTANEOUS at 13:23

## 2018-08-19 RX ADMIN — BUDESONIDE AND FORMOTEROL FUMARATE DIHYDRATE 2 PUFF(S): 160; 4.5 AEROSOL RESPIRATORY (INHALATION) at 20:24

## 2018-08-19 RX ADMIN — PIPERACILLIN AND TAZOBACTAM 25 GRAM(S): 4; .5 INJECTION, POWDER, LYOPHILIZED, FOR SOLUTION INTRAVENOUS at 13:22

## 2018-08-19 RX ADMIN — FINASTERIDE 5 MILLIGRAM(S): 5 TABLET, FILM COATED ORAL at 10:35

## 2018-08-19 RX ADMIN — Medication 0.5 MILLIGRAM(S): at 10:35

## 2018-08-19 RX ADMIN — Medication 200 MILLIGRAM(S): at 10:35

## 2018-08-19 RX ADMIN — Medication 3 MILLILITER(S): at 01:40

## 2018-08-19 RX ADMIN — Medication 81 MILLIGRAM(S): at 10:35

## 2018-08-19 RX ADMIN — PIPERACILLIN AND TAZOBACTAM 25 GRAM(S): 4; .5 INJECTION, POWDER, LYOPHILIZED, FOR SOLUTION INTRAVENOUS at 21:51

## 2018-08-19 RX ADMIN — ATORVASTATIN CALCIUM 20 MILLIGRAM(S): 80 TABLET, FILM COATED ORAL at 21:51

## 2018-08-19 RX ADMIN — HEPARIN SODIUM 5000 UNIT(S): 5000 INJECTION INTRAVENOUS; SUBCUTANEOUS at 21:51

## 2018-08-19 RX ADMIN — HEPARIN SODIUM 5000 UNIT(S): 5000 INJECTION INTRAVENOUS; SUBCUTANEOUS at 06:16

## 2018-08-19 RX ADMIN — Medication 3 MILLILITER(S): at 10:02

## 2018-08-19 RX ADMIN — Medication 600 MILLIGRAM(S): at 17:36

## 2018-08-19 RX ADMIN — Medication 200 MILLIGRAM(S): at 17:36

## 2018-08-19 RX ADMIN — PIPERACILLIN AND TAZOBACTAM 25 GRAM(S): 4; .5 INJECTION, POWDER, LYOPHILIZED, FOR SOLUTION INTRAVENOUS at 07:02

## 2018-08-19 RX ADMIN — TAMSULOSIN HYDROCHLORIDE 0.8 MILLIGRAM(S): 0.4 CAPSULE ORAL at 21:51

## 2018-08-19 RX ADMIN — Medication 3 MILLILITER(S): at 20:09

## 2018-08-19 RX ADMIN — Medication 600 MILLIGRAM(S): at 06:16

## 2018-08-19 NOTE — PROGRESS NOTE ADULT - SUBJECTIVE AND OBJECTIVE BOX
HOSPITAL COURSE AND ASSESSMENT  78M w/PMH HTN, HLD, COPD with chronic respiratory failure (Home O2 3L), BPH, recently treated for CAP with parapneumonic effusion s/p Rt Chest tube placement with hospital course complicated by post op PTX now presents with cough and fever.      In ED: Febrile, xray concerning for recurrent right sided effusion.  Code sepsis called, and he was given azithromycin, zosyn, vanco, and 2L NS. s/p PTC 8/15. CTS, Pulmonary following. ID managment of abx. Pigtail removed 8/19    Anticipate discharge in AM with final PT recs.           # sepsis/HCAP/right sided effusion s/p PTC  - vanco zosyn (started 8/15) as per ID recs  - CT chest noted  -pigtail removed  -ID and pulm eval appreciated    # Chronic diastolic CHF: will resume lasix 20 mg daily    # Acute renal failure: resolved  -s/p 2L NS    # COPD:  -c/w oxygen, monitor O2 sats and titrate to keep sats>90%  -duonebs  -inhaled corticosteroids    # anemia: Stable  -monitor    # hypoalbuminemia:  -nutrition eval - mod protein fausto malnutrition    # Hypokalemia.    -replete and check am labs      Prophylactic measure  -sq heparin.        ----------------------------------------------------------------------------------------------  CHIEF COMPLAINT:  chest tube    SUBJECTIVE:     tolerating PO. OOB. some confusion overnight requiring 1:1. feels well now    REVIEW OF SYSTEMS:  All other review of systems is negative unless indicated above    Vital Signs Last 24 Hrs  T(C): 36.7 (19 Aug 2018 10:59), Max: 36.9 (19 Aug 2018 00:25)  T(F): 98 (19 Aug 2018 10:59), Max: 98.4 (19 Aug 2018 00:25)  HR: 93 (19 Aug 2018 10:59) (82 - 109)  BP: 95/51 (19 Aug 2018 10:59) (95/51 - 144/61)  BP(mean): --  RR: 18 (19 Aug 2018 10:59) (18 - 20)  SpO2: 94% (19 Aug 2018 10:59) (94% - 96%)  CAPILLARY BLOOD GLUCOSE      POCT Blood Glucose.: 106 mg/dL (18 Aug 2018 22:37)      PHYSICAL EXAM:  Constitutional: NAD, NCAT, oob in chair  HEENT: EOMI, Normal Hearing, MMM, fair dentition  Neck: trachea midline, No JVD  Respiratory: Breath sounds are clear, unlabored respiration sutures on chest wall.  Cardiovascular: S1 and S2, regular rate   Gastrointestinal: Bowel Sounds present, soft, nontender, nondistended  Extremities: No peripheral edema  Vascular: 2+ radial pulse  Neurological: awake, alert, follows commands, sensation grossly intact  Psych: appropriate affect, decreased insight  Musculoskeletal: moves all extremities  Skin: No rashes    ALLERGIES  No Known Allergies      MEDICATIONS  (STANDING):  ALBUTerol/ipratropium for Nebulization 3 milliLiter(s) Nebulizer every 6 hours  aspirin  chewable 81 milliGRAM(s) Oral daily  atorvastatin 20 milliGRAM(s) Oral at bedtime  buDESOnide 160 MICROgram(s)/formoterol 4.5 MICROgram(s) Inhaler 2 Puff(s) Inhalation two times a day  escitalopram 20 milliGRAM(s) Oral daily  finasteride 5 milliGRAM(s) Oral daily  furosemide    Tablet 20 milliGRAM(s) Oral daily  guaiFENesin  milliGRAM(s) Oral every 12 hours  heparin  Injectable 5000 Unit(s) SubCutaneous every 8 hours  piperacillin/tazobactam IVPB. 3.375 Gram(s) IV Intermittent every 8 hours  tamsulosin 0.8 milliGRAM(s) Oral at bedtime  vancomycin  IVPB 750 milliGRAM(s) IV Intermittent every 12 hours      LABS: All Labs Reviewed:                Blood Culture: 08-15 @ 12:56  Organism --  Gram Stain Blood -- Gram Stain   polymorphonuclear leukocytes seen  No organisms seen by cytocentrifuge  Specimen Source Pleural Fl Pleural Fluid  Culture-Blood --    08-15 @ 06:00  Organism --  Gram Stain Blood -- Gram Stain   Numerous polymorphonuclear leukocytes  Few Squamous epithelial cells per low power field  Numerous Gram Negative Rods  Few Gram Positive Rods  Few Gram positive cocci in pairs  Rare Gram Negative Diplococci per oil power field  Specimen Source .Sputum Sputum  Culture-Blood --    08-14 @ 19:30  Organism --  Gram Stain Blood -- Gram Stain --  Specimen Source .Urine None  Culture-Blood --    08-14 @ 16:39  Organism --  Gram Stain Blood -- Gram Stain --  Specimen Source .Blood None  Culture-Blood --    08-14 @ 16:17  Organism --  Gram Stain Blood -- Gram Stain --  Specimen Source .Blood None  Culture-Blood --

## 2018-08-19 NOTE — PROGRESS NOTE ADULT - ASSESSMENT
- s/p right thoracentesis and pigtail catheter  - drainage decreasing  - thoracic f/u noted  - finish abx  - for discharge in am  - cont O2/bronchodilators/pulmicort  - dvt proph

## 2018-08-19 NOTE — PROGRESS NOTE ADULT - SUBJECTIVE AND OBJECTIVE BOX
Subjective:  awake and alert  no distress    MEDICATIONS  (STANDING):  ALBUTerol/ipratropium for Nebulization 3 milliLiter(s) Nebulizer every 6 hours  aspirin  chewable 81 milliGRAM(s) Oral daily  atorvastatin 20 milliGRAM(s) Oral at bedtime  buDESOnide 160 MICROgram(s)/formoterol 4.5 MICROgram(s) Inhaler 2 Puff(s) Inhalation two times a day  escitalopram 20 milliGRAM(s) Oral daily  finasteride 5 milliGRAM(s) Oral daily  furosemide    Tablet 20 milliGRAM(s) Oral daily  guaiFENesin  milliGRAM(s) Oral every 12 hours  heparin  Injectable 5000 Unit(s) SubCutaneous every 8 hours  piperacillin/tazobactam IVPB. 3.375 Gram(s) IV Intermittent every 8 hours  tamsulosin 0.8 milliGRAM(s) Oral at bedtime  vancomycin  IVPB 750 milliGRAM(s) IV Intermittent every 12 hours    MEDICATIONS  (PRN):  acetaminophen   Tablet 650 milliGRAM(s) Oral every 6 hours PRN For Temp greater than 38.5 C (101.3 F)  acetaminophen   Tablet. 650 milliGRAM(s) Oral every 6 hours PRN Moderate Pain (4 - 6)  clonazePAM Tablet 0.5 milliGRAM(s) Oral three times a day PRN tremors or anxiety  guaiFENesin    Syrup 200 milliGRAM(s) Oral every 6 hours PRN Cough  ondansetron Injectable 4 milliGRAM(s) IV Push every 6 hours PRN Nausea  senna 2 Tablet(s) Oral at bedtime PRN Constipation      Allergies    No Known Allergies    Intolerances        REVIEW OF SYSTEMS:    CONSTITUTIONAL:  As per HPI.  HEENT:  Eyes:  No diplopia or blurred vision. ENT:  No earache, sore throat or runny nose.  CARDIOVASCULAR:  No pressure, squeezing, tightness, heaviness or aching about the chest, neck, axilla or epigastrium.  RESPIRATORY:  No cough, shortness of breath, PND or orthopnea.  GASTROINTESTINAL:  No nausea, vomiting or diarrhea.  GENITOURINARY:  No dysuria, frequency or urgency.  MUSCULOSKELETAL:  no joint pain, deformity, tenderness  EXTREMITIES: no clubbing cyanosis,edema  SKIN:  No change in skin, hair or nails.  NEUROLOGIC:  No paresthesias, fasciculations, seizures or weakness.  PSYCHIATRIC:  No disorder of thought or mood.  ENDOCRINE:  No heat or cold intolerance, polyuria or polydipsia.  HEMATOLOGICAL:  No easy bruising or bleedings:    Vital Signs Last 24 Hrs  T(C): 36.7 (19 Aug 2018 10:59), Max: 36.9 (19 Aug 2018 00:25)  T(F): 98 (19 Aug 2018 10:59), Max: 98.4 (19 Aug 2018 00:25)  HR: 93 (19 Aug 2018 10:59) (82 - 109)  BP: 95/51 (19 Aug 2018 10:59) (95/51 - 144/61)  BP(mean): --  RR: 18 (19 Aug 2018 10:59) (18 - 20)  SpO2: 94% (19 Aug 2018 10:59) (94% - 96%)    PHYSICAL EXAMINATION:  SKIN: no rashes  HEAD: NC/AT  EYES: PERRLA, EOMI  EARS: TM's intact  NOSE: no abnormalities  NECK:  Supple. No lymphadenopathy. Jugular venous pressure not elevated. Carotids equal.   HEART:   The cardiac impulse has a normal quality. Reg., Nl S1 and S2.  There are no murmurs, rubs or gallops noted  CHEST:  decreased BS right base with pleurx catheter  ABDOMEN:  Soft and nontender.   EXTREMITIES:  no C/C/E  NEURO: AAO x 3, no focal deficts       LABS:                RADIOLOGY & ADDITIONAL TESTS:

## 2018-08-20 ENCOUNTER — TRANSCRIPTION ENCOUNTER (OUTPATIENT)
Age: 78
End: 2018-08-20

## 2018-08-20 VITALS
RESPIRATION RATE: 18 BRPM | HEART RATE: 97 BPM | SYSTOLIC BLOOD PRESSURE: 112 MMHG | TEMPERATURE: 98 F | DIASTOLIC BLOOD PRESSURE: 51 MMHG | OXYGEN SATURATION: 93 %

## 2018-08-20 PROBLEM — J96.10 CHRONIC RESPIRATORY FAILURE, UNSPECIFIED WHETHER WITH HYPOXIA OR HYPERCAPNIA: Chronic | Status: ACTIVE | Noted: 2018-08-14

## 2018-08-20 LAB
ADD ON TEST-SPECIMEN IN LAB: SIGNIFICANT CHANGE UP
ANION GAP SERPL CALC-SCNC: 6 MMOL/L — SIGNIFICANT CHANGE UP (ref 5–17)
BUN SERPL-MCNC: 12 MG/DL — SIGNIFICANT CHANGE UP (ref 7–23)
CALCIUM SERPL-MCNC: 8.5 MG/DL — SIGNIFICANT CHANGE UP (ref 8.5–10.1)
CHLORIDE SERPL-SCNC: 98 MMOL/L — SIGNIFICANT CHANGE UP (ref 96–108)
CO2 SERPL-SCNC: 33 MMOL/L — HIGH (ref 22–31)
CREAT SERPL-MCNC: 1.01 MG/DL — SIGNIFICANT CHANGE UP (ref 0.5–1.3)
CULTURE RESULTS: SIGNIFICANT CHANGE UP
GLUCOSE SERPL-MCNC: 115 MG/DL — HIGH (ref 70–99)
MAGNESIUM SERPL-MCNC: 2 MG/DL — SIGNIFICANT CHANGE UP (ref 1.6–2.6)
POTASSIUM SERPL-MCNC: 3.1 MMOL/L — LOW (ref 3.5–5.3)
POTASSIUM SERPL-SCNC: 3.1 MMOL/L — LOW (ref 3.5–5.3)
SODIUM SERPL-SCNC: 137 MMOL/L — SIGNIFICANT CHANGE UP (ref 135–145)
SPECIMEN SOURCE: SIGNIFICANT CHANGE UP

## 2018-08-20 PROCEDURE — 99232 SBSQ HOSP IP/OBS MODERATE 35: CPT

## 2018-08-20 RX ORDER — POTASSIUM CHLORIDE 20 MEQ
40 PACKET (EA) ORAL
Qty: 0 | Refills: 0 | Status: DISCONTINUED | OUTPATIENT
Start: 2018-08-20 | End: 2018-08-20

## 2018-08-20 RX ORDER — POTASSIUM CHLORIDE 20 MEQ
40 PACKET (EA) ORAL ONCE
Qty: 0 | Refills: 0 | Status: COMPLETED | OUTPATIENT
Start: 2018-08-20 | End: 2018-08-20

## 2018-08-20 RX ADMIN — Medication 3 MILLILITER(S): at 01:00

## 2018-08-20 RX ADMIN — Medication 3 MILLILITER(S): at 10:32

## 2018-08-20 RX ADMIN — FINASTERIDE 5 MILLIGRAM(S): 5 TABLET, FILM COATED ORAL at 11:37

## 2018-08-20 RX ADMIN — Medication 40 MILLIEQUIVALENT(S): at 06:54

## 2018-08-20 RX ADMIN — ESCITALOPRAM OXALATE 20 MILLIGRAM(S): 10 TABLET, FILM COATED ORAL at 11:37

## 2018-08-20 RX ADMIN — HEPARIN SODIUM 5000 UNIT(S): 5000 INJECTION INTRAVENOUS; SUBCUTANEOUS at 06:54

## 2018-08-20 RX ADMIN — Medication 250 MILLIGRAM(S): at 05:57

## 2018-08-20 RX ADMIN — BUDESONIDE AND FORMOTEROL FUMARATE DIHYDRATE 2 PUFF(S): 160; 4.5 AEROSOL RESPIRATORY (INHALATION) at 10:34

## 2018-08-20 RX ADMIN — Medication 40 MILLIEQUIVALENT(S): at 11:38

## 2018-08-20 RX ADMIN — Medication 600 MILLIGRAM(S): at 06:57

## 2018-08-20 RX ADMIN — PIPERACILLIN AND TAZOBACTAM 25 GRAM(S): 4; .5 INJECTION, POWDER, LYOPHILIZED, FOR SOLUTION INTRAVENOUS at 07:01

## 2018-08-20 RX ADMIN — Medication 81 MILLIGRAM(S): at 11:37

## 2018-08-20 RX ADMIN — Medication 20 MILLIGRAM(S): at 06:54

## 2018-08-20 RX ADMIN — Medication 0.5 MILLIGRAM(S): at 03:11

## 2018-08-20 NOTE — DISCHARGE NOTE ADULT - HOSPITAL COURSE
78M w/PMH HTN, HLD, COPD with chronic respiratory failure (Home O2 3L), BPH, recently treated for CAP with parapneumonic effusion s/p Rt Chest tube placement with hospital course complicated by post op PTX now presents with cough and fever.      In ED: Febrile, xray concerning for recurrent right sided effusion.  Code sepsis called, and he was given azithromycin, zosyn, vanco, and 2L NS. s/p PTC 8/15. CTS, Pulmonary following. ID managment of abx. Pigtail removed 8/19. Medically stable for discharge.      HOSPITAL COURSE BY PROBLEM:   # sepsis/HCAP/right sided effusion s/p PTC  - vanco zosyn (started 8/15) as per ID recs, to complete course with augmentin  - CT chest noted  -pigtail removed  -ID and pulm eval appreciated    # Chronic diastolic CHF: resumed lasix 20 mg daily    # Acute renal failure: resolved  -s/p 2L NS    # COPD:  -c/w oxygen, monitor O2 sats and titrate to keep sats>90%  -duonebs  -inhaled corticosteroids    # anemia: Stable  -monitor    # hypoalbuminemia:  -nutrition eval - mod protein fausto malnutrition    # Hypokalemia.    -replete and check am labs      Prophylactic measure  -sq heparin.      total time, including coordination of care: 40 minutes  GEN: NAD  EYES: eomi  CV: no edema  RESP: unlabored    follow up appointment made as pt on tae list.   Dr Glover off, so unable to give direct handoff.

## 2018-08-20 NOTE — DISCHARGE NOTE ADULT - CARE PLAN
Principal Discharge DX:	Parapneumonic effusion  Goal:	to be free of fluid around lungs  Assessment and plan of treatment:	Finish antibiotics as directed.

## 2018-08-20 NOTE — DISCHARGE NOTE ADULT - MEDICATION SUMMARY - MEDICATIONS TO TAKE
I will START or STAY ON the medications listed below when I get home from the hospital:    Proscar 5 mg oral tablet  -- 1 tab(s) by mouth once a day  -- Indication: For Prostate     Percocet 5/325 oral tablet  -- 1 tab(s) by mouth every 6 hours, As Needed  -- Indication: For Pain medication    aspirin 81 mg oral tablet, chewable  -- 1 tab(s) by mouth once a day  -- Indication: For Heart health    tamsulosin 0.4 mg oral capsule  -- 2 cap(s) by mouth once a day (at bedtime)  -- Indication: For Prostate health    clonazePAM 0.5 mg oral tablet  -- 1 tab(s) by mouth 3 times a day, As Needed for tremor  -- Indication: For mood    escitalopram 20 mg oral tablet  -- 1 tab(s) by mouth once a day  -- Indication: For mood    traZODone 50 mg oral tablet  -- 2 tab(s) by mouth once a day (at bedtime)  -- Indication: For Sleep    atorvastatin 20 mg oral tablet  -- 1 tab(s) by mouth once a day  -- Indication: For Cholesterol    Advair  mcg-21 mcg/inh inhalation aerosol  -- 2 puff(s) inhaled 2 times a day  -- Indication: For breathing    tiotropium 18 mcg inhalation capsule  -- 1 cap(s) inhaled once a day  -- Indication: For breathing     ProAir HFA 90 mcg/inh inhalation aerosol  -- 2 puff(s) inhaled 4 times a day, As Needed  -- Indication: For breathing    furosemide 20 mg oral tablet  -- 1 tab(s) by mouth once a day  -- Indication: For fluid management    guaiFENesin 600 mg oral tablet, extended release  -- 1 tab(s) by mouth every 12 hours  -- Indication: For Cough    potassium chloride 20 mEq oral tablet, extended release  -- 1 tab(s) by mouth once a day  -- Indication: For Supplement    folic acid 1 mg oral tablet  -- 1 tab(s) by mouth once a day  -- Indication: For Supplement    cholecalciferol 1000 intl units oral tablet  -- 1 tab(s) by mouth once a day  -- Indication: For Supplement I will START or STAY ON the medications listed below when I get home from the hospital:    Proscar 5 mg oral tablet  -- 1 tab(s) by mouth once a day  -- Indication: For Prostate     Percocet 5/325 oral tablet  -- 1 tab(s) by mouth every 6 hours, As Needed  -- Indication: For Pain medication    aspirin 81 mg oral tablet, chewable  -- 1 tab(s) by mouth once a day  -- Indication: For Heart health    tamsulosin 0.4 mg oral capsule  -- 2 cap(s) by mouth once a day (at bedtime)  -- Indication: For Prostate health    clonazePAM 0.5 mg oral tablet  -- 1 tab(s) by mouth 3 times a day, As Needed for tremor  -- Indication: For mood    escitalopram 20 mg oral tablet  -- 1 tab(s) by mouth once a day  -- Indication: For mood    traZODone 50 mg oral tablet  -- 2 tab(s) by mouth once a day (at bedtime)  -- Indication: For Sleep    atorvastatin 20 mg oral tablet  -- 1 tab(s) by mouth once a day  -- Indication: For Cholesterol    Advair  mcg-21 mcg/inh inhalation aerosol  -- 2 puff(s) inhaled 2 times a day  -- Indication: For breathing    tiotropium 18 mcg inhalation capsule  -- 1 cap(s) inhaled once a day  -- Indication: For breathing     ProAir HFA 90 mcg/inh inhalation aerosol  -- 2 puff(s) inhaled 4 times a day, As Needed  -- Indication: For breathing    furosemide 20 mg oral tablet  -- 1 tab(s) by mouth once a day  -- Indication: For fluid management    guaiFENesin 600 mg oral tablet, extended release  -- 1 tab(s) by mouth every 12 hours  -- Indication: For Cough    potassium chloride 20 mEq oral tablet, extended release  -- 1 tab(s) by mouth once a day  -- Indication: For Supplement    Augmentin 875 mg-125 mg oral tablet  -- 1 tab(s) by mouth every 12 hours   -- Finish all this medication unless otherwise directed by prescriber.  Take with food or milk.    -- Indication: For Pneumonia    folic acid 1 mg oral tablet  -- 1 tab(s) by mouth once a day  -- Indication: For Supplement    cholecalciferol 1000 intl units oral tablet  -- 1 tab(s) by mouth once a day  -- Indication: For Supplement

## 2018-08-20 NOTE — PROGRESS NOTE ADULT - SUBJECTIVE AND OBJECTIVE BOX
Subjective:78M pmhx: HTN, HLD, COPD, BPH, recurrent right pleural effusion s/p IR PTC 6/28 complicated by trapped lung readmitted with recurrence s/p PTC 8/15.    Pt without complaints. PTC drained ~1liter, CXR inmproved  8/17 Pt w/o complaints, no overnight events. PTC drained 500cc serous.  8/19 PTC d/c'd, CXR w/o PTX  8/20 Pt w/o complaints, no overnight events    Vital Signs:  Vital Signs Last 24 Hrs  T(C): 36.6 (08-20-18 @ 05:42), Max: 37.1 (08-19-18 @ 20:38)  T(F): 97.9 (08-20-18 @ 05:42), Max: 98.7 (08-19-18 @ 20:38)  HR: 90 (08-20-18 @ 05:42) (80 - 93)  BP: 110/51 (08-20-18 @ 05:42) (95/51 - 126/50)  RR: 16 (08-20-18 @ 05:42) (16 - 18)  SpO2: 96% (08-20-18 @ 05:42) (94% - 96%) on (O2)    Telemetry/Alarms:  General: WN/WD NAD  Neurology: Awake, nonfocal, NELSON x 4  Eyes: Scleras clear, PERRLA/ EOMI, Gross vision intact  ENT:Gross hearing intact, grossly patent pharynx, no stridor  Neck: Neck supple, trachea midline, No JVD,   Respiratory: CTA B/L, No wheezing, rales, rhonchi  CV: RRR, S1S2, no murmurs, rubs or gallops  Abdominal: Soft, NT, ND +BS,   Extremities: No edema, + peripheral pulses  Skin: No Rashes, Hematoma, Ecchymosis  Lymphatic: No Neck, axilla, groin LAD  Psych: Oriented x 3, normal affect  Incisions:   Tubes:  Relevant labs, radiology and Medications reviewed                        8.8 11.26 )-----------( 312      ( 20 Aug 2018 05:49 )             27.2     08-20    139  |  99  |  13  ----------------------------<  104<H>  2.8<LL>   |  33<H>  |  1.00    Ca    8.2<L>      20 Aug 2018 05:49        MEDICATIONS  (STANDING):  ALBUTerol/ipratropium for Nebulization 3 milliLiter(s) Nebulizer every 6 hours  aspirin  chewable 81 milliGRAM(s) Oral daily  atorvastatin 20 milliGRAM(s) Oral at bedtime  buDESOnide 160 MICROgram(s)/formoterol 4.5 MICROgram(s) Inhaler 2 Puff(s) Inhalation two times a day  escitalopram 20 milliGRAM(s) Oral daily  finasteride 5 milliGRAM(s) Oral daily  furosemide    Tablet 20 milliGRAM(s) Oral daily  guaiFENesin  milliGRAM(s) Oral every 12 hours  heparin  Injectable 5000 Unit(s) SubCutaneous every 8 hours  piperacillin/tazobactam IVPB. 3.375 Gram(s) IV Intermittent every 8 hours  potassium chloride    Tablet ER 40 milliEquivalent(s) Oral once  tamsulosin 0.8 milliGRAM(s) Oral at bedtime  vancomycin  IVPB 750 milliGRAM(s) IV Intermittent every 12 hours    MEDICATIONS  (PRN):  acetaminophen   Tablet 650 milliGRAM(s) Oral every 6 hours PRN For Temp greater than 38.5 C (101.3 F)  acetaminophen   Tablet. 650 milliGRAM(s) Oral every 6 hours PRN Moderate Pain (4 - 6)  clonazePAM Tablet 0.5 milliGRAM(s) Oral three times a day PRN tremors or anxiety  guaiFENesin    Syrup 200 milliGRAM(s) Oral every 6 hours PRN Cough  ondansetron Injectable 4 milliGRAM(s) IV Push every 6 hours PRN Nausea  senna 2 Tablet(s) Oral at bedtime PRN Constipation          I&O's Summary    19 Aug 2018 07:01  -  20 Aug 2018 07:00  --------------------------------------------------------  IN: 950 mL / OUT: 200 mL / NET: 750 mL        Assessment  78y Male  w/ PAST MEDICAL & SURGICAL HISTORY:  Chronic respiratory failure  Hematuria  BPH (benign prostatic hypertrophy)  Low back pain  Osteoarthritis  Pleural effusion: 2016  Hypercholesterolemia  Anxiety  Depression  COPD (chronic obstructive pulmonary disease)  HTN (hypertension)  Bladder tumor  Cataract extraction status: 2015  b/l  admitted with complaints of Patient is a 78y old  Male who presents with a chief complaint of cough, fever (14 Aug 2018 18:53)  78M pmhx: HTN, HLD, COPD, BPH, recurrent right pleural effusion s/p IR PTC 6/28 complicated by trapped lung readmitted with recurrence s/p PTC 8/15.    PLAN  PTC removed  space improved  Clear for d/c from thoracic standpoint  Discussed with Cardiothoracic Team at AM rounds.

## 2018-08-20 NOTE — DISCHARGE NOTE ADULT - CARE PROVIDER_API CALL
Dg Glover), Internal Medicine; Pulmonary Disease  [address] [place] [address]  Phone: [phone]  Fax: [phone]

## 2018-08-20 NOTE — PROGRESS NOTE ADULT - SUBJECTIVE AND OBJECTIVE BOX
Patient is a 78y old  Male who presents with a chief complaint of cough, fever (14 Aug 2018 18:53)  Date of service: 08-20-18 @ 12:22      Patient comfortable; eating lunch      ROS: no fever or chills; denies dizziness, no HA, no SOB or cough, no abdominal pain, no diarrhea or constipation; no dysuria, no urinary frequency, no legs pain, no rashes    MEDICATIONS  (STANDING):  ALBUTerol/ipratropium for Nebulization 3 milliLiter(s) Nebulizer every 6 hours  aspirin  chewable 81 milliGRAM(s) Oral daily  atorvastatin 20 milliGRAM(s) Oral at bedtime  buDESOnide 160 MICROgram(s)/formoterol 4.5 MICROgram(s) Inhaler 2 Puff(s) Inhalation two times a day  escitalopram 20 milliGRAM(s) Oral daily  finasteride 5 milliGRAM(s) Oral daily  furosemide    Tablet 20 milliGRAM(s) Oral daily  guaiFENesin  milliGRAM(s) Oral every 12 hours  heparin  Injectable 5000 Unit(s) SubCutaneous every 8 hours  piperacillin/tazobactam IVPB. 3.375 Gram(s) IV Intermittent every 8 hours  tamsulosin 0.8 milliGRAM(s) Oral at bedtime  vancomycin  IVPB 750 milliGRAM(s) IV Intermittent every 12 hours    MEDICATIONS  (PRN):  acetaminophen   Tablet 650 milliGRAM(s) Oral every 6 hours PRN For Temp greater than 38.5 C (101.3 F)  acetaminophen   Tablet. 650 milliGRAM(s) Oral every 6 hours PRN Moderate Pain (4 - 6)  clonazePAM Tablet 0.5 milliGRAM(s) Oral three times a day PRN tremors or anxiety  guaiFENesin    Syrup 200 milliGRAM(s) Oral every 6 hours PRN Cough  ondansetron Injectable 4 milliGRAM(s) IV Push every 6 hours PRN Nausea  senna 2 Tablet(s) Oral at bedtime PRN Constipation      Vital Signs Last 24 Hrs  T(C): 36.5 (20 Aug 2018 11:42), Max: 37.1 (19 Aug 2018 20:38)  T(F): 97.7 (20 Aug 2018 11:42), Max: 98.7 (19 Aug 2018 20:38)  HR: 97 (20 Aug 2018 11:42) (78 - 97)  BP: 112/51 (20 Aug 2018 11:42) (110/51 - 126/50)  BP(mean): --  RR: 18 (20 Aug 2018 11:42) (16 - 18)  SpO2: 93% (20 Aug 2018 11:42) (93% - 96%)    Physical Exam:      PE:    Constitutional: frail looking  HEENT: NC/AT, EOMI, PERRLA, conjunctivae clear; ears and nose atraumatic; pharynx clear  Neck: supple; thyroid not palpable  Back: no tenderness  Respiratory: respiratory effort normal; bilateral rhonchi; right side chest tube removed  Cardiovascular: S1S2 regular, no murmurs  Abdomen: soft, not tender, not distended, positive BS; no liver or spleen organomegaly  Genitourinary: no suprapubic tenderness  Musculoskeletal: no muscle tenderness, no joint swelling or tenderness  Neurological/ Psychiatric: AxOx3, judgement and insight normal;  moving all extremities  Skin: no rashes; no palpable lesions    Labs: all available labs reviewed                     Labs:         Labs:                        8.8    11.26 )-----------( 312      ( 20 Aug 2018 05:49 )             27.2     08-20    139  |  99  |  13  ----------------------------<  104<H>  2.8<LL>   |  33<H>  |  1.00    Ca    8.2<L>      20 Aug 2018 05:49  Mg     2.0     08-20             Cultures:       Culture - Fungal, Body Fluid (collected 08-15-18 @ 12:56)  Source: Pleural Fl Pleural Fluid  Preliminary Report (08-17-18 @ 06:49):    Testing in progress    Culture - Body Fluid with Gram Stain (collected 08-15-18 @ 12:56)  Source: Pleural Fl Pleural Fluid  Gram Stain (08-15-18 @ 22:43):    polymorphonuclear leukocytes seen    No organisms seen by cytocentrifuge  Preliminary Report (08-16-18 @ 18:03):    No growth    Culture - Acid Fast - Body Fluid w/Smear (collected 08-15-18 @ 12:56)  Source: Pleural Fl Pleural Fluid    Culture - Sputum (collected 08-15-18 @ 06:00)  Source: .Sputum Sputum  Gram Stain (08-15-18 @ 12:18):    Numerous polymorphonuclear leukocytes    Few Squamous epithelial cells per low power field    Numerous Gram Negative Rods    Few Gram Positive Rods    Few Gram positive cocci in pairs    Rare Gram Negative Diplococci per oil power field  Final Report (08-17-18 @ 12:04):    Normal Respiratory Brian present    Culture - Urine (collected 08-14-18 @ 19:30)  Source: .Urine None  Final Report (08-15-18 @ 23:36):    <10,000 CFU/ml Normal Urogenital brian present    Culture - Blood (collected 08-14-18 @ 16:39)  Source: .Blood None  Final Report (08-19-18 @ 22:00):    No growth at 5 days.    Culture - Blood (collected 08-14-18 @ 16:17)  Source: .Blood None  Final Report (08-19-18 @ 22:00):    No growth at 5 days.            < from: CT Chest No Cont (08.14.18 @ 20:29) >    EXAM:  CT CHEST                            PROCEDURE DATE:  08/14/2018          INTERPRETATION:  Exam Date: 8/14/2018 8:29 PM  Clinical Information: Fever  Technique:       CT of the chest was performed with axial images obtained   from the thoracic to the inlet to the bilateral adrenal glands       without IV contrast. Maximum intensity projection images were obtained.  Comparison:  CT chest 6/18/2018, 8/24/2017, 6/24/2017, 7/4/2015    FINDINGS:    Lungs, Airways and Pleura: Central tracheobronchial tree is grossly   patent. No endobronchial lesion. Mild centrilobular emphysema. No   pneumothorax. Mild to moderate right pleural effusion with right middle   lobe and right lower lobe consolidations may represent atelectasis   including rounded atelectasis versus pneumonia. Previously noted right   apical and upper lobe nodular opacities have resolved. Tiny nodularity   scattered throughout the bilateral lung fields may represent improvement   of the previously noted nodular densities versus interval development of   tiny nodules may represent an infectious or inflammatory etiology. Trace   left pleural effusion with minimal left basilar atelectasis.     Heart and Great Vessels: Atherosclerotic changes of the aorta and   coronary vasculature. Heart is normal in size. Trace pericardial effusion.    Mediastinum and Fay: Subcentimeter mediastinal lymph nodes.    Neck and Chest Wall: Minimal bilateral gynecomastia.    Bones: Degenerative changes and osteopenia.    Upper Abdomen:  Gallstones.    IMPRESSION:         Mild right pleural effusion with right middle lobe and right lower lobe   consolidative changes similar appearance to prior exam may represent   atelectasis versus pneumonia. Interval resolution of previously noted   right apical and upper lobe nodular densities     Tiny nodularity scattered throughout the bilateral lung fields may   represent improvement of the previously noted nodular densities versus   interval development of tiny nodules may represent an infectious or   inflammatory etiology.    Trace left pleural effusion with left basilar atelectasis.    < end of copied text >      Radiology: all available radiological tests reviewed    Advanced directives addressed: full resuscitation

## 2018-08-20 NOTE — DISCHARGE NOTE ADULT - PATIENT PORTAL LINK FT
You can access the [facility] Patient Portal, offered by [facility], by registering with the following website: [email or web address]

## 2018-08-20 NOTE — DISCHARGE NOTE ADULT - ADDITIONAL INSTRUCTIONS
PCP- follow up with Dr Glover on Wednesday 8/22 at 1 pm.      Wear oxygen as directed.     Aides have been resumed.

## 2018-08-20 NOTE — PROGRESS NOTE ADULT - ASSESSMENT
78M w/PMH HTN, HLD, COPD with chronic respiratory failure (Home O2 3L), BPH, recently hospitalized at  for pneumonia admitted on 8/14 for evaluation of worsening shortness of breath and cough associated with fever and chills; history per medical record as patient unable to provide history.   1. Patient admitted with pneumonia which will treat as health care associated pneumonia given recent hospitalization; also noted with leukocytosis most likely reactive to infection  - patient at risk for gram negative rods and other resistant bacteria   - iv hydration and supportive care   - oxygen and nebs as needed   - serial cbc and monitor temperature   - reviewed prior medical records to evaluate for resistant or atypical pathogens   - day #6 zosyn and vancomycin  - okay from id standpoint to discharge home on no antibiotics as all rx was iv  2. other issues:  HTN, HLD, COPD with chronic respiratory failure (Home O2 3L), BPH  - per medicine

## 2018-08-20 NOTE — DISCHARGE NOTE ADULT - INSTRUCTIONS
Follow up with  Follow up with Dr Glover Wednesday 8/22 and 1 pm. Return to hospital if you develop shortness of breath, difficulty breathing, chest pain or fever >101.

## 2018-08-20 NOTE — PROVIDER CONTACT NOTE (CRITICAL VALUE NOTIFICATION) - ACTION/TREATMENT ORDERED:
Informed Monica Metz Pt's K+ 2.8. Rcvd order to administer 40 mEq of potassium PO.  Will continue to monitor pt.

## 2018-08-23 ENCOUNTER — APPOINTMENT (OUTPATIENT)
Dept: INTERNAL MEDICINE | Facility: CLINIC | Age: 78
End: 2018-08-23
Payer: MEDICARE

## 2018-08-23 VITALS
DIASTOLIC BLOOD PRESSURE: 50 MMHG | TEMPERATURE: 98.5 F | OXYGEN SATURATION: 93 % | WEIGHT: 123 LBS | HEART RATE: 108 BPM | BODY MASS INDEX: 19.77 KG/M2 | RESPIRATION RATE: 24 BRPM | HEIGHT: 66 IN | SYSTOLIC BLOOD PRESSURE: 96 MMHG

## 2018-08-23 DIAGNOSIS — Z87.448 PERSONAL HISTORY OF OTHER DISEASES OF URINARY SYSTEM: ICD-10-CM

## 2018-08-23 DIAGNOSIS — Z87.898 PERSONAL HISTORY OF OTHER SPECIFIED CONDITIONS: ICD-10-CM

## 2018-08-23 DIAGNOSIS — E78.5 HYPERLIPIDEMIA, UNSPECIFIED: ICD-10-CM

## 2018-08-23 DIAGNOSIS — Z87.440 PERSONAL HISTORY OF URINARY (TRACT) INFECTIONS: ICD-10-CM

## 2018-08-23 PROCEDURE — 99496 TRANSJ CARE MGMT HIGH F2F 7D: CPT

## 2018-08-23 NOTE — PHYSICAL EXAM

## 2018-08-23 NOTE — HISTORY OF PRESENT ILLNESS
[FreeTextEntry1] : Followup from hospital [de-identified] : Mr. Arriaza presents for followup evaluation accompanied by his wife. He was admitted to [facility] on 8/14/18 with shortness of breath, cough and fever. Patient had a right pleural effusion with a probable right lower lobe infiltrate. He was treated as a hospital acquired pneumonia and was started on Zosyn and vancomycin. Mr. Arriaza was seen by infectious disease. He also had a recurrent right pleural effusion. The patient underwent a thoracocentesis with pigtail catheter insertion on 8/15. Pleural fluid was consistent with a transudate. The pigtail catheter was removed after 2-3 days. Patient was discharged on 8/20/18. He has a history of chronic COPD/CHF and is oxygen dependent. He is on 3 L per minute nasal cannula.

## 2018-08-23 NOTE — ASSESSMENT
[FreeTextEntry1] : Patient will continue on current medication regimen. Hospital admission was most likely caused by an exacerbation of CHF although he was treated for pneumonia as well. He is currently not on antibiotic therapy. All lab work and x-rays from [facility] reviewed. Patient will followup in 3 months. He'll continue on oxygen at 2 L per minute nasal cannula.

## 2018-08-23 NOTE — REVIEW OF SYSTEMS
[Negative] : Heme/Lymph [FreeTextEntry5] : As per history f present illness [FreeTextEntry6] : As per history of present illness

## 2018-08-24 ENCOUNTER — RX RENEWAL (OUTPATIENT)
Age: 78
End: 2018-08-24

## 2018-08-26 DIAGNOSIS — E87.6 HYPOKALEMIA: ICD-10-CM

## 2018-08-26 DIAGNOSIS — A41.9 SEPSIS, UNSPECIFIED ORGANISM: ICD-10-CM

## 2018-08-26 DIAGNOSIS — J44.0 CHRONIC OBSTRUCTIVE PULMONARY DISEASE WITH (ACUTE) LOWER RESPIRATORY INFECTION: ICD-10-CM

## 2018-08-26 DIAGNOSIS — E78.5 HYPERLIPIDEMIA, UNSPECIFIED: ICD-10-CM

## 2018-08-26 DIAGNOSIS — Z79.82 LONG TERM (CURRENT) USE OF ASPIRIN: ICD-10-CM

## 2018-08-26 DIAGNOSIS — J18.9 PNEUMONIA, UNSPECIFIED ORGANISM: ICD-10-CM

## 2018-08-26 DIAGNOSIS — Z99.81 DEPENDENCE ON SUPPLEMENTAL OXYGEN: ICD-10-CM

## 2018-08-26 DIAGNOSIS — E44.0 MODERATE PROTEIN-CALORIE MALNUTRITION: ICD-10-CM

## 2018-08-26 DIAGNOSIS — E88.09 OTHER DISORDERS OF PLASMA-PROTEIN METABOLISM, NOT ELSEWHERE CLASSIFIED: ICD-10-CM

## 2018-08-26 DIAGNOSIS — I50.32 CHRONIC DIASTOLIC (CONGESTIVE) HEART FAILURE: ICD-10-CM

## 2018-08-26 DIAGNOSIS — N40.0 BENIGN PROSTATIC HYPERPLASIA WITHOUT LOWER URINARY TRACT SYMPTOMS: ICD-10-CM

## 2018-08-26 DIAGNOSIS — R50.9 FEVER, UNSPECIFIED: ICD-10-CM

## 2018-08-26 DIAGNOSIS — I11.0 HYPERTENSIVE HEART DISEASE WITH HEART FAILURE: ICD-10-CM

## 2018-08-26 DIAGNOSIS — Z79.899 OTHER LONG TERM (CURRENT) DRUG THERAPY: ICD-10-CM

## 2018-08-26 DIAGNOSIS — J96.10 CHRONIC RESPIRATORY FAILURE, UNSPECIFIED WHETHER WITH HYPOXIA OR HYPERCAPNIA: ICD-10-CM

## 2018-08-26 DIAGNOSIS — J90 PLEURAL EFFUSION, NOT ELSEWHERE CLASSIFIED: ICD-10-CM

## 2018-08-26 DIAGNOSIS — F32.9 MAJOR DEPRESSIVE DISORDER, SINGLE EPISODE, UNSPECIFIED: ICD-10-CM

## 2018-08-26 DIAGNOSIS — Z87.891 PERSONAL HISTORY OF NICOTINE DEPENDENCE: ICD-10-CM

## 2018-08-26 DIAGNOSIS — D64.9 ANEMIA, UNSPECIFIED: ICD-10-CM

## 2018-08-26 DIAGNOSIS — F41.9 ANXIETY DISORDER, UNSPECIFIED: ICD-10-CM

## 2018-09-02 ENCOUNTER — MEDICATION RENEWAL (OUTPATIENT)
Age: 78
End: 2018-09-02

## 2018-09-02 RX ORDER — FUROSEMIDE 20 MG/1
20 TABLET ORAL
Qty: 30 | Refills: 3 | Status: ACTIVE | COMMUNITY
Start: 2016-12-06 | End: 1900-01-01

## 2018-09-04 ENCOUNTER — RX RENEWAL (OUTPATIENT)
Age: 78
End: 2018-09-04

## 2018-09-10 ENCOUNTER — APPOINTMENT (OUTPATIENT)
Dept: INTERNAL MEDICINE | Facility: CLINIC | Age: 78
End: 2018-09-10
Payer: MEDICARE

## 2018-09-10 VITALS
HEART RATE: 106 BPM | OXYGEN SATURATION: 94 % | RESPIRATION RATE: 26 BRPM | HEIGHT: 66 IN | TEMPERATURE: 97.5 F | BODY MASS INDEX: 19.93 KG/M2 | DIASTOLIC BLOOD PRESSURE: 56 MMHG | WEIGHT: 124 LBS | SYSTOLIC BLOOD PRESSURE: 90 MMHG

## 2018-09-10 DIAGNOSIS — I10 ESSENTIAL (PRIMARY) HYPERTENSION: ICD-10-CM

## 2018-09-10 DIAGNOSIS — J43.9 EMPHYSEMA, UNSPECIFIED: ICD-10-CM

## 2018-09-10 DIAGNOSIS — R06.00 DYSPNEA, UNSPECIFIED: ICD-10-CM

## 2018-09-10 DIAGNOSIS — K21.9 GASTRO-ESOPHAGEAL REFLUX DISEASE W/OUT ESOPHAGITIS: ICD-10-CM

## 2018-09-10 DIAGNOSIS — F41.9 ANXIETY DISORDER, UNSPECIFIED: ICD-10-CM

## 2018-09-10 DIAGNOSIS — I42.9 CARDIOMYOPATHY, UNSPECIFIED: ICD-10-CM

## 2018-09-10 DIAGNOSIS — F32.9 MAJOR DEPRESSIVE DISORDER, SINGLE EPISODE, UNSPECIFIED: ICD-10-CM

## 2018-09-10 DIAGNOSIS — J90 PLEURAL EFFUSION, NOT ELSEWHERE CLASSIFIED: ICD-10-CM

## 2018-09-10 PROCEDURE — 99214 OFFICE O/P EST MOD 30 MIN: CPT | Mod: 25

## 2018-09-10 PROCEDURE — 94060 EVALUATION OF WHEEZING: CPT

## 2018-09-10 NOTE — PLAN
[FreeTextEntry1] : Mr. Arriaza presents for followup evaluation. We'll continue Lanoxin at 2 L per minute nasal cannula. Physical examination demonstrates recurrence of right pleural effusion. The patient does not wish to go the hospital. He will increase Lasix to 40 mg daily x3 days with potassium 40 mEq daily x3 days and then resume 20 mg in 20 mEq daily respectively. It is no significant improvement or if his condition worsens then the patient will go to emergency room for evaluation and possible thoracentesis. He will continue his current medication regimen. Patient's wife states that he received his pneumococcal vaccination or possibly 5 years ago prior to coming to [place].

## 2018-09-10 NOTE — DATA REVIEWED
[FreeTextEntry1] : Spirometry pre-and postbronchodilator therapy show severe obstructive lung disease. FEV1 is 0.84 L which is 36% predicted. FEV1/FEC ratio of 60%.

## 2018-09-10 NOTE — PHYSICAL EXAM
[No Acute Distress] : no acute distress [Well Nourished] : well nourished [Well Developed] : well developed [Well-Appearing] : well-appearing [Normal Sclera/Conjunctiva] : normal sclera/conjunctiva [PERRL] : pupils equal round and reactive to light [EOMI] : extraocular movements intact [Normal Outer Ear/Nose] : the outer ears and nose were normal in appearance [Normal Oropharynx] : the oropharynx was normal [No JVD] : no jugular venous distention [Supple] : supple [No Lymphadenopathy] : no lymphadenopathy [Thyroid Normal, No Nodules] : the thyroid was normal and there were no nodules present [Normal Rate] : normal rate  [Regular Rhythm] : with a regular rhythm [Normal S1, S2] : normal S1 and S2 [No Carotid Bruits] : no carotid bruits [No Abdominal Bruit] : a ~M bruit was not heard ~T in the abdomen [No Varicosities] : no varicosities [Pedal Pulses Present] : the pedal pulses are present [No Edema] : there was no peripheral edema [No Extremity Clubbing/Cyanosis] : no extremity clubbing/cyanosis [No Palpable Aorta] : no palpable aorta [Soft] : abdomen soft [Non Tender] : non-tender [Non-distended] : non-distended [No Masses] : no abdominal mass palpated [No HSM] : no HSM [Normal Bowel Sounds] : normal bowel sounds [Normal Posterior Cervical Nodes] : no posterior cervical lymphadenopathy [Normal Anterior Cervical Nodes] : no anterior cervical lymphadenopathy [No CVA Tenderness] : no CVA  tenderness [No Spinal Tenderness] : no spinal tenderness [No Joint Swelling] : no joint swelling [Grossly Normal Strength/Tone] : grossly normal strength/tone [No Rash] : no rash [Normal Gait] : normal gait [Coordination Grossly Intact] : coordination grossly intact [No Focal Deficits] : no focal deficits [Deep Tendon Reflexes (DTR)] : deep tendon reflexes were 2+ and symmetric [Normal Affect] : the affect was normal [Normal Insight/Judgement] : insight and judgment were intact [de-identified] : Tachypneic with decreased breath sounds on right side one third [de-identified] : 2/6 systolic murmur

## 2018-09-15 LAB
CULTURE RESULTS: SIGNIFICANT CHANGE UP
SPECIMEN SOURCE: SIGNIFICANT CHANGE UP

## 2018-09-21 DIAGNOSIS — R74.8 ABNORMAL LEVELS OF OTHER SERUM ENZYMES: ICD-10-CM

## 2018-09-21 DIAGNOSIS — R77.0 ABNORMALITY OF ALBUMIN: ICD-10-CM

## 2018-09-21 DIAGNOSIS — E83.51 HYPOCALCEMIA: ICD-10-CM

## 2018-10-06 LAB
CULTURE RESULTS: SIGNIFICANT CHANGE UP
SPECIMEN SOURCE: SIGNIFICANT CHANGE UP

## 2018-10-24 NOTE — ED PROVIDER NOTE - TEMPLATE
Take antibiotic for UTI as prescribed  Call your urologist for follow up appointment as soon as possible  Respiratory

## 2018-11-26 ENCOUNTER — APPOINTMENT (OUTPATIENT)
Dept: INTERNAL MEDICINE | Facility: CLINIC | Age: 78
End: 2018-11-26

## 2018-12-03 ENCOUNTER — RX RENEWAL (OUTPATIENT)
Age: 78
End: 2018-12-03

## 2018-12-03 RX ORDER — ALBUTEROL SULFATE 90 UG/1
108 (90 BASE) AEROSOL, METERED RESPIRATORY (INHALATION)
Qty: 1 | Refills: 5 | Status: ACTIVE | COMMUNITY
Start: 2017-02-11 | End: 1900-01-01

## 2019-01-24 ENCOUNTER — MEDICATION RENEWAL (OUTPATIENT)
Age: 79
End: 2019-01-24

## 2019-01-24 RX ORDER — TAMSULOSIN HYDROCHLORIDE 0.4 MG/1
0.4 CAPSULE ORAL
Qty: 180 | Refills: 1 | Status: ACTIVE | COMMUNITY
Start: 2016-11-17 | End: 1900-01-01

## 2019-02-05 ENCOUNTER — APPOINTMENT (OUTPATIENT)
Dept: INTERNAL MEDICINE | Facility: CLINIC | Age: 79
End: 2019-02-05

## 2019-02-20 ENCOUNTER — RX RENEWAL (OUTPATIENT)
Age: 79
End: 2019-02-20

## 2019-02-20 RX ORDER — TIOTROPIUM BROMIDE 18 UG/1
18 CAPSULE ORAL; RESPIRATORY (INHALATION)
Qty: 30 | Refills: 5 | Status: ACTIVE | COMMUNITY
Start: 2017-02-02 | End: 1900-01-01

## 2019-04-11 ENCOUNTER — EMERGENCY (EMERGENCY)
Facility: HOSPITAL | Age: 79
LOS: 0 days | Discharge: HOME | End: 2019-04-11
Attending: EMERGENCY MEDICINE | Admitting: EMERGENCY MEDICINE
Payer: MEDICARE

## 2019-04-11 VITALS
HEIGHT: 66 IN | DIASTOLIC BLOOD PRESSURE: 56 MMHG | SYSTOLIC BLOOD PRESSURE: 120 MMHG | RESPIRATION RATE: 18 BRPM | WEIGHT: 121.92 LBS | TEMPERATURE: 97 F | HEART RATE: 86 BPM | OXYGEN SATURATION: 98 %

## 2019-04-11 VITALS
HEART RATE: 88 BPM | DIASTOLIC BLOOD PRESSURE: 66 MMHG | SYSTOLIC BLOOD PRESSURE: 146 MMHG | RESPIRATION RATE: 22 BRPM | OXYGEN SATURATION: 100 % | TEMPERATURE: 98 F

## 2019-04-11 DIAGNOSIS — N40.0 BENIGN PROSTATIC HYPERPLASIA WITHOUT LOWER URINARY TRACT SYMPTOMS: ICD-10-CM

## 2019-04-11 DIAGNOSIS — R05 COUGH: ICD-10-CM

## 2019-04-11 DIAGNOSIS — M19.90 UNSPECIFIED OSTEOARTHRITIS, UNSPECIFIED SITE: ICD-10-CM

## 2019-04-11 DIAGNOSIS — Z98.49 CATARACT EXTRACTION STATUS, UNSPECIFIED EYE: Chronic | ICD-10-CM

## 2019-04-11 DIAGNOSIS — I10 ESSENTIAL (PRIMARY) HYPERTENSION: ICD-10-CM

## 2019-04-11 DIAGNOSIS — F32.9 MAJOR DEPRESSIVE DISORDER, SINGLE EPISODE, UNSPECIFIED: ICD-10-CM

## 2019-04-11 DIAGNOSIS — J90 PLEURAL EFFUSION, NOT ELSEWHERE CLASSIFIED: ICD-10-CM

## 2019-04-11 DIAGNOSIS — J96.10 CHRONIC RESPIRATORY FAILURE, UNSPECIFIED WHETHER WITH HYPOXIA OR HYPERCAPNIA: ICD-10-CM

## 2019-04-11 DIAGNOSIS — E78.5 HYPERLIPIDEMIA, UNSPECIFIED: ICD-10-CM

## 2019-04-11 DIAGNOSIS — J44.9 CHRONIC OBSTRUCTIVE PULMONARY DISEASE, UNSPECIFIED: ICD-10-CM

## 2019-04-11 DIAGNOSIS — F41.9 ANXIETY DISORDER, UNSPECIFIED: ICD-10-CM

## 2019-04-11 DIAGNOSIS — R53.1 WEAKNESS: ICD-10-CM

## 2019-04-11 LAB
ALBUMIN SERPL ELPH-MCNC: 2.8 G/DL — LOW (ref 3.3–5)
ALP SERPL-CCNC: 137 U/L — HIGH (ref 40–120)
ALT FLD-CCNC: 12 U/L — SIGNIFICANT CHANGE UP (ref 12–78)
ANION GAP SERPL CALC-SCNC: 8 MMOL/L — SIGNIFICANT CHANGE UP (ref 5–17)
APPEARANCE UR: CLEAR — SIGNIFICANT CHANGE UP
APTT BLD: 31.6 SEC — SIGNIFICANT CHANGE UP (ref 27.5–36.3)
AST SERPL-CCNC: 17 U/L — SIGNIFICANT CHANGE UP (ref 15–37)
BACTERIA # UR AUTO: ABNORMAL
BASOPHILS # BLD AUTO: 0.04 K/UL — SIGNIFICANT CHANGE UP (ref 0–0.2)
BASOPHILS NFR BLD AUTO: 0.5 % — SIGNIFICANT CHANGE UP (ref 0–2)
BILIRUB SERPL-MCNC: 0.3 MG/DL — SIGNIFICANT CHANGE UP (ref 0.2–1.2)
BILIRUB UR-MCNC: NEGATIVE — SIGNIFICANT CHANGE UP
BUN SERPL-MCNC: 19 MG/DL — SIGNIFICANT CHANGE UP (ref 7–23)
CALCIUM SERPL-MCNC: 8.2 MG/DL — LOW (ref 8.5–10.1)
CHLORIDE SERPL-SCNC: 103 MMOL/L — SIGNIFICANT CHANGE UP (ref 96–108)
CO2 SERPL-SCNC: 31 MMOL/L — SIGNIFICANT CHANGE UP (ref 22–31)
COLOR SPEC: YELLOW — SIGNIFICANT CHANGE UP
CREAT SERPL-MCNC: 0.93 MG/DL — SIGNIFICANT CHANGE UP (ref 0.5–1.3)
DIFF PNL FLD: ABNORMAL
EOSINOPHIL # BLD AUTO: 0.24 K/UL — SIGNIFICANT CHANGE UP (ref 0–0.5)
EOSINOPHIL NFR BLD AUTO: 2.8 % — SIGNIFICANT CHANGE UP (ref 0–6)
EPI CELLS # UR: SIGNIFICANT CHANGE UP
GLUCOSE SERPL-MCNC: 112 MG/DL — HIGH (ref 70–99)
GLUCOSE UR QL: NEGATIVE MG/DL — SIGNIFICANT CHANGE UP
HCT VFR BLD CALC: 36.2 % — LOW (ref 39–50)
HGB BLD-MCNC: 11.3 G/DL — LOW (ref 13–17)
IMM GRANULOCYTES NFR BLD AUTO: 0.2 % — SIGNIFICANT CHANGE UP (ref 0–1.5)
INR BLD: 1.08 RATIO — SIGNIFICANT CHANGE UP (ref 0.88–1.16)
KETONES UR-MCNC: NEGATIVE — SIGNIFICANT CHANGE UP
LACTATE SERPL-SCNC: 1.5 MMOL/L — SIGNIFICANT CHANGE UP (ref 0.7–2)
LEUKOCYTE ESTERASE UR-ACNC: ABNORMAL
LYMPHOCYTES # BLD AUTO: 1.32 K/UL — SIGNIFICANT CHANGE UP (ref 1–3.3)
LYMPHOCYTES # BLD AUTO: 15.3 % — SIGNIFICANT CHANGE UP (ref 13–44)
MCHC RBC-ENTMCNC: 27.8 PG — SIGNIFICANT CHANGE UP (ref 27–34)
MCHC RBC-ENTMCNC: 31.2 GM/DL — LOW (ref 32–36)
MCV RBC AUTO: 89.2 FL — SIGNIFICANT CHANGE UP (ref 80–100)
MONOCYTES # BLD AUTO: 1.21 K/UL — HIGH (ref 0–0.9)
MONOCYTES NFR BLD AUTO: 14.1 % — HIGH (ref 2–14)
NEUTROPHILS # BLD AUTO: 5.77 K/UL — SIGNIFICANT CHANGE UP (ref 1.8–7.4)
NEUTROPHILS NFR BLD AUTO: 67.1 % — SIGNIFICANT CHANGE UP (ref 43–77)
NITRITE UR-MCNC: NEGATIVE — SIGNIFICANT CHANGE UP
NRBC # BLD: 0 /100 WBCS — SIGNIFICANT CHANGE UP (ref 0–0)
PH UR: 7 — SIGNIFICANT CHANGE UP (ref 5–8)
PLATELET # BLD AUTO: 290 K/UL — SIGNIFICANT CHANGE UP (ref 150–400)
POTASSIUM SERPL-MCNC: 3.2 MMOL/L — LOW (ref 3.5–5.3)
POTASSIUM SERPL-SCNC: 3.2 MMOL/L — LOW (ref 3.5–5.3)
PROT SERPL-MCNC: 7.3 GM/DL — SIGNIFICANT CHANGE UP (ref 6–8.3)
PROT UR-MCNC: 15 MG/DL
PROTHROM AB SERPL-ACNC: 12 SEC — SIGNIFICANT CHANGE UP (ref 10–12.9)
RAPID RVP RESULT: SIGNIFICANT CHANGE UP
RBC # BLD: 4.06 M/UL — LOW (ref 4.2–5.8)
RBC # FLD: 14.3 % — SIGNIFICANT CHANGE UP (ref 10.3–14.5)
RBC CASTS # UR COMP ASSIST: SIGNIFICANT CHANGE UP /HPF (ref 0–4)
SODIUM SERPL-SCNC: 142 MMOL/L — SIGNIFICANT CHANGE UP (ref 135–145)
SP GR SPEC: 1.01 — SIGNIFICANT CHANGE UP (ref 1.01–1.02)
UROBILINOGEN FLD QL: 1 MG/DL
WBC # BLD: 8.6 K/UL — SIGNIFICANT CHANGE UP (ref 3.8–10.5)
WBC # FLD AUTO: 8.6 K/UL — SIGNIFICANT CHANGE UP (ref 3.8–10.5)
WBC UR QL: SIGNIFICANT CHANGE UP

## 2019-04-11 PROCEDURE — 99284 EMERGENCY DEPT VISIT MOD MDM: CPT

## 2019-04-11 PROCEDURE — 71045 X-RAY EXAM CHEST 1 VIEW: CPT | Mod: 26

## 2019-04-11 PROCEDURE — 93010 ELECTROCARDIOGRAM REPORT: CPT

## 2019-04-11 PROCEDURE — 71250 CT THORAX DX C-: CPT | Mod: 26

## 2019-04-11 RX ORDER — IPRATROPIUM/ALBUTEROL SULFATE 18-103MCG
3 AEROSOL WITH ADAPTER (GRAM) INHALATION ONCE
Qty: 0 | Refills: 0 | Status: COMPLETED | OUTPATIENT
Start: 2019-04-11 | End: 2019-04-11

## 2019-04-11 RX ADMIN — Medication 3 MILLILITER(S): at 14:29

## 2019-04-11 NOTE — ED ADULT NURSE NOTE - OBJECTIVE STATEMENT
Pt alert and oriented x3. pt presents with productive cough and SOB for a few days. Denies fever or chills. Pt wife states he "has lung problem" Pt denies chest pain or palpitations.

## 2019-04-11 NOTE — ED ADULT NURSE NOTE - NSIMPLEMENTINTERV_GEN_ALL_ED
Implemented All Fall with Harm Risk Interventions:  [place] to call system. Call bell, personal items and telephone within reach. Instruct patient to call for assistance. Room bathroom lighting operational. Non-slip footwear when patient is off stretcher. Physically safe environment: no spills, clutter or unnecessary equipment. Stretcher in lowest position, wheels locked, appropriate side rails in place. Provide visual cue, wrist band, yellow gown, etc. Monitor gait and stability. Monitor for mental status changes and reorient to person, place, and time. Review medications for side effects contributing to fall risk. Reinforce activity limits and safety measures with patient and family. Provide visual clues: red socks.

## 2019-04-11 NOTE — ED ADULT TRIAGE NOTE - AS O2 DELIVERY
MEDICARE WELLNESS VISIT NOTE      History of Present Illness:   Bert Mckay presents for his Subsequent Annual Medicare Wellness Visit.   He has no current complaints or concerns.      Patient Care Team:  Mandeep Paredes MD as PCP - General (Med/Peds)  Leonardo Brown, YADIRA (Internal Medicine)  Tiffany Greenberg, RN as Registered Nurse (Registered Nurse)     Review potential risk factors.  Depression: Not Applicable   ADLs (Activities of Daily Living): Independent, but will use a cane  Safety concerns: Low - to  Mod risk    Hearing/Vision Screen :  Wears Glasses.  Vision test is oK.  Wears hearing aides   Advance Directives: See Below             Patient Active Problem List    Diagnosis Date Noted   • Chronic atrial fibrillation 11/25/2016     Priority: Low   • Perforated tympanic membrane 03/07/2016     Priority: Low   • Contracture of palmar fascia (Dupuytren's) 02/16/2016     Priority: Low   • AF (atrial fibrillation) - chronic 12/04/2014     Priority: Low   • Other primary cardiomyopathies 06/13/2013     Priority: Low   • Other pulmonary embolism and infarction 01/10/2013     Priority: Low     History of       • DVT (deep venous thrombosis)      Priority: Low   • Medtronic Pacemaker 10/25/2011     Priority: Low     No land line - no carelink     • CAD (coronary artery disease) 05/18/2011     Priority: Low     S/p stent mid LAD - EF 45% 2013     • PAD (peripheral artery disease) 05/18/2011     Priority: Low   • Hyperlipemia 05/18/2011     Priority: Low   • HTN (hypertension) 05/18/2011     Priority: Low   • Presbyacusis 05/18/2011     Priority: Low   • Encounter for long-term (current) use of anticoagulants - warfarin 04/12/2011     Priority: Low     warfarin           Past Medical History   Diagnosis Date   • AF (atrial fibrillation) 12/4/2014   • Atrial fibrillation - chronic 4/12/2011     chronic    • Blood clot associated with vein wall inflammation    • Bradycardia 9/26/2011   • CAD (coronary artery  disease) 5/18/2011   • Colon polyp 12/15/2011   • DVT (deep venous thrombosis)    • Falls frequently      Intermittent   • HTN (hypertension) 5/18/2011   • Hyperlipemia 5/18/2011   • Kidney stone 2/21/2014   • Medtronic Pacemaker 10/25/2011     No land line - no carelink    • Other pulmonary embolism and infarction 1/10/2013     History of     • Pacemaker at end of battery life    • PAD (peripheral artery disease) 5/18/2011   • Presbyacusis 5/18/2011   • S/P coronary artery stent placement 1/10/2013   • Sinoatrial node dysfunction    • Syncope and collapse 12/29/2011     S/p EMMY 2008 - positive          Past Surgical History   Procedure Laterality Date   • Colonoscopy remove lesions by snare  8/19/2010     DR Salazar/TA x1/tubulovillious adenoma/recall 3 months 11/2010 per office visit   • Past surgical history       Tonsillectomy age 19   • Tilt table evaluation  12/2008     Positive, florinef added   • Ep pacer  8/12006     Medtronic dual chamber EnRhythm   • Cdl ptca w/ stent  6/2006     [facility]   • Ptca     • Tonsillectomy and adenoidectomy     • Stress test  07/01/2013   • Echocardiogram  06/25/2013     EF 45%   • Cystoscopy,ureteroscopy,lithotripsy  2/22/2014     Dr. Tierney, [facility]         Social History   Substance Use Topics   • Smoking status: Current Every Day Smoker     Packs/day: 0.75     Types: Cigarettes   • Smokeless tobacco: Never Used      Comment: Patient declined   • Alcohol use No     Family History   Problem Relation Age of Onset   • Coronary Artery Disease Neg Hx    • Sudden Cardiac Death Neg Hx    • Cancer Father    • Cancer Brother        Current Outpatient Prescriptions   Medication Sig Dispense Refill   • clopidogrel (PLAVIX) 75 MG tablet TAKE ONE TABLET BY MOUTH ONCE DAILY 90 tablet 0   • simvastatin (ZOCOR) 40 MG tablet TAKE ONE TABLET BY MOUTH ONCE DAILY AT BEDTIME 90 tablet 0   • warfarin (COUMADIN) 3 MG tablet TAKE ONE TABLET BY MOUTH ONCE DAILY 90 tablet 0   •  fludrocortisone (FLORINEF) 0.1 MG tablet TAKE ONE-HALF TABLET BY MOUTH ONCE DAILY 45 tablet 0   • digoxin (LANOXIN) 0.125 MG tablet TAKE ONE-HALF TABLET BY MOUTH ONCE DAILY 90 tablet 0   • metoPROLOL (TOPROL-XL) 50 MG 24 hr tablet Take 1 tablet by mouth daily. 90 tablet 3   • ibuprofen (MOTRIN) 600 MG tablet Take 1 tablet by mouth every 8 hours as needed for Pain. 60 tablet 0   • pentoxifylline (TRENTAL) 400 MG CR tablet TAKE ONE  BY MOUTH THREE TIMES DAILY WITH MEALS 270 tablet 1   • Meclizine HCl 25 MG tablet Take 25 mg by mouth 3 times daily as needed.     • aspirin EC (ECOTRIN) 81 MG EC tablet Take 81 mg by mouth daily.       No current facility-administered medications for this visit.         Medicare Health Risk Assessment in electronic health record reviewed.  The following items were identified and addressed:    Falls risk   Bowel / bladder control problems  Need for increased physical activity  Hearing concerns  Vision concerns  Depression  Needs for home assistance / ADLs  Vision and hearing screens documented:  Yes  Advance Directive: Patient has an Advance Directives document, which is on file  Cognitive Assessment: no evidence of cognitive dysfunction by direct observation    Needed Screening/Treatment:   none  Needed follow up:  See Below    ------------------------------------------------------------------    CC/HPI:  Bert Mckay is a 74 year old male who presents for routine checkup of ongoing problems.    Patient with known coronary artery disease and atrial fibrillation. He is on warfarin. His levels are monitored by our anticoagulation clinic. He does see cardiology in regards to his arrhythmias. His medications were reviewed. He is not having any chest pain and is not having any shortness of breath.    Patient with known hyperlipidemia. He is on a statin medication. Overall this is working very well for the patient. He has been fasting. He is due for repeat blood work.    Patient does have  hearing aids for his decreased hearing. These are stable and working well. He denies any new problems today.              Past Medical History   Diagnosis Date   • AF (atrial fibrillation) 12/4/2014   • Atrial fibrillation - chronic 4/12/2011     chronic    • Blood clot associated with vein wall inflammation    • Bradycardia 9/26/2011   • CAD (coronary artery disease) 5/18/2011   • Colon polyp 12/15/2011   • DVT (deep venous thrombosis)    • Falls frequently      Intermittent   • HTN (hypertension) 5/18/2011   • Hyperlipemia 5/18/2011   • Kidney stone 2/21/2014   • Medtronic Pacemaker 10/25/2011     No land line - no carelink    • Other pulmonary embolism and infarction 1/10/2013     History of     • Pacemaker at end of battery life    • PAD (peripheral artery disease) 5/18/2011   • Presbyacusis 5/18/2011   • S/P coronary artery stent placement 1/10/2013   • Sinoatrial node dysfunction    • Syncope and collapse 12/29/2011     S/p EMMY 2008 - positive        Past Surgical History   Procedure Laterality Date   • Colonoscopy remove lesions by snare  8/19/2010     DR Salazar/TA x1/tubulovillious adenoma/recall 3 months 11/2010 per office visit   • Past surgical history       Tonsillectomy age 19   • Tilt table evaluation  12/2008     Positive, florinef added   • Ep pacer  8/12006     Medtronic dual chamber EnRhythm   • Cdl ptca w/ stent  6/2006     [facility]   • Ptca     • Tonsillectomy and adenoidectomy     • Stress test  07/01/2013   • Echocardiogram  06/25/2013     EF 45%   • Cystoscopy,ureteroscopy,lithotripsy  2/22/2014     Dr. Tierney, [facility] [place] 2]        ALLERGIES:  No Known Allergies    Current Outpatient Prescriptions   Medication Sig Dispense Refill   • clopidogrel (PLAVIX) 75 MG tablet TAKE ONE TABLET BY MOUTH ONCE DAILY 90 tablet 0   • simvastatin (ZOCOR) 40 MG tablet TAKE ONE TABLET BY MOUTH ONCE DAILY AT BEDTIME 90 tablet 0   • warfarin (COUMADIN) 3 MG tablet TAKE ONE TABLET BY MOUTH ONCE DAILY 90 tablet 0    • fludrocortisone (FLORINEF) 0.1 MG tablet TAKE ONE-HALF TABLET BY MOUTH ONCE DAILY 45 tablet 0   • digoxin (LANOXIN) 0.125 MG tablet TAKE ONE-HALF TABLET BY MOUTH ONCE DAILY 90 tablet 0   • metoPROLOL (TOPROL-XL) 50 MG 24 hr tablet Take 1 tablet by mouth daily. 90 tablet 3   • ibuprofen (MOTRIN) 600 MG tablet Take 1 tablet by mouth every 8 hours as needed for Pain. 60 tablet 0   • pentoxifylline (TRENTAL) 400 MG CR tablet TAKE ONE  BY MOUTH THREE TIMES DAILY WITH MEALS 270 tablet 1   • Meclizine HCl 25 MG tablet Take 25 mg by mouth 3 times daily as needed.     • aspirin EC (ECOTRIN) 81 MG EC tablet Take 81 mg by mouth daily.       No current facility-administered medications for this visit.         Family History   Problem Relation Age of Onset   • Coronary Artery Disease Neg Hx    • Sudden Cardiac Death Neg Hx    • Cancer Father    • Cancer Brother        Family Status   Relation Status   • Mother    • Father        Social History     Social History   • Marital status:      Spouse name: N/A   • Number of children: 3   • Years of education: N/A     Occupational History   • Retired      Social History Main Topics   • Smoking status: Current Every Day Smoker     Packs/day: 0.75     Types: Cigarettes   • Smokeless tobacco: Never Used      Comment: Patient declined   • Alcohol use No   • Drug use: No   • Sexual activity: Not on file     Other Topics Concern   • Seat Belt Yes     Social History Narrative        ROS: The patient denies symptoms of:       General: fever, chills, night sweats, fatigue, weight loss, weight gain and decreased appetite  Skin: rash, itching, lumps or bumps or moles that are changing, hair changes and nail changes  Eyes: visual blurring, double vision, spots before the eyes and eye pain  Ears: decreased auditory acuity, ringing or tinnitus in the ears, pain in the ears and discharge from the ears  Nose: nose bleed, discharge from the nose and decrease in ability to  smell  Mouth: soreness of the mouth or tongue or lips, lumps or bumps in the mouth and abnormality of the teeth or gums  Throat: sore throat and hoarseness or voice change  Neck: swelling or masses in the neck, stiffness or pain in the neck and limited range of motion in the neck  Cardiorespiratory: chest pain, edema, cough, hemoptysis, wheeze and shortness of breath  Gastrointestinal: abdominal pain, nausea, vomiting, constipation, diarrhea, black tarry stools, blood in the stools, change in the bowel habits, sour burps or heart burn and indigestion  Neurologic: headache, weakness, loss of sensation, visual disturbance, trouble with balance or coordination and loss of memory  Musculoskeletal: joint stiffness, joint pain, joint swelling, muscle pain and blanching of the fingers with cold exposure        PHYSICAL EXAMINATION:   Visit Vitals   • /84   • Pulse 83   • Ht 5' 7\" (1.702 m)   • Wt 74.3 kg   • SpO2 100%   • BMI 25.66 kg/m2       Wt Readings from Last 4 Encounters:   02/16/17 74.3 kg   01/03/17 74.8 kg   10/06/16 73.5 kg   09/20/16 72.1 kg       GENERAL:  appears stated age, is in no apparent distress and is well developed and well nourished  SKIN:  normal color, normal texture, normal turgor, no skin rashes, no atypical appearing skin lesions and no bruises  HEAD:  normocephalic and non-tender  EYES:  pupils are equal and reactive to light and accommodation extraocular movements are full sclerae and conjunctivae are normal lids and lashes are normal  EARS:  pinna and external ear is normal bilaterally, external auditory canals are normal, tympanic membranes are normal, middle ear space is normal bilaterally, there is no discomfort on traction of the pinna or palpation of the tragus and auditory acuity is grossly normal  NOSE:  external nose is normal to inspection, no septal deviation and anterior nares are normal  MOUTH/THROAT:  tongue is midline and appears normal, oropharynx appears normal, soft  palate and uvula are normal and oral mucosa is normal  NECK:  neck is supple, no thyromegaly, no anterior cervical adenopathy, no posterior cervical adenopathy and no supraclavicular adenopathy  CHEST:  contour is normal with normal AP diameter, normal respiratory excursion and respiratory effort is not labored  HEART:  normal PMI, normal rate and rhythm, S1 and S2 normal, no S3 or S4, no murmurs and no extra heart sounds  LUNGS:  lungs are clear to auscultation with normal inspiratory/expiratory sounds, no rales, no rhonchi and no wheezes  ABDOMEN:  abdomen is soft, normal active bowel sounds, nontender, without masses, without hepatomegaly and without splenomegaly        ASSESSMENT:   1. Medicare annual wellness visit, subsequent    2. Essential hypertension    3. Hyperlipidemia, unspecified hyperlipidemia type    4. Coronary artery disease involving native coronary artery of native heart without angina pectoris    5. Chronic atrial fibrillation        PLAN:   Orders Placed This Encounter   • OFFICE/OUTPT VISIT EST LEVEL IV   • Comprehensive Metabolic Panel   • Lipid Panel with Reflex         With regards to the Medicare wellness visit, all aspects were reviewed and are up-to-date or not applicable at this time.    Patient has been fasting. He is due for CMP and lipid panel. We reviewed his medication list. We will continue with all current medications at this time. All questions were answered.      FOLLOW UP: In 6 months. Sooner if indicated.      Mandeep Paredes MD   room air

## 2019-04-11 NOTE — ED PROVIDER NOTE - PROGRESS NOTE DETAILS
Labs urnemarkable.  CXR with right effusion +/- infiltrate.  Will check Chest CT, d/w Anselmi Chronic pleural effusion, atelectasis on CT scan.  Improved upper lobe opcaities, and spiculated lesion RUL which will need repeat imaging 3 months.  Pt with stable VS, asymptomatic on reeval.  Okay for d/c home with pulmonary follow up. Vernon August D.O.

## 2019-04-11 NOTE — ED PROVIDER NOTE - OBJECTIVE STATEMENT
79 y/o male with PMHx of HTN, HLD, BPH, COPD, CHF presents to the ED c/o loose productive cough x 3 days with generalized weakness. Denies NVD, fever or other sx. PCP: Dr. Glover

## 2019-04-12 LAB
CULTURE RESULTS: SIGNIFICANT CHANGE UP
SPECIMEN SOURCE: SIGNIFICANT CHANGE UP

## 2019-04-25 ENCOUNTER — RX RENEWAL (OUTPATIENT)
Age: 79
End: 2019-04-25

## 2019-05-28 ENCOUNTER — RX RENEWAL (OUTPATIENT)
Age: 79
End: 2019-05-28

## 2019-05-28 RX ORDER — ESCITALOPRAM OXALATE 20 MG/1
20 TABLET ORAL
Qty: 90 | Refills: 1 | Status: ACTIVE | COMMUNITY
Start: 2018-09-04 | End: 1900-01-01

## 2019-06-15 ENCOUNTER — EMERGENCY (EMERGENCY)
Facility: HOSPITAL | Age: 79
LOS: 0 days | Discharge: HOME | End: 2019-06-15
Attending: EMERGENCY MEDICINE | Admitting: EMERGENCY MEDICINE
Payer: MEDICARE

## 2019-06-15 VITALS
SYSTOLIC BLOOD PRESSURE: 129 MMHG | WEIGHT: 126.99 LBS | DIASTOLIC BLOOD PRESSURE: 75 MMHG | HEART RATE: 82 BPM | HEIGHT: 66 IN | TEMPERATURE: 98 F | RESPIRATION RATE: 16 BRPM | OXYGEN SATURATION: 98 %

## 2019-06-15 VITALS
OXYGEN SATURATION: 100 % | HEART RATE: 80 BPM | SYSTOLIC BLOOD PRESSURE: 130 MMHG | RESPIRATION RATE: 18 BRPM | TEMPERATURE: 99 F | DIASTOLIC BLOOD PRESSURE: 77 MMHG

## 2019-06-15 DIAGNOSIS — I10 ESSENTIAL (PRIMARY) HYPERTENSION: ICD-10-CM

## 2019-06-15 DIAGNOSIS — R33.9 RETENTION OF URINE, UNSPECIFIED: ICD-10-CM

## 2019-06-15 DIAGNOSIS — R10.30 LOWER ABDOMINAL PAIN, UNSPECIFIED: ICD-10-CM

## 2019-06-15 DIAGNOSIS — N40.0 BENIGN PROSTATIC HYPERPLASIA WITHOUT LOWER URINARY TRACT SYMPTOMS: ICD-10-CM

## 2019-06-15 DIAGNOSIS — E78.5 HYPERLIPIDEMIA, UNSPECIFIED: ICD-10-CM

## 2019-06-15 DIAGNOSIS — J44.9 CHRONIC OBSTRUCTIVE PULMONARY DISEASE, UNSPECIFIED: ICD-10-CM

## 2019-06-15 DIAGNOSIS — Z98.49 CATARACT EXTRACTION STATUS, UNSPECIFIED EYE: Chronic | ICD-10-CM

## 2019-06-15 LAB
APPEARANCE UR: CLEAR — SIGNIFICANT CHANGE UP
BACTERIA # UR AUTO: ABNORMAL
BILIRUB UR-MCNC: NEGATIVE — SIGNIFICANT CHANGE UP
COLOR SPEC: YELLOW — SIGNIFICANT CHANGE UP
DIFF PNL FLD: ABNORMAL
GLUCOSE UR QL: NEGATIVE MG/DL — SIGNIFICANT CHANGE UP
KETONES UR-MCNC: ABNORMAL
LEUKOCYTE ESTERASE UR-ACNC: ABNORMAL
NITRITE UR-MCNC: NEGATIVE — SIGNIFICANT CHANGE UP
PH UR: 7 — SIGNIFICANT CHANGE UP (ref 5–8)
PROT UR-MCNC: 15 MG/DL
RBC CASTS # UR COMP ASSIST: SIGNIFICANT CHANGE UP /HPF (ref 0–4)
SP GR SPEC: 1.01 — SIGNIFICANT CHANGE UP (ref 1.01–1.02)
UROBILINOGEN FLD QL: 1 MG/DL
WBC UR QL: SIGNIFICANT CHANGE UP

## 2019-06-15 PROCEDURE — 99284 EMERGENCY DEPT VISIT MOD MDM: CPT

## 2019-06-15 NOTE — ED ADULT NURSE NOTE - NSIMPLEMENTINTERV_GEN_ALL_ED
Implemented All Fall with Harm Risk Interventions:  [place] to call system. Call bell, personal items and telephone within reach. Instruct patient to call for assistance. Room bathroom lighting operational. Non-slip footwear when patient is off stretcher. Physically safe environment: no spills, clutter or unnecessary equipment. Stretcher in lowest position, wheels locked, appropriate side rails in place. Provide visual cue, wrist band, yellow gown, etc. Monitor gait and stability. Monitor for mental status changes and reorient to person, place, and time. Review medications for side effects contributing to fall risk. Reinforce activity limits and safety measures with patient and family. Provide visual clues: red socks.

## 2019-06-15 NOTE — ED ADULT NURSE NOTE - OBJECTIVE STATEMENT
pt came to ED for evaluation of urinary retention since last night. Vincent placed in ED. Pt sts immediate relief once vincent placed.

## 2019-06-15 NOTE — ED ADULT NURSE NOTE - CAS ELECT INFOMATION PROVIDED
Pt's wife demonstrated ability to empty vincent bag/leg bag. Sts she had aide at home that can change from leg bag to bedside drainage bag. Pt has had multiple vincent cath before. Pt following up with urology./DC instructions

## 2019-06-15 NOTE — ED ADULT NURSE NOTE - NS ED NURSE DC INFO COMPLEXITY
Patient asked questions/Moderate: Comprehensive teaching/Returned Demonstration/Verbalized Understanding

## 2019-06-15 NOTE — ED PROVIDER NOTE - CARE PROVIDER_API CALL
Abdoul Chavira)  Urology  [address], [address] [place] [address]  Phone: [phone]  Fax: [phone]  Follow Up Time:

## 2019-06-16 LAB
CULTURE RESULTS: NO GROWTH — SIGNIFICANT CHANGE UP
SPECIMEN SOURCE: SIGNIFICANT CHANGE UP

## 2019-06-19 ENCOUNTER — APPOINTMENT (OUTPATIENT)
Dept: UROLOGY | Facility: CLINIC | Age: 79
End: 2019-06-19
Payer: MEDICARE

## 2019-06-19 VITALS
BODY MASS INDEX: 20.66 KG/M2 | OXYGEN SATURATION: 97 % | RESPIRATION RATE: 18 BRPM | HEART RATE: 78 BPM | DIASTOLIC BLOOD PRESSURE: 63 MMHG | TEMPERATURE: 98.2 F | HEIGHT: 66 IN | SYSTOLIC BLOOD PRESSURE: 120 MMHG | WEIGHT: 128.56 LBS

## 2019-06-19 PROCEDURE — 99214 OFFICE O/P EST MOD 30 MIN: CPT

## 2019-06-19 RX ORDER — BETHANECHOL CHLORIDE 10 MG/1
10 TABLET ORAL 3 TIMES DAILY
Qty: 45 | Refills: 0 | Status: ACTIVE | COMMUNITY
Start: 2019-06-19 | End: 1900-01-01

## 2019-06-26 ENCOUNTER — APPOINTMENT (OUTPATIENT)
Dept: UROLOGY | Facility: CLINIC | Age: 79
End: 2019-06-26
Payer: MEDICARE

## 2019-06-26 PROCEDURE — 51700 IRRIGATION OF BLADDER: CPT

## 2019-06-30 NOTE — HISTORY OF PRESENT ILLNESS
[FreeTextEntry1] : 77 yo male presents for Urinary retention. \par Went to [facility] ED recently with urinary retention, Garcia placed. \par Garcia to leg bag draining clear urine  \par Per wife taking Flomax(2 caps) and Finasteride. \par \par s/p TURBT w Intravesical Mitomycin instillation done on 6/13/17 at . Path- Non invasive Papillary Urothelial carcinoma. Chose surveillance Cystoscopy, has not had it. \par \par \par

## 2019-06-30 NOTE — PHYSICAL EXAM
[Abdomen Soft] : soft [Abdomen Tenderness] : non-tender [Costovertebral Angle Tenderness] : no ~M costovertebral angle tenderness [Skin Color & Pigmentation] : normal skin color and pigmentation [] : no respiratory distress [Oriented To Time, Place, And Person] : oriented to person, place, and time [No Focal Deficits] : no focal deficits [FreeTextEntry1] : wheel chair bound

## 2019-07-03 ENCOUNTER — APPOINTMENT (OUTPATIENT)
Dept: UROLOGY | Facility: CLINIC | Age: 79
End: 2019-07-03
Payer: MEDICARE

## 2019-07-03 DIAGNOSIS — N40.0 BENIGN PROSTATIC HYPERPLASIA WITHOUT LOWER URINARY TRACT SYMPMS: ICD-10-CM

## 2019-07-03 DIAGNOSIS — R33.9 RETENTION OF URINE, UNSPECIFIED: ICD-10-CM

## 2019-07-03 DIAGNOSIS — N13.8 BENIGN PROSTATIC HYPERPLASIA WITH LOWER URINARY TRACT SYMPMS: ICD-10-CM

## 2019-07-03 DIAGNOSIS — N40.1 BENIGN PROSTATIC HYPERPLASIA WITH LOWER URINARY TRACT SYMPMS: ICD-10-CM

## 2019-07-03 PROCEDURE — 99214 OFFICE O/P EST MOD 30 MIN: CPT | Mod: 25

## 2019-07-03 PROCEDURE — 51798 US URINE CAPACITY MEASURE: CPT

## 2019-07-03 NOTE — PHYSICAL EXAM
[General Appearance - In No Acute Distress] : no acute distress [] : no respiratory distress [Abdomen Soft] : soft [Abdomen Tenderness] : non-tender [Oriented To Time, Place, And Person] : oriented to person, place, and time

## 2019-07-03 NOTE — ASSESSMENT
[FreeTextEntry1] : Benign Prostatic Hyperplasia:\par Urinary retention:\par Minimal PVR. \par Continue Flomax (2 caps) and Finasteride. \par Stop Bethanechol. \par Recommended that patient report to Emergency department if has difficulty with urination or cannot urinate. \par \par Bladder cancer:\par Will get Urinalysis, Urine culture and Urine cytology. \par Cystoscopy in 2 weeks.

## 2019-07-03 NOTE — HISTORY OF PRESENT ILLNESS
[FreeTextEntry1] : 77 yo male presents for follow up. \par Since Garcia removal is urinating small amounts frequently, has off and on dysuria and has urinary incontinence. \par Denies  hematuria, lower abdominal or flank pain, fever, chills or rigors. \par Taking Flomax(2 caps), Finasteride and Bethanechol.\par At last visit trial of void. \par  \par Recently seen for Urinary retention. \par Went to [facility] ED recently with urinary retention, Garcia placed. \par \par s/p TURBT w Intravesical Mitomycin instillation done on 6/13/17 at . Path- Non invasive Papillary Urothelial carcinoma. Chose surveillance Cystoscopy, has not had it. \par \par \par

## 2019-07-05 LAB
APPEARANCE: CLEAR
BACTERIA: NEGATIVE
BILIRUBIN URINE: NEGATIVE
BLOOD URINE: NEGATIVE
COLOR: COLORLESS
GLUCOSE QUALITATIVE U: NEGATIVE
HYALINE CASTS: 0 /LPF
KETONES URINE: NEGATIVE
LEUKOCYTE ESTERASE URINE: NEGATIVE
MICROSCOPIC-UA: NORMAL
NITRITE URINE: NEGATIVE
PH URINE: 6.5
PROTEIN URINE: NEGATIVE
RED BLOOD CELLS URINE: 0 /HPF
SPECIFIC GRAVITY URINE: 1.01
SQUAMOUS EPITHELIAL CELLS: 0 /HPF
UROBILINOGEN URINE: NORMAL
WHITE BLOOD CELLS URINE: 0 /HPF

## 2019-07-06 LAB — URINE CYTOLOGY: NORMAL

## 2019-07-10 ENCOUNTER — RX RENEWAL (OUTPATIENT)
Age: 79
End: 2019-07-10

## 2019-07-10 ENCOUNTER — APPOINTMENT (OUTPATIENT)
Dept: UROLOGY | Facility: CLINIC | Age: 79
End: 2019-07-10

## 2019-07-12 ENCOUNTER — RX RENEWAL (OUTPATIENT)
Age: 79
End: 2019-07-12

## 2019-07-16 ENCOUNTER — RX RENEWAL (OUTPATIENT)
Age: 79
End: 2019-07-16

## 2019-07-16 RX ORDER — ALBUTEROL SULFATE 90 UG/1
108 (90 BASE) INHALANT RESPIRATORY (INHALATION)
Qty: 1 | Refills: 5 | Status: ACTIVE | COMMUNITY
Start: 2019-07-16 | End: 1900-01-01

## 2019-07-19 ENCOUNTER — APPOINTMENT (OUTPATIENT)
Dept: UROLOGY | Facility: CLINIC | Age: 79
End: 2019-07-19
Payer: MEDICARE

## 2019-07-19 VITALS — HEART RATE: 72 BPM | DIASTOLIC BLOOD PRESSURE: 53 MMHG | SYSTOLIC BLOOD PRESSURE: 102 MMHG | OXYGEN SATURATION: 91 %

## 2019-07-19 DIAGNOSIS — C67.9 MALIGNANT NEOPLASM OF BLADDER, UNSPECIFIED: ICD-10-CM

## 2019-07-19 PROCEDURE — 52000 CYSTOURETHROSCOPY: CPT

## 2019-08-19 ENCOUNTER — MEDICATION RENEWAL (OUTPATIENT)
Age: 79
End: 2019-08-19

## 2019-08-19 ENCOUNTER — RX RENEWAL (OUTPATIENT)
Age: 79
End: 2019-08-19

## 2019-08-19 RX ORDER — FINASTERIDE 5 MG/1
5 TABLET, FILM COATED ORAL
Qty: 90 | Refills: 0 | Status: ACTIVE | COMMUNITY
Start: 2018-03-28 | End: 1900-01-01

## 2019-08-28 ENCOUNTER — RX RENEWAL (OUTPATIENT)
Age: 79
End: 2019-08-28

## 2019-08-29 ENCOUNTER — RX RENEWAL (OUTPATIENT)
Age: 79
End: 2019-08-29

## 2019-09-13 NOTE — PROGRESS NOTE ADULT - PROBLEM SELECTOR PROBLEM 10
Urinary tract infection
none

## 2019-09-16 ENCOUNTER — RX RENEWAL (OUTPATIENT)
Age: 79
End: 2019-09-16

## 2019-09-16 RX ORDER — FLUTICASONE PROPIONATE AND SALMETEROL XINAFOATE 115; 21 UG/1; UG/1
115-21 AEROSOL, METERED RESPIRATORY (INHALATION)
Qty: 1 | Refills: 0 | Status: ACTIVE | COMMUNITY
Start: 2019-04-25 | End: 1900-01-01

## 2019-10-10 ENCOUNTER — RX RENEWAL (OUTPATIENT)
Age: 79
End: 2019-10-10

## 2019-11-18 NOTE — ED ADULT TRIAGE NOTE - BSA (M2)
Orders done,please adv to complete asap as she has been noncompliant with follow-up visits. Last mammogram order given to her in  . 1.74

## 2019-12-09 ENCOUNTER — RX RENEWAL (OUTPATIENT)
Age: 79
End: 2019-12-09

## 2020-01-01 NOTE — PROGRESS NOTE ADULT - PROBLEM SELECTOR PLAN 7
The patient is a 14y Male complaining of testicular pain.
-PT Consult
-PT Consult  -Recommended for DAVID (PT note reviewed
-PT Consult  -Recommended for DAVID (PT note reviewed)
-PT Consult  -Recommended for DAVID (PT note reviewed)
-Atorvastatin 20mg Oral qhs  -Aspirin 81mg
-PT Consult  -Recommended for DAVID (PT note reviewed

## 2020-01-22 ENCOUNTER — APPOINTMENT (OUTPATIENT)
Dept: UROLOGY | Facility: CLINIC | Age: 80
End: 2020-01-22

## 2020-01-22 NOTE — ASU PREOP CHECKLIST - HEIGHT IN FEET
5

## 2020-05-08 NOTE — ED PROVIDER NOTE - CROS ED CARDIOVAS ALL NEG
Date:May 11, 2020      Patient was self referred, no letter generated. Do not send.        [facility] Health Information       negative...

## 2020-08-13 NOTE — PROGRESS NOTE ADULT - PROBLEM/PLAN-3
normal appearance, without tenderness upon palpation, no deformities, no cervical lymphadenopathy, no masses, no thyroid nodules, Thyroid normal size, no JVD, thyroid nontender
DISPLAY PLAN FREE TEXT

## 2020-08-21 NOTE — PHYSICAL THERAPY INITIAL EVALUATION ADULT - LEVEL OF INDEPENDENCE: SIT/STAND, REHAB EVAL
Worsening. Multifactorial, likely 2/2 obstruction from ascites, hypotension prior to admission could be contributing, and c/f hepatorenal syndrome  - will monitor BMP and obtain urine studies to further evaluate  - post-void bladder scan of 270, but difficult to measure due to ascites moderate assist (50% patients effort) Patient with h/o COPD, likely contributing to pt's SOB  - continue home Symbicort  - continue nasal cannula oxygen Patient with h/o COPD, likely contributing to pt's SOB  - continue home Symbicort  - duonebs Q6H standing

## 2020-10-22 NOTE — ASSESSMENT
[FreeTextEntry1] : Benign Prostatic Hyperplasia:\par Urinary retention:\par Continue Flomax (2 caps) and Finasteride. \par Will start Bethanechol.\par \par Bladder cancer:\par s/p TURBT w Intravesical Mitomycin instillation done on 6/13/17 at . Path- Non invasive Papillary Urothelial carcinoma. Needs surveillance Cystoscopy.\par \par Return to office in 1 week for trial of void. 97.7

## 2021-04-06 NOTE — ED ADULT NURSE NOTE - CAS EDP DISCH TYPE
Anesthesia Evaluation     Patient summary reviewed and Nursing notes reviewed   NPO Solid Status: > 6 hours  NPO Liquid Status: > 6 hours           Airway   Mallampati: I  TM distance: >3 FB  Neck ROM: full  No difficulty expected  Dental - normal exam     Pulmonary - normal exam   (+) COPD moderate, shortness of breath, sleep apnea,   Cardiovascular - normal exam    (+) hypertension well controlled 2 medications or greater, CAD, cardiac stents unknown timeframe dysrhythmias, angina at rest, CHF , PVD,       Neuro/Psych  (+) numbness,     GI/Hepatic/Renal/Endo    (+) obesity,  hiatal hernia, GERD,  liver disease, renal disease CRI, diabetes mellitus type 2 poorly controlled,     Musculoskeletal     Abdominal  - normal exam    Bowel sounds: normal.   Substance History - negative use     OB/GYN negative ob/gyn ROS         Other   arthritis,    history of cancer                    Anesthesia Plan    ASA 4     MAC     intravenous induction     Anesthetic plan, all risks, benefits, and alternatives have been provided, discussed and informed consent has been obtained with: patient.    Plan discussed with CRNA and CAA.      
Home

## 2021-04-14 NOTE — ASU PREOP CHECKLIST - TEMPERATURE IN FAHRENHEIT (DEGREES F)
97.3

## 2021-06-25 NOTE — PHYSICAL THERAPY INITIAL EVALUATION ADULT - RANGE OF MOTION EXAMINATION, REHAB EVAL
Emergency Department APC Note    Patient: Feli Godfrey Age: 53 year old Sex: female   MRN: [id number] : 1968 Encounter Date: 2021       History          Chief Complaint   Patient presents with   • Shoulder Injury       Feli Godfrey is a 53 year old female with PMH of allergy and hypertension presents with a left shoulder pain since today.  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  Patient denies any fall, trauma or other injuries.  Patient denies any LOC.  Patient is able to use arm but hand pain and movement.  Patient denies any numbness or tingling to the fingers or hand.  Patient is right-hand dominant.  Patient denies any chest pain, shortness of breath, dizziness, palpitations, headache, nausea, vomiting.  Patient denies any previous shoulder area injuries.  Denies taking any medication for pain so far.    The history is provided by the patient. No  was used.   Shoulder Injury   The incident occurred 1 to 2 hours ago. The incident occurred at work. The injury mechanism was torsion. The left shoulder is affected. The pain is at a severity of 7/10. The pain is moderate. The pain has been constant since onset. The pain does not radiate. There is no history of shoulder injury. She has no other injuries. There is no history of shoulder surgery. Pertinent negatives include no numbness, no muscle weakness and no tingling. She reports no foreign bodies present.       Review of Systems   Constitutional: Negative for activity change, chills, fatigue and fever.   HENT: Negative for congestion and rhinorrhea.    Eyes: Negative for visual disturbance.   Respiratory: Negative for cough and shortness of breath.    Cardiovascular: Negative for chest pain and palpitations.   Gastrointestinal: Negative for abdominal distention, abdominal pain, constipation, diarrhea, nausea and vomiting.   Genitourinary:  Negative for difficulty urinating, dysuria, hematuria and urgency.   Musculoskeletal: Positive for arthralgias (Left shoulder). Negative for back pain, gait problem, myalgias and neck pain.   Skin: Negative for color change, rash and wound.   Allergic/Immunologic: Negative for environmental allergies and food allergies.   Neurological: Negative for dizziness, tingling, weakness, light-headedness, numbness and headaches.   Psychiatric/Behavioral: Negative for agitation and confusion.       Past Medical History:   Diagnosis Date   • Allergy    • Benign essential hypertension 2/4/2021        Past Surgical History:   Procedure Laterality Date   • HYSTERECTOMY         Family History   Problem Relation Age of Onset   • Hypertension Mother    • Diabetes Mother    • Hypertension Father    • Hypertension Sister        Social History     Tobacco Use   • Smoking status: Never Smoker   • Smokeless tobacco: Never Used   Vaping Use   • Vaping Use: Never assessed   Substance Use Topics   • Alcohol use: Never   • Drug use: Never       Current Discharge Medication List      Prior to Admission Medications    Details   amLODIPine (NORVASC) 5 MG tablet TAKE ONE TABLET BY MOUTH EVERYDAY  Qty: 30 tablet, Refills: 0      acetaminophen (TYLENOL) 500 MG tablet Take 1 tablet by mouth 3 times daily as needed for Pain.      baclofen (LIORESAL) 10 MG tablet Take 1 tablet by mouth at bedtime as needed (muscle spasm). May cause drowsiness  Qty: 30 tablet, Refills: 3         New Prescriptions    Details   cyclobenzaprine (FLEXERIL) 10 MG tablet Take 1 tablet by mouth 3 times daily as needed for Muscle spasms.  Qty: 5 tablet, Refills: 0      lidocaine (LIDODERM) 5 % patch Place 1 patch onto the skin every 24 hours. Remove patch 12 hours after applying  Qty: 15 patch, Refills: 0      ibuprofen (MOTRIN) 600 MG tablet Take 1 tablet by mouth 3 times daily as needed for Pain.  Qty: 30 tablet, Refills: 0               No Known Allergies    Physical Exam      ED Triage Vitals [06/25/21 1450]   ED Triage Vitals Group      Temp 98.2 °F (36.8 °C)      Pulse       Resp (!) 66      /71      SpO2 100 %      EtCO2 mmHg       Height 5' 2\" (1.575 m)      Weight 191 lb (86.6 kg)      Weight Scale Used       BMI (Calculated) 34.93      IBW/kg (Calculated) 50.1       Physical Exam  Vitals and nursing note reviewed.   Constitutional:       Appearance: Normal appearance. She is not ill-appearing or toxic-appearing.   HENT:      Head: Normocephalic and atraumatic.   Eyes:      Extraocular Movements: Extraocular movements intact.      Conjunctiva/sclera: Conjunctivae normal.   Cardiovascular:      Rate and Rhythm: Normal rate and regular rhythm.      Pulses: Normal pulses.      Heart sounds: Normal heart sounds.   Pulmonary:      Effort: Pulmonary effort is normal.      Breath sounds: Normal breath sounds.   Abdominal:      General: Abdomen is flat. Bowel sounds are normal.      Palpations: Abdomen is soft.   Musculoskeletal:      Left shoulder: Tenderness present. No swelling, deformity or effusion. Decreased range of motion (Due to pain.).        Arms:       Cervical back: Normal range of motion and neck supple.      Comments: Radial pulse palpable on left, sensation intact.  Capillary refill within normal.   Skin:     General: Skin is warm and dry.      Capillary Refill: Capillary refill takes less than 2 seconds.   Neurological:      General: No focal deficit present.      Mental Status: She is alert and oriented to person, place, and time.   Psychiatric:         Mood and Affect: Mood normal.         Behavior: Behavior normal.         Results     No results found for this visit on 06/25/21.    Results for orders placed or performed in visit on 02/04/21   ELECTROCARDIOGRAM 12-LEAD    Impression    EKG done, sinus rhythm with occasional supraventricular premature complexes, rate 65        Imaging Results          XR SHOULDER 3 VIEWS LEFT (Final result)  Result time 06/25/21  15:19:52    Final result                 Impression:      1.  Deformity, superolateral aspect, proximal left humerus, quite possibly  related to remote trauma.    2.  Minimal degenerative changes, left acromioclavicular and glenohumeral  joints.    3.  Degenerative changes, thoracic spine.    Electronically Signed by: BASHIR NY MD   Signed on: 6/25/2021 3:19 PM                Narrative:    EXAM: XR SHOULDER 3 VIEWS LEFT      INDICATION: Left shoulder pain.    COMPARISON: None.    FINDINGS:    There is deformity involving the superolateral aspect of the proximal left  humerus, which may well represent a manifestation of remote trauma.    There are minimal degenerative changes of the left acromioclavicular and  glenohumeral joints.    There is no evidence of fracture, dislocation, destructive lesion, abnormal  soft tissue calcification or other additional abnormality involving the  region of the left shoulder.    Incidentally noted are degenerative changes of the thoracic spine.                                  ED Course     Procedures    ED Medication Orders (From admission, onward)    None                ED Course as of Jun 25 1605   Fri Jun 25, 2021   1452 I have entered orders to expedite patient care due to limited ED space capability. I have not examined or personally evaluated this patient.       [VS]   1529 There is no evidence of fracture, dislocation, destructive lesion, abnormal  soft tissue calcification or other additional abnormality involving the  region of the left shoulder.   XR SHOULDER 3 VIEWS LEFT [VS]      ED Course User Index  [VS] Eliot Gonzáles CNP       [facility]  Number of Diagnoses or Management Options  Strain of left shoulder, initial encounter  Diagnosis management comments: Ddx: Left shoulder strain versus dislocation  - Feli Godfrey is a 53 year old female with PMH of allergy and hypertension presents with a left shoulder pain since today.  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   -X-rays negative for any dislocation or fracture.  Will address pain.  Advise analgesic and RICE.  Left arm sling for comfort.  -Discharge to home with PCP follow-up as needed.       Amount and/or Complexity of Data Reviewed  Tests in the radiology section of CPT®: ordered and reviewed    Risk of Complications, Morbidity, and/or Mortality  Presenting problems: low  Diagnostic procedures: low  Management options: moderate    Patient Progress  Patient progress: stable      Clinical Impression     ED Diagnosis   1. Strain of left shoulder, initial encounter         Disposition     Discharge 6/25/2021  4:05 PM  Feli Godfrey discharge to home/self care.         Plan and return precautions discussed with the patient and the patient verbalized understanding and agreement. Please refer to discharge instructions for further details. All questions were answered to the patient's satisfaction.     This patient was seen during the novel Coronavirus, COVID-19 pandemic. There were significant changes in work flow, staffing, resource availability, disposition and practice patterns due to this pandemic (as recommended by the CDC and [employer]).    I participated in the care of this patient and this note provides information regarding their visit. This note may have been created using voice dictation technology and may include inadvertent transcriptional errors. Any such errors should be contextually interpreted.    GLENNY Funes, MS, PhD, [facility]-  Acute Care Nurse Practitioner  Emergency Medicine  [employer]: [phone]     Eliot Gonzáles, ALENA  06/25/21 8399     no ROM deficits were identified

## 2021-10-06 PROBLEM — I10 ESSENTIAL HYPERTENSION: Status: ACTIVE | Noted: 2017-04-26

## 2021-12-20 NOTE — CONSULT NOTE ADULT - PROBLEM/RECOMMENDATION-3
[facility] - [address] [place]  [address]  [facility] - [address] [facility], [place] [address]      Ms. Radha King is a 57 year old (1964) female who presents to the [facility] for   Chief Complaint   Patient presents with   • Office Visit     right knee pain     HISTORY OF PRESENT ILLNESS (HPI):     57-year-old female, very active, presents to [facility] in regards to right knee pain, acute on chronic condition.  Referral from urgent care.  X-rays conducted on 12/14/2021.      Patient states that she developed pain on 12/09/2021, noted that she had been riding in a car, for approximately 1 hour to the [place], then road in airplane, she had pain when exiting the car, and then again when exiting the airplane, the point where she had considered using a wheelchair.  She is a very active individual, hence this was significantly disturbing to her.  She has had on again and off again right knee pain over the years, mostly at the medial compartment, but this was more significant than normal.  No specific injury, or mechanism.  She denies a twisting or turning type modality that caused her pain.  She did notice pain was centered at the medial compartment, and noticed some instability as if the knee would give out at times.  She denies any locking, catching sensations.  She did have an effusion, swelling at the time, and the swelling radiated down her leg to the level of her ankle, this has improved/resolved since that time.  She presented to Urgent Care on 12/14/2021 his symptoms were not improving despite conservative management which included use of anti-inflammatory medication, knee brace, heat therapy, and rest.  X-rays were conducted the demonstrated some mild medial joint space narrowing and osteoarthritis of the medial compartment, but no other acute findings.  She was instructed on conservative  management which she has been doing, and overall she has improved, over the last weekend she was very inactive, watching movies as they had been sick in the household, and this did improve her knee pain, but she still has continued pain.  Most pain is associated with inactivity, rising from a seated position, getting out of a car, and she has nighttime symptoms, keep her awake.    She denies any previous injuries of the right knee.  No previous surgeries of the right knee.  Left knee is asymptomatic.  She does have degenerative disc disease of her lumbar spine.  And she also states that she has left shoulder pain, chronic condition but is improving with continued use.    REVIEWED MEDICAL INFORMATION:   The following portions of the patient's history were reviewed and updated as appropriate by my nursing staff and support staff: allergies, current medications, past family history, past medical history, past social history, past surgical history, and problem list. I agree with their documentation.    ALLERGIES:  ALLERGIES:   Allergen Reactions   • Cat Dander    • Dog Dander    • Latex   (Environmental) RASH       MEDICATIONS:  Medications were reviewed and updated in clinic today.   PROBLEM LIST:  Patient Active Problem List   Diagnosis   • Other and unspecified hyperlipidemia   • Port - wine nevus   • Osteopenia   • ADHD     REVIEW OF SYSTEMS (ROS):   Review of Systems   Constitutional: Positive for activity change. Negative for chills, fatigue and fever.   HENT: Negative for dental problem, hearing loss, mouth sores, rhinorrhea and sore throat.    Eyes: Negative for pain, discharge and visual disturbance.   Respiratory: Negative for cough, chest tightness and shortness of breath.    Cardiovascular: Negative for chest pain, palpitations and leg swelling.   Gastrointestinal: Negative for abdominal pain, blood in stool, constipation, diarrhea, nausea and vomiting.   Endocrine: Negative for cold intolerance, heat  intolerance, polydipsia and polyuria.   Genitourinary: Negative for decreased urine volume, difficulty urinating, dysuria and hematuria.   Musculoskeletal: Positive for arthralgias, gait problem, joint swelling and myalgias. Negative for back pain, neck pain and neck stiffness.   Skin: Negative for color change, rash and wound.   Allergic/Immunologic: Negative for environmental allergies, food allergies and immunocompromised state.   Neurological: Negative for dizziness, tremors, seizures, syncope, weakness, light-headedness, numbness and headaches.   Hematological: Negative for adenopathy. Does not bruise/bleed easily.   Psychiatric/Behavioral: Negative for confusion, sleep disturbance and suicidal ideas. The patient is not nervous/anxious.       PHYSICAL EXAM (PE):     Vitals:    12/20/21 1011   Resp: 15   Weight: 59 kg (130 lb 1.1 oz)   Height: 5' 1.5\" (1.562 m)   LMP: 04/16/2015     Body mass index is 24.18 kg/m².  Vitals signs reviewed. Nursing note reviewed.  GEN: Oriented to person, place, and time. Well-developed and well-nourished female. No acute distress.   HEAD: Normocephalic and atraumatic.   EYES: Right eye exhibits no discharge. Left eye exhibits no discharge. Appropriate eye-tracking bilat.  NECK: No tracheal deviation present. No jugular venous distention bilat.   CARDIO: Intact symmetrical distal pulses, 2/4 grade.   PULM/CHEST: Effort normal. No respiratory distress, speaks in complete sentences without distress.   SKIN: Skin is warm and dry. No rash noted. No erythema. No pallor.   PSYCH: Normal mood and affect. Behavior appropriate. Judgment and thought content appropriate.  NEURO: Intact sensation to palpation. CN II-XII intact without focal deficits.    ORTHO EXAM: RIGHT KNEE:  Relatively normal stance, slight genu varus appearance.  Minimal swelling, mildly positive ballottement test. No palpable popliteal cyst.  Pain with palpation at the medial joint line, mostly over the medial femoral  condyle and medial tibial plateau.  No pain with palpation of the lateral joint line, popliteal fossa, quad tendon, patellar tendon, tibial tuberosity, medial retinaculum, lateral retinaculum, patella, lateral tibial plateau, lateral femoral condyle, or pes anserine bursal location. Normal range of motion in flexion and extension. 5/5 muscle strength upon quadricep and hamstring testing. Negative patellar apprehension.  Ambulation with slight limp.  Pain when rising from seated position Intact sensation to light touch throughout knee exam. Normal perfusion.    STUDIES REVIEWED:     X-ray:  Right knee:  12/14/2021: No evidence of fracture, dislocation or other acute bony abnormalities.  Mild predominantly medial compartment joint space narrowing with small spurs.  No significant joint space effusion.    ASSESSMENT / PLAN (A/P):     ASSESSMENT:  1. Acute on chronic right knee pain   - medial compartment/joint line   - possible medial meniscus tear versus osteoarthritis    2.  Genu varus, medial joint space collapse   - primary knee osteoarthritis    PLAN:  X-rays previously conducted were reviewed in detail with the patient the bedside, demonstrating no acute osseous abnormalities, fractures.  There is mild degenerative changes with joint space narrowing of the medial compartment as well as osteophyte formation, which could be contributing to her acute on chronic right knee pain.  Knee pain is centered at the medial compartment, this does seem to be the most significant on x-ray review.  Physical exam also correlates with pain overlying the medial tibial plateau and medial femoral condyle as well as joint line.  She does also have some symptoms that seem to correlate with possible meniscus pathology, with a possible acute injury, and suspected possible tear.  Patient is requesting MRI for further evaluation I am in agreement, hence MRI was ordered on behalf of the patient, will be reviewed in detail once conducted,  will further direct management based on the MRI findings.      We did briefly discuss knee osteoarthritis, with her genu varus stance, medial joint space collapse.  She was provided lateral wedge foot orthotics in order to offload the medial compartment.  She will consistently use anti-inflammatory medication, meloxicam which she currently has.  She also had an topical Voltaren gel for anti-inflammatory effect.  She will consistently use heat therapy.  She is to remain active, but modified activity to low weight-bearing or low impact type modalities until symptoms have improved.  We could consider further aggressive interventions such as intra-articular cortisone injection under ultrasound guidance if pain is persistent MRI negative for medial meniscus tear.    All questions answered during clinic encounter. Patient encouraged to contact the clinic with any further issues or questions. The patient indicated understanding of the diagnosis and agreed with the individualized plan of care.    I spent a total of 45 minutes on the day of the visit.  This includes pre-charting, chart review, documenting, and referring/communicating with other health care professionals.    1. Right medial knee pain    2. Chronic instability of knee, right knee    3. Effusion of right knee       Orders Placed This Encounter   • MRI KNEE JOINT WO CONTRAST RIGHT       FOLLOW-UP:     Return for MRI follow-up, right knee.  --------------------------------------------------------------------------------------------------------------    Jair Singh DO   DISPLAY PLAN FREE TEXT

## 2022-03-24 NOTE — ED PROVIDER NOTE - MUSCULOSKELETAL, MLM
ED Attending Physician Note        Patient : Hardik Lugo  Age: 64 year old  Sex: male   MRN: [id number]  Encounter Date: 3/24/2022     Time seen: ***  Note - all recorded times are estimates and are as accurate as possible    Chief Complaint   Patient presents with   • Ankle Swelling        HPI:  64 year old male with past medical history significant for hypertension, cellulitis, and arthropathy, presents to the emergency department for evaluation of ankle swelling.      PMH:  Past Medical History:   Diagnosis Date   • Acute blood loss anemia    • Acute respiratory failure with hypercapnia (CMS/HCC)    • Alcoholic cirrhosis of liver without ascites (CMS/HCC)    • Arthropathy, unspecified    • Cellulitis    • Depression    • Esophageal varices (CMS/HCC)    • GI bleeding    • Hemorrhagic shock (CMS/HCC)    • Hypertension    • Iron deficiency anemia    • S/P TIPS (transjugular intrahepatic portosystemic shunt)        ROS: all systems reviewed and otherwise negative     PMH/Soc Hx/Fam Hx: see HPI    Physical Exam:   General:  Alert, appears comfortable.    Skin:  Normal for ethnicity.   Eye:  Normal conjunctiva.   Ears, nose, mouth and throat:  deferred 2/2 COVID pandemic  Cardiovascular:  Regular *** rate and rhythm, 2+ radial pulses  Respiratory:   respirations are non-labored. No conversational dyspnea  Chest wall: there is no tenderness and no deformity  Gastrointestinal: abdomen is soft, nontender, nondistended, there are no hernias palpable  Musculoskeletal:  No deformity, normal tone  Neurological:  speech is clear, no focal weakness or numbness.      Psychiatric:  Cooperative, appropriate mood & affect.       Notable Work up/Results:                  -Labs - ***                  -Imaging - ***                  -EKG - ***                  MDM and ED Course:    ED Medication Orders (From admission, onward)    None          ***    (PPE worn: ***)    Diagnosis:   1. ***    Disposition:   ***    Charting performed  by LINA Alegria for Dr. Limon.    I have reviewed the scribe's charting and agree that the final signed note accurately reflects my personal performance of the history, physical exam, hospital course, and assessment and plan.     Spine appears normal, range of motion is not limited, no muscle or joint tenderness

## 2022-08-18 NOTE — ED PROVIDER NOTE - ENMT, MLM
Airway patent, Nasal mucosa clear. Mouth with normal mucosa. Throat has no vesicles, no oropharyngeal exudates and uvula is midline. Airway patent, Nasal mucosa clear. Mouth with dry normal mucosa. Throat has no vesicles, no oropharyngeal exudates and uvula is midline. Azithromycin Pregnancy And Lactation Text: This medication is considered safe during pregnancy and is also secreted in breast milk.

## 2022-12-09 NOTE — ASU PATIENT PROFILE, ADULT - CIGARETTES, PACKS/DAY
Caller: Ana Nicholas    Relationship: Self    Best call back number: [phone]    What is the best time to reach you: ANY    Who are you requesting to speak with (clinical staff, provider,  specific staff member): CLINIC    Do you know the name of the person who called: ANA    What was the call regarding: NEEDS TO KNOW WHEN HE HAD THE DEFIBRILLATOR INSTALLED BY DR CARRILLO.     Do you require a callback: YES      
Patient informed- 1/24/2018  
2

## 2023-05-02 NOTE — ED PROVIDER NOTE - CADM POA CENTRAL LINE
Postoperative Instructions:  Hand/Upper Limb Surgery (elective)      Expectations  You may have local / regional anesthetic to numb your limb.  Consider taking pain pills before the numbness subsides as pain will increase once the anesthetic wears off.  The hand and fingers may swell after surgery.  Elevate the hand on several pillows and apply ice for 30 minutes each hour while awake for first 24-48 hours.  Medications  Take an over the counter anti-inflammatory of your choice (ibuprofen, Motrin type products) for 5 days to help control pain and swelling unless contraindicated due to gastrointestinal, kidney disease or bleeding issues.   Do not take NSAIDs (e.g., Motrin, Advil, ibuprofen) if you have been given a prescription or  take a prescription on a regular basis for an anti-inflammatory.    Your medicines after surgery may include a narcotic painkiller combined with acetaminophen (e.g., hydrocodone/APAP, oxycodone/APAP), an anti-inflammatory (e.g., meloxicam), and/or an anti-itch/nausea medicine (e.g., Hydroxyzine).  ** Do not take Tylenol if your prescription painkiller includes APAP or acetaminophen.    To limit constipation, drink plenty of water and consider an over-the-counter stool softener such as Colace.  **  You should take Vitamin C 500 mg three times a day for a month.  May use over the counter.  Stop taking if causing indigestion or significant heartburn.    Wound Care  Keep the bandage clean and dry.  Use a plastic bag or glove while showering.    You may remove the bandage  _in 5 days and shower.  Recover with large bandaid___.   Don't remove the Steri-Strip (tape / Band-Aid like strip) lying beneath bandages - will remove in office with suture.  If strip comes off by itself in shower leave off but suture should remain.   The wounds may get wet in the shower once the bandage has been removed.  Do not soak or submerge under water x 2 weeks.    Activities  Do not drive for the first 24 hours after  anesthesia or if taking narcotic pills.  You should flex and extend your fingers regularly.    You may use the hand for light activities of daily living.  If you have a sling, you should remove your arm from sling daily, keeping it close to the side of your body, and perform full extension and flexion exercises at the elbow.  Place arm back in sling.  Do this 4 times a day.    Follow-Up  You should see Emma LIU in 14 days, after surgery.  You will see Dr. Salgado in 6 weeks.      To schedule an appt. call _[phone]_.  Cintia West RN is Dr. Salgado's nurse.  Notify Dr. Salgado of any unusual drainage, high fevers, or uncontrollable pain.    No

## 2023-06-01 NOTE — ED PROVIDER NOTE - OBJECTIVE STATEMENT
Outreach attempts to coordination scheduling on the patient's Service to Rheumatology Immunology order in workqueue [id number] requested on 5/15/2023 have been conducted.    Please contact Saloni if further coordination is needed.     76 y/o male with a PMHx of depression, HTN, BPH, COPD, pleural effusion presents to the ED c/o SOB. 76 y/o male with a PMHx of depression, HTN, BPH, COPD, pleural effusion presents to the ED c/o SOB x 3-4 days. Pt is also experiencing fever x 2 days, generalized myalgias, pedal edema. On diuretic. Denies CP, dizziness, dysuria.

## 2023-06-19 NOTE — ED PROVIDER NOTE - CARE PLAN
Principal Discharge DX:	Urinary tract infection with hematuria, site unspecified Opzelura Counseling:  I discussed with the patient the risks of Opzelura including but not limited to nasopharngitis, bronchitis, ear infection, eosinophila, hives, diarrhea, folliculitis, tonsillitis, and rhinorrhea.  Taken orally, this medication has been linked to serious infections; higher rate of mortality; malignancy and lymphoproliferative disorders; major adverse cardiovascular events; thrombosis; thrombocytopenia, anemia, and neutropenia; and lipid elevations.

## 2023-07-17 NOTE — ED ADULT TRIAGE NOTE - BANDS:
I called daughter Stacie and reviewed pathology at length. I would like for her to see Dr Johnston for radiation evaluation.   She voiced understanding.    Fall Risk;

## 2023-10-12 NOTE — PATIENT PROFILE ADULT. - PRO ARRIVE FROM
Please verify with patient that he has been taking diuretics as prescribed (he has independently stopped in the past).  Thanks!   home

## 2023-11-08 NOTE — ED ADULT NURSE NOTE - CAS DISCH ACCOMP BY
[facility] PreAdmits  Scheduled Appointment: 01/03/2024    [facility]  PSYCHIATRY [facility] De[facility]  Scheduled Appointment: 01/03/2024    
Self/Spouse

## 2023-11-10 NOTE — ED PROVIDER NOTE - NS ED ATTENDING STATEMENT MOD
Patient's daughter and her friend are at the bedside. Dr. Shirley River in to see daughter. Daughter is angry, support and encouragement offered. The  has been paged. Nursing Supervisor, and Nurse Manager aware of events. Nurse manager attempted to speak with patient's daughter. Attending Only

## 2024-01-15 NOTE — HISTORY OF PRESENT ILLNESS
General Call    Contacts         Type Contact Phone/Fax    01/15/2024 10:01 AM CST Phone (Incoming) Jane Weldon (Self) [phone] (M)          Reason for Call:  patient called in and wanted to know what her results were for imaging/ x-ray. I told her there is not a note on the account for her imaging. She told me that she would like a call back about this. I did read off her notes regarding her levothyroxine and she told me that she is going to  the new prescription soon.   Please advise on imaging.    Date of last appointment with provider: 1-    Could we send this information to you in [facility] or would you prefer to receive a phone call?:   Patient would prefer a phone call   Okay to leave a detailed message?: Yes at Cell number on file:    Telephone Information:   Mobile [phone]       [FreeTextEntry1] : Followup evaluation [de-identified] : Mr. Arriaza presents for a followup evaluation accompanied by his wife. He continues on oxygen at 3 L per minute nasal cannula continuously. Patient thinks that shortness of breath with minimal exertion. He has no fevers or chills. His wife states that he does cough at night. Mr. Arriaza has a history of a right pleural effusion which has previously been drained by thoracentesis. Pleural fluid studies were consistent with transudate.

## 2024-03-31 NOTE — ED ADULT TRIAGE NOTE - NS ED NURSE BANDS TYPE
Sussy Costa is a 85 y.o. female with a left intertrochanteric femur fracture, closed, NVI. They take no anticoagulation at home. They required no ambulatory assistive devices prior to this injury.     -Admitted to medicine hip fracture service for pre-operative clearance and medical evaluation  -To OR 3/31/24 for operative fixation of left intertrochanteric femur fracture  -Pt marked, booked, and consented for surgery  -DVT PPx: Hold anticoagulation  -Abx: Preop abx ordered  -Labs: Pending  -Bed rest, ca, NPO at midnight  -Iv: ordered for contralateral arm    Patient was explained in detail the severity of the injury that was suffered. Patient was explained the risks/benefits/and alternatives to operative management in detail including infection, bleeding, pain, nerve and vascular damage, heterotopic ossification, leg length discrepancies, rotational deformities and they express full understanding.  We discussed the 30% national first year mortality rate with hip fractures, the need for early mobilization, and the expected rehab course.  She expresses full understanding of the condition and expresses that they want to proceed with surgery. The patient is admitted to the medicine hip fracture service for optimization of medical comorbidities. Will plan for OR 3/31/24. No guarantees were made, informed consent was obtained. All questions were answered to patient's and family's satisfaction.     Name band;

## 2024-10-22 NOTE — H&P ADULT - VASCULAR
You can access the FollowMyHealth Patient Portal offered by [facility] by registering at the following website: [email or web address]. By joining [employer]’s FollowMyHealth portal, you will also be able to view your health information using other applications (apps) compatible with our system.
detailed exam

## 2025-01-22 NOTE — ED ADULT NURSE NOTE - DOES PATIENT HAVE ADVANCE DIRECTIVE
Products Recommended: La Roche-Posay and Elta MD
General Sunscreen Counseling: I recommended a broad spectrum sunscreen with a SPF of 30 or higher.  I explained that SPF 30 sunscreens block approximately 97 percent of the sun's harmful rays.  Sunscreens should be applied at least 15 minutes prior to expected sun exposure and then every 2 hours after that as long as sun exposure continues. If swimming or exercising sunscreen should be reapplied every 45 minutes to an hour after getting wet or sweating.  One ounce, or the equivalent of a shot glass full of sunscreen, is adequate to protect the skin not covered by a bathing suit. I also recommended a lip balm with a sunscreen as well. Sun protective clothing can be used in lieu of sunscreen but must be worn the entire time you are exposed to the sun's rays.
Detail Level: Zone
No

## 2025-02-18 NOTE — DISCHARGE NOTE ADULT - NS AS DC FU [facility] LV FUNCTION ASSESSMENT
[facility] [place]  [facility]    Radiation Oncology Weekly Progress Note  Encounter Date: 02/18/25     Bert Reese Jr.  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     Now resuming RT alone for an additional 12 fractions for a total of 7200cGy/36fx    AJCC Staging has been reviewed.    Interval History   Mr. Reese is a 75 y.o. male seen today for his weekly on-treatment evaluation    2/18/25: At fraction 5 out of additional 12 (after intiial 19 chemoRT before stopping). Weight is 175.4lbs, using feeding tube without issues. We discussed restarting baking soda mouthrinses. No throat pain, eating regular food by mouth but reports ongoing dysguesia. Slight xerostomia. No skin changes. No mucositis on exam. Mild fatigue. Overall doing well. No nausea, moving bowels regularly.     Review of Systems:  A complete review of systems was obtained and negative except as described above.    Treatment Details:     Treatment Site Dose/Fx (cGy) #Fx Current Dose (cGy) Total Planned Dose (cGy) Start Date Most Recent Treatment Date   L tonsil Tumor  Bilateral Neck 200  160 5/12   (After 19 with chemoRT) 4800  3840 7200  5760 12/3/24, stopped 12/30/24 and resumed 2/13/25 02/18/25     Concurrent Therapy: No concurrent systemic therapy - initially received weekly cisplatin with initial 19fx, stopped chemo after this      Allergies and Medications   No Known Allergies    Current Outpatient Medications   Medication Instructions    ALPRAZolam (XANAX) 1 mg, Oral, NIGHTLY PRN    apixaban (ELIQUIS) 5 mg, Oral, 2 TIMES DAILY    Calcium 200 MG TABS Calcium    Cholecalciferol (VITAMIN D) 10 MCG/ML LIQD Vitamin  yes

## 2025-03-13 NOTE — PROGRESS NOTE ADULT - PROBLEM SELECTOR PROBLEM 6
Neurology Consult Note    Consult Date: 3/13/2025  Referring MD: Figueroa Chaves MD  Reason for Consult: Possible seizure-like activity    Patient: Franko Lucero (69 y.o. male)  MRN: [id number]  : 1955    History of Present Illness:   Franko Lucero is a 69 y.o. male past medical history of anemia, anxiety, arthritis, BPH, CAD, non-Hodgkin's lymphoma (in remission), diabetes, thyroid disease, GERD, hearing loss, hyperlipidemia, hypertension, bilateral iliac artery aneurysm, kidney stones, cirrhosis of the liver, migraine headaches, stage II/III renal disease, and sleep apnea (wears CPAP).  Patient was consulted to inpatient neurology due to concerns of possible seizure-like activity.  Patient was initially admitted on 3/11 with what is described as a syncopal episode.  Patient was at [facility] and the staff was attempting to help him go to the bathroom.  Apparently, the  patient had a syncopal episode.  He was incontinent of his bowels. He did not bite his tongue.  He was initially hypotensive.  Once EMS got there his blood pressure improved to 102/58.  His blood sugar was 195.  He was also noted to be dehydrated and anemic on admission and has been battling an URI for past week or more. CT scan of the head was performed that was negative for acute intracranial abnormalities.  Ultrasound of the carotid arteries revealed a 50 to 69% on the right and less than 50% on the left.  However, there is heavy plaque burden bilaterally. Patient has been seen and worked up this admission by cardiology who do not feel as though the episode was cardiac related.  Patient does have a Holter monitor ordered to be applied at discharge.  His wife is with him at bedside and states that he is had 2 episodes in the remote past.  The episode that brought him to the hospital a couple days ago as well as an episode that occurred while in the car with her.  Patient was a passenger in the vehicle while she was driving.  
"She states that he stated that he started to feel funny and then had a blank stare.  She states that he was disoriented, lost control of his bowel, and his hands were in a fist/gripping position.  On further questioning, patient has had an issue with both of his eyelids drooping with the left one much worse than the right for quite some time.  He states that his vision is \"fuzzy\" at time and that he might have double vision sometimes, but did not really know for sure. He does have dizziness with upward gaze. Additionally, patient has been having some issues with swallowing but thought maybe it was related to his previous neck surgery years ago. Patient is very tired and falls asleep easily while talking to him. He denies every having been diagnosed with myasthenia gravis or having been tested for it. Orthostatic blood pressures have not yet been obtained today. Wife present at bedside and helps provide some of his history.  At his baseline patient is independent with all ADLs and uses a walker for stability.  Within the last 6 months he was in rehab s/p femur fracture and has been going to outpatient therapy.    Medical History:   Past Medical/Surgical Hx:  Past Medical History:   Diagnosis Date    Anemia     Anxiety     Arthritis     BPH (benign prostatic hypertrophy)     CAD (coronary artery disease)     Cancer     non-hodgkins lymphoma    Diabetes mellitus     Disease of thyroid gland     GERD (gastroesophageal reflux disease)     Hearing loss     History of transfusion     Hyperlipidemia     Hypertension     Iliac artery aneurysm, bilateral     Kidney stone     Liver cirrhosis     Migraines     Sleep apnea     c-pap     Past Surgical History:   Procedure Laterality Date    COLONOSCOPY  02/04/2014    COLONOSCOPY N/A 04/26/2017    Procedure: COLONOSCOPY WITH ANESTHESIA;  Surgeon: Sher Cui MD;  Location: [facility] ENDOSCOPY;  Service:     CORONARY ARTERY BYPASS GRAFT      2    CYSTOSCOPY URETEROSCOPY LASER "
LITHOTRIPSY Left 04/17/2017    Procedure: URETEROSCOPY LASER LITHOTRIPSY WITH DOUBLE J STENT INSERTION, LEFT ;  Surgeon: Weston Peck MD;  Location:  [facility] OR;  Service:     CYSTOSCOPY URETEROSCOPY LASER LITHOTRIPSY Left 08/14/2017    Procedure: CYSTOSCOPY URETEROSCOPY LASER LITHOTRIPSY STENT INSERTION STONE EXTRACTION;  Surgeon: Weston Peck MD;  Location:  [facility] OR;  Service:     CYSTOSCOPY W/ URETERAL STENT PLACEMENT Left 04/17/2017    Procedure: CYSTOSCOPY URETERAL DOUBLE J STENT INSERTION, LEFT;  Surgeon: Weston Peck MD;  Location:  [facility] OR;  Service:     ENDOSCOPY N/A 04/26/2017    Procedure: ESOPHAGOGASTRODUODENOSCOPY WITH ANESTHESIA;  Surgeon: Sher Cui MD;  Location: [facility] ENDOSCOPY;  Service:     INCISION AND DRAINAGE LEG Right 07/12/2023    Procedure: INCISION AND DRAINAGE - RIGHT FOOT;  Surgeon: Silas Swan DPM;  Location:  [facility] OR;  Service: Podiatry;  Laterality: Right;    JOINT REPLACEMENT Right     KIDNEY STONE SURGERY      KNEE SURGERY Right 08/2024    replacement    NECK SURGERY      ROTATOR CUFF REPAIR      SHOULDER SURGERY      TONSILLECTOMY      TRANS METATARSAL AMPUTATION Right 08/09/2023    Procedure: Partial 4th Ray Amputation - Right Foot;  Surgeon: Silas Swan DPM;  Location:  [facility] OR;  Service: Podiatry;  Laterality: Right;    URETEROSCOPY LASER LITHOTRIPSY WITH STENT INSERTION Left 09/01/2022    Procedure: LEFT URETEROSCOPY LASER LITHOTRIPSY WITH STENT INSERTION;  Surgeon: Weston Peck MD;  Location:  [facility] OR;  Service: Urology;  Laterality: Left;    URETEROSCOPY LASER LITHOTRIPSY WITH STENT INSERTION Left 09/15/2022    Procedure: LEFT URETEROSCOPY LASER LITHOTRIPSY WITH STENT EXCHANGE;  Surgeon: Weston Peck MD;  Location:  [facility] OR;  Service: Urology;  Laterality: Left;       Medications On Admission:  Medications Prior to Admission   Medication Sig Dispense Refill Last Dose/Taking    ALPRAZolam (XANAX) 0.25 MG 
tablet Take 1 tablet by mouth 2 (Two) Times a Day As Needed for Anxiety. (Patient taking differently: Take 1 tablet by mouth Every Night.)   Patient Taking Differently    busPIRone (BUSPAR) 15 MG tablet Take 1 tablet by mouth 2 (Two) Times a Day.   Taking    clopidogrel (PLAVIX) 75 MG tablet Take 1 tablet by mouth Every Morning.   Taking    docusate sodium (COLACE) 100 MG capsule Take 1 capsule by mouth Every Evening.   Taking    DULoxetine (CYMBALTA) 60 MG capsule Take 1 capsule by mouth Every Night.   Taking    empagliflozin (JARDIANCE) 25 MG tablet tablet Take 1 tablet by mouth Every Morning.   Taking    ferrous sulfate 325 (65 FE) MG EC tablet Take 1 tablet by mouth 2 (Two) Times a Day.   Taking    gabapentin (NEURONTIN) 300 MG capsule Take 1 capsule by mouth Every Night.   Taking    GARLIC-CALCIUM PO Take 1 tablet by mouth Daily.   Taking    icosapent ethyl (VASCEPA) 1 g capsule capsule Take 2 g by mouth 2 (Two) Times a Day With Meals.   Taking    Insulin Degludec (Tresiba) 100 UNIT/ML solution injection Inject 18 Units under the skin into the appropriate area as directed Every Night.   Taking    levothyroxine (SYNTHROID, LEVOTHROID) 75 MCG tablet Take 1 tablet by mouth Every Morning.   Taking    metFORMIN (GLUCOPHAGE) 500 MG tablet Take 1 tablet by mouth 2 (Two) Times a Day With Meals.   Taking    pantoprazole (PROTONIX) 40 MG EC tablet Take 1 tablet by mouth Daily.   Taking    potassium chloride (KLOR-CON M20) 20 MEQ CR tablet Take 1 tablet by mouth Daily.   Taking    ranolazine (RANEXA) 1000 MG 12 hr tablet Take 1 tablet by mouth 2 (Two) Times a Day.   Taking    rosuvastatin (CRESTOR) 40 MG tablet Take 1 tablet by mouth Daily.   Taking    Vitamin D, Cholecalciferol, 1000 UNITS tablet Take 1 tablet by mouth Daily.   Taking    acetaminophen (TYLENOL) 325 MG tablet Take 2 tablets by mouth Every 6 (Six) Hours As Needed for Mild Pain, Fever or Moderate Pain.       naloxone (NARCAN) 4 MG/0.1ML nasal spray 
Administer 1 spray into the nostril(s) as directed by provider As Needed for Opioid Reversal. Call 911. Don't prime. [place] in 1 nostril for overdose. Repeat in 2-3 minutes in other nostril if no or minimal breathing/responsiveness.       nitroglycerin (NITROSTAT) 0.4 MG SL tablet Place 1 tablet under the tongue Every 5 (Five) Minutes As Needed for Chest Pain.       polyethylene glycol (MIRALAX) powder Take 17 g by mouth Daily As Needed.          Current Medications:    Current Facility-Administered Medications:     acetaminophen (TYLENOL) tablet 650 mg, 650 mg, Oral, Q6H PRN, Figueroa Chaves MD, 650 mg at 03/11/25 2048    ALPRAZolam (XANAX) tablet 0.25 mg, 0.25 mg, Oral, BID PRN, Figueroa Chaves MD, 0.25 mg at 03/11/25 2046    azithromycin (ZITHROMAX) 500 mg in sodium chloride 0.9 % 250 mL IVPB-VTB, 500 mg, Intravenous, Q24H, Figueroa Chaves MD, 500 mg at 03/13/25 0926    sennosides-docusate (PERICOLACE) 8.6-50 MG per tablet 2 tablet, 2 tablet, Oral, BID PRN **AND** polyethylene glycol (MIRALAX) packet 17 g, 17 g, Oral, Daily PRN **AND** bisacodyl (DULCOLAX) EC tablet 5 mg, 5 mg, Oral, Daily PRN **AND** bisacodyl (DULCOLAX) suppository 10 mg, 10 mg, Rectal, Daily PRN, Figueroa Chaves MD    busPIRone (BUSPAR) tablet 15 mg, 15 mg, Oral, BID, Figueroa Chaves MD, 15 mg at 03/13/25 0925    Calcium Replacement - Follow Nurse / BPA Driven Protocol, , Not Applicable, PRN, Figueroa Chaves MD    cholecalciferol (VITAMIN D3) tablet 1,000 Units, 1,000 Units, Oral, Daily, Figueroa Chaves MD, 1,000 Units at 03/13/25 0925    clopidogrel (PLAVIX) tablet 75 mg, 75 mg, Oral, Daily, Figueroa Chaves MD, 75 mg at 03/13/25 0925    docusate sodium (COLACE) capsule 100 mg, 100 mg, Oral, Q PM, Figueroa Chaves MD, 100 mg at 03/12/25 1727    DULoxetine (CYMBALTA) DR capsule 60 mg, 60 mg, Oral, Nightly, Figueroa Chaves MD, 60 mg at 03/12/25 2133    empagliflozin (JARDIANCE) tablet 25 mg, 25 mg, Oral, 
Daily, Figueroa Chaves MD, 25 mg at 03/13/25 0925    [Held by provider] enoxaparin sodium (LOVENOX) syringe 40 mg, 40 mg, Subcutaneous, Daily, Figueroa Chaves MD    ferrous sulfate tablet 325 mg, 325 mg, Oral, Daily With Breakfast, Figueroa Chaves MD, 325 mg at 03/13/25 0924    gabapentin (NEURONTIN) capsule 300 mg, 300 mg, Oral, Daily, Figueroa Chaves MD, 300 mg at 03/13/25 0925    Hydrocod Mak-Chlorphe Mak ER (TUSSIONEX PENNKINETIC) 10-8 MG/5ML ER suspension 5 mL, 5 mL, Oral, Q12H PRN, Figueroa Chaves MD, 5 mL at 03/13/25 0704    HYDROcodone-acetaminophen (NORCO) 5-325 MG per tablet 1 tablet, 1 tablet, Oral, Q4H PRN, Figueroa Chaves MD    ipratropium-albuterol (DUO-NEB) nebulizer solution 3 mL, 3 mL, Nebulization, 4x Daily - RT, Figueroa Chaves MD, 3 mL at 03/13/25 0629    levothyroxine (SYNTHROID, LEVOTHROID) tablet 75 mcg, 75 mcg, Oral, Q AM, Figueroa Chaves MD, 75 mcg at 03/13/25 0507    Magnesium Standard Dose Replacement - Follow Nurse / BPA Driven Protocol, , Not Applicable, PRN, Figueroa Chaves MD    metFORMIN (GLUCOPHAGE) tablet 500 mg, 500 mg, Oral, BID With Meals, Figueroa Chaves MD, 500 mg at 03/13/25 0924    nitroglycerin (NITROSTAT) SL tablet 0.4 mg, 0.4 mg, Sublingual, Q5 Min PRN, Figueroa Chaves MD    ondansetron ODT (ZOFRAN-ODT) disintegrating tablet 4 mg, 4 mg, Oral, Q6H PRN **OR** ondansetron (ZOFRAN) injection 4 mg, 4 mg, Intravenous, Q6H PRN, Figueroa Chaves MD    pantoprazole (PROTONIX) EC tablet 40 mg, 40 mg, Oral, Q AM, Figueroa Chaves MD, 40 mg at 03/13/25 0507    Phosphorus Replacement - Follow Nurse / BPA Driven Protocol, , Not Applicable, PRN, Figueroa Chaves MD    potassium chloride (MICRO-K/KLOR-CON) CR capsule, 20 mEq, Oral, Daily, Figueroa Chaves MD, 20 mEq at 03/13/25 0924    Potassium Replacement - Follow Nurse / BPA Driven Protocol, , Not Applicable, PRN, Figueroa Chaves MD    ranolazine (RANEXA) 12 hr tablet 1,000 
mg, 1,000 mg, Oral, BID, Figueroa Chaves MD, 1,000 mg at 25 0925    rosuvastatin (CRESTOR) tablet 40 mg, 40 mg, Oral, Daily, Figueroa Chaves MD, 40 mg at 25 0924    [COMPLETED] Insert Peripheral IV, , , Once **AND** sodium chloride 0.9 % flush 10 mL, 10 mL, Intravenous, PRN, Figueroa Chaves MD    sodium chloride 0.9 % flush 10 mL, 10 mL, Intravenous, Q12H, Figueroa Chaves MD, 10 mL at 2525    sodium chloride 0.9 % flush 10 mL, 10 mL, Intravenous, PRN, Figueroa Chaves MD    sodium chloride 0.9 % infusion 40 mL, 40 mL, Intravenous, PRN, Figueroa Chaves MD     Allergies:  Allergies   Allergen Reactions    Adhesive Tape Rash     Pt states that if it isn't left on very long it is okay    Cefazolin Rash    Cefuroxime Axetil Rash    Cephalosporins Rash    Neomycin-Polymyxin B Gu Rash    Percocet [Oxycodone-Acetaminophen] Rash       Social Hx:  Social History     Socioeconomic History    Marital status:    Tobacco Use    Smoking status: Former     Current packs/day: 0.00     Types: Cigarettes     Quit date:      Years since quittin.     Passive exposure: Never    Smokeless tobacco: Never   Vaping Use    Vaping status: Never Used   Substance and Sexual Activity    Alcohol use: No    Drug use: No    Sexual activity: Defer       Family Hx:  Family History   Problem Relation Age of Onset    Kidney disease Father     Heart disease Father     Cancer Mother         brain    Colon cancer Neg Hx     Colon polyps Neg Hx      Physical Examination:   Vital Signs:  Vitals:    25 0634 25 0746 25 1146 25 1147   BP:  103/69 94/56 103/73   BP Location:  Left arm Left arm Left arm   Patient Position:  Lying Lying Standing   Pulse: 88 102 101 80   Resp: 16 16     Temp:  98 °F (36.7 °C)     TempSrc:  Oral     SpO2: 94% 96%     Weight:       Height:           General Exam:  Head:  Normocephalic, atraumatic  HEENT:  Neck supple, ptosis noted of bilateral 
"eyelids, but worse on left, faint \"twitching\" note to right eye/upper cheek area.    CVS:  Regular rate and rhythm.  No murmurs  Carotid Examination:  No bruits  Lungs:  Cough and congestion noted. O2 at 2L per NC.   Abdomen:  Nontender, Nondistended  Extremities:  No signs of peripheral edema  Skin:  No rashes    Neurologic Exam:    Mental Status:    -Very drowsy, but will awaken with minimal stimulation. Oriented X 3  -No word finding difficulties  -No aphasia  -No dysarthria  -Follows simple and complex commands    CN II:  Visual fields full.  Pupils equally reactive to light  CN III, IV, VI:  Extraocular Muscles full with no signs of nystagmus. Reports \"dizziness\" with sustained upward gaze. Bilateral ptosis noted with left much worse than right. Denies diplopia currently.   CN V:  Facial sensory is symmetric with no asymmetries.  CN VII:  Facial motor symmetric  CN VIII:  Gross hearing intact bilaterally  CN IX:  Palate elevates symmetrically  CN X:  Palate elevates symmetrically  CN XI:  Shoulder shrug symmetric  CN XII:  Tongue is midline on protrusion. No trauma noted to tongue.     Motor: (strength out of 5:  1= minimal movement, 2 = movement in plane of gravity, 3 = movement against gravity, 4 = movement against some resistance, 5 = full strength)    -Right Upper Ext: Proximal: 5 Distal: 5  -Left Upper Ext: Proximal: 5 Distal: 5    -Right Lower Ext: Proximal: 5 Distal: 5  -Left Lower Ext: Proximal: 5 Distal: 5    DTR:  -Right   Biceps: 2+ Triceps: 2+ Brachioradialis: 2+   Patella: 2+ Ankle: 2+ Neg Babinski  -Left   Biceps: 2+ Triceps: 2+ Brachioradialis: 2+   Patella: 2+ Ankle: 2+ Neg Babinski    Sensory:  -Intact to light touch    Coordination:  -Finger to nose intact  -Heel to shin intact  -No ataxia    Gait  -Not tested. Defer to PT/OT. Fall risk.     Recent Diagnostics:   Laboratory Results:   - Reviewed in EMR  Lab Results   Component Value Date    GLUCOSE 115 (H) 03/13/2025    CALCIUM 8.9 03/13/2025 "
    03/13/2025    K 3.8 03/13/2025    CO2 25.0 03/13/2025     03/13/2025    BUN 14 03/13/2025    CREATININE 1.29 (H) 03/13/2025    EGFRIFAFRI >59 08/24/2022    EGFRIFNONA >60 08/24/2022    BCR 10.9 03/13/2025    ANIONGAP 11.0 03/13/2025     Lab Results   Component Value Date    WBC 3.31 (L) 03/13/2025    HGB 8.7 (L) 03/13/2025    HCT 27.7 (L) 03/13/2025    .1 (H) 03/13/2025    PLT 68 (L) 03/13/2025     Lab Results   Component Value Date    PTT 33.3 05/25/2016    INR 1.15 (H) 03/11/2025     Lab Results   Component Value Date    TRIG 123 07/11/2023    HDL 33 (L) 07/11/2023    LDL 72 07/11/2023     Lab Results   Component Value Date    HGBA1C 6.10 (H) 07/10/2023       Imaging Results:  Imaging Results (Last 24 Hours)       Procedure Component Value Units Date/Time    US Carotid Bilateral [[id number]] Collected: 03/12/25 1554     Updated: 03/12/25 1557    Narrative:      History: Carotid occlusive disease       Impression:      Impression:  1. There is 50 to 69% stenosis of the right internal carotid artery.  2. There is less than 50% stenosis of the left internal carotid artery.  3. Antegrade flow is demonstrated in bilateral vertebral arteries.  4. There is heavy plaque burden at both bifurcations. Further  imaging/evaluation may be warranted with a CTA of the neck.     Comments: Bilateral carotid vertebral arterial duplex scan was  performed.     Grayscale imaging shows intimal thickening and calcified elements at the  carotid bifurcation. The right internal carotid artery peak systolic  velocity is 134.2 cm/sec. The end-diastolic velocity is 25.7 cm/sec. The  right ICA/CCA ratio is approximately 1.6. These findings correlate with  50 to 69% stenosis of the right internal carotid artery.     Grayscale imaging shows intimal thickening and calcified elements at the  carotid bifurcation. The left internal carotid artery peak systolic  velocity is 111.5 cm/sec. The end-diastolic velocity is 17.8 cm/sec. 
Anxiety
The  left ICA/CCA ratio is approximately 1.3. These findings correlate with  less than 50% stenosis of the left internal carotid artery.     Antegrade flow is demonstrated in bilateral vertebral arteries.  There is greater than 50% stenosis in bilateral external carotid  arteries.     This report was signed and finalized on 3/12/2025 3:56 PM by Dr. Handy Traylor MD.                Other labs:  Result Review:  I have personally reviewed the results from the time of this admission to 3/13/2025 14:00 CDT and agree with these findings:  [x]  Laboratory list / accordion  []  Microbiology  [x]  Radiology  [x]  EKG/Telemetry   [x]  Cardiology/Vascular   []  Pathology  []  Old records  []  Other:  Most notable findings include: see above.     Other RAD:  CT Head Without Contrast  Result Date: 3/11/2025  EXAM: CT HEAD WO CONTRAST-  DATE: 3/11/2025 12:56 PM  HISTORY: syncope   COMPARISON: 1/24/2025.  DOSE LENGTH PRODUCT: 943.32 mGy.cm  Automated exposure control was also utilized to decrease patient radiation dose.  TECHNIQUE: Unenhanced CT images obtained from vertex to skull base with multiplanar reformats.  FINDINGS: There is no acute intracranial hemorrhage, midline shift, mass effect, or hydrocephalus. There is no CT evidence for acute infarct.  There are chronic changes with volume loss and chronic small vessel ischemic change of the periventricular white matter.  SOFT TISSUES: The scalp soft tissues are unremarkable.   SINUS:The visualized paranasal sinuses and mastoid air cells are clear.  ORBITS: The visualized orbits and globes are unremarkable.       Impression: 1. Chronic changes and no acute intracranial findings.  This report was signed and finalized on 3/11/2025 2:26 PM by Jose Crenshaw.    MRI of brain with and without contrast has been ordered and pending completion.  EEG ordered to assess for seizure activity/sub-clinical seizure activity.   Orthostatic blood pressure check--No orthostasis noted with 
the following result:   Lyin/56 and 101 bpm   Standin/73 and 80 bpm    Assessment & Plan:   Franko Lucero is a 69 y.o. male past medical history of anemia, anxiety, arthritis, BPH, CAD, non-Hodgkin's lymphoma (in remission), diabetes, thyroid disease, GERD, hearing loss, hyperlipidemia, hypertension, bilateral iliac artery aneurysm, kidney stones, cirrhosis of the liver, migraine headaches, stage II/III renal disease, and sleep apnea (wears CPAP).  Patient was consulted to inpatient neurology due to concerns of possible seizure-like activity.  Patient was initially admitted on 3/11 with what is described as a syncopal episode.  Patient was at [facility] and the staff was attempting to help him go to the bathroom.  Apparently, the  patient had a syncopal episode.  He was incontinent of his bowels. He did not bite his tongue.  He was initially hypotensive.  Once EMS got there his blood pressure improved to 102/58.  His blood sugar was 195.  He was also noted to be dehydrated and anemic on admission and has been battling an URI for past week or more. CT scan of the head was performed that was negative for acute intracranial abnormalities.  Ultrasound of the carotid arteries revealed a 50 to 69% on the right and less than 50% on the left.  However, there is heavy plaque burden bilaterally. Patient has been seen and worked up this admission by cardiology who do not feel as though the episode was cardiac related.  Patient does have a Holter monitor ordered to be applied at discharge.  His wife is with him at bedside and states that he is had 2 episodes in the remote past.  The episode that brought him to the hospital a couple days ago as well as an episode that occurred while in the car with her.  Patient was a passenger in the vehicle while she was driving.  She states that he stated that he started to feel funny and then had a blank stare.  She states that he was disoriented, lost control of his bowel, 
"and his hands were in a fist/gripping position.  On further questioning, patient has had an issue with both of his eyelids drooping with the left one much worse than the right for quite some time.  He states that his vision is \"fuzzy\" at time and that he might have double vision sometimes, but did not really know for sure. He does have dizziness with upward gaze. Additionally, patient has been having some issues with swallowing but thought maybe it was related to his previous neck surgery years ago. Patient is very tired and falls asleep easily while talking to him. He denies every having been diagnosed with myasthenia gravis or having been tested for it. Orthostatic blood pressures have not yet been obtained today. Wife present at bedside and helps provide some of his history.  At his baseline patient is independent with all ADLs and uses a walker for stability.  Within the last 6 months he was in rehab s/p femur fracture and has been going to outpatient therapy.    Impression:  Syncopal episode with low concern for seizure or seizure-like activity as patient's initial blood pressure was hypotensive. With seizure activity typically both blood pressure and heart rate are elevated.  CT of the head with chronic changes and no acute intracranial findings.  EEG with diffuse cerebral dysfunction of mild degree, nonspecific.  This is most commonly seen due to toxic/metabolic or diffuse hypoxemic/ischemic causes.  No definitive evidence epilepsy/epileptiform activity was seen.  No orthostasis noted with lying and standing vital sign check as noted above.   Concern for possible myasthenia gravis due to ptosis of eyelids, dysphagia, dizziness with sustained upward gaze, unsteady gait, excessive drowsiness/fatigue, excetra.  Unsteady gait, excessive drowsiness/fatigue, etc.   Abnormal carotid ultrasound with heavy plaque burden noted.  HTN  HLD        Plan:   MRI of brain with and without contrast to ensure no acute "
ischemia/stroke/lesions.  Patient has a history of non-Hodgkin's lymphoma and  currently in remission and previously treated at [place]).  Obtain acetylcholine receptor antibody test (send out) to assess for myasthenia gravis.  Consider CTA of the neck, but patient will need to have hydration due to current renal status  Continue to monitor blood pressure.  Blood pressure is currently 103/73.  Continue Plavix 75 mg daily  Continue Crestor 40 mg daily  PT/OT--appreciate their input and assistance  Neuro will continue to follow.   Further recommendations per Dr. Carnes once the above testing has been completed.    Denae Naik, APRN  03/13/25  11:56 CDT    Medical Decision Making    Number/Complexity of Problems  Moderate  1 undiagnosed new problem with uncertain prognosis -   1 acute illness with systemic symptoms -   High  1 acute or chronic illness that poses a threat to life/body function -   High     MDM Data  Moderate - 1/3 categories  Extensive - 2/3 categories    Category 1: 3 of the following  Review of external notes  Review of results  Ordering of each unique test  Independent historian  Category 2:  Independent interpretation of test (ex: imaging)  Category 3:  Discussion of management with another provider    Extensive--discussed with patient and wife     Treatment Plan  Moderate - Prescription Drug management  High  Drug therapy requiring intensive monitoring for toxicity  Decision regarding hospitalization or escalation of care  De-escalate care/DNR decisions  Mod        
Peripheral neuropathy
Unsteady gait